# Patient Record
Sex: FEMALE | Race: WHITE | NOT HISPANIC OR LATINO | Employment: UNEMPLOYED | URBAN - METROPOLITAN AREA
[De-identification: names, ages, dates, MRNs, and addresses within clinical notes are randomized per-mention and may not be internally consistent; named-entity substitution may affect disease eponyms.]

---

## 2017-02-07 ENCOUNTER — GENERIC CONVERSION - ENCOUNTER (OUTPATIENT)
Dept: OTHER | Facility: OTHER | Age: 37
End: 2017-02-07

## 2017-02-21 ENCOUNTER — APPOINTMENT (OUTPATIENT)
Dept: LAB | Facility: CLINIC | Age: 37
End: 2017-02-21
Payer: MEDICARE

## 2017-02-21 ENCOUNTER — TRANSCRIBE ORDERS (OUTPATIENT)
Dept: LAB | Facility: CLINIC | Age: 37
End: 2017-02-21

## 2017-02-21 DIAGNOSIS — R53.83 OTHER MALAISE AND FATIGUE: Primary | ICD-10-CM

## 2017-02-21 DIAGNOSIS — E78.49 OTHER HYPERLIPIDEMIA: ICD-10-CM

## 2017-02-21 DIAGNOSIS — E34.9 UNSPECIFIED ENDOCRINE DISORDER: ICD-10-CM

## 2017-02-21 DIAGNOSIS — R79.82 ELEVATED C-REACTIVE PROTEIN (CRP): ICD-10-CM

## 2017-02-21 DIAGNOSIS — I10 ESSENTIAL HYPERTENSION, MALIGNANT: ICD-10-CM

## 2017-02-21 DIAGNOSIS — R94.7 NONSPECIFIC ABNORMAL RESULTS OF OTHER ENDOCRINE FUNCTION STUDY: ICD-10-CM

## 2017-02-21 DIAGNOSIS — E66.9 OBESITY, UNSPECIFIED: ICD-10-CM

## 2017-02-21 DIAGNOSIS — Z13.29 SCREENING FOR ENDOCRINE DISORDER: ICD-10-CM

## 2017-02-21 DIAGNOSIS — R53.81 OTHER MALAISE AND FATIGUE: ICD-10-CM

## 2017-02-21 DIAGNOSIS — Z13.29 SCREENING FOR ENDOCRINE, NUTRITIONAL, METABOLIC AND IMMUNITY DISORDER: ICD-10-CM

## 2017-02-21 DIAGNOSIS — R53.81 OTHER MALAISE AND FATIGUE: Primary | ICD-10-CM

## 2017-02-21 DIAGNOSIS — E11.8 DIABETIC COMPLICATION (HCC): ICD-10-CM

## 2017-02-21 DIAGNOSIS — Z13.1 SCREENING FOR DIABETES MELLITUS: ICD-10-CM

## 2017-02-21 DIAGNOSIS — Z13.228 SCREENING FOR METABOLIC DISORDER: ICD-10-CM

## 2017-02-21 DIAGNOSIS — E55.9 VITAMIN D DEFICIENCY, UNSPECIFIED: ICD-10-CM

## 2017-02-21 DIAGNOSIS — R53.83 OTHER MALAISE AND FATIGUE: ICD-10-CM

## 2017-02-21 DIAGNOSIS — D64.9 ANEMIA, UNSPECIFIED: ICD-10-CM

## 2017-02-21 DIAGNOSIS — E28.2 POLYCYSTIC OVARIAN DISEASE: ICD-10-CM

## 2017-02-21 DIAGNOSIS — R63.5 ABNORMAL WEIGHT GAIN: ICD-10-CM

## 2017-02-21 DIAGNOSIS — E03.9 UNSPECIFIED HYPOTHYROIDISM: ICD-10-CM

## 2017-02-21 DIAGNOSIS — E29.9 TESTICULAR DYSFUNCTION: ICD-10-CM

## 2017-02-21 DIAGNOSIS — Z13.0 SCREENING FOR ENDOCRINE, NUTRITIONAL, METABOLIC AND IMMUNITY DISORDER: ICD-10-CM

## 2017-02-21 DIAGNOSIS — Z13.21 SCREENING FOR ENDOCRINE, NUTRITIONAL, METABOLIC AND IMMUNITY DISORDER: ICD-10-CM

## 2017-02-21 DIAGNOSIS — R94.6 NONSPECIFIC ABNORMAL RESULTS OF THYROID FUNCTION STUDY: ICD-10-CM

## 2017-02-21 DIAGNOSIS — E88.9 UNSPECIFIED DISORDER OF METABOLISM: ICD-10-CM

## 2017-02-21 DIAGNOSIS — Z13.228 SCREENING FOR ENDOCRINE, NUTRITIONAL, METABOLIC AND IMMUNITY DISORDER: ICD-10-CM

## 2017-02-21 LAB
25(OH)D3 SERPL-MCNC: 16.3 NG/ML (ref 30–100)
ALBUMIN SERPL BCP-MCNC: 3.9 G/DL (ref 3.5–5)
ALP SERPL-CCNC: 86 U/L (ref 46–116)
ALT SERPL W P-5'-P-CCNC: 49 U/L (ref 12–78)
ANION GAP SERPL CALCULATED.3IONS-SCNC: 9 MMOL/L (ref 4–13)
AST SERPL W P-5'-P-CCNC: 23 U/L (ref 5–45)
BACTERIA UR QL AUTO: ABNORMAL /HPF
BASOPHILS # BLD AUTO: 0.02 THOUSANDS/ΜL (ref 0–0.1)
BASOPHILS NFR BLD AUTO: 0 % (ref 0–1)
BILIRUB SERPL-MCNC: 0.59 MG/DL (ref 0.2–1)
BILIRUB UR QL STRIP: NEGATIVE
BUN SERPL-MCNC: 10 MG/DL (ref 5–25)
CALCIUM SERPL-MCNC: 9.4 MG/DL (ref 8.3–10.1)
CHLORIDE SERPL-SCNC: 104 MMOL/L (ref 100–108)
CHOLEST SERPL-MCNC: 159 MG/DL (ref 50–200)
CLARITY UR: CLEAR
CO2 SERPL-SCNC: 27 MMOL/L (ref 21–32)
COLOR UR: YELLOW
CREAT SERPL-MCNC: 0.61 MG/DL (ref 0.6–1.3)
CRP SERPL HS-MCNC: 2 MG/L
EOSINOPHIL # BLD AUTO: 0.25 THOUSAND/ΜL (ref 0–0.61)
EOSINOPHIL NFR BLD AUTO: 4 % (ref 0–6)
ERYTHROCYTE [DISTWIDTH] IN BLOOD BY AUTOMATED COUNT: 13.1 % (ref 11.6–15.1)
EST. AVERAGE GLUCOSE BLD GHB EST-MCNC: 120 MG/DL
FERRITIN SERPL-MCNC: 18 NG/ML (ref 8–388)
FOLATE SERPL-MCNC: 16 NG/ML (ref 3.1–17.5)
GFR SERPL CREATININE-BSD FRML MDRD: >60 ML/MIN/1.73SQ M
GLUCOSE SERPL-MCNC: 90 MG/DL (ref 65–140)
GLUCOSE UR STRIP-MCNC: NEGATIVE MG/DL
HBA1C MFR BLD: 5.8 % (ref 4.2–6.3)
HCT VFR BLD AUTO: 40.3 % (ref 34.8–46.1)
HCYS SERPL-SCNC: 9 UMOL/L (ref 3.7–11.2)
HDLC SERPL-MCNC: 53 MG/DL (ref 40–60)
HGB BLD-MCNC: 13.5 G/DL (ref 11.5–15.4)
HGB UR QL STRIP.AUTO: NEGATIVE
HYALINE CASTS #/AREA URNS LPF: ABNORMAL /LPF
INSULIN SERPL-ACNC: 31.3 MU/L (ref 3–25)
IRON SERPL-MCNC: 80 UG/DL (ref 50–170)
KETONES UR STRIP-MCNC: NEGATIVE MG/DL
LDLC SERPL CALC-MCNC: 81 MG/DL (ref 0–100)
LEUKOCYTE ESTERASE UR QL STRIP: NEGATIVE
LYMPHOCYTES # BLD AUTO: 1.79 THOUSANDS/ΜL (ref 0.6–4.47)
LYMPHOCYTES NFR BLD AUTO: 30 % (ref 14–44)
MCH RBC QN AUTO: 29.8 PG (ref 26.8–34.3)
MCHC RBC AUTO-ENTMCNC: 33.5 G/DL (ref 31.4–37.4)
MCV RBC AUTO: 89 FL (ref 82–98)
MONOCYTES # BLD AUTO: 0.41 THOUSAND/ΜL (ref 0.17–1.22)
MONOCYTES NFR BLD AUTO: 7 % (ref 4–12)
NEUTROPHILS # BLD AUTO: 3.53 THOUSANDS/ΜL (ref 1.85–7.62)
NEUTS SEG NFR BLD AUTO: 59 % (ref 43–75)
NITRITE UR QL STRIP: NEGATIVE
NON-SQ EPI CELLS URNS QL MICRO: ABNORMAL /HPF
NRBC BLD AUTO-RTO: 0 /100 WBCS
PH UR STRIP.AUTO: 6.5 [PH] (ref 4.5–8)
PLATELET # BLD AUTO: 305 THOUSANDS/UL (ref 149–390)
PMV BLD AUTO: 9.7 FL (ref 8.9–12.7)
POTASSIUM SERPL-SCNC: 4 MMOL/L (ref 3.5–5.3)
PROT SERPL-MCNC: 7.8 G/DL (ref 6.4–8.2)
PROT UR STRIP-MCNC: ABNORMAL MG/DL
RBC # BLD AUTO: 4.53 MILLION/UL (ref 3.81–5.12)
RBC #/AREA URNS AUTO: ABNORMAL /HPF
SODIUM SERPL-SCNC: 140 MMOL/L (ref 136–145)
SP GR UR STRIP.AUTO: 1.02 (ref 1–1.03)
T3 SERPL-MCNC: 0.9 NG/ML (ref 0.6–1.8)
T3FREE SERPL-MCNC: 2.8 PG/ML (ref 2.3–4.2)
T4 FREE SERPL-MCNC: 0.94 NG/DL (ref 0.76–1.46)
T4 SERPL-MCNC: 4.4 UG/DL (ref 4.7–13.3)
TRIGL SERPL-MCNC: 127 MG/DL
TSH SERPL DL<=0.05 MIU/L-ACNC: 1.09 UIU/ML (ref 0.36–3.74)
URATE SERPL-MCNC: 4.9 MG/DL (ref 2–6.8)
UROBILINOGEN UR QL STRIP.AUTO: 0.2 E.U./DL
VIT B12 SERPL-MCNC: 320 PG/ML (ref 100–900)
WBC # BLD AUTO: 6.01 THOUSAND/UL (ref 4.31–10.16)
WBC #/AREA URNS AUTO: ABNORMAL /HPF

## 2017-02-21 PROCEDURE — 84550 ASSAY OF BLOOD/URIC ACID: CPT

## 2017-02-21 PROCEDURE — 82672 ASSAY OF ESTROGEN: CPT

## 2017-02-21 PROCEDURE — 84403 ASSAY OF TOTAL TESTOSTERONE: CPT

## 2017-02-21 PROCEDURE — 82626 DEHYDROEPIANDROSTERONE: CPT

## 2017-02-21 PROCEDURE — 83520 IMMUNOASSAY QUANT NOS NONAB: CPT

## 2017-02-21 PROCEDURE — 83090 ASSAY OF HOMOCYSTEINE: CPT

## 2017-02-21 PROCEDURE — 82746 ASSAY OF FOLIC ACID SERUM: CPT

## 2017-02-21 PROCEDURE — 83540 ASSAY OF IRON: CPT

## 2017-02-21 PROCEDURE — 82728 ASSAY OF FERRITIN: CPT

## 2017-02-21 PROCEDURE — 83036 HEMOGLOBIN GLYCOSYLATED A1C: CPT

## 2017-02-21 PROCEDURE — 36415 COLL VENOUS BLD VENIPUNCTURE: CPT

## 2017-02-21 PROCEDURE — 82157 ASSAY OF ANDROSTENEDIONE: CPT

## 2017-02-21 PROCEDURE — 81001 URINALYSIS AUTO W/SCOPE: CPT

## 2017-02-21 PROCEDURE — 82627 DEHYDROEPIANDROSTERONE: CPT

## 2017-02-21 PROCEDURE — 80053 COMPREHEN METABOLIC PANEL: CPT

## 2017-02-21 PROCEDURE — 85025 COMPLETE CBC W/AUTO DIFF WBC: CPT

## 2017-02-21 PROCEDURE — 84480 ASSAY TRIIODOTHYRONINE (T3): CPT

## 2017-02-21 PROCEDURE — 86141 C-REACTIVE PROTEIN HS: CPT

## 2017-02-21 PROCEDURE — 84443 ASSAY THYROID STIM HORMONE: CPT

## 2017-02-21 PROCEDURE — 80061 LIPID PANEL: CPT

## 2017-02-21 PROCEDURE — 84439 ASSAY OF FREE THYROXINE: CPT

## 2017-02-21 PROCEDURE — 82306 VITAMIN D 25 HYDROXY: CPT

## 2017-02-21 PROCEDURE — 82607 VITAMIN B-12: CPT

## 2017-02-21 PROCEDURE — 84402 ASSAY OF FREE TESTOSTERONE: CPT

## 2017-02-21 PROCEDURE — 84681 ASSAY OF C-PEPTIDE: CPT

## 2017-02-21 PROCEDURE — 84481 FREE ASSAY (FT-3): CPT

## 2017-02-21 PROCEDURE — 84479 ASSAY OF THYROID (T3 OR T4): CPT

## 2017-02-21 PROCEDURE — 83525 ASSAY OF INSULIN: CPT

## 2017-02-21 PROCEDURE — 82679 ASSAY OF ESTRONE: CPT

## 2017-02-22 LAB
C PEPTIDE SERPL-MCNC: 3.5 NG/ML (ref 1.1–4.4)
DHEA-S SERPL-MCNC: 503.7 UG/DL (ref 57.3–279.2)
T3RU NFR SERPL: 40 % (ref 24–39)
TESTOST FREE SERPL-MCNC: 3 PG/ML (ref 0–4.2)
TESTOST SERPL-MCNC: 46 NG/DL (ref 8–48)

## 2017-02-23 LAB
ESTROGEN SERPL-MCNC: 40 PG/ML
ESTRONE SERPL-MCNC: 69 PG/ML
LEPTIN SERPL-MCNC: 35.7 NG/ML

## 2017-02-24 LAB — DHEA SERPL-MCNC: 845 NG/DL (ref 31–701)

## 2017-02-27 LAB — ANDROST SERPL-MCNC: 109 NG/DL (ref 41–262)

## 2017-03-16 ENCOUNTER — GENERIC CONVERSION - ENCOUNTER (OUTPATIENT)
Dept: OTHER | Facility: OTHER | Age: 37
End: 2017-03-16

## 2017-03-21 ENCOUNTER — ALLSCRIPTS OFFICE VISIT (OUTPATIENT)
Dept: OTHER | Facility: OTHER | Age: 37
End: 2017-03-21

## 2017-04-10 ENCOUNTER — OFFICE VISIT (OUTPATIENT)
Dept: URGENT CARE | Facility: CLINIC | Age: 37
End: 2017-04-10
Payer: MEDICARE

## 2017-04-10 PROCEDURE — 99214 OFFICE O/P EST MOD 30 MIN: CPT

## 2017-04-10 PROCEDURE — G0463 HOSPITAL OUTPT CLINIC VISIT: HCPCS

## 2017-04-20 ENCOUNTER — GENERIC CONVERSION - ENCOUNTER (OUTPATIENT)
Dept: OTHER | Facility: OTHER | Age: 37
End: 2017-04-20

## 2017-07-26 ENCOUNTER — OFFICE VISIT (OUTPATIENT)
Dept: URGENT CARE | Facility: CLINIC | Age: 37
End: 2017-07-26
Payer: MEDICARE

## 2017-07-26 PROCEDURE — G0463 HOSPITAL OUTPT CLINIC VISIT: HCPCS

## 2017-07-26 PROCEDURE — 99213 OFFICE O/P EST LOW 20 MIN: CPT

## 2017-08-08 ENCOUNTER — APPOINTMENT (OUTPATIENT)
Dept: RADIOLOGY | Facility: CLINIC | Age: 37
End: 2017-08-08
Payer: MEDICARE

## 2017-08-08 ENCOUNTER — OFFICE VISIT (OUTPATIENT)
Dept: URGENT CARE | Facility: CLINIC | Age: 37
End: 2017-08-08
Payer: MEDICARE

## 2017-08-08 DIAGNOSIS — S00.33XA CONTUSION OF NOSE: ICD-10-CM

## 2017-08-08 PROCEDURE — G0463 HOSPITAL OUTPT CLINIC VISIT: HCPCS

## 2017-08-08 PROCEDURE — 99213 OFFICE O/P EST LOW 20 MIN: CPT

## 2017-08-08 PROCEDURE — 70160 X-RAY EXAM OF NASAL BONES: CPT

## 2017-10-19 ENCOUNTER — ALLSCRIPTS OFFICE VISIT (OUTPATIENT)
Dept: OTHER | Facility: OTHER | Age: 37
End: 2017-10-19

## 2017-10-19 ENCOUNTER — TRANSCRIBE ORDERS (OUTPATIENT)
Dept: RADIOLOGY | Facility: CLINIC | Age: 37
End: 2017-10-19

## 2017-10-19 DIAGNOSIS — E11.9 TYPE 2 DIABETES MELLITUS WITHOUT COMPLICATIONS (HCC): ICD-10-CM

## 2017-10-19 DIAGNOSIS — E55.9 VITAMIN D DEFICIENCY: ICD-10-CM

## 2017-10-19 DIAGNOSIS — E03.9 HYPOTHYROIDISM: ICD-10-CM

## 2017-10-19 DIAGNOSIS — M17.11 PRIMARY OSTEOARTHRITIS OF RIGHT KNEE: ICD-10-CM

## 2017-10-20 ENCOUNTER — APPOINTMENT (OUTPATIENT)
Dept: RADIOLOGY | Facility: CLINIC | Age: 37
End: 2017-10-20
Payer: COMMERCIAL

## 2017-10-20 ENCOUNTER — TRANSCRIBE ORDERS (OUTPATIENT)
Dept: RADIOLOGY | Facility: CLINIC | Age: 37
End: 2017-10-20

## 2017-10-20 DIAGNOSIS — M17.11 PRIMARY OSTEOARTHRITIS OF RIGHT KNEE: ICD-10-CM

## 2017-10-20 DIAGNOSIS — M17.11 PRIMARY OSTEOARTHRITIS OF RIGHT KNEE: Primary | ICD-10-CM

## 2017-10-20 PROCEDURE — 73562 X-RAY EXAM OF KNEE 3: CPT

## 2017-10-20 NOTE — PROGRESS NOTES
Assessment  1  Arthritis of knee, right (716 96) (M17 11)   2  Breast cancer (174 9) (C50 919)   3  Obesity (278 00) (E66 9)   4  Anxiety (300 00) (F41 9)   5  Hypothyroidism (244 9) (E03 9)   6  Hyperlipidemia (272 4) (E78 5)    Plan  Anxiety    · ALPRAZolam 0 25 MG Oral Tablet; TAKE 1 TABLET 4 TIMES DAILY AS NEEDED  Arthritis of knee, right    · * XR KNEE 3 VW RIGHT NON INJURY; Status:Active; Requested for:19Oct2017;   Breast cancer    · Anastrozole 1 MG Oral Tablet (Arimidex); TAKE 1 TABLET DAILY  Depression    · PARoxetine HCl - 20 MG Oral Tablet; TAKE 1 TABLET DAILY AS DIRECTED  Depression screening    · *VB-Depression Screening; Status:Complete - Retrospective Authorization;   Done:  41JUO7003 02:32PM  Diabetes mellitus    · MetFORMIN HCl - 500 MG Oral Tablet   · Januvia 100 MG Oral Tablet; TAKE 1 TABLET DAILY   · (1) HEMOGLOBIN A1C; Status:Active; Requested USO:[id number];    · *VB - Eye Exam; Status:Active - Retrospective Authorization; Requested for:19Oct2017;    · *VB - Foot Exam; Status:Complete - Retrospective Authorization;   Done: 04XFR6881  12:00AM  Hyperlipidemia    · Simvastatin 10 MG Oral Tablet; TAKE 1 TABLET AT BEDTIME  Hypothyroidism    · Levothyroxine Sodium 75 MCG Oral Tablet (Synthroid); TAKE 1 TABLET DAILY   · (1) TSH; Status:Active; Requested for:19Oct2017;   Vitamin D deficiency    · From  Vitamin D 55217 UNIT CAPS  To Vitamin D (Ergocalciferol) 89879 UNIT  Oral Capsule TAKE 1 CAPSULE WEEKLY    Discussion/Summary  Discussion Summary:   The patient will follow up in one month to follow up on her DM  Counseling Documentation With Imm: The patient was counseled regarding impressions  Chief Complaint  Chief Complaint Free Text Note Form: NEWP to be established  Depression screening positive :27  Foot exam due today  HgbA1c today 5 3%  RTN labs are up to date  History of Present Illness  HPI: The patient was seen and examined and noted to have issues with DM   She states that she has been on the Metformin and this has caused her severe GI distress  She states that she has no other issues at this time  The patient states that she has pain in her bilateral knees and notes that she can not use NSAIDs because she had prior gastric bypass surgery  The patient states that she is being seen by Dr Phuong Godwin  The patient states that there are no other concerns at this time  Review of Systems  Complete-Female:   Constitutional: recent weight gain, but-- no fever,-- not feeling poorly,-- not feeling tired-- and-- no recent weight loss  Eyes: no dryness of the eyes-- and-- no purulent discharge from the eyes  ENT: no sore throat-- and-- no nasal discharge  Cardiovascular: no chest pain,-- no intermittent leg claudication,-- no palpitations-- and-- no lower extremity edema  Respiratory: no shortness of breath,-- no cough,-- no orthopnea-- and-- no shortness of breath during exertion  Gastrointestinal: no abdominal pain,-- no nausea,-- no vomiting-- and-- no diarrhea  Genitourinary: No complaints of dysuria, no incontinence, no pelvic pain, no dysmenorrhea, no vaginal discharge or bleeding  Musculoskeletal: no arthralgias-- and-- no myalgias  Integumentary: No complaints of skin rash or lesions, no itching, no skin wounds, no breast pain or lump  Neurological: no headache,-- no numbness,-- no tingling-- and-- no dizziness  Psychiatric: Not suicidal, no sleep disturbance, no anxiety or depression, no change in personality, no emotional problems  Active Problems  1  Acute frontal sinusitis (461 1) (J01 10)   2  Anxiety (300 00) (F41 9)   3  Arthritis (716 90) (M19 90)   4  Backache (724 5) (M54 9)   5  BRCA1 negative (V82 71) (Z13 71)   6  BRCA2 negative (V82 71) (Z13 71)   7  Breast cancer (174 9) (C50 919)   8  Depression (311) (F32 9)   9  Diabetes mellitus (250 00) (E11 9)   10  Hyperlipidemia (272 4) (E78 5)   11  Hypothyroidism (244 9) (E03 9)   12   Knee pain, bilateral (719 46) (M25 561,M25 562)   13  Lice infestation (302 8) (B85 2)   14  Low vitamin B12 level (266 2) (E53 8)   15  Nasal contusion (920) (S00 33XA)   16  Obesity (278 00) (E66 9)   17  Other abnormal glucose (790 29) (R73 09)   18  Patellofemoral joint pain (719 46) (M25 569)   19  Postgastrectomy malabsorption (579 3) (K91 2,Z90 3)   20  Primary malignant neoplasm of upper outer quadrant of female breast, left (174 4)    (C50 412)   21  Status post bariatric surgery (V45 86) (Z98 84)   22  Vitamin D deficiency (268 9) (E55 9)   23  Weight gain (783 1) (R63 5)    Past Medical History  1  History of Age At First Period 16 Years Old (Menarche)   2  History of Age At First Pregnancy 26 Years Old   3  History of Candidiasis, cutaneous (112 3) (B37 2)   4  History of Cervical Cancer (V10 41)   5  History of Disorders Of Urinary Tract (599 9)   6  History of Encounter for breast reconstruction following mastectomy (V51 0) (Z42 1)   7  History of Encounter to discuss breast reconstruction (V65 49) (Z71 89)   8  History of Fatty liver (571 8) (K76 0)   9  History Of 8  Previous Pregnancies (V61 5)   10  History of acute bronchitis (V12 69) (Z87 09)   11  History of allergy (V15 09) (Z88 9)   12  History of hemorrhoids (V13 89) (Z87 19)   13  History of radiation therapy (V15 3) (Z92 3)   14  Denied: History of substance abuse   15  History of urinary tract infection (V13 02) (Z87 440)   16  History of Pre-operative cardiovascular examination (V72 81) (Z01 810)   17  History of Previous Pregnancies Resulted In 5  Live Birth(S)    Surgical History  1  History of Biopsy Breast Percutaneous Needle Core   2  History of Breast Surgery Mastectomy   3  History of Gastric Surgery For Morbid Obesity Laparoscopic Longitudinal Gastrectomy   4  History of Hysterectomy   5  History of Salpingo-oophorectomy Bilateral    Family History  Mother    1  Family history of Diabetes Mellitus (V18 0)   2  Denied: Family history of mental disorder   3  Denied: Family history of substance abuse  Father    4  Family history of Diabetes Mellitus (V18 0)   5  Denied: Family history of mental disorder   6  Denied: Family history of substance abuse  Son    7  Family history of chronic myeloid leukemia (V16 6) (Z80 6)  Sister    6  Family history of Ovarian Cancer (V16 41)  Paternal Grandmother    5  Family history of Hodgkin Disease  Maternal Grandfather    10  Family history of Hypertension (V17 49)  Maternal Aunt    11  Family history of Carcinoma Of The Uterus (V16 49)  Family History    12  Family history of Breast Cancer (V16 3)   13  Family history of Diabetes Mellitus (V18 0)   14  Denied: Family history of mental disorder   15  Denied: Family history of substance abuse   16  Family history of Hypertension (V17 49)    Social History   · Denied: History of Alcohol   · History of Current Smoker (305 1)   · Denied: History of Drug use   · Former smoker (B36 87) (N20 643)  Social History Reviewed: The social history was reviewed and updated today  The social history was reviewed and is unchanged  Current Meds   1  Arimidex 1 MG Oral Tablet; Therapy: (Recorded:13Bey1151) to Recorded   2  MetFORMIN HCl - 500 MG Oral Tablet; take one tablet by mouth twice daily; Therapy: (Recorded:72Uaa8507) to Recorded   3  Multivitamins TABS; Therapy: (Naheed Gonzales) to Recorded   4  Simvastatin 10 MG Oral Tablet; TAKE 1 TABLET AT BEDTIME; Therapy: (Recorded:96Pqg9770) to Recorded   5  Synthroid 50 MCG Oral Tablet; TAKE 1 TABLET DAILY; Therapy: ((99) 2502 5745) to Recorded   6  Vitamin D 45111 UNIT CAPS; Therapy: (Theowood Rarden) to Recorded   7  Xanax TABS; PRN; Therapy: (Recorded:61Sxv3203) to Recorded    Allergies  1   Taxol    Vitals  Signs   Recorded: 89PEC1671 02:12PM   Temperature: 98 F  Heart Rate: 92  Respiration: 18  Systolic: 460  Diastolic: 86  Height: 5 ft 2 in  Weight: 213 lb   BMI Calculated: 38 96  BSA Calculated: 1 96  O2 Saturation: 97    Physical Exam    Constitutional   General appearance: No acute distress, well appearing and well nourished  Eyes   Conjunctiva and lids: No swelling, erythema or discharge  Ears, Nose, Mouth, and Throat   Otoscopic examination: Tympanic membranes translucent with normal light reflex  Canals patent without erythema  Nasal mucosa, septum, and turbinates: Normal without edema or erythema  Oropharynx: Normal with no erythema, edema, exudate or lesions  Pulmonary   Respiratory effort: No increased work of breathing or signs of respiratory distress  Auscultation of lungs: Clear to auscultation  Cardiovascular   Palpation of heart: Normal PMI, no thrills  Auscultation of heart: Normal rate and rhythm, normal S1 and S2, without murmurs  Examination of extremities for edema and/or varicosities: Normal     Carotid pulses: Normal     Abdomen   Abdomen: Non-tender, no masses  Lymphatic   Palpation of lymph nodes in neck: No lymphadenopathy  Musculoskeletal   Gait and station: Normal     Skin   Skin and subcutaneous tissue: Normal without rashes or lesions  Neurologic   Cranial nerves: Cranial nerves 2-12 intact  Psychiatric   Mood and affect: Normal          Results/Data  *VB-Depression Screening 75AVM9849 02:32PM Illona Contras     Test Name Result Flag Reference   Depression Scale Result      Depression Screen - Positive Findings     PHQ-9 Adult Depression Screening 91YFM9063 02:31PM User, Ahs     Test Name Result Flag Reference   PHQ-9 Adult Depression Score 27     Over the last two weeks, how often have you been bothered by any of the following problems?   Little interest or pleasure in doing things: Nearly every day - 3  Feeling down, depressed, or hopeless: Nearly every day - 3  Trouble falling or staying asleep, or sleeping too much: Nearly every day - 3  Feeling tired or having little energy: Nearly every day - 3  Poor appetite or over eating: Nearly every day - 3  Feeling bad about yourself - or that you are a failure or have let yourself or your family down: Nearly every day - 3  Trouble concentrating on things, such as reading the newspaper or watching television: Nearly every day - 3  Moving or speaking so slowly that other people could have noticed   Or the opposite -  being so fidgety or restless that you have been moving around a lot more than usual: Nearly every day - 3  Thoughts that you would be better off dead, or of hurting yourself in some way: Nearly every day - 3   PHQ-9 Adult Depression Screening Positive     PHQ-9 Difficulty Level Very difficult     PHQ-9 Severity Severe Depression       *VB - Foot Exam 37KWI1490 12:00AM Deepti Hamilton     Test Name Result Flag Reference   FOOT EXAM 10/19/17         Future Appointments    Date/Time Provider Specialty Site   03/21/2018 10:30 AM Baldo Pallas, [facility] General Surgery [facility]     Signatures   Electronically signed by : Adriel Banda DO; Oct 19 2017  3:14PM EST                       (Author)

## 2017-10-23 ENCOUNTER — LAB CONVERSION - ENCOUNTER (OUTPATIENT)
Dept: OTHER | Facility: OTHER | Age: 37
End: 2017-10-23

## 2017-10-26 ENCOUNTER — GENERIC CONVERSION - ENCOUNTER (OUTPATIENT)
Dept: OTHER | Facility: OTHER | Age: 37
End: 2017-10-26

## 2017-11-03 ENCOUNTER — GENERIC CONVERSION - ENCOUNTER (OUTPATIENT)
Dept: OTHER | Facility: OTHER | Age: 37
End: 2017-11-03

## 2017-11-10 ENCOUNTER — OFFICE VISIT (OUTPATIENT)
Dept: URGENT CARE | Facility: CLINIC | Age: 37
End: 2017-11-10
Payer: COMMERCIAL

## 2017-11-10 PROCEDURE — S9088 SERVICES PROVIDED IN URGENT: HCPCS

## 2017-11-10 PROCEDURE — 99213 OFFICE O/P EST LOW 20 MIN: CPT

## 2017-11-13 ENCOUNTER — GENERIC CONVERSION - ENCOUNTER (OUTPATIENT)
Dept: OTHER | Facility: OTHER | Age: 37
End: 2017-11-13

## 2017-11-13 NOTE — PROGRESS NOTES
Assessment    1  Headache, acute (784 0) (R51)   2  Vertigo (780 4) (R42)    Discussion/Summary  Discussion Summary:   Discussed with patient due to chief complaints of new onset headache and vertigo she will need a further eval at ER patient is in agreement and will go to [facility] report called over at this time  Medication Side Effects Reviewed: Possible side effects of new medications were reviewed with the patient/guardian today  Understands and agrees with treatment plan: The treatment plan was reviewed with the patient/guardian  The patient/guardian understands and agrees with the treatment plan   Counseling Documentation With Imm: The patient was counseled regarding patient and family education  total time of encounter was 25 minutes-- and-- 10 minutes was spent counseling  Follow Up Instructions:   proceed to ER for further eval    If your symptoms worsen, go to the nearest [facility] Emergency Department  Chief Complaint    1  Headache  Chief Complaint Free Text Note Form: C/o headache since prescribed new x 2 weeks ago  History of Present Illness  HPI: 40year old female at urgent care today with chief complaint of new onset headache for the past 2 weeks also having increased fatigue and lightheadness for the past week   Hospital Based Practices Required Assessment:  Pain Assessment  the patient states they have pain  The pain is located in the headache  Abuse And Domestic Violence Screen   Yes, the patient is safe at home  -- The patient states no one is hurting them  Readiness To Learn: Receptive  Barriers To Learning: none  Preferred Learning: verbal  Education Completed: further treatment/follow-up  Teaching Method: verbal  Person Taught: patient  Evaluation Of Learning: verbalized/demonstrated understanding   Headache: Riki Gottron presents with complaints of headache    Associated symptoms include new onset headache-- and-- vertigo, but-- no typical headache features,-- no different headache features,-- no nausea,-- no vomiting,-- no phonophobia,-- no aura,-- no scotoma,-- no numbness,-- no tingling,-- no weakness,-- no fever,-- no chills,-- no scalp tenderness,-- no ataxia,-- no dysarthria,-- no aphasia,-- no diplopia,-- no vision loss,-- no confusion-- and-- no tinnitus  Review of Systems  Focused-Female:  Constitutional: No fever, no chills, feels well, no tiredness, no recent weight gain or loss  ENT: no ear ache, no loss of hearing, no nosebleeds or nasal discharge, no sore throat or hoarseness  Cardiovascular: no complaints of slow or fast heart rate, no chest pain, no palpitations, no leg claudication or lower extremity edema  Respiratory: no complaints of shortness of breath, no wheezing, no dyspnea on exertion, no orthopnea or PND  Breasts: no complaints of breast pain, breast lump or nipple discharge  Gastrointestinal: no complaints of abdominal pain, no constipation, no nausea or diarrhea, no vomiting, no bloody stools  Genitourinary: no complaints of dysuria, no incontinence, no pelvic pain, no dysmenorrhea, no vaginal discharge or abnormal vaginal bleeding  Musculoskeletal: no complaints of arthralgia, no myalgia, no joint swelling or stiffness, no limb pain or swelling  Integumentary: no complaints of skin rash or lesion, no itching or dry skin, no skin wounds  Neurological: headache-- and-- dizziness, but-- as noted in HPI  ROS Reviewed:   ROS reviewed  Active Problems    1  Acute frontal sinusitis (461 1) (J01 10)   2  Anxiety (300 00) (F41 9)   3  Arthritis (716 90) (M19 90)   4  Arthritis of knee, right (716 96) (M17 11)   5  Backache (724 5) (M54 9)   6  BRCA1 negative (V82 71) (Z13 71)   7  BRCA2 negative (V82 71) (Z13 71)   8  Breast cancer (174 9) (C50 919)   9  Depression (311) (F32 9)   10  Depression screening (V79 0) (Z13 89)   11  Diabetes mellitus (250 00) (E11 9)   12  Hyperlipidemia (272 4) (E78 5)   13   Hypothyroidism (244 9) (E03 9)   14  Knee pain, bilateral (719 46) (M25 561,M25 562)   15  Lice infestation (803 1) (B85 2)   16  Low vitamin B12 level (266 2) (E53 8)   17  Nasal contusion (920) (S00 33XA)   18  Obesity (278 00) (E66 9)   19  Other abnormal glucose (790 29) (R73 09)   20  Patellofemoral joint pain (719 46) (M25 569)   21  Postgastrectomy malabsorption (579 3) (K91 2,Z90 3)   22  Primary malignant neoplasm of upper outer quadrant of female breast, left (174 4)  (C50 412)   23  Status post bariatric surgery (V45 86) (Z98 84)   24  Vitamin D deficiency (268 9) (E55 9)   25  Weight gain (783 1) (R63 5)    Past Medical History  1  History of Age At First Period 16 Years Old (Menarche)   2  History of Age At First Pregnancy 26 Years Old   3  History of Candidiasis, cutaneous (112 3) (B37 2)   4  History of Cervical Cancer (V10 41)   5  History of Disorders Of Urinary Tract (599 9)   6  History of Encounter for breast reconstruction following mastectomy (V51 0) (Z42 1)   7  History of Encounter to discuss breast reconstruction (V65 49) (Z71 89)   8  History of Fatty liver (571 8) (K76 0)   9  History Of 8  Previous Pregnancies (V61 5)   10  History of acute bronchitis (V12 69) (Z87 09)   11  History of allergy (V15 09) (Z88 9)   12  History of hemorrhoids (V13 89) (Z87 19)   13  History of radiation therapy (V15 3) (Z92 3)   14  Denied: History of substance abuse   15  History of urinary tract infection (V13 02) (Z87 440)   16  History of Pre-operative cardiovascular examination (V72 81) (Z01 810)   17  History of Previous Pregnancies Resulted In 5  Live Birth(S)  Active Problems And Past Medical History Reviewed: The active problems and past medical history were reviewed and updated today  Family History  Mother    1  Family history of Diabetes Mellitus (V18 0)   2  Denied: Family history of mental disorder   3  Denied: Family history of substance abuse  Father    4  Family history of Diabetes Mellitus (V18 0)   5  Denied: Family history of mental disorder   6  Denied: Family history of substance abuse  Son    7  Family history of chronic myeloid leukemia (V16 6) (Z80 6)  Sister    6  Family history of Ovarian Cancer (V16 41)  Paternal Grandmother    5  Family history of Hodgkin Disease  Maternal Grandfather    10  Family history of Hypertension (V17 49)  Maternal Aunt    11  Family history of Carcinoma Of The Uterus (V16 49)  Family History    12  Family history of Breast Cancer (V16 3)   13  Family history of Diabetes Mellitus (V18 0)   14  Denied: Family history of mental disorder   15  Denied: Family history of substance abuse   16  Family history of Hypertension (V17 49)  Family History Reviewed: The family history was reviewed and updated today  Social History   · Denied: History of Alcohol   · History of Current Smoker (305 1)   · Denied: History of Drug use   · Former smoker (Q43 28) (R22 268)  Social History Reviewed: The social history was reviewed and updated today  The social history was reviewed and is unchanged  Surgical History    1  History of Biopsy Breast Percutaneous Needle Core   2  History of Breast Surgery Mastectomy   3  History of Gastric Surgery For Morbid Obesity Laparoscopic Longitudinal Gastrectomy   4  History of Hysterectomy   5  History of Salpingo-oophorectomy Bilateral  Surgical History Reviewed: The surgical history was reviewed and updated today  Current Meds   1  ALPRAZolam 0 25 MG Oral Tablet; TAKE 1 TABLET 4 TIMES DAILY AS NEEDED; Last Rx:19Oct2017 Ordered   2  Anastrozole 1 MG Oral Tablet; TAKE 1 TABLET DAILY; Last Rx:19Oct2017 Ordered   3  Rufina Contour Monitor w/Device Kit; USE AS DIRECTED; Therapy: 82AIK8553 to (Last Rx:03Nov2017)  Requested for: 97DZT6730 Ordered   4  Rufina Contour Test In Vitro Strip; TEST ONCE DAILY; Therapy: 93BOL0139 to (51-30-20-57)  Requested for: 64VOM1357; Last Rx:03Nov2017 Ordered   5   Rufina Microlet Lancets Miscellaneous; TEST ONCE DAILY; Therapy: 80YEB2490 to (74 831 591)  Requested for: 77VBN7502; Last Rx:03Nov2017 Ordered   6  Januvia 100 MG Oral Tablet; TAKE 1 TABLET DAILY; Therapy: 73AOW1814 to (Refugia Pod)  Requested for: 80VFB7713; Last Rx:03Nov2017 Ordered   7  Levothyroxine Sodium 75 MCG Oral Tablet; TAKE 1 TABLET DAILY  Requested for: 25PLP1533; Last Rx:19Oct2017 Ordered   8  Multivitamins TABS; Therapy: (Jocelin Churchill) to Recorded   9  Sertraline HCl - 25 MG Oral Tablet; TAKE 1 TABLET DAILY; Therapy: 01LJS6914 to (Roslyn Currie)  Requested for: 06IUN8235; Last Rx:03Nov2017 Ordered   10  Simvastatin 10 MG Oral Tablet; TAKE 1 TABLET AT BEDTIME; Therapy: (Recorded:19Oct2017) to Recorded   11  Vitamin D (Ergocalciferol) 38695 UNIT Oral Capsule; TAKE 1 CAPSULE WEEKLY; Last  Rx:19Oct2017 Ordered  Medication List Reviewed: The medication list was reviewed and updated today  Allergies    1  Taxol    Vitals  Signs   Recorded: 36UEB0551 01:08PM   Temperature: 98 2 F  Heart Rate: 106  Respiration: 18  Systolic: 062  Diastolic: 88  BP Cuff Size: Large  Height: 5 ft 2 in  Weight: 210 lb   BMI Calculated: 38 41  BSA Calculated: 1 95    Physical Exam   Constitutional  General appearance: No acute distress, well appearing and well nourished  Eyes  Conjunctiva and lids: No swelling, erythema or discharge  Pupils and irises: Equal, round and reactive to light  Ears, Nose, Mouth, and Throat  External inspection of ears and nose: Normal    Otoscopic examination: Tympanic membranes translucent with normal light reflex  Canals patent without erythema  Nasal mucosa, septum, and turbinates: Normal without edema or erythema  Oropharynx: Normal with no erythema, edema, exudate or lesions  Pulmonary  Respiratory effort: No increased work of breathing or signs of respiratory distress  Auscultation of lungs: Clear to auscultation  Cardiovascular  Palpation of heart: Normal PMI, no thrills     Auscultation of heart: Normal rate and rhythm, normal S1 and S2, without murmurs  Examination of extremities for edema and/or varicosities: Normal    Abdomen  Abdomen: Non-tender, no masses  Liver and spleen: No hepatomegaly or splenomegaly  Lymphatic  Palpation of lymph nodes in neck: No lymphadenopathy  Musculoskeletal  Gait and station: Normal    Digits and nails: Normal without clubbing or cyanosis  Inspection/palpation of joints, bones, and muscles: Normal    Skin  Skin and subcutaneous tissue: Normal without rashes or lesions  Neurologic  Cranial nerves: Cranial nerves 2-12 intact  Reflexes: 2+ and symmetric  Sensation: No sensory loss     Psychiatric  Orientation to person, place, and time: Normal    Mood and affect: Normal        Future Appointments    Date/Time Provider Specialty Site   11/13/2017 10:45 AM Alisa Whelan DO Internal Medicine [address] [place] [address]   12/04/2017 09:30 AM Alisa Whelan DO Internal Medicine [address] [place] [address]   03/21/2018 10:30 AM Rocco Ramirez [facility] General Surgery [facility]       Signatures   Electronically signed by : Ifeanyi Bejarano NP; Nov 10 2017  1:19PM EST                       (Author)    Electronically signed by : ANALI Singh ; Nov 12 2017 11:41AM EST                       (Co-author)

## 2017-12-04 ENCOUNTER — GENERIC CONVERSION - ENCOUNTER (OUTPATIENT)
Dept: OTHER | Facility: OTHER | Age: 37
End: 2017-12-04

## 2018-01-11 NOTE — MISCELLANEOUS
Message  Patient's  called and wanted to know "Why the doctor is refusing to do surgery on my wife"?    is on the communication list  Informed Hayley March that surgery has not been discussed by either Dr Dione Delaney or his wife on her last two follow up visits with Dr Dione Delaney  Patient saw Dr Sally Rodgers in November of 2015 at which time he stated that further surgery should wait at least 6-12 months due to the patient's recent bariatric procedure  I suggested to patient that since neither he was here at the time of his wife's visit today and I was not present during Dr Moe Lemus discussion with her, that I would like to have Dr Dione Delaney discuss this issue directly with Bertha  I informed him that Dr Dione Delaney would return his call within 48 hours as he is out of the office today and in the OR all day tomorrow  I also made him aware that the message was relayed to the , Ty Hooker as his wife would like to speak with her as well  Active Problems    1  Anxiety (300 00) (F41 9)   2  Arthritis (716 90) (M19 90)   3  Backache (724 5) (M54 9)   4  BRCA1 negative (V82 71) (Z13 71)   5  BRCA2 negative (V82 71) (Z13 71)   6  Breast cancer (174 9) (C50 919)   7  Depression (311) (F32 9)   8  Diabetes mellitus (250 00) (E11 9)   9  Hypothyroidism (244 9) (E03 9)   10  Knee pain, bilateral (719 46) (M25 561,M25 562)   11  Low vitamin B12 level (266 2) (E53 8)   12  Obesity (278 00) (E66 9)   13  Patellofemoral joint pain (719 46) (M25 569)   14  Postgastrectomy malabsorption (579 3) (K91 2,Z90 3)   15  Status post bariatric surgery (V45 86) (Z98 84)    Current Meds   1  Bariatric Fusion CHEW Recorded   2  Synthroid 50 MCG Oral Tablet (Levothyroxine Sodium); TAKE 1 TABLET DAILY; Therapy: (Recorded:09Oct2014) to Recorded   3  Xanax TABS (ALPRAZolam); PRN;    Therapy: (Recorded:68Zgd5765) to Recorded    Plan  Anxiety, Breast cancer    · MAMMO DIAGNOSTIC RIGHT W CAD; Status:Hold For - Scheduling; Requested  for:57Zzt6331;    · Follow-up visit in 6 months Evaluation and Treatment  Follow-up  Status: Complete   Done: 95LKQ4985    Signatures   Electronically signed by : Gwen Estrada MD; May  5 2016  7:38AM EST

## 2018-01-11 NOTE — MISCELLANEOUS
Message  Patient called immediately after leaving the office with her appointment with Dr Brandon Melo  She refused to schedule any follow appointments at check out  When she called my number she stated she wanted to get a surgery date for September of 2016  I discussed with patient that the topic of further surgery was not discussed with Dr Brandon Melo at today's visit and informed her that if she wanted surgery in September she would need to see Dr Brandon Melo before that time  Patient became very angry and was yelling and swearing on the phone  She stated she talked to him before about further surgery including a prophylactic mastectomy with bilateral reconstruction but he refused further surgery in the past  I did ask that she refrain from yelling at me that I was just relaying information from her chart  Offered the patient a return phone call from Dr Brandon Melo and the patient stated she wanted to speak to the " of the Building"  I attempted to contact [email or web address], however, her  stated she was in a meeting  I explained to the patient that Barbara Horvath would return the call  Patient stated she would obtain her medical records and go to another surgeon  Active Problems   1  Anxiety (300 00) (F41 9)  2  Arthritis (716 90) (M19 90)  3  Backache (724 5) (M54 9)  4  BRCA1 negative (V82 71) (Z13 71)  5  BRCA2 negative (V82 71) (Z13 71)  6  Breast cancer (174 9) (C50 919)  7  Depression (311) (F32 9)  8  Diabetes mellitus (250 00) (E11 9)  9  Hypothyroidism (244 9) (E03 9)  10  Knee pain, bilateral (719 46) (M25 561,M25 562)  11  Low vitamin B12 level (266 2) (E53 8)  12  Obesity (278 00) (E66 9)  13  Patellofemoral joint pain (719 46) (M25 569)  14  Postgastrectomy malabsorption (579 3) (K91 2,Z90 3)  15  Status post bariatric surgery (V45 86) (Z98 84)    Current Meds  1  Bariatric Fusion CHEW Recorded  2  Synthroid 50 MCG Oral Tablet; TAKE 1 TABLET DAILY; Therapy: (Recorded:09Oct2014) to Recorded  3  Xanax TABS; PRN;    Therapy: (Recorded:68Sit3540) to Recorded    Plan  Anxiety, Breast cancer    · MAMMO DIAGNOSTIC RIGHT W CAD; Status:Hold For - Scheduling; Requested  for:58Nxu0804;    · Follow-up visit in 6 months Evaluation and Treatment  Follow-up  Status: Complete   Done: 86JSF1088    Signatures   Electronically signed by : Delia Germain MD; May  5 2016  7:39AM EST

## 2018-01-13 VITALS
RESPIRATION RATE: 18 BRPM | HEART RATE: 92 BPM | DIASTOLIC BLOOD PRESSURE: 86 MMHG | TEMPERATURE: 98 F | WEIGHT: 213 LBS | BODY MASS INDEX: 39.2 KG/M2 | HEIGHT: 62 IN | SYSTOLIC BLOOD PRESSURE: 122 MMHG | OXYGEN SATURATION: 97 %

## 2018-01-14 VITALS
HEART RATE: 74 BPM | RESPIRATION RATE: 18 BRPM | SYSTOLIC BLOOD PRESSURE: 110 MMHG | WEIGHT: 214.5 LBS | HEIGHT: 62 IN | TEMPERATURE: 98.6 F | DIASTOLIC BLOOD PRESSURE: 70 MMHG | BODY MASS INDEX: 39.47 KG/M2

## 2018-01-14 NOTE — MISCELLANEOUS
Provider Comments  Provider Comments:   Dear Phil Lopez had a scheduled appointment at our office 02/16/2017 today that you called to cancel but did not reschedule for another day  It is very important that you follow up with us so that we can assess your physical and nutritional safety after your surgery  Please call our office at [phone] to reschedule your appointment       Sincerely,     [facility]          Signatures   Electronically signed by : Balbina Montiel [facility]; Feb 7 2017  8:46AM EST                       (Author)

## 2018-01-14 NOTE — RESULT NOTES
Verified Results  * XR KNEE 3 VW RIGHT NON INJURY 20Oct2017 08:43AM Baby Didi     Test Name Result Flag Reference   XR KNEE 3 VW RIGHT (Report)     RIGHT KNEE     INDICATION: Right knee pain  COMPARISON: None     VIEWS: 3     IMAGES: 3     FINDINGS:     There is no acute fracture or dislocation  There is no joint effusion  No significant degenerative changes aside from medial femorotibial compartment space narrowing  No lytic or blastic lesions are seen  Soft tissues are unremarkable  IMPRESSION:     No significant degenerative changes aside from stable medial femorotibial compartment space narrowing         Workstation performed: [id number]     Signed by:   Alton Noriega DO   10/23/17

## 2018-01-15 ENCOUNTER — ALLSCRIPTS OFFICE VISIT (OUTPATIENT)
Dept: OTHER | Facility: OTHER | Age: 38
End: 2018-01-15

## 2018-01-15 NOTE — MISCELLANEOUS
Message   Recorded as Task   Date: 06/22/2016 01:22 PM, Created By: Claudia Hernandez   Task Name: Miscellaneous   Assigned To: Nathaly Aranda   Regarding Patient: Jess Rea, Status: Active   CommentInda Backer - 22 Jun 2016 1:22 PM     TASK CREATED  Patient  called left message, cancelled appointment with Dr Garcia Diss for 7/26/2016  Is going to follow with Anshu Parker for now        Active Problems    1  Anxiety (300 00) (F41 9)   2  Arthritis (716 90) (M19 90)   3  Backache (724 5) (M54 9)   4  BRCA1 negative (V82 71) (Z13 71)   5  BRCA2 negative (V82 71) (Z13 71)   6  Breast cancer (174 9) (C50 919)   7  Depression (311) (F32 9)   8  Diabetes mellitus (250 00) (E11 9)   9  Hypothyroidism (244 9) (E03 9)   10  Knee pain, bilateral (719 46) (M25 561,M25 562)   11  Low vitamin B12 level (266 2) (E53 8)   12  Obesity (278 00) (E66 9)   13  Patellofemoral joint pain (719 46) (M25 569)   14  Postgastrectomy malabsorption (579 3) (K91 2,Z90 3)   15  Primary malignant neoplasm of upper outer quadrant of female breast, left (174 4)    (C50 412)   16  Status post bariatric surgery (V45 86) (Z98 84)    Current Meds   1  Arimidex 1 MG Oral Tablet (Anastrozole); Therapy: (Recorded:48Yzg3641) to Recorded   2  Synthroid 50 MCG Oral Tablet (Levothyroxine Sodium); TAKE 1 TABLET DAILY; Therapy: (Recorded:67Uac6541) to Recorded   3  Xanax TABS (ALPRAZolam); PRN; Therapy: (Recorded:17Vnb7021) to Recorded    Allergies    1   Taxol    Signatures   Electronically signed by : Lamont Sam, ; Jun 22 2016  1:23PM EST                       (Author)

## 2018-01-16 NOTE — PROGRESS NOTES
Message  placed follow up call to patient  appointment scheduled for SW and dietary for 5/26/17 at 10 am  Patient is experiencing levels of agitation  has history of PTSD  she has leg pains, fatigue and some vitamin concerns  CR recommended SW and dietary visit  patient will check with her insurance regarding finding a therapist       Active Problems    1  Acute frontal sinusitis (461 1) (J01 10)   2  Anxiety (300 00) (F41 9)   3  Arthritis (716 90) (M19 90)   4  Backache (724 5) (M54 9)   5  BRCA1 negative (V82 71) (Z13 71)   6  BRCA2 negative (V82 71) (Z13 71)   7  Breast cancer (174 9) (C50 919)   8  Depression (311) (F32 9)   9  Diabetes mellitus (250 00) (E11 9)   10  Hypothyroidism (244 9) (E03 9)   11  Knee pain, bilateral (719 46) (M25 561,M25 562)   12  Low vitamin B12 level (266 2) (E53 8)   13  Obesity (278 00) (E66 9)   14  Other abnormal glucose (790 29) (R73 09)   15  Patellofemoral joint pain (719 46) (M25 569)   16  Postgastrectomy malabsorption (579 3) (K91 2,Z90 3)   17  Primary malignant neoplasm of upper outer quadrant of female breast, left (174 4)    (C50 412)   18  Status post bariatric surgery (V45 86) (Z98 84)   19  Weight gain (783 1) (R63 5)    Current Meds   1  Amoxicillin-Pot Clavulanate 875-125 MG Oral Tablet (Augmentin); TAKE 1 TABLET   EVERY 12 HOURS DAILY; Therapy: 62Kcl4398 to (Evaluate:89Mdr4268)  Requested for: 15Wpe4298; Last   Rx:30Ter7870 Ordered   2  Arimidex 1 MG Oral Tablet (Anastrozole); Therapy: (Recorded:94Evm8172) to Recorded   3  Calcium 500 TABS; Therapy: (Aldo Christina) to Recorded   4  Fluticasone Propionate 50 MCG/ACT Nasal Suspension; USE 2 SPRAYS IN EACH   NOSTRIL ONCE DAILY; Therapy: 21Evx4145 to (Last Rx:46Fcg5899)  Requested for: 29Ixt8590 Ordered   5  Multivitamins TABS; Therapy: (Aldo Christina) to Recorded   6  Synthroid 50 MCG Oral Tablet (Levothyroxine Sodium); TAKE 1 TABLET DAILY; Therapy: (([phone]) to Recorded   7  Vitamin D 73707 UNIT CAPS; Therapy: (Dylon Weber) to Recorded   8  Xanax TABS (ALPRAZolam); PRN; Therapy: (Recorded:62Esk5378) to Recorded    Allergies    1  Taxol    Signatures   Electronically signed by :  Estrella Lafleur LCSWMSWMSJOSE,MARISOL; Apr 20 2017 10:38AM EST                       (Author)

## 2018-01-16 NOTE — RESULT NOTES
Dear Josafat Tam,   Your test results have returned and are listed below:      Discussion/Summary  Your results show some abnormalities  Your vitamin B12 level is low, which can affect your nerve health  Recommend that you take 500 mcg of vitamin B12 sublingually (under the tongue) per day  Your vitamin D level is low, which can affect your bone health  Recommend that you take 50,000 IU of vitamin D3( Replesta) once per week for 8 weeks  Jessica Maldonados is an over the counter product and a slip is enclosed for you to take to the pharmacy, or it can be purchased on line  In addition, you need to take 1200 to 1500 mg of calcium per day, in divided doses of 500 to 600 mg each  After 8 weeks, switch to a maintenance dose of 4000 IU of vitamin D3 per day and continue to take calcium daily  There were no other significant abnormalities  Please keep your regularly scheduled follow-up appointment  If you do not have a follow-up scheduled, please call the office to schedule a follow-up visit  If you have any questions, please don't hesitate to call the office  Sincerely,      Signatures   Electronically signed by : LUDA Weaver RD; Mar 16 2017  3:05PM EST                       (Author)    Electronically signed by :  SANJUANA Garcia Si ; Apr 14 2017  9:59AM EST

## 2018-01-16 NOTE — PROGRESS NOTES
Assessment   1  Depression (311) (F32 9)   2  Anxiety (300 00) (F41 9)   3  Hyperlipidemia (272 4) (E78 5)   4  Hypothyroidism (244 9) (E03 9)   5  Diabetes mellitus (250 00) (E11 9)   · Borderline    Plan   Breast cancer, Depression, Diabetes mellitus, Hyperlipidemia, Hypothyroidism    · (1) CBC/PLT/DIFF; Status:Active; Requested for:33Dqa4081;    · (1) COMPREHENSIVE METABOLIC PANEL; Status:Active; Requested for:43Mqb5388;    · (1) LIPID PANEL FASTING W DIRECT LDL REFLEX; Status:Active; Requested for:06Wyu7891;   Breast cancer, Diabetes mellitus, Hyperlipidemia, Hypothyroidism    · (1) MICROALBUMIN CREATININE RATIO, RANDOM URINE; Status:Active; Requested for:77Htz8134;   Depression    · FLUoxetine HCl - 20 MG Oral Capsule; TAKE (1) CAPSULE BY MOUTH ONCE DAILY  Diabetes mellitus    · Januvia 100 MG Oral Tablet; TAKE 1 TABLET DAILY  Diabetes mellitus, Other abnormal glucose    · (1) HEMOGLOBIN A1C; Status:Active; Requested for:22Feb2018;   Hyperlipidemia    · Simvastatin 10 MG Oral Tablet; TAKE 1 TABLET AT BEDTIME  Hypothyroidism    · Levothyroxine Sodium 75 MCG Oral Tablet (Synthroid); TAKE 1 TABLET DAILY    Discussion/Summary   Discussion Summary: We will follow up in the next 4 months and see how she is doing  We will follow up on labs when available  Counseling Documentation With Imm: The patient was counseled regarding impressions  Chief Complaint   Chief Complaint Free Text Note Form: Pt presents today for 1 month follow up  Pt states that she is doing well  Labs ordered  History of Present Illness   HPI: The patient was seen and examined and noted to have issues with an elevated DM  She notes that she is doing well with the Januvia  Her TSH was checked in October of last year and was noted to be good  He current dose of Levothyroxine appears to be correct  The patient is doing well on the Fluoxetine and her mood is good with the Alprazolam to help with acute anxiety   He BP notes some mild Diastolic HTN and we will continue to watch this  Th patient is tolerating the Simvastatin  Review of Systems   Complete-Female:      Constitutional: no fever,-- not feeling poorly-- and-- no chills  ENT: no sore throat-- and-- no nasal discharge  Cardiovascular: no chest pain,-- no intermittent leg claudication,-- no palpitations-- and-- no lower extremity edema  Respiratory: no shortness of breath,-- no cough,-- no orthopnea-- and-- no shortness of breath during exertion  Gastrointestinal: no abdominal pain,-- no nausea,-- no vomiting-- and-- no diarrhea  Genitourinary: No complaints of dysuria, no incontinence, no pelvic pain, no dysmenorrhea, no vaginal discharge or bleeding  Musculoskeletal: no arthralgias-- and-- no myalgias  Neurological: no headache,-- no numbness,-- no tingling-- and-- no dizziness  Psychiatric: Not suicidal, no sleep disturbance, no anxiety or depression, no change in personality, no emotional problems  Endocrine: No complaints of proptosis, no hot flashes, no muscle weakness, no deepening of the voice, no feelings of weakness  Hematologic/Lymphatic: No complaints of swollen glands, no swollen glands in the neck, does not bleed easily, does not bruise easily  Active Problems   1  Acute frontal sinusitis (461 1) (J01 10)   2  Anxiety (300 00) (F41 9)   3  Arthritis (716 90) (M19 90)   4  Arthritis of knee, right (716 96) (M17 11)   5  Backache (724 5) (M54 9)   6  BRCA1 negative (V82 71) (Z13 71)   7  BRCA2 negative (V82 71) (Z13 71)   8  Breast cancer (174 9) (C50 919)   9  Chronic allergic rhinitis, unspecified seasonality, unspecified trigger (477 9) (J30 9)   10  Depression (311) (F32 9)   11  Depression screening (V79 0) (Z13 89)   12  Diabetes mellitus (250 00) (E11 9)   13  Headache, acute (784 0) (R51)   14  Hyperlipidemia (272 4) (E78 5)   15  Hypothyroidism (244 9) (E03 9)   16   Knee pain, bilateral (719 46) (M25 561,M25 562)   17  Lice infestation (983 3) (B85 2)   18  Low vitamin B12 level (266 2) (E53 8)   19  Nasal contusion (920) (S00 33XA)   20  Obesity (278 00) (E66 9)   21  Other abnormal glucose (790 29) (R73 09)   22  Patellofemoral joint pain (719 46) (M25 569)   23  Postgastrectomy malabsorption (579 3) (K91 2,Z90 3)   24  Primary malignant neoplasm of upper outer quadrant of female breast, left (174 4) (C50 412)   25  Status post bariatric surgery (V45 86) (Z98 84)   26  Vertigo (780 4) (R42)   27  Vitamin D deficiency (268 9) (E55 9)   28  Weight gain (783 1) (R63 5)    Past Medical History   1  History of Age At First Period 16 Years Old (Menarche)   2  History of Age At First Pregnancy 26 Years Old   3  History of Candidiasis, cutaneous (112 3) (B37 2)   4  History of Cervical Cancer (V10 41)   5  History of Disorders Of Urinary Tract (599 9)   6  History of Encounter for breast reconstruction following mastectomy (V51 0) (Z42 1)   7  History of Encounter to discuss breast reconstruction (V65 49) (Z71 89)   8  History of Fatty liver (571 8) (K76 0)   9  History Of 8  Previous Pregnancies (V61 5)   10  History of acute bronchitis (V12 69) (Z87 09)   11  History of allergy (V15 09) (Z88 9)   12  History of hemorrhoids (V13 89) (Z87 19)   13  History of radiation therapy (V15 3) (Z92 3)   14  Denied: History of substance abuse   15  History of urinary tract infection (V13 02) (Z87 440)   16  History of Pre-operative cardiovascular examination (V72 81) (Z01 810)   17  History of Previous Pregnancies Resulted In 5  Live Birth(S)  Active Problems And Past Medical History Reviewed: The active problems and past medical history were reviewed and updated today  Surgical History   1  History of Biopsy Breast Percutaneous Needle Core   2  History of Breast Surgery Mastectomy   3  History of Gastric Surgery For Morbid Obesity Laparoscopic Longitudinal Gastrectomy   4  History of Hysterectomy   5   History of Salpingo-oophorectomy Bilateral  Surgical History Reviewed: The surgical history was reviewed and updated today  Family History   Mother    1  Family history of Diabetes Mellitus (V18 0)   2  Denied: Family history of mental disorder   3  Denied: Family history of substance abuse  Father    4  Family history of Diabetes Mellitus (V18 0)   5  Denied: Family history of mental disorder   6  Denied: Family history of substance abuse  Son    7  Family history of chronic myeloid leukemia (V16 6) (Z80 6)  Sister    6  Family history of Ovarian Cancer (V16 41)  Paternal Grandmother    5  Family history of Hodgkin Disease  Maternal Grandfather    10  Family history of Hypertension (V17 49)  Maternal Aunt    11  Family history of Carcinoma Of The Uterus (V16 49)  Family History    12  Family history of Breast Cancer (V16 3)   13  Family history of Diabetes Mellitus (V18 0)   14  Denied: Family history of mental disorder   15  Denied: Family history of substance abuse   16  Family history of Hypertension (V17 49)  Family History Reviewed: The family history was reviewed and updated today  Social History    · Denied: History of Alcohol   · History of Current Smoker (305 1)   · Denied: History of Drug use   · Former smoker (Y48 27) (O21 184)  Social History Reviewed: The social history was reviewed and updated today  The social history was reviewed and is unchanged  Current Meds    1  ALPRAZolam 0 25 MG Oral Tablet; TAKE 1 TABLET 4 TIMES DAILY AS NEEDED; Last Rx:19Oct2017     Ordered   2  Anastrozole 1 MG Oral Tablet; TAKE 1 TABLET DAILY; Last Rx:19Oct2017 Ordered   3  Rufina Contour Monitor w/Device Kit; USE AS DIRECTED; Therapy: 39GFB4945 to (Last Rx:03Nov2017)  Requested for: 24XFI2826 Ordered   4  Rufina Contour Test In Vitro Strip; TEST ONCE DAILY; Therapy: 99GKE7161 to (04 00 14 32 96)  Requested for: 80WAU3555; Last Rx:03Nov2017     Ordered   5   Rufina Microlet Lancets Miscellaneous; TEST ONCE DAILY; Therapy: 93BXB9208 to ([phone])  Requested for: 26TTC2993; Last Rx:03Nov2017     Ordered   6  FLUoxetine HCl - 20 MG Oral Capsule; TAKE (1) CAPSULE BY MOUTH ONCE DAILY; Therapy: 11CFF5331 to (Evaluate:28Vzo7629)  Requested for: 81RXP9697; Last Rx:13Nov2017     Ordered   7  Fluticasone Propionate 50 MCG/ACT Nasal Suspension; USE 2 SPRAYS IN EACH NOSTRIL ONCE     DAILY; Therapy: 61EOA8207 to (Tilden Hue)  Requested for: 86AZK0732; Last Rx:76Mce1034     Ordered   8  Januvia 100 MG Oral Tablet; TAKE 1 TABLET DAILY; Therapy: 02VIH7737 to (Levora Locust)  Requested for: 53VIK8489; Last Rx:03Nov2017     Ordered   9  Levothyroxine Sodium 75 MCG Oral Tablet; TAKE 1 TABLET DAILY  Requested for: 47EWK9858; Last     Rx:19Oct2017 Ordered   10  Multivitamins TABS; Therapy: (Bertis Cowden) to Recorded   11  Simvastatin 10 MG Oral Tablet; TAKE 1 TABLET AT BEDTIME; Therapy: (Recorded:19Oct2017) to Recorded   12  Vitamin D (Ergocalciferol) 41118 UNIT Oral Capsule; TAKE 1 CAPSULE WEEKLY  Requested for:      68UUM1642; Last Rx:14Nov2017 Ordered  Medication List Reviewed: The medication list was reviewed and updated today  Allergies   1  Taxol    Vitals   Vital Signs    Recorded: 47OGU4980 08:33AM   Temperature 98 5 F   Heart Rate 84   Respiration 16   Systolic 290   Diastolic 90   Height 5 ft 2 in   Weight 218 lb 4 oz   BMI Calculated 39 92   BSA Calculated 1 98   O2 Saturation 99     Physical Exam        Constitutional      General appearance: No acute distress, well appearing and well nourished  Eyes      Conjunctiva and lids: No swelling, erythema or discharge  Ears, Nose, Mouth, and Throat      Otoscopic examination: Tympanic membranes translucent with normal light reflex  Canals patent without erythema  Nasal mucosa, septum, and turbinates: Normal without edema or erythema  Oropharynx: Normal with no erythema, edema, exudate or lesions  Pulmonary      Respiratory effort: No increased work of breathing or signs of respiratory distress  Auscultation of lungs: Clear to auscultation  Auscultation of the lungs revealed no expiratory wheezing,-- normal expiratory time-- and-- no inspiratory wheezing  no rales or crackles were heard bilaterally  no rhonchi  no friction rub  no wheezing  no diminished breath sounds  no bronchial breath sounds  Cardiovascular      Auscultation of heart: Normal rate and rhythm, normal S1 and S2, without murmurs  Examination of extremities for edema and/or varicosities: Normal        Carotid pulses: Normal        Abdomen      Abdomen: Non-tender, no masses  Liver and spleen: No hepatomegaly or splenomegaly  Lymphatic      Palpation of lymph nodes in neck: No lymphadenopathy  Musculoskeletal      Gait and station: Normal        Skin      Skin and subcutaneous tissue: Normal without rashes or lesions  Neurologic      Cranial nerves: Cranial nerves 2-12 intact         Psychiatric      Orientation to person, place, and time: Normal        Mood and affect: Normal           Future Appointments      Date/Time Provider Specialty Site   03/21/2018 10:30 AM Rajinder Holder, [facility]     Signatures    Electronically signed by : Franc Christianson DO; Pedro 15 2018  9:14AM EST                       (Author)

## 2018-01-22 VITALS
BODY MASS INDEX: 38.72 KG/M2 | HEART RATE: 92 BPM | TEMPERATURE: 98.6 F | SYSTOLIC BLOOD PRESSURE: 132 MMHG | HEIGHT: 62 IN | DIASTOLIC BLOOD PRESSURE: 84 MMHG | RESPIRATION RATE: 18 BRPM | OXYGEN SATURATION: 98 % | WEIGHT: 210.38 LBS

## 2018-01-22 VITALS
WEIGHT: 218.25 LBS | HEART RATE: 84 BPM | HEIGHT: 62 IN | RESPIRATION RATE: 16 BRPM | OXYGEN SATURATION: 99 % | BODY MASS INDEX: 40.16 KG/M2 | DIASTOLIC BLOOD PRESSURE: 90 MMHG | SYSTOLIC BLOOD PRESSURE: 122 MMHG | TEMPERATURE: 98.5 F

## 2018-01-22 VITALS
DIASTOLIC BLOOD PRESSURE: 84 MMHG | HEIGHT: 62 IN | WEIGHT: 213.5 LBS | BODY MASS INDEX: 39.29 KG/M2 | OXYGEN SATURATION: 97 % | SYSTOLIC BLOOD PRESSURE: 128 MMHG | HEART RATE: 99 BPM | TEMPERATURE: 98.3 F | RESPIRATION RATE: 20 BRPM

## 2018-01-24 VITALS
WEIGHT: 214.25 LBS | HEIGHT: 62 IN | SYSTOLIC BLOOD PRESSURE: 122 MMHG | BODY MASS INDEX: 39.43 KG/M2 | HEART RATE: 88 BPM | RESPIRATION RATE: 18 BRPM | DIASTOLIC BLOOD PRESSURE: 86 MMHG | TEMPERATURE: 99 F | OXYGEN SATURATION: 98 %

## 2018-02-06 LAB
LEFT EYE DIABETIC RETINOPATHY: NORMAL
RIGHT EYE DIABETIC RETINOPATHY: NORMAL

## 2018-02-06 PROCEDURE — 3072F LOW RISK FOR RETINOPATHY: CPT | Performed by: INTERNAL MEDICINE

## 2018-02-19 ENCOUNTER — TELEPHONE (OUTPATIENT)
Dept: INTERNAL MEDICINE CLINIC | Facility: CLINIC | Age: 38
End: 2018-02-19

## 2018-02-19 DIAGNOSIS — Z00.8 ENCOUNTER FOR WORK CAPABILITY ASSESSMENT: ICD-10-CM

## 2018-02-19 DIAGNOSIS — Z00.8 ENCOUNTER FOR WORK CAPABILITY ASSESSMENT: Primary | ICD-10-CM

## 2018-02-19 NOTE — TELEPHONE ENCOUNTER
Why would she need a FCE  There is no diagnosis in the chart that would justify this  Follow up to discuss

## 2018-02-20 NOTE — TELEPHONE ENCOUNTER
Pt states she just needs an Rx for this so she can schedule a work assessment at [facility] in [place] d/t her cancer and said she saw you last month

## 2018-02-20 NOTE — TELEPHONE ENCOUNTER
Thanks for your input Samina, but why are we doing this   debilitation, unable to return to work secondary to? Follow up please or this will not be covered by insurance

## 2018-02-22 DIAGNOSIS — R73.09 OTHER ABNORMAL GLUCOSE: ICD-10-CM

## 2018-02-22 DIAGNOSIS — E11.9 TYPE 2 DIABETES MELLITUS WITHOUT COMPLICATIONS (HCC): ICD-10-CM

## 2018-02-22 DIAGNOSIS — F32.9 MAJOR DEPRESSIVE DISORDER, SINGLE EPISODE: ICD-10-CM

## 2018-02-22 DIAGNOSIS — E78.5 HYPERLIPIDEMIA: ICD-10-CM

## 2018-02-22 DIAGNOSIS — C50.919 MALIGNANT NEOPLASM OF FEMALE BREAST (HCC): ICD-10-CM

## 2018-02-22 DIAGNOSIS — E03.9 HYPOTHYROIDISM: ICD-10-CM

## 2018-03-16 ENCOUNTER — OFFICE VISIT (OUTPATIENT)
Dept: INTERNAL MEDICINE CLINIC | Facility: CLINIC | Age: 38
End: 2018-03-16
Payer: COMMERCIAL

## 2018-03-16 VITALS
OXYGEN SATURATION: 98 % | HEART RATE: 100 BPM | HEIGHT: 62 IN | WEIGHT: 219.6 LBS | TEMPERATURE: 98 F | DIASTOLIC BLOOD PRESSURE: 84 MMHG | SYSTOLIC BLOOD PRESSURE: 122 MMHG | RESPIRATION RATE: 16 BRPM | BODY MASS INDEX: 40.41 KG/M2

## 2018-03-16 DIAGNOSIS — E11.9 TYPE 2 DIABETES MELLITUS WITHOUT COMPLICATION, WITHOUT LONG-TERM CURRENT USE OF INSULIN (HCC): ICD-10-CM

## 2018-03-16 DIAGNOSIS — E03.8 HYPOTHYROIDISM DUE TO HASHIMOTO'S THYROIDITIS: ICD-10-CM

## 2018-03-16 DIAGNOSIS — E06.3 HYPOTHYROIDISM DUE TO HASHIMOTO'S THYROIDITIS: ICD-10-CM

## 2018-03-16 DIAGNOSIS — Z01.818 PRE-OP EXAM: Primary | ICD-10-CM

## 2018-03-16 PROBLEM — M17.11 ARTHRITIS OF KNEE, RIGHT: Status: ACTIVE | Noted: 2017-10-19

## 2018-03-16 PROBLEM — J30.9 CHRONIC ALLERGIC RHINITIS: Status: ACTIVE | Noted: 2017-12-04

## 2018-03-16 PROBLEM — E78.5 HYPERLIPIDEMIA: Status: ACTIVE | Noted: 2017-10-19

## 2018-03-16 PROBLEM — R42 VERTIGO: Status: ACTIVE | Noted: 2017-11-10

## 2018-03-16 PROBLEM — E55.9 VITAMIN D DEFICIENCY: Status: ACTIVE | Noted: 2017-05-10

## 2018-03-16 PROCEDURE — 99203 OFFICE O/P NEW LOW 30 MIN: CPT | Performed by: INTERNAL MEDICINE

## 2018-03-16 RX ORDER — ACETAMINOPHEN,DIPHENHYDRAMINE HCL 500; 25 MG/1; MG/1
TABLET, FILM COATED ORAL
COMMUNITY
End: 2019-07-10

## 2018-03-16 RX ORDER — ERGOCALCIFEROL 1.25 MG/1
CAPSULE ORAL WEEKLY
Refills: 3 | COMMUNITY
Start: 2017-12-20 | End: 2019-07-10 | Stop reason: SDUPTHER

## 2018-03-16 RX ORDER — FLUOXETINE HYDROCHLORIDE 20 MG/1
CAPSULE ORAL DAILY
COMMUNITY
Start: 2017-11-13 | End: 2019-07-10

## 2018-03-16 RX ORDER — ALPRAZOLAM 0.25 MG/1
0.25 TABLET ORAL
COMMUNITY

## 2018-03-16 RX ORDER — LEVOTHYROXINE SODIUM 0.07 MG/1
75 TABLET ORAL DAILY
Refills: 5 | COMMUNITY
Start: 2018-02-10 | End: 2018-06-04 | Stop reason: SDUPTHER

## 2018-03-16 RX ORDER — NYSTATIN 100000 [USP'U]/G
1 POWDER TOPICAL 2 TIMES DAILY PRN
Refills: 2 | COMMUNITY
Start: 2018-01-10 | End: 2021-01-15 | Stop reason: SDUPTHER

## 2018-03-16 RX ORDER — PREDNISOLONE ACETATE 10 MG/ML
SUSPENSION/ DROPS OPHTHALMIC
Refills: 0 | COMMUNITY
Start: 2018-02-06 | End: 2019-07-10

## 2018-03-16 RX ORDER — ERGOCALCIFEROL (VITAMIN D2) 1250 MCG
50000 CAPSULE ORAL
COMMUNITY
End: 2018-05-24

## 2018-03-16 RX ORDER — SIMVASTATIN 10 MG
1 TABLET ORAL
COMMUNITY
End: 2018-03-16

## 2018-03-16 RX ORDER — METFORMIN HYDROCHLORIDE 500 MG/1
TABLET, EXTENDED RELEASE ORAL 2 TIMES DAILY WITH MEALS
Refills: 11 | COMMUNITY
Start: 2018-02-23 | End: 2019-07-10

## 2018-03-16 RX ORDER — FLUOXETINE HYDROCHLORIDE 20 MG/1
CAPSULE ORAL
Refills: 5 | COMMUNITY
Start: 2017-12-20 | End: 2018-03-16

## 2018-03-16 RX ORDER — FLUTICASONE PROPIONATE 50 MCG
2 SPRAY, SUSPENSION (ML) NASAL DAILY
COMMUNITY
Start: 2017-12-04 | End: 2018-08-24

## 2018-03-16 RX ORDER — SIMVASTATIN 40 MG
40 TABLET ORAL
COMMUNITY
End: 2019-07-10

## 2018-03-16 NOTE — PROGRESS NOTES
Assessment/Plan:    No problem-specific Assessment & Plan notes found for this encounter  There are no diagnoses linked to this encounter  Subjective:      Patient ID: Braxton Boykin is a 41 y o  female  The patient was seen and examined and noted to have no issues at this time  The patient is here for pre-op clearance for left TKR  The following portions of the patient's history were reviewed and updated as appropriate: Subjective:     Bertha Campbell is a 41 y o  female who presents to the office today for a preoperative consultation at the request of surgeon Dr Lara Zavala who plans on performing Left total knee on March 28  This consultation is requested for the specific conditions prompting preoperative evaluation (i e  because of potential affect on operative risk): none  Planned anesthesia: general  The patient has the following known anesthesia issues: none  Patients bleeding risk: no recent abnormal bleeding  Patient does not have objections to receiving blood products if needed  The following portions of the patient's history were reviewed and updated as appropriate:   She  has a past medical history of Allergy; Candidiasis, cutaneous; Cervical cancer ([facility] ); Disorder of urinary tract; Fatty liver; Hemorrhoids; and History of radiation therapy  She   Patient Active Problem List    Diagnosis Date Noted    Chronic allergic rhinitis 12/04/2017    Vertigo 11/10/2017    Arthritis of knee, right 10/19/2017    Hyperlipidemia 10/19/2017    Vitamin D deficiency 05/10/2017    Breast cancer ([facility] ) 07/25/2016    Osteopenia 06/09/2016    PCOS (polycystic ovarian syndrome) 06/09/2016    Postgastrectomy malabsorption 02/06/2015    Anxiety 07/21/2014    Depression 07/21/2014    Diabetes mellitus ([facility] ) 07/21/2014    Obesity 07/21/2014    Hypothyroidism 02/20/2014     She  has a past surgical history that includes Breast biopsy;  Mastectomy (12/19/2013); GASTRIC BYPASS LAPAROSCOPIC; Hysterectomy (04/07/2014); and Bilateral salpingoophorectomy  Her family history includes Breast cancer in her family; Diabetes in her family, father, and mother; Hodgkin's lymphoma in her paternal grandmother; Hypertension in her family and maternal grandfather; Leukemia in her son; Ovarian cancer (age of onset: 29) in her sister; Uterine cancer (age of onset: 64) in her maternal aunt  She  reports that she has quit smoking  She has never used smokeless tobacco  She reports that she does not drink alcohol or use drugs  Current Outpatient Prescriptions   Medication Sig Dispense Refill    ALPRAZolam (XANAX) 0 25 mg tablet Take 0 25 mg by mouth      anastrozole (ARIMIDEX) 1 mg tablet Take 1 mg by mouth daily   calcium-vitamin D (OSCAL 500 + D) 500 mg-200 units per tablet Take 1 tablet by mouth daily   diphenhydrAMINE-acetaminophen (TYLENOL PM)  MG TABS Take by mouth      ergocalciferol (ERGOCALCIFEROL) 36363 units capsule Take 50,000 Units by mouth      ergocalciferol (VITAMIN D2) 50,000 units   3    FLUoxetine (PROzac) 20 mg capsule Take by mouth      levothyroxine 75 mcg tablet   5    metFORMIN (GLUCOPHAGE-XR) 500 mg 24 hr tablet   11    Multiple Vitamin (MULTIVITAMINS PO) Take by mouth      nystatin (MYCOSTATIN) powder   2    prednisoLONE acetate (PRED FORTE) 1 % ophthalmic suspension   0    simvastatin (ZOCOR) 40 mg tablet Take 40 mg by mouth      AURELIO MICROLET LANCETS lancets by Does not apply route daily      Blood Glucose Monitoring Suppl ([employer] CONTOUR MONITOR) w/Device KIT by Does not apply route      fluticasone (FLONASE) 50 mcg/act nasal spray 2 sprays into each nostril daily      Glucose Blood (AURELIO CONTOUR TEST VI) by In Vitro route daily       No current facility-administered medications for this visit  Current Outpatient Prescriptions on File Prior to Visit   Medication Sig    anastrozole (ARIMIDEX) 1 mg tablet Take 1 mg by mouth daily      calcium-vitamin D (OSCAL 500 + D) 500 mg-200 units per tablet Take 1 tablet by mouth daily   [DISCONTINUED] ALPRAZolam (XANAX) 0 25 mg tablet Take by mouth daily at bedtime as needed for anxiety   [DISCONTINUED] Cholecalciferol (VITAMIN D) 2000 UNITS CAPS Take by mouth   [DISCONTINUED] levothyroxine 50 mcg tablet Take 50 mcg by mouth daily  No current facility-administered medications on file prior to visit  She is allergic to paclitaxel       Review of Systems  Pertinent items are noted in HPI       Objective:     /84 (BP Location: Right arm, Patient Position: Sitting, Cuff Size: Large)   Pulse 100   Temp 98 °F (36 7 °C)   Resp 16   Ht 5' 2" (1 575 m)   Wt 99 6 kg (219 lb 9 6 oz)   SpO2 98%   BMI 40 17 kg/m²     General Appearance:    Alert, cooperative, no distress, appears stated age   Head:    Normocephalic, without obvious abnormality, atraumatic   Eyes:    PERRL, conjunctiva/corneas clear, EOM's intact, fundi     benign, both eyes   Ears:    Normal TM's and external ear canals, both ears   Nose:   Nares normal, septum midline, mucosa normal, no drainage    or sinus tenderness   Throat:   Lips, mucosa, and tongue normal; teeth and gums normal   Neck:   Supple, symmetrical, trachea midline, no adenopathy;     thyroid:  no enlargement/tenderness/nodules; no carotid    bruit or JVD   Back:     Symmetric, no curvature, ROM normal, no CVA tenderness   Lungs:     Clear to auscultation bilaterally, respirations unlabored   Chest Wall:    No tenderness or deformity    Heart:    Regular rate and rhythm, S1 and S2 normal, no murmur, rub   or gallop   Breast Exam:    No tenderness, masses, or nipple abnormality   Abdomen:     Soft, non-tender, bowel sounds active all four quadrants,     no masses, no organomegaly   Genitalia:    Normal female without lesion, discharge or tenderness   Rectal:    Normal tone, no masses or tenderness; guaiac negative stool   Extremities:   Extremities normal, atraumatic, no cyanosis or edema   Pulses:   2+ and symmetric all extremities   Skin:   Skin color, texture, turgor normal, no rashes or lesions   Lymph nodes:   Cervical, supraclavicular, and axillary nodes normal   Neurologic:   CNII-XII intact, normal strength, sensation and reflexes     throughout       Predictors of intubation difficulty:   Morbid obesity? yes   Anatomically abnormal facies? no   Prominent incisors? no   Receding mandible? no   Short, thick neck? no   Neck range of motion: normal   Mallampati score: I (soft palate, uvula, fauces, and tonsillar pillars visible)   Thyromental distance: < 6cm   Mouth openin cm   Dentition: No chipped, loose, or missing teeth  Cardiographics  ECG: normal sinus rhythm, no blocks or conduction defects, no ischemic changes  Echocardiogram: not done    Imaging  Chest x-ray: normal     Lab Review   No visits with results within 2 Month(s) from this visit  Latest known visit with results is:   Lab Requisition on 2017   Component Date Value    Cholesterol 2017 144         Assessment:     41 y o  female with planned surgery as above  Known risk factors for perioperative complications: None    Difficulty with intubation is not anticipated  Cardiac Risk Estimation: n/a    Current medications which may produce withdrawal symptoms if withheld perioperatively:  Uses Alprazolam    Plan:     1  Preoperative workup as follows none  2  Change in medication regimen before surgery: none, continue medication regimen including morning of surgery, with sip of water  3  Prophylaxis for cardiac events with perioperative beta-blockers: not indicated  4  Invasive hemodynamic monitoring perioperatively: at the discretion of anesthesiologist   5  Deep vein thrombosis prophylaxis postoperatively:pharmacologic prophylaxis (with any of the following: enoxaparin (Lovenox) 40mg SQ 2 hours prior to surgery then every day)    6  Surveillance for postoperative MI with ECG immediately postoperatively and on postoperative days 1 and 2 AND troponin levels 24 hours postoperatively and on day 4 or hospital discharge (whichever comes first): not indicated  7  Other measures: Preoperative alcohol abstention x 30 days  She  has a past medical history of Allergy; Candidiasis, cutaneous; Cervical cancer ([facility] ); Disorder of urinary tract; Fatty liver; Hemorrhoids; and History of radiation therapy  She   Patient Active Problem List    Diagnosis Date Noted    Chronic allergic rhinitis 12/04/2017    Vertigo 11/10/2017    Arthritis of knee, right 10/19/2017    Hyperlipidemia 10/19/2017    Vitamin D deficiency 05/10/2017    Breast cancer ([place] [address] ) 07/25/2016    Osteopenia 06/09/2016    PCOS (polycystic ovarian syndrome) 06/09/2016    Postgastrectomy malabsorption 02/06/2015    Anxiety 07/21/2014    Depression 07/21/2014    Diabetes mellitus ([place] [address] ) 07/21/2014    Obesity 07/21/2014    Hypothyroidism 02/20/2014     She  has a past surgical history that includes Breast biopsy; Mastectomy (12/19/2013); GASTRIC BYPASS LAPAROSCOPIC; Hysterectomy (04/07/2014); and Bilateral salpingoophorectomy  Her family history includes Breast cancer in her family; Diabetes in her family, father, and mother; Hodgkin's lymphoma in her paternal grandmother; Hypertension in her family and maternal grandfather; Leukemia in her son; Ovarian cancer (age of onset: 29) in her sister; Uterine cancer (age of onset: 64) in her maternal aunt  She  reports that she has quit smoking  She has never used smokeless tobacco  She reports that she does not drink alcohol or use drugs  Current Outpatient Prescriptions   Medication Sig Dispense Refill    ALPRAZolam (XANAX) 0 25 mg tablet Take 0 25 mg by mouth      anastrozole (ARIMIDEX) 1 mg tablet Take 1 mg by mouth daily   calcium-vitamin D (OSCAL 500 + D) 500 mg-200 units per tablet Take 1 tablet by mouth daily        diphenhydrAMINE-acetaminophen (TYLENOL PM)  MG TABS Take by mouth      ergocalciferol (ERGOCALCIFEROL) 08948 units capsule Take 50,000 Units by mouth      ergocalciferol (VITAMIN D2) 50,000 units   3    FLUoxetine (PROzac) 20 mg capsule Take by mouth      levothyroxine 75 mcg tablet   5    metFORMIN (GLUCOPHAGE-XR) 500 mg 24 hr tablet   11    Multiple Vitamin (MULTIVITAMINS PO) Take by mouth      nystatin (MYCOSTATIN) powder   2    prednisoLONE acetate (PRED FORTE) 1 % ophthalmic suspension   0    simvastatin (ZOCOR) 40 mg tablet Take 40 mg by mouth      AURELIO MICROLET LANCETS lancets by Does not apply route daily      Blood Glucose Monitoring Suppl ([employer] CONTOUR MONITOR) w/Device KIT by Does not apply route      fluticasone (FLONASE) 50 mcg/act nasal spray 2 sprays into each nostril daily      Glucose Blood (AURELIO CONTOUR TEST VI) by In Vitro route daily       No current facility-administered medications for this visit  Current Outpatient Prescriptions on File Prior to Visit   Medication Sig    anastrozole (ARIMIDEX) 1 mg tablet Take 1 mg by mouth daily   calcium-vitamin D (OSCAL 500 + D) 500 mg-200 units per tablet Take 1 tablet by mouth daily   [DISCONTINUED] ALPRAZolam (XANAX) 0 25 mg tablet Take by mouth daily at bedtime as needed for anxiety   [DISCONTINUED] Cholecalciferol (VITAMIN D) 2000 UNITS CAPS Take by mouth   [DISCONTINUED] levothyroxine 50 mcg tablet Take 50 mcg by mouth daily  No current facility-administered medications on file prior to visit  She is allergic to paclitaxel       Review of Systems   Constitutional: Negative for chills, fatigue and fever  HENT: Negative for sinus pain, sinus pressure and sore throat  Respiratory: Negative for cough and shortness of breath  Cardiovascular: Negative for chest pain and palpitations  Gastrointestinal: Negative for abdominal pain, constipation, diarrhea, nausea and vomiting  Endocrine: Negative  Genitourinary: Negative      Musculoskeletal: Negative for arthralgias, back pain and myalgias  Skin: Negative  Neurological: Negative  Psychiatric/Behavioral: Negative  Objective:      /84 (BP Location: Right arm, Patient Position: Sitting, Cuff Size: Large)   Pulse 100   Temp 98 °F (36 7 °C)   Resp 16   Ht 5' 2" (1 575 m)   Wt 99 6 kg (219 lb 9 6 oz)   SpO2 98%   BMI 40 17 kg/m²          Physical Exam   Constitutional: She is oriented to person, place, and time  She appears well-developed and well-nourished  HENT:   Head: Normocephalic and atraumatic  Eyes: Conjunctivae are normal  Pupils are equal, round, and reactive to light  Neck: Normal range of motion  Cardiovascular: Normal rate, regular rhythm, normal heart sounds and intact distal pulses  Pulmonary/Chest: Effort normal and breath sounds normal    Abdominal: Soft  Musculoskeletal: Normal range of motion  Neurological: She is alert and oriented to person, place, and time  Skin: Skin is warm and dry  Psychiatric: She has a normal mood and affect

## 2018-05-24 ENCOUNTER — OFFICE VISIT (OUTPATIENT)
Dept: INTERNAL MEDICINE CLINIC | Facility: CLINIC | Age: 38
End: 2018-05-24
Payer: COMMERCIAL

## 2018-05-24 VITALS
TEMPERATURE: 97.8 F | HEART RATE: 77 BPM | DIASTOLIC BLOOD PRESSURE: 90 MMHG | SYSTOLIC BLOOD PRESSURE: 122 MMHG | HEIGHT: 62 IN | RESPIRATION RATE: 16 BRPM | OXYGEN SATURATION: 97 % | BODY MASS INDEX: 39.6 KG/M2 | WEIGHT: 215.2 LBS

## 2018-05-24 DIAGNOSIS — E03.9 HYPOTHYROIDISM, UNSPECIFIED TYPE: Primary | ICD-10-CM

## 2018-05-24 DIAGNOSIS — E11.9 TYPE 2 DIABETES MELLITUS WITHOUT COMPLICATION, WITHOUT LONG-TERM CURRENT USE OF INSULIN (HCC): ICD-10-CM

## 2018-05-24 DIAGNOSIS — F32.5 MAJOR DEPRESSIVE DISORDER WITH SINGLE EPISODE, IN FULL REMISSION (HCC): ICD-10-CM

## 2018-05-24 DIAGNOSIS — R25.2 LEG CRAMPS: ICD-10-CM

## 2018-05-24 PROCEDURE — 99214 OFFICE O/P EST MOD 30 MIN: CPT | Performed by: INTERNAL MEDICINE

## 2018-05-24 NOTE — PROGRESS NOTES
Assessment/Plan:       Diagnoses and all orders for this visit:    Hypothyroidism, unspecified type  -     TSH, 3rd generation with T4 reflex    Major depressive disorder with single episode, in full remission ([facility] )    Type 2 diabetes mellitus without complication, without long-term current use of insulin (HCC)    Leg cramps  -     D-dimer, quantitative; Future  -     Magnesium; Future    Other orders  -     diclofenac sodium (VOLTAREN) 1 %; Apply 1 application topically        No problem-specific Assessment & Plan notes found for this encounter  The patient will follow up in the next 3 months and see how things go  Subjective:      Patient ID: Julio Merritt is a 41 y o  female  The patient was concerned and noted to have issues with DM  She is doing well with the Metformin and notes no issues  The labs are noted to be negative  The patient states that she is doing well with the Levothyroxine and we will follow up on the TSH when available  She notes bilateral legs cramps and we discussed warm up prior to activity and stretching  I noted that dehydration could also lead to leg  Cramps and she should attempt to stay as hydrated as possible  We will check her magnesium levels  Diabetes   Pertinent negatives for diabetes include no chest pain and no fatigue  The following portions of the patient's history were reviewed and updated as appropriate:   She has a past medical history of Allergy; Anxiety; Arthritis; Candidiasis, cutaneous; Cervical cancer ([facility] ); Depression; Diabetes mellitus ([facility] ); Disorder of urinary tract; Fatty liver; Hemorrhoids; History of radiation therapy; and Obesity  ,   does not have any pertinent problems on file  ,   has a past surgical history that includes Breast biopsy; Mastectomy (12/19/2013); GASTRIC BYPASS LAPAROSCOPIC; Hysterectomy (04/07/2014); and Bilateral salpingoophorectomy  ,  family history includes Breast cancer in her family; Diabetes in her family, father, and mother; Hodgkin's lymphoma in her paternal grandmother; Hypertension in her family and maternal grandfather; Leukemia in her son; Ovarian cancer (age of onset: 29) in her sister; Uterine cancer (age of onset: 64) in her maternal aunt  ,   reports that she has quit smoking  She has never used smokeless tobacco  She reports that she does not drink alcohol or use drugs  ,  is allergic to paclitaxel     Current Outpatient Prescriptions   Medication Sig Dispense Refill    ALPRAZolam (XANAX) 0 25 mg tablet Take 0 25 mg by mouth      anastrozole (ARIMIDEX) 1 mg tablet Take 1 mg by mouth daily   AURELIO MICROLET LANCETS lancets by Does not apply route daily      Blood Glucose Monitoring Suppl ([employer] CONTOUR MONITOR) w/Device KIT by Does not apply route      calcium-vitamin D (OSCAL 500 + D) 500 mg-200 units per tablet Take 1 tablet by mouth daily   diclofenac sodium (VOLTAREN) 1 % Apply 1 application topically      diphenhydrAMINE-acetaminophen (TYLENOL PM)  MG TABS Take by mouth      ergocalciferol (VITAMIN D2) 50,000 units   3    FLUoxetine (PROzac) 20 mg capsule Take by mouth daily        fluticasone (FLONASE) 50 mcg/act nasal spray 2 sprays into each nostril daily      Glucose Blood (AURELIO CONTOUR TEST VI) by In Vitro route daily      levothyroxine 75 mcg tablet 75 mcg daily    5    metFORMIN (GLUCOPHAGE-XR) 500 mg 24 hr tablet 2 (two) times a day with meals    11    Multiple Vitamin (MULTIVITAMINS PO) Take by mouth      nystatin (MYCOSTATIN) powder   2    prednisoLONE acetate (PRED FORTE) 1 % ophthalmic suspension   0    simvastatin (ZOCOR) 40 mg tablet Take 40 mg by mouth       No current facility-administered medications for this visit  Review of Systems   Constitutional: Negative for chills, fatigue and fever  HENT: Negative for ear pain, sinus pain and sinus pressure  Respiratory: Negative for cough and shortness of breath      Cardiovascular: Negative for chest pain and palpitations  Gastrointestinal: Negative for abdominal pain, constipation, diarrhea, nausea and vomiting  Genitourinary: Negative  Musculoskeletal: Negative for arthralgias and myalgias  Skin: Negative  Neurological: Negative  Psychiatric/Behavioral: Negative  Objective:  Vitals:    05/24/18 0927   BP: 122/90   BP Location: Right arm   Patient Position: Sitting   Cuff Size: Large   Pulse: 77   Resp: 16   Temp: 97 8 °F (36 6 °C)   SpO2: 97%   Weight: 97 6 kg (215 lb 3 2 oz)   Height: 5' 2" (1 575 m)     Body mass index is 39 36 kg/m²  Physical Exam   Constitutional: She is oriented to person, place, and time  She appears well-developed and well-nourished  HENT:   Head: Normocephalic and atraumatic  Eyes: Conjunctivae and EOM are normal  Pupils are equal, round, and reactive to light  Neck: Normal range of motion  Neck supple  Cardiovascular: Normal rate, regular rhythm, normal heart sounds and intact distal pulses  Pulses are no weak pulses  Pulses:       Dorsalis pedis pulses are 2+ on the right side, and 2+ on the left side  Posterior tibial pulses are 2+ on the right side, and 2+ on the left side  Pulmonary/Chest: Effort normal and breath sounds normal    Abdominal: Soft  Bowel sounds are normal    Musculoskeletal: Normal range of motion  Feet:   Right Foot:   Skin Integrity: Negative for ulcer, skin breakdown, erythema, warmth, callus or dry skin  Left Foot:   Skin Integrity: Negative for ulcer, skin breakdown, erythema, warmth, callus or dry skin  Neurological: She is alert and oriented to person, place, and time  She has normal reflexes  Skin: Skin is warm and dry  Psychiatric: She has a normal mood and affect  Patient's shoes and socks removed  Right Foot/Ankle   Right Foot Inspection  Skin Exam: skin normal and skin intact no dry skin, no warmth, no callus, no erythema, no maceration, no abnormal color, no pre-ulcer, no ulcer and no callus Toe Exam: ROM and strength within normal limits  Sensory       Monofilament testing: intact  Vascular  Capillary refills: < 3 seconds  The right DP pulse is 2+  The right PT pulse is 2+  Left Foot/Ankle  Left Foot Inspection  Skin Exam: skin normal and skin intactno dry skin, no warmth, no erythema, no maceration, normal color, no pre-ulcer, no ulcer and no callus                         Toe Exam: ROM and strength within normal limits                   Sensory       Monofilament: intact  Vascular  Capillary refills: < 3 seconds  The left DP pulse is 2+  The left PT pulse is 2+  Assign Risk Category:  No deformity present; No loss of protective sensation;  No weak pulses       Risk: 0

## 2018-05-29 LAB
HBA1C MFR BLD HPLC: 5.7 %
MICROALBUMIN UR-MCNC: 4.1 MG/L (ref 0–20)
MICROALBUMIN/CREAT UR: 17 MG/G{CREAT}

## 2018-05-29 PROCEDURE — 3061F NEG MICROALBUMINURIA REV: CPT | Performed by: INTERNAL MEDICINE

## 2018-05-30 PROBLEM — Z98.84 BARIATRIC SURGERY STATUS: Status: ACTIVE | Noted: 2018-05-30

## 2018-06-04 DIAGNOSIS — E03.9 HYPOTHYROIDISM, UNSPECIFIED TYPE: Primary | ICD-10-CM

## 2018-06-04 RX ORDER — LEVOTHYROXINE SODIUM 0.07 MG/1
75 TABLET ORAL DAILY
Qty: 30 TABLET | Refills: 5 | Status: SHIPPED | OUTPATIENT
Start: 2018-06-04 | End: 2018-08-24 | Stop reason: SDUPTHER

## 2018-08-08 ENCOUNTER — OFFICE VISIT (OUTPATIENT)
Dept: URGENT CARE | Facility: CLINIC | Age: 38
End: 2018-08-08
Payer: MEDICARE

## 2018-08-08 VITALS
RESPIRATION RATE: 16 BRPM | SYSTOLIC BLOOD PRESSURE: 136 MMHG | OXYGEN SATURATION: 98 % | TEMPERATURE: 100.4 F | HEART RATE: 106 BPM | DIASTOLIC BLOOD PRESSURE: 77 MMHG

## 2018-08-08 DIAGNOSIS — J02.0 STREP THROAT: Primary | ICD-10-CM

## 2018-08-08 LAB — S PYO AG THROAT QL: NEGATIVE

## 2018-08-08 PROCEDURE — 99213 OFFICE O/P EST LOW 20 MIN: CPT | Performed by: PHYSICIAN ASSISTANT

## 2018-08-08 PROCEDURE — G0463 HOSPITAL OUTPT CLINIC VISIT: HCPCS | Performed by: PHYSICIAN ASSISTANT

## 2018-08-08 PROCEDURE — 87070 CULTURE OTHR SPECIMN AEROBIC: CPT | Performed by: PHYSICIAN ASSISTANT

## 2018-08-08 PROCEDURE — 87430 STREP A AG IA: CPT | Performed by: PHYSICIAN ASSISTANT

## 2018-08-08 RX ORDER — AMOXICILLIN 500 MG/1
500 CAPSULE ORAL EVERY 8 HOURS SCHEDULED
Qty: 21 CAPSULE | Refills: 0 | Status: SHIPPED | OUTPATIENT
Start: 2018-08-08 | End: 2018-08-15

## 2018-08-08 NOTE — PATIENT INSTRUCTIONS
Take medications as directed  Drink plenty of fluids  Follow up with family doctor this week  Go to ER immediately if new or worsening symptoms occur  Strep Throat   WHAT YOU NEED TO KNOW:   Strep throat is a throat infection caused by bacteria  It is easily spread from person to person  DISCHARGE INSTRUCTIONS:   Call 911 for any of the following:   · You have trouble breathing  Return to the emergency department if:   · You have new symptoms like a bad headache, stiff neck, chest pain, or vomiting  · You are drooling because you cannot swallow your spit  Contact your healthcare provider if:   · You have a fever  · You have a rash or ear pain  · You have green, yellow-brown, or bloody mucus when you cough or blow your nose  · You are unable to drink anything  · You have questions or concerns about your condition or care  Medicines:   · Antibiotics  help treat your strep throat  You should feel better within 2 to 3 days after you start antibiotics  · Take your medicine as directed  Contact your healthcare provider if you think your medicine is not helping or if you have side effects  Tell him or her if you are allergic to any medicine  Keep a list of the medicines, vitamins, and herbs you take  Include the amounts, and when and why you take them  Bring the list or the pill bottles to follow-up visits  Carry your medicine list with you in case of an emergency  Manage your symptoms:   · Use lozenges, ice, soft foods, or popsicles  to soothe your throat  · Drink juice, milk shakes, or soup  if your throat is too sore to eat solid food  Drinking liquids can also help prevent dehydration  · Gargle with salt water  Mix ¼ teaspoon salt in a glass of warm water and gargle  This may help reduce swelling in your throat  · Do not smoke  Nicotine and other chemicals in cigarettes and cigars can cause lung damage and make your symptoms worse   Ask your healthcare provider for information if you currently smoke and need help to quit  E-cigarettes or smokeless tobacco still contain nicotine  Talk to your healthcare provider before you use these products  Return to work or school  24 hours after you start antibiotic medicine  Prevent the spread of strep throat:   · Wash your hands often  Use soap and water  Wash your hands after you use the bathroom, change a child's diapers, or sneeze  Wash your hands before you prepare or eat food  · Do not share food or drinks  Replace your toothbrush after you have taken antibiotics for 24 hours  Follow up with your healthcare provider as directed:  Write down your questions so you remember to ask them during your visits  © 2017 [employer] [employer] Information is for End User's use only and may not be sold, redistributed or otherwise used for commercial purposes  All illustrations and images included in CareNotes® are the copyrighted property of [employer]  or [employer]  The above information is an  only  It is not intended as medical advice for individual conditions or treatments  Talk to your doctor, nurse or pharmacist before following any medical regimen to see if it is safe and effective for you

## 2018-08-08 NOTE — PROGRESS NOTES
[facility]        NAME: Maddison Faria is a 41 y o  female  : 1980    MRN: [id number]  DATE: 2018  TIME: 1:37 PM    Assessment and Plan   Strep throat [J02 0]  1  Strep throat  POCT rapid strepA    amoxicillin (AMOXIL) 500 mg capsule    Throat culture         Patient Instructions       Take medications as directed  Drink plenty of fluids  Follow up with family doctor this week  Go to ER immediately if new or worsening symptoms occur  Chief Complaint     Chief Complaint   Patient presents with    Sore Throat     x 1 day          History of Present Illness       Sore Throat    This is a new problem  The current episode started yesterday  The problem has been rapidly worsening  Neither side of throat is experiencing more pain than the other  The maximum temperature recorded prior to her arrival was 100 4 - 100 9 F  The fever has been present for less than 1 day  The pain is at a severity of 6/10  The pain is moderate  Associated symptoms include swollen glands and trouble swallowing  Pertinent negatives include no congestion, coughing, diarrhea, drooling, ear discharge, ear pain, headaches, hoarse voice, neck pain, shortness of breath, stridor or vomiting  She has had no exposure to strep  She has tried acetaminophen for the symptoms  The treatment provided mild relief  Review of Systems   Review of Systems   Constitutional: Negative for chills, diaphoresis, fatigue and fever  HENT: Positive for sore throat and trouble swallowing  Negative for congestion, drooling, ear discharge, ear pain, hoarse voice, rhinorrhea, sinus pain, sinus pressure, sneezing and voice change  Eyes: Negative  Respiratory: Negative for cough, chest tightness, shortness of breath and stridor  Cardiovascular: Negative for chest pain and palpitations  Gastrointestinal: Negative for constipation, diarrhea, nausea and vomiting  Endocrine: Negative  Genitourinary: Negative for dysuria  Musculoskeletal: Negative for back pain, myalgias and neck pain  Skin: Negative for pallor and rash  Allergic/Immunologic: Negative  Neurological: Negative for dizziness, syncope and headaches  Hematological: Negative  Psychiatric/Behavioral: Negative  Current Medications       Current Outpatient Prescriptions:     ALPRAZolam (XANAX) 0 25 mg tablet, Take 0 25 mg by mouth, Disp: , Rfl:     amoxicillin (AMOXIL) 500 mg capsule, Take 1 capsule (500 mg total) by mouth every 8 (eight) hours for 7 days, Disp: 21 capsule, Rfl: 0    anastrozole (ARIMIDEX) 1 mg tablet, Take 1 mg by mouth daily  , Disp: , Rfl:     AURELIO MICROLET LANCETS lancets, by Does not apply route daily, Disp: , Rfl:     Blood Glucose Monitoring Suppl (AURELIO CONTOUR MONITOR) w/Device KIT, by Does not apply route, Disp: , Rfl:     calcium-vitamin D (OSCAL 500 + D) 500 mg-200 units per tablet, Take 1 tablet by mouth daily  , Disp: , Rfl:     diclofenac sodium (VOLTAREN) 1 %, Apply 1 application topically, Disp: , Rfl:     diphenhydrAMINE-acetaminophen (TYLENOL PM)  MG TABS, Take by mouth, Disp: , Rfl:     ergocalciferol (VITAMIN D2) 50,000 units, , Disp: , Rfl: 3    FLUoxetine (PROzac) 20 mg capsule, Take by mouth daily  , Disp: , Rfl:     fluticasone (FLONASE) 50 mcg/act nasal spray, 2 sprays into each nostril daily, Disp: , Rfl:     Glucose Blood (AURELIO CONTOUR TEST VI), by In Vitro route daily, Disp: , Rfl:     levothyroxine 75 mcg tablet, Take 1 tablet (75 mcg total) by mouth daily, Disp: 30 tablet, Rfl: 5    metFORMIN (GLUCOPHAGE-XR) 500 mg 24 hr tablet, 2 (two) times a day with meals  , Disp: , Rfl: 11    Multiple Vitamin (MULTIVITAMINS PO), Take by mouth, Disp: , Rfl:     nystatin (MYCOSTATIN) powder, , Disp: , Rfl: 2    prednisoLONE acetate (PRED FORTE) 1 % ophthalmic suspension, , Disp: , Rfl: 0    simvastatin (ZOCOR) 40 mg tablet, Take 40 mg by mouth, Disp: , Rfl:     Current Allergies     Allergies as of 08/08/2018 - Reviewed 08/08/2018   Allergen Reaction Noted    Paclitaxel Anaphylaxis 02/26/2016            The following portions of the patient's history were reviewed and updated as appropriate: allergies, current medications, past family history, past medical history, past social history, past surgical history and problem list      Past Medical History:   Diagnosis Date    Allergy     Anxiety     Arthritis     Candidiasis, cutaneous     [facility]     Depression     Diabetes mellitus [facility]     Disorder of urinary tract     Fatty liver     Hemorrhoids     History of radiation therapy     Obesity        Past Surgical History:   Procedure Laterality Date    BILATERAL SALPINGOOPHORECTOMY      BREAST BIOPSY      needle core    GASTRIC BYPASS LAPAROSCOPIC      Onset: 1/26/15    HYSTERECTOMY  04/07/2014    MASTECTOMY  12/19/2013       Family History   Problem Relation Age of Onset    Diabetes Mother     Diabetes Father     Ovarian cancer Sister 29    Hypertension Maternal Grandfather     Hodgkin's lymphoma Paternal Grandmother     Breast cancer Family     Diabetes Family     Hypertension Family     Uterine cancer Maternal Aunt 64    Leukemia Son         chronic myeloid         Medications have been verified  Objective   /77   Pulse (!) 106   Temp 100 4 °F (38 °C) (Tympanic)   Resp 16   SpO2 98%        Physical Exam     Physical Exam   Constitutional: She appears well-developed and well-nourished  No distress  HENT:   Head: Normocephalic and atraumatic  Right Ear: External ear normal    Left Ear: External ear normal    Nose: Nose normal    Erythematous posterior pharynx  No swelling  The airway patent  Handling own secretions  TMs intact and pearly bilaterally  Eyes: Conjunctivae are normal  Right eye exhibits no discharge  Left eye exhibits no discharge  Neck: Normal range of motion  Neck supple     Cardiovascular: Normal rate, regular rhythm, normal heart sounds and intact distal pulses  Pulmonary/Chest: Effort normal and breath sounds normal  No respiratory distress  She has no wheezes  She has no rales  Lymphadenopathy:     She has no cervical adenopathy  Skin: Skin is warm  No rash noted  She is not diaphoretic  Nursing note and vitals reviewed

## 2018-08-10 LAB — BACTERIA THROAT CULT: NORMAL

## 2018-08-17 ENCOUNTER — APPOINTMENT (OUTPATIENT)
Dept: RADIOLOGY | Facility: CLINIC | Age: 38
End: 2018-08-17
Payer: MEDICARE

## 2018-08-17 ENCOUNTER — OFFICE VISIT (OUTPATIENT)
Dept: URGENT CARE | Facility: CLINIC | Age: 38
End: 2018-08-17
Payer: MEDICARE

## 2018-08-17 VITALS
TEMPERATURE: 98.7 F | DIASTOLIC BLOOD PRESSURE: 73 MMHG | SYSTOLIC BLOOD PRESSURE: 128 MMHG | RESPIRATION RATE: 16 BRPM | HEART RATE: 90 BPM | OXYGEN SATURATION: 97 %

## 2018-08-17 DIAGNOSIS — M79.671 RIGHT FOOT PAIN: ICD-10-CM

## 2018-08-17 DIAGNOSIS — M79.674 TOE PAIN, RIGHT: Primary | ICD-10-CM

## 2018-08-17 PROCEDURE — 73630 X-RAY EXAM OF FOOT: CPT

## 2018-08-17 PROCEDURE — G0463 HOSPITAL OUTPT CLINIC VISIT: HCPCS | Performed by: PHYSICIAN ASSISTANT

## 2018-08-17 PROCEDURE — 99213 OFFICE O/P EST LOW 20 MIN: CPT | Performed by: PHYSICIAN ASSISTANT

## 2018-08-17 NOTE — PROGRESS NOTES
[address] [facility]  ([facility] [phone]  (fax) [phone]        NAME: Dillon Maravilla is a 41 y o  female  : 1980    MRN: [id number]  DATE: 2018  TIME: 5:34 PM    Assessment and Plan   Toe pain, right [M79 674]  1  Toe pain, right  XR foot 3+ vw right       Patient Instructions   Xray appears negative for any fracture  Will follow up with radiologist report when available  Recommend elevating body part, icing the area every 2 hours for 20-30 minutes, take Ibuprofen every 6-8 hours to reduce inflammation  If not improving over the next week, follow up with PCP or orthopedics  To present to the ER if symptoms worsen  Chief Complaint     Chief Complaint   Patient presents with    Toe Pain     p/s a "large wooden door" fell on her right foot and toes about 1 hour ago and is painful         History of Present Illness   Bertha Beckett presents to the clinic c/o    Toe Pain    The incident occurred 1 to 3 hours ago  The incident occurred at home  Injury mechanism: Door fell on foot  The pain is present in the right foot  The quality of the pain is described as aching  The pain is at a severity of 9/10  The pain is moderate  The pain has been constant since onset  Pertinent negatives include no inability to bear weight, loss of motion, loss of sensation, muscle weakness, numbness or tingling  She reports no foreign bodies present  Nothing aggravates the symptoms  She has tried nothing for the symptoms  The treatment provided no relief  Review of Systems   Review of Systems   Constitutional: Negative for activity change, appetite change, chills, diaphoresis, fatigue and fever  HENT: Negative for congestion, ear discharge, ear pain, facial swelling, rhinorrhea, sinus pain, sinus pressure, sneezing and sore throat  Eyes: Negative for photophobia, pain, discharge, redness, itching and visual disturbance     Respiratory: Negative for apnea, cough, chest tightness, shortness of breath and wheezing  Cardiovascular: Negative for chest pain  Gastrointestinal: Negative for abdominal distention, abdominal pain, anal bleeding, blood in stool, constipation, diarrhea, nausea and vomiting  Genitourinary: Negative for dysuria, flank pain, frequency, hematuria and urgency  Musculoskeletal: Positive for arthralgias and gait problem (limping due to toe pain)  Negative for back pain, joint swelling, myalgias, neck pain and neck stiffness  Skin: Negative for color change, rash and wound  Allergic/Immunologic: Negative for immunocompromised state  Neurological: Negative for dizziness, tingling, facial asymmetry, numbness and headaches  Hematological: Negative for adenopathy  Psychiatric/Behavioral: Negative for confusion and decreased concentration           Current Medications     Long-Term Prescriptions   Medication Sig Dispense Refill    "[employer] MICROLET LANCETS lancets by Does not apply route daily      Blood Glucose Monitoring Suppl ("[employer] CONTOUR MONITOR) w/Device KIT by Does not apply route      diclofenac sodium (VOLTAREN) 1 % Apply 1 application topically      diphenhydrAMINE-acetaminophen (TYLENOL PM)  MG TABS Take by mouth      ergocalciferol (VITAMIN D2) 50,000 units   3    FLUoxetine (PROzac) 20 mg capsule Take by mouth daily        fluticasone (FLONASE) 50 mcg/act nasal spray 2 sprays into each nostril daily      levothyroxine 75 mcg tablet Take 1 tablet (75 mcg total) by mouth daily 30 tablet 5    metFORMIN (GLUCOPHAGE-XR) 500 mg 24 hr tablet 2 (two) times a day with meals    11    nystatin (MYCOSTATIN) powder   2    prednisoLONE acetate (PRED FORTE) 1 % ophthalmic suspension   0    simvastatin (ZOCOR) 40 mg tablet Take 40 mg by mouth         Current Allergies     Allergies as of 08/17/2018 - Reviewed 08/17/2018   Allergen Reaction Noted    Paclitaxel Anaphylaxis 02/26/2016            The following portions of the patient's history were reviewed and updated as appropriate: allergies, current medications, past family history, past medical history, past social history, past surgical history and problem list   Past Medical History:   Diagnosis Date    Allergy     Anxiety     Arthritis     Candidiasis, cutaneous     [facility]     Depression     [facility]     Disorder of urinary tract     Fatty liver     Hemorrhoids     History of radiation therapy     Obesity      Past Surgical History:   Procedure Laterality Date    BILATERAL SALPINGOOPHORECTOMY      BREAST BIOPSY      needle core    GASTRIC BYPASS LAPAROSCOPIC      Onset: 1/26/15    HYSTERECTOMY  04/07/2014    MASTECTOMY  12/19/2013     Social History     Social History    Marital status: /Civil Union     Spouse name: N/A    Number of children: N/A    Years of education: N/A     Occupational History    Not on file  Social History Main Topics    Smoking status: Former Smoker    Smokeless tobacco: Never Used    Alcohol use No    Drug use: No    Sexual activity: Not on file     Other Topics Concern    Not on file     Social History Narrative    No narrative on file       Objective   /73   Pulse 90   Temp 98 7 °F (37 1 °C) (Tympanic)   Resp 16   SpO2 97%      Physical Exam     Physical Exam   Constitutional: She is oriented to person, place, and time  She appears well-developed and well-nourished  No distress  HENT:   Head: Normocephalic and atraumatic  Right Ear: Tympanic membrane and external ear normal    Left Ear: Tympanic membrane and external ear normal    Nose: Nose normal    Mouth/Throat: Oropharynx is clear and moist  No oropharyngeal exudate  Eyes: Conjunctivae and EOM are normal  Pupils are equal, round, and reactive to light  Right eye exhibits no discharge  Left eye exhibits no discharge  No scleral icterus  Neck: Normal range of motion  Neck supple  No JVD present   No tracheal deviation present  No thyromegaly present  Cardiovascular: Normal rate, regular rhythm and normal heart sounds  Exam reveals no gallop and no friction rub  No murmur heard  Pulmonary/Chest: Effort normal and breath sounds normal  No stridor  No respiratory distress  She has no wheezes  She has no rales  She exhibits no tenderness  Abdominal: Soft  Bowel sounds are normal  She exhibits no distension and no mass  There is no tenderness  There is no rebound and no guarding  Musculoskeletal: Normal range of motion  She exhibits no edema, tenderness or deformity  Feet:    Lymphadenopathy:     She has no cervical adenopathy  Neurological: She is alert and oriented to person, place, and time  She has normal reflexes  Coordination normal    Skin: Skin is warm and dry  No rash noted  She is not diaphoretic  No erythema  No pallor  Psychiatric: She has a normal mood and affect  Her behavior is normal  Judgment and thought content normal    Nursing note and vitals reviewed        Fay Briseno PA-C

## 2018-08-24 ENCOUNTER — OFFICE VISIT (OUTPATIENT)
Dept: INTERNAL MEDICINE CLINIC | Facility: CLINIC | Age: 38
End: 2018-08-24
Payer: MEDICARE

## 2018-08-24 VITALS
TEMPERATURE: 98.9 F | DIASTOLIC BLOOD PRESSURE: 86 MMHG | RESPIRATION RATE: 18 BRPM | HEART RATE: 75 BPM | OXYGEN SATURATION: 99 % | BODY MASS INDEX: 39.82 KG/M2 | WEIGHT: 216.4 LBS | SYSTOLIC BLOOD PRESSURE: 124 MMHG | HEIGHT: 62 IN

## 2018-08-24 DIAGNOSIS — E03.9 HYPOTHYROIDISM, UNSPECIFIED TYPE: ICD-10-CM

## 2018-08-24 DIAGNOSIS — J06.9 RECURRENT UPPER RESPIRATORY INFECTION (URI): Primary | ICD-10-CM

## 2018-08-24 PROCEDURE — 99213 OFFICE O/P EST LOW 20 MIN: CPT | Performed by: INTERNAL MEDICINE

## 2018-08-24 RX ORDER — LEVOTHYROXINE SODIUM 0.07 MG/1
75 TABLET ORAL DAILY
Qty: 90 TABLET | Refills: 1 | Status: SHIPPED | OUTPATIENT
Start: 2018-08-24 | End: 2019-02-12 | Stop reason: SDUPTHER

## 2018-08-26 RX ORDER — MONTELUKAST SODIUM 10 MG/1
10 TABLET ORAL
Qty: 30 TABLET | Refills: 5 | Status: SHIPPED | OUTPATIENT
Start: 2018-08-26 | End: 2019-07-10

## 2018-08-27 NOTE — PROGRESS NOTES
Assessment/Plan:       Diagnoses and all orders for this visit:    Recurrent upper respiratory infection (URI)  -     montelukast (SINGULAIR) 10 mg tablet; Take 1 tablet (10 mg total) by mouth daily at bedtime for 180 days    Hypothyroidism, unspecified type  -     levothyroxine 75 mcg tablet; Take 1 tablet (75 mcg total) by mouth daily        No problem-specific Assessment & Plan notes found for this encounter  We will follow up on labs when able and see how the patient is doing  We will treat this conservatively for now  Subjective:      Patient ID: Mica Du is a 41 y o  female  URI    This is a recurrent problem  The current episode started 1 to 4 weeks ago  The problem has been waxing and waning  There has been no fever  Associated symptoms include coughing and rhinorrhea  Pertinent negatives include no chest pain, nausea, sinus pain, sore throat or vomiting  She has tried nothing for the symptoms  The treatment provided significant relief  The following portions of the patient's history were reviewed and updated as appropriate:   She has a past medical history of Allergy; Anxiety; Arthritis; Candidiasis, cutaneous; Cervical cancer ([facility] [address] ); Depression; Diabetes mellitus ([facility] [address] [facility] ); Disorder of urinary tract; Fatty liver; Hemorrhoids; History of radiation therapy; and Obesity  ,   does not have any pertinent problems on file  ,   has a past surgical history that includes Breast biopsy; Mastectomy (12/19/2013); GASTRIC BYPASS LAPAROSCOPIC; Hysterectomy (04/07/2014); and Bilateral salpingoophorectomy  ,  family history includes Breast cancer in her family; Diabetes in her family, father, and mother; Hodgkin's lymphoma in her paternal grandmother; Hypertension in her family and maternal grandfather; Leukemia in her son; Ovarian cancer (age of onset: 29) in her sister; Uterine cancer (age of onset: 64) in her maternal aunt  ,   reports that she has quit smoking   She has never used smokeless tobacco  She reports that she does not drink alcohol or use drugs  ,  is allergic to paclitaxel     Current Outpatient Prescriptions   Medication Sig Dispense Refill    ALPRAZolam (XANAX) 0 25 mg tablet Take 0 25 mg by mouth as needed        anastrozole (ARIMIDEX) 1 mg tablet Take 1 mg by mouth daily   AURELIO MICROLET LANCETS lancets by Does not apply route daily      Blood Glucose Monitoring Suppl ([employer] CONTOUR MONITOR) w/Device KIT by Does not apply route      calcium-vitamin D (OSCAL 500 + D) 500 mg-200 units per tablet Take 1 tablet by mouth daily   diclofenac sodium (VOLTAREN) 1 % Apply 1 application topically      diphenhydrAMINE-acetaminophen (TYLENOL PM)  MG TABS Take by mouth      ergocalciferol (VITAMIN D2) 50,000 units once a week    3    FLUoxetine (PROzac) 20 mg capsule Take by mouth daily        Glucose Blood (AURELIO CONTOUR TEST VI) by In Vitro route daily      levothyroxine 75 mcg tablet Take 1 tablet (75 mcg total) by mouth daily 90 tablet 1    metFORMIN (GLUCOPHAGE-XR) 500 mg 24 hr tablet 2 (two) times a day with meals    11    Multiple Vitamin (MULTIVITAMINS PO) Take by mouth      nystatin (MYCOSTATIN) powder   2    prednisoLONE acetate (PRED FORTE) 1 % ophthalmic suspension   0    simvastatin (ZOCOR) 40 mg tablet Take 40 mg by mouth      montelukast (SINGULAIR) 10 mg tablet Take 1 tablet (10 mg total) by mouth daily at bedtime for 180 days 30 tablet 5     No current facility-administered medications for this visit  Review of Systems   Constitutional: Negative for chills, fatigue and fever  HENT: Positive for rhinorrhea  Negative for sinus pain and sore throat  Respiratory: Positive for cough  Cardiovascular: Negative for chest pain, palpitations and leg swelling  Gastrointestinal: Negative for nausea and vomiting  Musculoskeletal: Negative for arthralgias, myalgias and neck stiffness  Hematological: Negative  Psychiatric/Behavioral: Negative  Objective:  Vitals:    08/24/18 1037   BP: 124/86   BP Location: Right arm   Patient Position: Sitting   Cuff Size: Large   Pulse: 75   Resp: 18   Temp: 98 9 °F (37 2 °C)   SpO2: 99%   Weight: 98 2 kg (216 lb 6 4 oz)   Height: 5' 2" (1 575 m)     Body mass index is 39 58 kg/m²  Physical Exam   Constitutional: She is oriented to person, place, and time  She appears well-developed and well-nourished  HENT:   Head: Normocephalic and atraumatic  Nose: Rhinorrhea present  Eyes: Conjunctivae and EOM are normal  Pupils are equal, round, and reactive to light  Neck: Normal range of motion  Neck supple  Cardiovascular: Normal rate, regular rhythm and intact distal pulses  Pulmonary/Chest: Effort normal  No respiratory distress  She has no wheezes  She has rales  Abdominal: Soft  Bowel sounds are normal  She exhibits no distension  There is no tenderness  There is no rebound  Musculoskeletal: Normal range of motion  Neurological: She is alert and oriented to person, place, and time  Skin: Skin is warm and dry  Psychiatric: She has a normal mood and affect  Nursing note and vitals reviewed

## 2018-11-02 ENCOUNTER — OFFICE VISIT (OUTPATIENT)
Dept: URGENT CARE | Facility: CLINIC | Age: 38
End: 2018-11-02
Payer: MEDICARE

## 2018-11-02 VITALS
TEMPERATURE: 98.5 F | OXYGEN SATURATION: 97 % | WEIGHT: 220 LBS | HEART RATE: 92 BPM | HEIGHT: 62 IN | DIASTOLIC BLOOD PRESSURE: 60 MMHG | SYSTOLIC BLOOD PRESSURE: 128 MMHG | RESPIRATION RATE: 20 BRPM | BODY MASS INDEX: 40.48 KG/M2

## 2018-11-02 DIAGNOSIS — J06.9 ACUTE URI: Primary | ICD-10-CM

## 2018-11-02 PROCEDURE — G0463 HOSPITAL OUTPT CLINIC VISIT: HCPCS | Performed by: NURSE PRACTITIONER

## 2018-11-02 PROCEDURE — 99213 OFFICE O/P EST LOW 20 MIN: CPT | Performed by: NURSE PRACTITIONER

## 2018-11-02 RX ORDER — INFLUENZA A VIRUS A/SINGAPORE/GP1908/2015 IVR-180A (H1N1) ANTIGEN (PROPIOLACTONE INACTIVATED), INFLUENZA A VIRUS A/HONG KONG/4801/2014 X-263B (H3N2) ANTIGEN (PROPIOLACTONE INACTIVATED), INFLUENZA B VIRUS B/BRISBANE/46/2015 ANTIGEN (PROPIOLACTONE INACTIVATED), AND INFLUENZA B VIRUS B/PHUKET/3073/2013 BVR-1B ANTIGEN (PROPIOLACTONE INACTIVATED) 15; 15; 15; 15 UG/.5ML; UG/.5ML; UG/.5ML; UG/.5ML
INJECTION, SUSPENSION INTRAMUSCULAR
Refills: 0 | COMMUNITY
Start: 2018-09-12 | End: 2019-07-10

## 2018-11-02 RX ORDER — DEXTROMETHORPHAN HYDROBROMIDE AND PROMETHAZINE HYDROCHLORIDE 15; 6.25 MG/5ML; MG/5ML
5 SYRUP ORAL 4 TIMES DAILY PRN
Qty: 118 ML | Refills: 0 | Status: SHIPPED | OUTPATIENT
Start: 2018-11-02 | End: 2019-07-10

## 2018-11-02 RX ORDER — SIMVASTATIN 40 MG
40 TABLET ORAL DAILY
COMMUNITY
End: 2019-07-10

## 2018-11-02 RX ORDER — NYSTATIN 100000 [USP'U]/G
POWDER TOPICAL 3 TIMES DAILY
COMMUNITY
Start: 2018-01-10 | End: 2019-07-10

## 2018-11-02 RX ORDER — DIPHENOXYLATE HYDROCHLORIDE AND ATROPINE SULFATE 2.5; .025 MG/1; MG/1
1 TABLET ORAL DAILY
COMMUNITY
End: 2019-07-10

## 2018-11-02 RX ORDER — AMOXICILLIN 500 MG/1
500 TABLET, FILM COATED ORAL 3 TIMES DAILY
Qty: 30 TABLET | Refills: 0 | Status: SHIPPED | OUTPATIENT
Start: 2018-11-02 | End: 2018-11-12

## 2018-11-02 RX ORDER — ANASTROZOLE 1 MG/1
1 TABLET ORAL DAILY
COMMUNITY
Start: 2018-09-28 | End: 2019-07-10

## 2018-11-02 NOTE — PROGRESS NOTES
[facility]        NAME: Cornelia Urbina is a 41 y o  female  : 1980    MRN: [id number]  DATE: 2018  TIME: 3:59 PM    Assessment and Plan   Acute URI [J06 9]  1  Acute URI  amoxicillin (AMOXIL) 500 MG tablet    promethazine-dextromethorphan (PHENERGAN-DM) 6 25-15 mg/5 mL oral syrup         Patient Instructions       Follow up with PCP in 3-5 days  Proceed to  ER if symptoms worsen  You have an upper respiratory infection  You have been prescribed amoxicillin and promethazine DM - take as prescribed  Increase your water intake  Follow up with your PCP  Go to the ED if symptoms worsen      Chief Complaint     Chief Complaint   Patient presents with    Cough     cough,runny nose and migraine for 3 days         History of Present Illness       This is a morbidly obese 41year old female who states has had a sorethroat, cough, nasal congestion and headache x 3 days  She states she has been taking dayquil for relief  She states she is unable to take motrin due to gastric bypass hx  Unsure of fevers  LMP: hysterectomy       Cough   Associated symptoms include a fever, myalgias, rhinorrhea and a sore throat  Review of Systems   Review of Systems   Constitutional: Positive for fever  HENT: Positive for congestion, rhinorrhea and sore throat  Eyes: Negative  Respiratory: Positive for cough  Cardiovascular: Negative  Gastrointestinal: Negative  Endocrine: Negative  Genitourinary: Negative  Musculoskeletal: Positive for myalgias  Skin: Negative  Allergic/Immunologic: Negative  Neurological: Negative  Hematological: Negative  Psychiatric/Behavioral: Negative  All other systems reviewed and are negative          Current Medications       Current Outpatient Prescriptions:     AFLURIA QUADRIVALENT SUSP, , Disp: , Rfl: 0    ALPRAZolam (XANAX) 0 25 mg tablet, Take 0 25 mg by mouth as needed  , Disp: , Rfl:     anastrozole (ARIMIDEX) 1 mg tablet, Take 1 mg by mouth daily  , Disp: , Rfl:     AURELIO MICROLET LANCETS lancets, by Does not apply route daily, Disp: , Rfl:     Blood Glucose Monitoring Suppl (AURELIO CONTOUR MONITOR) w/Device KIT, by Does not apply route, Disp: , Rfl:     calcium-vitamin D (OSCAL 500 + D) 500 mg-200 units per tablet, Take 1 tablet by mouth daily  , Disp: , Rfl:     diclofenac sodium (VOLTAREN) 1 %, Apply 1 application topically, Disp: , Rfl:     ergocalciferol (VITAMIN D2) 50,000 units, once a week  , Disp: , Rfl: 3    FLUoxetine (PROzac) 20 mg capsule, Take by mouth daily  , Disp: , Rfl:     Glucose Blood (AURELIO CONTOUR TEST VI), by In Vitro route daily, Disp: , Rfl:     levothyroxine 75 mcg tablet, Take 1 tablet (75 mcg total) by mouth daily, Disp: 90 tablet, Rfl: 1    metFORMIN (GLUCOPHAGE-XR) 500 mg 24 hr tablet, 2 (two) times a day with meals  , Disp: , Rfl: 11    Multiple Vitamin (MULTIVITAMINS PO), Take by mouth, Disp: , Rfl:     multivitamin (THERAGRAN) TABS, Take 1 tablet by mouth daily, Disp: , Rfl:     simvastatin (ZOCOR) 40 mg tablet, Take 40 mg by mouth, Disp: , Rfl:     amoxicillin (AMOXIL) 500 MG tablet, Take 1 tablet (500 mg total) by mouth 3 (three) times a day for 10 days, Disp: 30 tablet, Rfl: 0    anastrozole (ARIMIDEX) 1 mg tablet, Take 1 mg by mouth daily, Disp: , Rfl:     diclofenac sodium (VOLTAREN) 1 %, APPLY 1 APPLICATION TOPICALLY 4 (FOUR) TIMES A DAY   APPLY TO AFFECTED AREA , Disp: , Rfl:     diphenhydrAMINE-acetaminophen (TYLENOL PM)  MG TABS, Take by mouth, Disp: , Rfl:     montelukast (SINGULAIR) 10 mg tablet, Take 1 tablet (10 mg total) by mouth daily at bedtime for 180 days (Patient not taking: Reported on 11/2/2018 ), Disp: 30 tablet, Rfl: 5    nystatin (MYCOSTATIN) powder, , Disp: , Rfl: 2    nystatin (MYCOSTATIN) powder, Apply topically Three times a day, Disp: , Rfl:     prednisoLONE acetate (PRED FORTE) 1 % ophthalmic suspension, , Disp: , Rfl: 0   promethazine-dextromethorphan (PHENERGAN-DM) 6 25-15 mg/5 mL oral syrup, Take 5 mL by mouth 4 (four) times a day as needed for cough, Disp: 118 mL, Rfl: 0    simvastatin (ZOCOR) 40 mg tablet, Take 40 mg by mouth daily, Disp: , Rfl:     sitaGLIPtin (JANUVIA) 100 mg tablet, daily, Disp: , Rfl:     Current Allergies     Allergies as of 11/02/2018 - Reviewed 11/02/2018   Allergen Reaction Noted    Paclitaxel Anaphylaxis 02/26/2016            The following portions of the patient's history were reviewed and updated as appropriate: allergies, current medications, past family history, past medical history, past social history, past surgical history and problem list      Past Medical History:   Diagnosis Date    Allergy     Anxiety     Arthritis     [facility]     Candidiasis, cutaneous     Cervical cancer [facility]     Depression     [facility]     Disorder of urinary tract     Fatty liver     Hemorrhoids     History of radiation therapy     Obesity        Past Surgical History:   Procedure Laterality Date    BILATERAL SALPINGOOPHORECTOMY      BREAST BIOPSY      needle core    GASTRIC BYPASS LAPAROSCOPIC      Onset: 1/26/15    HYSTERECTOMY  04/07/2014    MASTECTOMY  12/19/2013       Family History   Problem Relation Age of Onset    Diabetes Mother     Diabetes Father     Ovarian cancer Sister 29    Hypertension Maternal Grandfather     Hodgkin's lymphoma Paternal Grandmother     Breast cancer Family     Diabetes Family     Hypertension Family     Uterine cancer Maternal Aunt 64    Leukemia Son         chronic myeloid         Medications have been verified  Objective   /60   Pulse 92   Temp 98 5 °F (36 9 °C) (Tympanic)   Resp 20   Ht 5' 2" (1 575 m)   Wt 99 8 kg (220 lb)   SpO2 97%   BMI 40 24 kg/m²        Physical Exam     Physical Exam   Constitutional: She is oriented to person, place, and time  She appears well-developed and well-nourished   She appears distressed  HENT:   Head: Normocephalic and atraumatic  Mouth/Throat: Oropharyngeal exudate present  B/L TM bulging  + oropharyngeal erythema    Eyes: Pupils are equal, round, and reactive to light  Conjunctivae and EOM are normal    Neck: Normal range of motion  Neck supple  Cardiovascular: Normal rate, regular rhythm and normal heart sounds  Pulmonary/Chest: Effort normal and breath sounds normal    Abdominal: Soft  Bowel sounds are normal    Musculoskeletal: Normal range of motion  Neurological: She is alert and oriented to person, place, and time  She has normal reflexes  Skin: Skin is warm and dry  She is not diaphoretic  Psychiatric: She has a normal mood and affect  Her behavior is normal  Judgment and thought content normal    Nursing note and vitals reviewed

## 2018-11-02 NOTE — PATIENT INSTRUCTIONS
You have an upper respiratory infection  You have been prescribed amoxicillin and promethazine DM - take as prescribed  Increase your water intake  Follow up with your PCP  Go to the ED if symptoms worsen    Upper Respiratory Infection   WHAT YOU NEED TO KNOW:   An upper respiratory infection is also called the common cold  It is an infection that can affect your nose, throat, ears, and sinuses  For healthy people, the common cold is usually not serious and does not need special treatment  Cold symptoms are usually worst for the first 3 to 5 days  Most people get better in 7 to 14 days  You may continue to cough for 2 to 3 weeks  Colds are caused by viruses and do not get better with antibiotics  DISCHARGE INSTRUCTIONS:   Return to the emergency department if:   · You have chest pain or trouble breathing  Contact your healthcare provider if:   · You have a fever over 102ºF (39°C)  · Your sore throat gets worse or you see white or yellow spots in your throat  · Your symptoms get worse after 3 to 5 days or your cold is not better in 14 days  · You have a rash anywhere on your skin  · You have large, tender lumps in your neck  · You have thick, green or yellow drainage from your nose  · You cough up thick yellow, green, or bloody mucus  · You have vomiting for more than 24 hours and cannot keep fluids down  · You have a bad earache  · You have questions or concerns about your condition or care  Medicines: You may need any of the following:  · Decongestants  help reduce nasal congestion and help you breathe more easily  If you take decongestant pills, they may make you feel restless or cause problems with your sleep  Do not use decongestant sprays for more than a few days  · Cough suppressants  help reduce coughing  Ask your healthcare provider which type of cough medicine is best for you  · NSAIDs , such as ibuprofen, help decrease swelling, pain, and fever   NSAIDs can cause stomach bleeding or kidney problems in certain people  If you take blood thinner medicine, always ask your healthcare provider if NSAIDs are safe for you  Always read the medicine label and follow directions  · Acetaminophen  decreases pain and fever  It is available without a doctor's order  Ask how much to take and how often to take it  Follow directions  Read the labels of all other medicines you are using to see if they also contain acetaminophen, or ask your doctor or pharmacist  Acetaminophen can cause liver damage if not taken correctly  Do not use more than 4 grams (4,000 milligrams) total of acetaminophen in one day  · Take your medicine as directed  Contact your healthcare provider if you think your medicine is not helping or if you have side effects  Tell him or her if you are allergic to any medicine  Keep a list of the medicines, vitamins, and herbs you take  Include the amounts, and when and why you take them  Bring the list or the pill bottles to follow-up visits  Carry your medicine list with you in case of an emergency  Follow up with your healthcare provider as directed:  Write down your questions so you remember to ask them during your visits  Self-care:   · Rest as much as possible  Slowly start to do more each day  · Drink more liquids as directed  Liquids will help thin and loosen mucus so you can cough it up  Liquids will also help prevent dehydration  Liquids that help prevent dehydration include water, fruit juice, and broth  Do not drink liquids that contain caffeine  Caffeine can increase your risk for dehydration  Ask your healthcare provider how much liquid to drink each day  · Soothe a sore throat  Gargle with warm salt water  This helps your sore throat feel better  Make salt water by dissolving ¼ teaspoon salt in 1 cup warm water  You may also suck on hard candy or throat lozenges  You may use a sore throat spray  · Use a humidifier or vaporizer    Use a cool mist humidifier or a vaporizer to increase air moisture in your home  This may make it easier for you to breathe and help decrease your cough  · Use saline nasal drops as directed  These help relieve congestion  · Apply petroleum-based jelly around the outside of your nostrils  This can decrease irritation from blowing your nose  · Do not smoke  Nicotine and other chemicals in cigarettes and cigars can make your symptoms worse  They can also cause infections such as bronchitis or pneumonia  Ask your healthcare provider for information if you currently smoke and need help to quit  E-cigarettes or smokeless tobacco still contain nicotine  Talk to your healthcare provider before you use these products  Prevent spreading your cold to others:   · Try to stay away from other people during the first 2 to 3 days of your cold when it is more easily spread  · Do not share food or drinks  · Do not share hand towels with household members  · Wash your hands often, especially after you blow your nose  Turn away from other people and cover your mouth and nose with a tissue when you sneeze or cough  © 2017 [facility] Information is for End User's use only and may not be sold, redistributed or otherwise used for commercial purposes  All illustrations and images included in CareNotes® are the copyrighted property of [employer] D [employer] M [employer]  or [employer]  The above information is an  only  It is not intended as medical advice for individual conditions or treatments  Talk to your doctor, nurse or pharmacist before following any medical regimen to see if it is safe and effective for you

## 2018-11-02 NOTE — LETTER
November 2, 2018     Patient: Leona Santos   YOB: 1980   Date of Visit: 11/2/2018       To Whom It May Concern: It is my medical opinion that Ani Davis may return to work on 11/5/18  If you have any questions or concerns, please don't hesitate to call           Sincerely,        CONCEPCIÓN Curry    CC: No Recipients

## 2019-02-12 DIAGNOSIS — E03.9 HYPOTHYROIDISM, UNSPECIFIED TYPE: ICD-10-CM

## 2019-02-13 RX ORDER — LEVOTHYROXINE SODIUM 0.07 MG/1
75 TABLET ORAL DAILY
Qty: 90 TABLET | Refills: 0 | Status: SHIPPED | OUTPATIENT
Start: 2019-02-13 | End: 2019-11-12 | Stop reason: SDUPTHER

## 2019-05-10 ENCOUNTER — OFFICE VISIT (OUTPATIENT)
Dept: URGENT CARE | Facility: CLINIC | Age: 39
End: 2019-05-10
Payer: COMMERCIAL

## 2019-05-10 VITALS
TEMPERATURE: 98.4 F | DIASTOLIC BLOOD PRESSURE: 79 MMHG | RESPIRATION RATE: 18 BRPM | HEART RATE: 93 BPM | OXYGEN SATURATION: 96 % | SYSTOLIC BLOOD PRESSURE: 132 MMHG

## 2019-05-10 DIAGNOSIS — J30.2 SEASONAL ALLERGIES: Primary | ICD-10-CM

## 2019-05-10 PROCEDURE — 99283 EMERGENCY DEPT VISIT LOW MDM: CPT | Performed by: PHYSICIAN ASSISTANT

## 2019-05-10 PROCEDURE — G0463 HOSPITAL OUTPT CLINIC VISIT: HCPCS | Performed by: PHYSICIAN ASSISTANT

## 2019-05-10 RX ORDER — METHYLPREDNISOLONE 4 MG/1
TABLET ORAL
Qty: 1 EACH | Refills: 0 | Status: SHIPPED | OUTPATIENT
Start: 2019-05-10 | End: 2019-07-10

## 2019-05-30 LAB
LEFT EYE DIABETIC RETINOPATHY: NORMAL
RIGHT EYE DIABETIC RETINOPATHY: NORMAL

## 2019-06-10 ENCOUNTER — TELEPHONE (OUTPATIENT)
Dept: BARIATRICS | Facility: CLINIC | Age: 39
End: 2019-06-10

## 2019-07-10 ENCOUNTER — OFFICE VISIT (OUTPATIENT)
Dept: INTERNAL MEDICINE CLINIC | Facility: CLINIC | Age: 39
End: 2019-07-10
Payer: COMMERCIAL

## 2019-07-10 VITALS
WEIGHT: 225 LBS | BODY MASS INDEX: 41.41 KG/M2 | OXYGEN SATURATION: 97 % | RESPIRATION RATE: 18 BRPM | SYSTOLIC BLOOD PRESSURE: 138 MMHG | TEMPERATURE: 99 F | DIASTOLIC BLOOD PRESSURE: 90 MMHG | HEIGHT: 62 IN | HEART RATE: 89 BPM

## 2019-07-10 DIAGNOSIS — E55.9 VITAMIN D DEFICIENCY: ICD-10-CM

## 2019-07-10 DIAGNOSIS — E78.5 HYPERLIPIDEMIA, UNSPECIFIED HYPERLIPIDEMIA TYPE: ICD-10-CM

## 2019-07-10 DIAGNOSIS — E03.8 HYPOTHYROIDISM DUE TO HASHIMOTO'S THYROIDITIS: ICD-10-CM

## 2019-07-10 DIAGNOSIS — E66.01 MORBID OBESITY WITH BMI OF 40.0-44.9, ADULT (HCC): ICD-10-CM

## 2019-07-10 DIAGNOSIS — E11.9 TYPE 2 DIABETES MELLITUS WITHOUT COMPLICATION, WITHOUT LONG-TERM CURRENT USE OF INSULIN (HCC): Primary | ICD-10-CM

## 2019-07-10 DIAGNOSIS — Z17.0 MALIGNANT NEOPLASM OF LEFT BREAST IN FEMALE, ESTROGEN RECEPTOR POSITIVE, UNSPECIFIED SITE OF BREAST (HCC): ICD-10-CM

## 2019-07-10 DIAGNOSIS — E06.3 HYPOTHYROIDISM DUE TO HASHIMOTO'S THYROIDITIS: ICD-10-CM

## 2019-07-10 DIAGNOSIS — C50.912 MALIGNANT NEOPLASM OF LEFT BREAST IN FEMALE, ESTROGEN RECEPTOR POSITIVE, UNSPECIFIED SITE OF BREAST (HCC): ICD-10-CM

## 2019-07-10 LAB — SL AMB POCT HEMOGLOBIN AIC: 5.6 (ref ?–6.5)

## 2019-07-10 PROCEDURE — 83036 HEMOGLOBIN GLYCOSYLATED A1C: CPT | Performed by: INTERNAL MEDICINE

## 2019-07-10 PROCEDURE — 99214 OFFICE O/P EST MOD 30 MIN: CPT | Performed by: INTERNAL MEDICINE

## 2019-07-10 RX ORDER — ERGOCALCIFEROL 1.25 MG/1
50000 CAPSULE ORAL WEEKLY
Qty: 4 CAPSULE | Refills: 0 | Status: SHIPPED | OUTPATIENT
Start: 2019-07-10 | End: 2019-09-16 | Stop reason: SDUPTHER

## 2019-07-10 NOTE — PATIENT INSTRUCTIONS
Obesity   AMBULATORY CARE:   Obesity  is when your body mass index (BMI) is greater than 30  Your healthcare provider will use your height and weight to measure your BMI  The risks of obesity include  many health problems, such as injuries or physical disability  You may need tests to check for the following:  · Diabetes     · High blood pressure or high cholesterol     · Heart disease     · Gallbladder or liver disease     · Cancer of the colon, breast, prostate, liver, or kidney     · Sleep apnea     · Arthritis or gout  Seek care immediately if:   · You have a severe headache, confusion, or difficulty speaking  · You have weakness on one side of your body  · You have chest pain, sweating, or shortness of breath  Contact your healthcare provider if:   · You have symptoms of gallbladder or liver disease, such as pain in your upper abdomen  · You have knee or hip pain and discomfort while walking  · You have symptoms of diabetes, such as intense hunger and thirst, and frequent urination  · You have symptoms of sleep apnea, such as snoring or daytime sleepiness  · You have questions or concerns about your condition or care  Treatment for obesity  focuses on helping you lose weight to improve your health  Even a small decrease in BMI can reduce the risk for many health problems  Your healthcare provider will help you set a weight-loss goal   · Lifestyle changes  are the first step in treating obesity  These include making healthy food choices and getting regular physical activity  Your healthcare provider may suggest a weight-loss program that involves coaching, education, and therapy  · Medicine  may help you lose weight when it is used with a healthy diet and physical activity  · Surgery  can help you lose weight if you are very obese and have other health problems  There are several types of weight-loss surgery  Ask your healthcare provider for more information    Be successful losing weight:   · Set small, realistic goals  An example of a small goal is to walk for 20 minutes 5 days a week  Anther goal is to lose 5% of your body weight  · Tell friends, family members, and coworkers about your goals  and ask for their support  Ask a friend to lose weight with you, or join a weight-loss support group  · Identify foods or triggers that may cause you to overeat , and find ways to avoid them  Remove tempting high-calorie foods from your home and workplace  Place a bowl of fresh fruit on your kitchen counter  If stress causes you to eat, then find other ways to cope with stress  · Keep a diary to track what you eat and drink  Also write down how many minutes of physical activity you do each day  Weigh yourself once a week and record it in your diary  Eating changes: You will need to eat 500 to 1,000 fewer calories each day than you currently eat to lose 1 to 2 pounds a week  The following changes will help you cut calories:  · Eat smaller portions  Use small plates, no larger than 9 inches in diameter  Fill your plate half full of fruits and vegetables  Measure your food using measuring cups until you know what a serving size looks like  · Eat 3 meals and 1 or 2 snacks each day  Plan your meals in advance  [place] and eat at home most of the time  Eat slowly  · Eat fruits and vegetables at every meal   They are low in calories and high in fiber, which makes you feel full  Do not add butter, margarine, or cream sauce to vegetables  Use herbs to season steamed vegetables  · Eat less fat and fewer fried foods  Eat more baked or grilled chicken and fish  These protein sources are lower in calories and fat than red meat  Limit fast food  Dress your salads with olive oil and vinegar instead of bottled dressing  · Limit the amount of sugar you eat  Do not drink sugary beverages  Limit alcohol  Activity changes:  Physical activity is good for your body in many ways   It helps you burn calories and build strong muscles  It decreases stress and depression, and improves your mood  It can also help you sleep better  Talk to your healthcare provider before you begin an exercise program   · Exercise for at least 30 minutes 5 days a week  Start slowly  Set aside time each day for physical activity that you enjoy and that is convenient for you  It is best to do both weight training and an activity that increases your heart rate, such as walking, bicycling, or swimming  · Find ways to be more active  Do yard work and housecleaning  Walk up the stairs instead of using elevators  Spend your leisure time going to events that require walking, such as outdoor festivals or fairs  This extra physical activity can help you lose weight and keep it off  Follow up with your healthcare provider as directed: You may need to meet with a dietitian  Write down your questions so you remember to ask them during your visits  © 2017 [address] [employer] Information is for End User's use only and may not be sold, redistributed or otherwise used for commercial purposes  All illustrations and images included in CareNotes® are the copyrighted property of [employer]  or [employer]  The above information is an  only  It is not intended as medical advice for individual conditions or treatments  Talk to your doctor, nurse or pharmacist before following any medical regimen to see if it is safe and effective for you

## 2019-07-10 NOTE — PROGRESS NOTES
Assessment/Plan:       Diagnoses and all orders for this visit:    Type 2 diabetes mellitus without complication, without long-term current use of insulin ([facility])  -     POCT hemoglobin A1c  -     Microalbumin / creatinine urine ratio  -     Ambulatory referral to Ophthalmology; Future  -     CBC and differential; Future  -     Comprehensive metabolic panel; Future  -     metFORMIN (GLUCOPHAGE) 500 mg tablet; Take 1 tablet (500 mg total) by mouth daily for 180 days    Vitamin D deficiency  -     Vitamin D 25 hydroxy; Future  -     ergocalciferol (VITAMIN D2) 50,000 units; Take 1 capsule (50,000 Units total) by mouth once a week for 30 days    Hypothyroidism due to Hashimoto's thyroiditis  -     TSH, 3rd generation with Free T4 reflex; Future    Hyperlipidemia, unspecified hyperlipidemia type  -     Lipid Panel with Direct LDL reflex; Future    Malignant neoplasm of left breast in female, estrogen receptor positive, unspecified site of breast ([facility] [address] )        No problem-specific Assessment & Plan notes found for this encounter  The patient will follow up in the next several weeks and see how things go  Subjective:      Patient ID: Catia Redd is a 46 y o  female  The patient is noted to have a HgbA1c of 5 6 and is noted to have stopped the Metfromin prescribed previously  However, she does have issues with obesity and this could help loose weight also  We discussed that and caloric control  The patient's vitamin D needs evaluation and we gave her a month of D2 and we will follow up on her vitamin D level  The patient states there are no other issues at this time  She also need a follow up on her TSH  Her BP is noted to be mildly elevated  She continues on the Anastrozole  Diabetes   She presents for her follow-up diabetic visit  She has type 2 diabetes mellitus  Her disease course has been stable  There are no hypoglycemic associated symptoms  There are no diabetic associated symptoms   Pertinent negatives for diabetes include no chest pain and no fatigue  Symptoms are stable  Risk factors for coronary artery disease include obesity and diabetes mellitus  Current diabetic treatment includes oral agent (monotherapy)  She is compliant with treatment all of the time  Her weight is stable  The following portions of the patient's history were reviewed and updated as appropriate:   She has a past medical history of Allergy, Anxiety, Arthritis, Breast cancer ([facility] [address] ), Candidiasis, cutaneous, Cervical cancer ([facility] [address] ), Depression, Diabetes mellitus ([facility] [address] ), Disorder of urinary tract, Fatty liver, Hemorrhoids, History of radiation therapy, and Obesity  ,  does not have any pertinent problems on file  ,   has a past surgical history that includes Breast biopsy; Mastectomy (12/19/2013); GASTRIC BYPASS LAPAROSCOPIC; Hysterectomy (04/07/2014); and Bilateral salpingoophorectomy  ,  family history includes Breast cancer in her family; Diabetes in her family, father, and mother; Hodgkin's lymphoma in her paternal grandmother; Hypertension in her family and maternal grandfather; Leukemia in her son; Ovarian cancer (age of onset: 29) in her sister; Uterine cancer (age of onset: 64) in her maternal aunt  ,   reports that she has quit smoking  She has never used smokeless tobacco  She reports that she does not drink alcohol or use drugs  ,  is allergic to paclitaxel     Current Outpatient Medications   Medication Sig Dispense Refill    ALPRAZolam (XANAX) 0 25 mg tablet Take 0 25 mg by mouth as needed        anastrozole (ARIMIDEX) 1 mg tablet Take 1 mg by mouth daily   AURELIO MICROLET LANCETS lancets by Does not apply route daily      Blood Glucose Monitoring Suppl ([employer] CONTOUR MONITOR) w/Device KIT by Does not apply route      calcium-vitamin D (OSCAL 500 + D) 500 mg-200 units per tablet Take 1 tablet by mouth daily        diclofenac sodium (VOLTAREN) 1 % Apply 1 application topically      ergocalciferol (VITAMIN D2) 50,000 units Take 1 capsule (50,000 Units total) by mouth once a week for 30 days 4 capsule 0    Glucose Blood (AURELIO CONTOUR TEST VI) by In Vitro route daily      levothyroxine 75 mcg tablet Take 1 tablet (75 mcg total) by mouth daily 90 tablet 0    Multiple Vitamin (MULTIVITAMINS PO) Take by mouth      metFORMIN (GLUCOPHAGE) 500 mg tablet Take 1 tablet (500 mg total) by mouth daily for 180 days 30 tablet 5    nystatin (MYCOSTATIN) powder   2     No current facility-administered medications for this visit  Review of Systems   Constitutional: Negative for chills, fatigue and fever  HENT: Negative for rhinorrhea, sinus pressure and sinus pain  Respiratory: Negative for cough, chest tightness, shortness of breath and stridor  Cardiovascular: Negative for chest pain, palpitations and leg swelling  Gastrointestinal: Negative for abdominal pain, constipation, diarrhea and nausea  Genitourinary: Negative  Musculoskeletal: Negative for arthralgias, joint swelling, myalgias and neck pain  Neurological: Negative  Psychiatric/Behavioral: Negative  Objective:  Vitals:    07/10/19 1243   BP: 138/90   BP Location: Right arm   Patient Position: Sitting   Cuff Size: Large   Pulse: 89   Resp: 18   Temp: 99 °F (37 2 °C)   TempSrc: Tympanic   SpO2: 97%   Weight: 102 kg (225 lb)   Height: 5' 2" (1 575 m)     Body mass index is 41 15 kg/m²  Physical Exam   Constitutional: She is oriented to person, place, and time  She appears well-developed and well-nourished  HENT:   Head: Normocephalic and atraumatic  Eyes: Pupils are equal, round, and reactive to light  EOM are normal    Neck: Normal range of motion  Neck supple  Cardiovascular: Normal rate, regular rhythm, normal heart sounds and intact distal pulses  Exam reveals no friction rub  Pulses are no weak pulses  No murmur heard  Pulses:       Dorsalis pedis pulses are 2+ on the right side, and 2+ on the left side          Posterior tibial pulses are 2+ on the right side, and 2+ on the left side  Pulmonary/Chest: Effort normal and breath sounds normal    Abdominal: Soft  Bowel sounds are normal    Musculoskeletal: Normal range of motion  She exhibits no edema or tenderness  Feet:   Right Foot:   Skin Integrity: Negative for ulcer, skin breakdown, erythema, warmth, callus or dry skin  Left Foot:   Skin Integrity: Negative for ulcer, skin breakdown, erythema, warmth, callus or dry skin  Neurological: She is alert and oriented to person, place, and time  Skin: Skin is warm and dry  Psychiatric: She has a normal mood and affect  Nursing note and vitals reviewed  Patient's shoes and socks removed  Right Foot/Ankle   Right Foot Inspection  Skin Exam: skin normal and skin intact no dry skin, no warmth, no callus, no erythema, no maceration, no abnormal color, no pre-ulcer, no ulcer and no callus                          Toe Exam: ROM and strength within normal limits  Sensory       Monofilament testing: intact  Vascular  Capillary refills: < 3 seconds  The right DP pulse is 2+  The right PT pulse is 2+  Left Foot/Ankle  Left Foot Inspection  Skin Exam: skin normal and skin intactno dry skin, no warmth, no erythema, no maceration, normal color, no pre-ulcer, no ulcer and no callus                         Toe Exam: ROM and strength within normal limits                   Sensory       Monofilament: intact  Vascular  Capillary refills: < 3 seconds  The left DP pulse is 2+  The left PT pulse is 2+  Assign Risk Category:  No deformity present; No loss of protective sensation; No weak pulses       Risk: 0    BMI Counseling: Body mass index is 41 15 kg/m²  Discussed the patient's BMI with her  The BMI is above average  BMI counseling and education was provided to the patient  Nutrition recommendations include reducing portion sizes

## 2019-07-19 DIAGNOSIS — E78.5 HYPERLIPIDEMIA, UNSPECIFIED HYPERLIPIDEMIA TYPE: Primary | ICD-10-CM

## 2019-07-19 RX ORDER — SIMVASTATIN 20 MG
20 TABLET ORAL
Qty: 30 TABLET | Refills: 5 | Status: SHIPPED | OUTPATIENT
Start: 2019-07-19 | End: 2019-08-07 | Stop reason: SDUPTHER

## 2019-07-24 ENCOUNTER — HOSPITAL ENCOUNTER (EMERGENCY)
Facility: HOSPITAL | Age: 39
Discharge: HOME | End: 2019-07-24
Attending: EMERGENCY MEDICINE
Payer: COMMERCIAL

## 2019-07-24 ENCOUNTER — APPOINTMENT (EMERGENCY)
Dept: CT IMAGING | Facility: HOSPITAL | Age: 39
End: 2019-07-24
Payer: COMMERCIAL

## 2019-07-24 VITALS
WEIGHT: 222 LBS | BODY MASS INDEX: 40.85 KG/M2 | SYSTOLIC BLOOD PRESSURE: 138 MMHG | HEART RATE: 92 BPM | DIASTOLIC BLOOD PRESSURE: 92 MMHG | RESPIRATION RATE: 18 BRPM | HEIGHT: 62 IN | TEMPERATURE: 97.8 F | OXYGEN SATURATION: 98 %

## 2019-07-24 DIAGNOSIS — D73.89 SPLENIC LESION: ICD-10-CM

## 2019-07-24 DIAGNOSIS — R10.11 RUQ PAIN: Primary | ICD-10-CM

## 2019-07-24 LAB
ALBUMIN SERPL BCP-MCNC: 4.4 G/DL (ref 3.5–5.7)
ALP SERPL-CCNC: 63 U/L (ref 40–150)
ALT SERPL W P-5'-P-CCNC: 35 U/L (ref 7–52)
ANION GAP SERPL CALCULATED.3IONS-SCNC: 7 MMOL/L (ref 4–13)
AST SERPL W P-5'-P-CCNC: 22 U/L (ref 13–39)
BACTERIA UR QL AUTO: ABNORMAL /HPF
BASOPHILS # BLD AUTO: 0 THOUSANDS/ΜL (ref 0–0.1)
BASOPHILS NFR BLD AUTO: 1 % (ref 0–2)
BILIRUB SERPL-MCNC: 0.6 MG/DL (ref 0.2–1)
BILIRUB UR QL STRIP: NEGATIVE
BUN SERPL-MCNC: 16 MG/DL (ref 7–25)
CALCIUM SERPL-MCNC: 9.5 MG/DL (ref 8.6–10.5)
CHLORIDE SERPL-SCNC: 103 MMOL/L (ref 98–107)
CLARITY UR: CLEAR
CO2 SERPL-SCNC: 28 MMOL/L (ref 21–31)
COLOR UR: YELLOW
CREAT SERPL-MCNC: 0.7 MG/DL (ref 0.6–1.2)
EOSINOPHIL # BLD AUTO: 0.2 THOUSAND/ΜL (ref 0–0.61)
EOSINOPHIL NFR BLD AUTO: 3 % (ref 0–5)
ERYTHROCYTE [DISTWIDTH] IN BLOOD BY AUTOMATED COUNT: 13.9 % (ref 11.5–14.5)
GFR SERPL CREATININE-BSD FRML MDRD: 110 ML/MIN/1.73SQ M
GLUCOSE SERPL-MCNC: 99 MG/DL (ref 65–99)
GLUCOSE UR STRIP-MCNC: NEGATIVE MG/DL
HCT VFR BLD AUTO: 38.8 % (ref 42–47)
HGB BLD-MCNC: 13.4 G/DL (ref 12–16)
HGB UR QL STRIP.AUTO: ABNORMAL
KETONES UR STRIP-MCNC: NEGATIVE MG/DL
LEUKOCYTE ESTERASE UR QL STRIP: NEGATIVE
LIPASE SERPL-CCNC: 25 U/L (ref 11–82)
LYMPHOCYTES # BLD AUTO: 2 THOUSANDS/ΜL (ref 0.6–4.47)
LYMPHOCYTES NFR BLD AUTO: 30 % (ref 21–51)
MCH RBC QN AUTO: 30.4 PG (ref 26–34)
MCHC RBC AUTO-ENTMCNC: 34.6 G/DL (ref 31–37)
MCV RBC AUTO: 88 FL (ref 81–99)
MONOCYTES # BLD AUTO: 0.5 THOUSAND/ΜL (ref 0.17–1.22)
MONOCYTES NFR BLD AUTO: 8 % (ref 2–12)
NEUTROPHILS # BLD AUTO: 3.7 THOUSANDS/ΜL (ref 1.4–6.5)
NEUTS SEG NFR BLD AUTO: 58 % (ref 42–75)
NITRITE UR QL STRIP: NEGATIVE
NON-SQ EPI CELLS URNS QL MICRO: ABNORMAL /HPF
PH UR STRIP.AUTO: 5.5 [PH]
PLATELET # BLD AUTO: 273 THOUSANDS/UL (ref 149–390)
PMV BLD AUTO: 7.6 FL (ref 8.6–11.7)
POTASSIUM SERPL-SCNC: 4.1 MMOL/L (ref 3.5–5.5)
PROT SERPL-MCNC: 7.2 G/DL (ref 6.4–8.9)
PROT UR STRIP-MCNC: NEGATIVE MG/DL
RBC # BLD AUTO: 4.42 MILLION/UL (ref 3.9–5.2)
RBC #/AREA URNS AUTO: ABNORMAL /HPF
SODIUM SERPL-SCNC: 138 MMOL/L (ref 134–143)
SP GR UR STRIP.AUTO: >=1.03 (ref 1–1.03)
UROBILINOGEN UR QL STRIP.AUTO: 0.2 E.U./DL
WBC # BLD AUTO: 6.5 THOUSAND/UL (ref 4.8–10.8)
WBC #/AREA URNS AUTO: ABNORMAL /HPF

## 2019-07-24 PROCEDURE — 81001 URINALYSIS AUTO W/SCOPE: CPT | Performed by: PHYSICIAN ASSISTANT

## 2019-07-24 PROCEDURE — 81003 URINALYSIS AUTO W/O SCOPE: CPT | Performed by: PHYSICIAN ASSISTANT

## 2019-07-24 PROCEDURE — 85025 COMPLETE CBC W/AUTO DIFF WBC: CPT | Performed by: PHYSICIAN ASSISTANT

## 2019-07-24 PROCEDURE — 96374 THER/PROPH/DIAG INJ IV PUSH: CPT

## 2019-07-24 PROCEDURE — 36415 COLL VENOUS BLD VENIPUNCTURE: CPT | Performed by: PHYSICIAN ASSISTANT

## 2019-07-24 PROCEDURE — 74177 CT ABD & PELVIS W/CONTRAST: CPT

## 2019-07-24 PROCEDURE — 80053 COMPREHEN METABOLIC PANEL: CPT | Performed by: PHYSICIAN ASSISTANT

## 2019-07-24 PROCEDURE — 99284 EMERGENCY DEPT VISIT MOD MDM: CPT

## 2019-07-24 PROCEDURE — 83690 ASSAY OF LIPASE: CPT | Performed by: PHYSICIAN ASSISTANT

## 2019-07-24 RX ORDER — KETOROLAC TROMETHAMINE 30 MG/ML
30 INJECTION, SOLUTION INTRAMUSCULAR; INTRAVENOUS ONCE
Status: COMPLETED | OUTPATIENT
Start: 2019-07-24 | End: 2019-07-24

## 2019-07-24 RX ADMIN — IOHEXOL 100 ML: 350 INJECTION, SOLUTION INTRAVENOUS at 16:38

## 2019-07-24 RX ADMIN — KETOROLAC TROMETHAMINE 30 MG: 30 INJECTION, SOLUTION INTRAMUSCULAR; INTRAVENOUS at 16:02

## 2019-07-24 NOTE — ED PROVIDER NOTES
History  Chief Complaint   Patient presents with    Abdominal Pain     Patient presents with intermittent right upper quadrant pain for the past week  Patient states pain began last night at work and has been constant since, currently 6/10, does not radiate, took Tylenol without relief, no provocation or palliation noted  Patient also admits to loose stool this morning  Last meal was 7:00 a m  Today  History provided by:  Patient  Abdominal Pain   Pain location:  RUQ  Pain radiates to:  Does not radiate  Pain severity:  Moderate  Onset quality:  Gradual  Duration:  1 week  Timing:  Intermittent  Progression:  Worsening  Chronicity:  New  Relieved by:  Nothing  Worsened by:  Nothing  Ineffective treatments:  OTC medications  Associated symptoms: no chest pain, no chills, no constipation, no cough, no diarrhea, no dysuria, no fever, no hematemesis, no hematochezia, no hematuria, no melena, no nausea, no shortness of breath, no sore throat and no vomiting        Prior to Admission Medications   Prescriptions Last Dose Informant Patient Reported? Taking? ALPRAZolam (XANAX) 0 25 mg tablet More than a month at Unknown time  Yes No   Sig: Take 0 25 mg by mouth as needed     AURELIO MICROLET LANCETS lancets Past Week at Unknown time  Yes Yes   Sig: by Does not apply route daily   Blood Glucose Monitoring Suppl ([employer] CONTOUR MONITOR) w/Device KIT Past Week at Unknown time  Yes Yes   Sig: by Does not apply route   Glucose Blood (AURELIO CONTOUR TEST VI)   Yes No   Sig: by In Vitro route daily   Multiple Vitamin (MULTIVITAMINS PO) 7/24/2019 at Unknown time  Yes Yes   Sig: Take by mouth   anastrozole (ARIMIDEX) 1 mg tablet 7/24/2019 at Unknown time  Yes Yes   Sig: Take 1 mg by mouth daily  calcium-vitamin D (OSCAL 500 + D) 500 mg-200 units per tablet 7/24/2019 at Unknown time  Yes Yes   Sig: Take 1 tablet by mouth daily     diclofenac sodium (VOLTAREN) 1 %   Yes No   Sig: Apply 1 application topically ergocalciferol (VITAMIN D2) 50,000 units 7/24/2019 at Unknown time  No Yes   Sig: Take 1 capsule (50,000 Units total) by mouth once a week for 30 days   levothyroxine 75 mcg tablet 7/24/2019 at Unknown time  No Yes   Sig: Take 1 tablet (75 mcg total) by mouth daily   metFORMIN (GLUCOPHAGE) 500 mg tablet 7/23/2019 at Unknown time  No Yes   Sig: Take 1 tablet (500 mg total) by mouth daily for 180 days   nystatin (MYCOSTATIN) powder 7/24/2019 at Unknown time  Yes Yes   simvastatin (ZOCOR) 20 mg tablet 7/23/2019 at Unknown time  No Yes   Sig: Take 1 tablet (20 mg total) by mouth daily at bedtime      Facility-Administered Medications: None       Past Medical History:   Diagnosis Date    Allergy     Anxiety     Arthritis     [facility] ([facility] )     Candidiasis, cutaneous     Cervical cancer ([facility] )     Depression     Diabetes mellitus (HCC)     Disorder of urinary tract     Fatty liver     Hemorrhoids     History of radiation therapy     Obesity        Past Surgical History:   Procedure Laterality Date    BILATERAL SALPINGOOPHORECTOMY      BREAST BIOPSY      needle core    GASTRIC BYPASS LAPAROSCOPIC      Onset: 1/26/15    HYSTERECTOMY  04/07/2014    MASTECTOMY  12/19/2013       Family History   Problem Relation Age of Onset    Diabetes Mother     Diabetes Father     Ovarian cancer Sister 29    Hypertension Maternal Grandfather     Hodgkin's lymphoma Paternal Grandmother     Breast cancer Family     Diabetes Family     Hypertension Family     Uterine cancer Maternal Aunt 64    Leukemia Son         chronic myeloid     I have reviewed and agree with the history as documented  Social History     Tobacco Use    Smoking status: Former Smoker    Smokeless tobacco: Never Used   Substance Use Topics    Alcohol use: No    Drug use: No        Review of Systems   Constitutional: Negative for chills and fever  HENT: Negative for congestion, rhinorrhea and sore throat      Eyes: Negative for visual disturbance  Respiratory: Negative for cough and shortness of breath  Cardiovascular: Negative for chest pain  Gastrointestinal: Positive for abdominal pain  Negative for constipation, diarrhea, hematemesis, hematochezia, melena, nausea and vomiting  Genitourinary: Negative for dysuria and hematuria  Musculoskeletal: Negative for back pain  Skin: Negative for rash  Neurological: Negative for headaches  Physical Exam  Physical Exam   Constitutional: She is oriented to person, place, and time  She appears well-developed and well-nourished  HENT:   Head: Normocephalic and atraumatic  Mouth/Throat: Oropharynx is clear and moist    Eyes: EOM are normal    Cardiovascular: Normal rate, regular rhythm and normal heart sounds  Pulmonary/Chest: Effort normal and breath sounds normal    Abdominal: Soft  Normal appearance and bowel sounds are normal  There is tenderness in the right upper quadrant and right lower quadrant  Neurological: She is alert and oriented to person, place, and time  Skin: Skin is warm and dry  Capillary refill takes less than 2 seconds  Psychiatric: She has a normal mood and affect  Her behavior is normal    Nursing note and vitals reviewed        Vital Signs  ED Triage Vitals [07/24/19 1514]   Temperature Pulse Respirations Blood Pressure SpO2   97 8 °F (36 6 °C) 92 18 138/92 98 %      Temp Source Heart Rate Source Patient Position - Orthostatic VS BP Location FiO2 (%)   Temporal Monitor Sitting Right arm --      Pain Score       6           Vitals:    07/24/19 1514   BP: 138/92   Pulse: 92   Patient Position - Orthostatic VS: Sitting         Visual Acuity      ED Medications  Medications   ketorolac (TORADOL) injection 30 mg (30 mg Intravenous Given 7/24/19 1602)   iohexol (OMNIPAQUE) 350 MG/ML injection (MULTI-DOSE) 100 mL (100 mL Intravenous Given 7/24/19 1638)       Diagnostic Studies  Results Reviewed     Procedure Component Value Units Date/Time    Urine Microscopic [[id number]]  (Abnormal) Collected:  07/24/19 1608    Lab Status:  Final result Specimen:  Urine, Clean Catch Updated:  07/24/19 1634     RBC, UA 1-2 /hpf      WBC, UA 2-4 /hpf      Epithelial Cells Occasional /hpf      Bacteria, UA Occasional /hpf     UA w Reflex to Microscopic w Reflex to Culture [[id number]]  (Abnormal) Collected:  07/24/19 1608    Lab Status:  Final result Specimen:  Urine, Clean Catch Updated:  07/24/19 1621     Color, UA Yellow     Clarity, UA Clear     Specific Gravity, UA >=1 030     pH, UA 5 5     Leukocytes, UA Negative     Nitrite, UA Negative     Protein, UA Negative mg/dl      Glucose, UA Negative mg/dl      Ketones, UA Negative mg/dl      Urobilinogen, UA 0 2 E U /dl      Bilirubin, UA Negative     Blood, UA 1+    Lipase [[id number]]  (Normal) Collected:  07/24/19 1543    Lab Status:  Final result Specimen:  Blood from Arm, Right Updated:  07/24/19 1604     Lipase 25 u/L     Comprehensive metabolic panel [[id number]] Collected:  07/24/19 1543    Lab Status:  Final result Specimen:  Blood from Arm, Right Updated:  07/24/19 1604     Sodium 138 mmol/L      Potassium 4 1 mmol/L      Chloride 103 mmol/L      CO2 28 mmol/L      ANION GAP 7 mmol/L      BUN 16 mg/dL      Creatinine 0 70 mg/dL      Glucose 99 mg/dL      Calcium 9 5 mg/dL      AST 22 U/L      ALT 35 U/L      Alkaline Phosphatase 63 U/L      Total Protein 7 2 g/dL      Albumin 4 4 g/dL      Total Bilirubin 0 60 mg/dL      eGFR 110 ml/min/1 73sq m     Narrative:       Eliana guidelines for Chronic Kidney Disease (CKD):     Stage 1 with normal or high GFR (GFR > 90 mL/min/1 73 square meters)    Stage 2 Mild CKD (GFR = 60-89 mL/min/1 73 square meters)    Stage 3A Moderate CKD (GFR = 45-59 mL/min/1 73 square meters)    Stage 3B Moderate CKD (GFR = 30-44 mL/min/1 73 square meters)    Stage 4 Severe CKD (GFR = 15-29 mL/min/1 73 square meters)    Stage 5 End Stage CKD (GFR <15 mL/min/1 73 square meters)  Note: GFR calculation is accurate only with a steady state creatinine    CBC and differential [[id number]]  (Abnormal) Collected:  07/24/19 1543    Lab Status:  Final result Specimen:  Blood from Arm, Right Updated:  07/24/19 1548     WBC 6 50 Thousand/uL      RBC 4 42 Million/uL      Hemoglobin 13 4 g/dL      Hematocrit 38 8 %      MCV 88 fL      MCH 30 4 pg      MCHC 34 6 g/dL      RDW 13 9 %      MPV 7 6 fL      Platelets 034 Thousands/uL      Neutrophils Relative 58 %      Lymphocytes Relative 30 %      Monocytes Relative 8 %      Eosinophils Relative 3 %      Basophils Relative 1 %      Neutrophils Absolute 3 70 Thousands/µL      Lymphocytes Absolute 2 00 Thousands/µL      Monocytes Absolute 0 50 Thousand/µL      Eosinophils Absolute 0 20 Thousand/µL      Basophils Absolute 0 00 Thousands/µL                  CT abdomen pelvis with contrast   Final Result by Marisa Parisi DO (07/24 1701)      No signs of bowel obstruction or inflammation  Hepatic steatosis  Prior gastric surgery  2 4 cm cystic mass arising from the medial aspect of the spleen with Hounsfield units of 20  This is nonspecific and may represent simple cyst or hemangioma  Recommend nonemergent ultrasound follow-up  The study was marked in [facility] for immediate notification  Workstation performed: [id number]                    Procedures  Procedures       ED Course  ED Course as of Jul 24 1718 Wed Jul 24, 2019   1715 Pain is 1/10 currently  Will discharge patient with GI follow-up                                    MDM    Disposition  Final diagnoses:   RUQ pain   Splenic lesion     Time reflects when diagnosis was documented in both MDM as applicable and the Disposition within this note     Time User Action Codes Description Comment    7/24/2019  5:16 PM Primo Barrera Add [R10 11] RUQ pain     7/24/2019  5:16 PM Primo Barrera Add [D73 9] Splenic lesion       ED Disposition     ED Disposition Condition Date/Time Comment    Discharge Stable Wed Jul 24, 2019  5:16 PM Bertha Beckett discharge to home/self care  Follow-up Information     Follow up With Specialties Details Why [address] Estrella MD Gastroenterology Schedule an appointment as soon as possible for a visit   [address] [place]  [address] [facility]  [phone]            Patient's Medications   Discharge Prescriptions    No medications on file     No discharge procedures on file      ED Provider  Electronically Signed by           Jyoti Cao PA-C  07/24/19 4073

## 2019-07-25 ENCOUNTER — TELEPHONE (OUTPATIENT)
Dept: INTERNAL MEDICINE CLINIC | Facility: CLINIC | Age: 39
End: 2019-07-25

## 2019-07-25 DIAGNOSIS — D73.4 SPLENIC CYST: Primary | ICD-10-CM

## 2019-07-25 NOTE — TELEPHONE ENCOUNTER
PT called back while I was waiting for your response and she already scheduled appt with Dr Cleve Barclay on Monday  Advise please

## 2019-07-25 NOTE — TELEPHONE ENCOUNTER
PT was in ER last night and they found a mass on her spleen  She has to f/u with Dr Chris Silverman (gastro) and she is requesting a referral  If you are ok with providing please put order in chart and I will leave her know she can schedule   Thanks

## 2019-07-26 ENCOUNTER — HOSPITAL ENCOUNTER (OUTPATIENT)
Dept: ULTRASOUND IMAGING | Facility: HOSPITAL | Age: 39
Discharge: HOME | End: 2019-07-26
Payer: COMMERCIAL

## 2019-07-26 DIAGNOSIS — D73.4 SPLENIC CYST: ICD-10-CM

## 2019-07-26 PROCEDURE — 76705 ECHO EXAM OF ABDOMEN: CPT

## 2019-07-31 ENCOUNTER — OFFICE VISIT (OUTPATIENT)
Dept: BARIATRICS | Facility: CLINIC | Age: 39
End: 2019-07-31
Payer: COMMERCIAL

## 2019-07-31 VITALS
BODY MASS INDEX: 42.95 KG/M2 | WEIGHT: 233.4 LBS | HEIGHT: 62 IN | SYSTOLIC BLOOD PRESSURE: 114 MMHG | RESPIRATION RATE: 16 BRPM | DIASTOLIC BLOOD PRESSURE: 74 MMHG | HEART RATE: 86 BPM | TEMPERATURE: 98.2 F

## 2019-07-31 DIAGNOSIS — K91.2 POSTSURGICAL MALABSORPTION: ICD-10-CM

## 2019-07-31 DIAGNOSIS — Z98.84 BARIATRIC SURGERY STATUS: Primary | ICD-10-CM

## 2019-07-31 DIAGNOSIS — E66.01 OBESITY, CLASS III, BMI 40-49.9 (MORBID OBESITY) (HCC): ICD-10-CM

## 2019-07-31 PROCEDURE — 99214 OFFICE O/P EST MOD 30 MIN: CPT | Performed by: PHYSICIAN ASSISTANT

## 2019-07-31 NOTE — PROGRESS NOTES
Assessment/Plan:     Patient ID: Pepe William is a 46 y o  female seen today at [facility]  Bariatric Surgery Status    · Continued/Maintain healthy weight loss with good nutrition intakes  · Adequate hydration with at least 64oz  fluid intake  · Follow diet as discussed  · Follow vitamin and mineral recommendations as reviewed with you  · Exercise as tolerated  · Follow-up in 1 month after studies completed  We kindly ask that your arrive 15 minutes before your scheduled appointment time with your provider to allow our staff to room you, get your vital signs and update your chart  · Get lab work done prior to annual visit  Please call the office if you need a script  It is recommended to check with your insurance BEFORE getting labs done to make sure they are covered by your policy  · Call our office if you have any problems with abdominal pain especially associated with fever, chills, nausea, vomiting or any other concerns  · All  Post-bariatric surgery patients should be aware that very small quantities of any alcohol can cause impairment and it is very possible not to feel the effect  The effect can be in the system for several hours  It is also a stomach irritant  · It is advised to AVOID alcohol, Nonsteroidal antiinflammatory drugs (NSAIDS) and nicotine of all forms   Any of these can cause stomach irritation/pain  · Keep up the good work!      Postsurgical Malabsorption   -At risk for malabsorption of vitamins/minerals secondary to malabsorption and restriction of intake from bariatric surgery  -Currently taking adequate postop bariatric surgery vitamin supplementation  -Next set of bariatric labs ordered for approximately 2 weeks  -Patient received education about the importance of adhering to a lifelong supplementation regimen to avoid vitamin/mineral deficiencies     Obesity Class III, BMI 40-49 9 ([facility] [address] )   - Meet with dietitian and  to review healthy lifestyle choices  - Follow dietary suggestions as discussed  - Increase physical exercise  - UGI and EGD to evaluate sleeve anatomy; rule out non-excluded fundus, fistula, ulcer   - After meeting with dietitian and  patient informed she may be referred to Medical Weight Management if she chooses  - Also discussed option of a revision but patient needs to have time to consider this option because she is not interested in another surgery at this time     Subjective:      Patient ID: Anthony Phelps is a 46 y o  female  -s/p Vertical Sleeve Gastrectomy with Dr Walt Yancey on 1/26/2015  Presents to the office today for routine follow up  Tolerating diet without issues; denies N/V, dysphagia, reflux  Overall doing Poor  Patient states that she has failed to lose any significant weight since surgery  Lost a total since surgery 20lbs and has regained all of her weight back  Does not exercise  Still has restriction in diet  If she were to order a large pizza, could only tolerate 1 slice  Initial: 222  Current: 223 4  EWL: -6%  Fabrice: 180  Current BMI is Body mass index is 42 69 kg/m²  · Tolerating a regular diet-yes  · Eating at least 60 grams of protein per day-yes  · Following 30/60 minute rule with liquids-no  · Drinking at least 64 ounces of fluid per day-no  · Drinking carbonated beverages-yes  · Sufficient exercise-no  · Using NSAIDs regularly-no  · Using nicotine-no  · Using alcohol-no  · Supplements: Multivitamin patch     · EWL is -6%    The following portions of the patient's history were reviewed and updated as appropriate: allergies, current medications, past family history, past medical history, past social history, past surgical history and problem list     Review of Systems   Constitutional: Positive for fatigue  Negative for chills, fever and unexpected weight change  HENT: Negative for trouble swallowing  Eyes: Negative for visual disturbance     Respiratory: Negative for apnea, cough and choking  Cardiovascular: Negative for chest pain and leg swelling  Gastrointestinal: Negative for abdominal pain, blood in stool, constipation, diarrhea, nausea and vomiting  Neurological: Negative for dizziness, syncope, weakness and numbness  Psychiatric/Behavioral: Negative  Objective:    /74 (BP Location: Left arm, Patient Position: Sitting, Cuff Size: Large)   Pulse 86   Temp 98 2 °F (36 8 °C) (Temporal)   Resp 16   Ht 5' 2" (1 575 m)   Wt 106 kg (233 lb 6 4 oz)   BMI 42 69 kg/m²      Physical Exam   Constitutional: She is oriented to person, place, and time  She appears well-developed and well-nourished  No distress  HENT:   Head: Normocephalic and atraumatic  Eyes: Conjunctivae and EOM are normal  No scleral icterus  Neck: Normal range of motion  Neck supple  Cardiovascular: Normal rate and normal heart sounds  No murmur heard  Pulmonary/Chest: Effort normal and breath sounds normal  No respiratory distress  Abdominal: Soft  She exhibits no distension  There is no tenderness  Obese   Musculoskeletal: Normal range of motion  Neurological: She is alert and oriented to person, place, and time  Skin: Skin is warm and dry  Psychiatric: She has a normal mood and affect  Her behavior is normal    Vitals reviewed

## 2019-08-07 ENCOUNTER — OFFICE VISIT (OUTPATIENT)
Dept: INTERNAL MEDICINE CLINIC | Facility: CLINIC | Age: 39
End: 2019-08-07
Payer: COMMERCIAL

## 2019-08-07 VITALS
HEIGHT: 62 IN | RESPIRATION RATE: 14 BRPM | TEMPERATURE: 98.7 F | DIASTOLIC BLOOD PRESSURE: 78 MMHG | WEIGHT: 234 LBS | BODY MASS INDEX: 43.06 KG/M2 | HEART RATE: 88 BPM | SYSTOLIC BLOOD PRESSURE: 114 MMHG

## 2019-08-07 DIAGNOSIS — Z00.00 HEALTHCARE MAINTENANCE: ICD-10-CM

## 2019-08-07 DIAGNOSIS — D73.4 SPLENIC CYST: ICD-10-CM

## 2019-08-07 DIAGNOSIS — E66.01 MORBID OBESITY WITH BMI OF 40.0-44.9, ADULT (HCC): ICD-10-CM

## 2019-08-07 DIAGNOSIS — E78.5 HYPERLIPIDEMIA, UNSPECIFIED HYPERLIPIDEMIA TYPE: ICD-10-CM

## 2019-08-07 DIAGNOSIS — Z23 ENCOUNTER FOR VACCINATION: Primary | ICD-10-CM

## 2019-08-07 PROCEDURE — 99395 PREV VISIT EST AGE 18-39: CPT | Performed by: INTERNAL MEDICINE

## 2019-08-07 PROCEDURE — 86580 TB INTRADERMAL TEST: CPT | Performed by: INTERNAL MEDICINE

## 2019-08-07 RX ORDER — SIMVASTATIN 40 MG
40 TABLET ORAL
Qty: 90 TABLET | Refills: 1 | Status: CANCELLED | OUTPATIENT
Start: 2019-08-07

## 2019-08-07 RX ORDER — SIMVASTATIN 40 MG
40 TABLET ORAL
Qty: 90 TABLET | Refills: 1 | Status: SHIPPED | OUTPATIENT
Start: 2019-08-07 | End: 2019-11-12

## 2019-08-07 NOTE — PATIENT INSTRUCTIONS
Obesity   AMBULATORY CARE:   Obesity  is when your body mass index (BMI) is greater than 30  Your healthcare provider will use your height and weight to measure your BMI  The risks of obesity include  many health problems, such as injuries or physical disability  You may need tests to check for the following:  · Diabetes     · High blood pressure or high cholesterol     · Heart disease     · Gallbladder or liver disease     · Cancer of the colon, breast, prostate, liver, or kidney     · Sleep apnea     · Arthritis or gout  Seek care immediately if:   · You have a severe headache, confusion, or difficulty speaking  · You have weakness on one side of your body  · You have chest pain, sweating, or shortness of breath  Contact your healthcare provider if:   · You have symptoms of gallbladder or liver disease, such as pain in your upper abdomen  · You have knee or hip pain and discomfort while walking  · You have symptoms of diabetes, such as intense hunger and thirst, and frequent urination  · You have symptoms of sleep apnea, such as snoring or daytime sleepiness  · You have questions or concerns about your condition or care  Treatment for obesity  focuses on helping you lose weight to improve your health  Even a small decrease in BMI can reduce the risk for many health problems  Your healthcare provider will help you set a weight-loss goal   · Lifestyle changes  are the first step in treating obesity  These include making healthy food choices and getting regular physical activity  Your healthcare provider may suggest a weight-loss program that involves coaching, education, and therapy  · Medicine  may help you lose weight when it is used with a healthy diet and physical activity  · Surgery  can help you lose weight if you are very obese and have other health problems  There are several types of weight-loss surgery  Ask your healthcare provider for more information    Be successful losing weight:   · Set small, realistic goals  An example of a small goal is to walk for 20 minutes 5 days a week  Anther goal is to lose 5% of your body weight  · Tell friends, family members, and coworkers about your goals  and ask for their support  Ask a friend to lose weight with you, or join a weight-loss support group  · Identify foods or triggers that may cause you to overeat , and find ways to avoid them  Remove tempting high-calorie foods from your home and workplace  Place a bowl of fresh fruit on your kitchen counter  If stress causes you to eat, then find other ways to cope with stress  · Keep a diary to track what you eat and drink  Also write down how many minutes of physical activity you do each day  Weigh yourself once a week and record it in your diary  Eating changes: You will need to eat 500 to 1,000 fewer calories each day than you currently eat to lose 1 to 2 pounds a week  The following changes will help you cut calories:  · Eat smaller portions  Use small plates, no larger than 9 inches in diameter  Fill your plate half full of fruits and vegetables  Measure your food using measuring cups until you know what a serving size looks like  · Eat 3 meals and 1 or 2 snacks each day  Plan your meals in advance  Debe Comp and eat at home most of the time  Eat slowly  · Eat fruits and vegetables at every meal   They are low in calories and high in fiber, which makes you feel full  Do not add butter, margarine, or cream sauce to vegetables  Use herbs to season steamed vegetables  · Eat less fat and fewer fried foods  Eat more baked or grilled chicken and fish  These protein sources are lower in calories and fat than red meat  Limit fast food  Dress your salads with olive oil and vinegar instead of bottled dressing  · Limit the amount of sugar you eat  Do not drink sugary beverages  Limit alcohol  Activity changes:  Physical activity is good for your body in many ways   It helps you burn calories and build strong muscles  It decreases stress and depression, and improves your mood  It can also help you sleep better  Talk to your healthcare provider before you begin an exercise program   · Exercise for at least 30 minutes 5 days a week  Start slowly  Set aside time each day for physical activity that you enjoy and that is convenient for you  It is best to do both weight training and an activity that increases your heart rate, such as walking, bicycling, or swimming  · Find ways to be more active  Do yard work and housecleaning  Walk up the stairs instead of using elevators  Spend your leisure time going to events that require walking, such as outdoor festivals or fairs  This extra physical activity can help you lose weight and keep it off  Follow up with your healthcare provider as directed: You may need to meet with a dietitian  Write down your questions so you remember to ask them during your visits  © 2017 [facility] Information is for End User's use only and may not be sold, redistributed or otherwise used for commercial purposes  All illustrations and images included in CareNotes® are the copyrighted property of [employer]  or [employer]olph Luciano  The above information is an  only  It is not intended as medical advice for individual conditions or treatments  Talk to your doctor, nurse or pharmacist before following any medical regimen to see if it is safe and effective for you

## 2019-08-07 NOTE — PROGRESS NOTES
Assessment/Plan:       Diagnoses and all orders for this visit:    Encounter for vaccination  -     TB Skin Test    Healthcare maintenance    Hyperlipidemia, unspecified hyperlipidemia type  -     simvastatin (ZOCOR) 40 mg tablet; Take 1 tablet (40 mg total) by mouth daily at bedtime    Splenic cyst    Other orders  -     Cancel: simvastatin (ZOCOR) 40 mg tablet; Take 1 tablet (40 mg total) by mouth daily at bedtime        No problem-specific Assessment & Plan notes found for this encounter  We will follow up PRN  Subjective:      Patient ID: Grace Johnson is a 46 y o  female  We reviewed the patient's labs from Dr Vitaliy Dodson and noted no concerns  The US of the spleen noted a 2 cm simple cyst and we will follow up on this in 6 months  The following portions of the patient's history were reviewed and updated as appropriate:   She has a past medical history of Allergy, Anxiety, Arthritis, Breast cancer ([facility] [address] ), Candidiasis, cutaneous, Cervical cancer ([address] ), Depression, Diabetes mellitus ([facility] [address] ), Disorder of urinary tract, Fatty liver, Hemorrhoids, History of radiation therapy, and Obesity  ,  does not have any pertinent problems on file  ,   has a past surgical history that includes Breast biopsy; Mastectomy (Left, 12/19/2013); Hysterectomy (04/07/2014); Bilateral salpingoophorectomy; and GASTRECTOMY SLEEVE LAPAROSCOPIC ,  family history includes Breast cancer in her family; Diabetes in her family, father, and mother; Hodgkin's lymphoma in her paternal grandmother; Hypertension in her family and maternal grandfather; Leukemia in her son; Ovarian cancer (age of onset: 29) in her sister; Uterine cancer (age of onset: 64) in her maternal aunt  ,   reports that she quit smoking about 6 years ago  She has never used smokeless tobacco  She reports that she does not drink alcohol or use drugs  ,  is allergic to paclitaxel     Current Outpatient Medications   Medication Sig Dispense Refill    ALPRAZolam (XANAX) 0 25 mg tablet Take 0 25 mg by mouth as needed        anastrozole (ARIMIDEX) 1 mg tablet Take 1 mg by mouth daily   AURELIO MICROLET LANCETS lancets by Does not apply route daily      Blood Glucose Monitoring Suppl (R2G CONTOUR MONITOR) w/Device KIT by Does not apply route      calcium-vitamin D (OSCAL 500 + D) 500 mg-200 units per tablet Take 1 tablet by mouth daily   ergocalciferol (VITAMIN D2) 50,000 units Take 1 capsule (50,000 Units total) by mouth once a week for 30 days 4 capsule 0    Glucose Blood (AURELIO CONTOUR TEST VI) by In Vitro route daily      levothyroxine 75 mcg tablet Take 1 tablet (75 mcg total) by mouth daily 90 tablet 0    metFORMIN (GLUCOPHAGE) 500 mg tablet Take 1 tablet (500 mg total) by mouth daily for 180 days 30 tablet 5    Multiple Vitamin (MULTIVITAMINS PO) Take by mouth      nystatin (MYCOSTATIN) powder   2    simvastatin (ZOCOR) 40 mg tablet Take 1 tablet (40 mg total) by mouth daily at bedtime 90 tablet 1    diclofenac sodium (VOLTAREN) 1 % Apply 1 application topically       No current facility-administered medications for this visit  Review of Systems   Constitutional: Negative  HENT: Negative  Respiratory: Negative  Cardiovascular: Negative  Gastrointestinal: Negative  Genitourinary: Negative  Musculoskeletal: Negative  Skin: Negative  Neurological: Negative  Hematological: Negative  Psychiatric/Behavioral: Negative  Objective:  Vitals:    08/07/19 0907   BP: 114/78   Pulse: 88   Resp: 14   Temp: 98 7 °F (37 1 °C)   TempSrc: Tympanic   Weight: 106 kg (234 lb)   Height: 5' 2" (1 575 m)     Body mass index is 42 8 kg/m²  Physical Exam   Constitutional: She is oriented to person, place, and time  She appears well-developed  HENT:   Head: Normocephalic  Eyes: Pupils are equal, round, and reactive to light  EOM are normal    Neck: Normal range of motion  Neck supple     Cardiovascular: Normal rate, regular rhythm, normal heart sounds and intact distal pulses  Exam reveals no gallop and no friction rub  No murmur heard  Pulmonary/Chest: Effort normal and breath sounds normal  No stridor  No respiratory distress  She has no wheezes  Abdominal: Soft  Bowel sounds are normal  She exhibits no distension  There is no tenderness  There is no guarding  Musculoskeletal: Normal range of motion  She exhibits no edema or tenderness  Neurological: She is alert and oriented to person, place, and time  No cranial nerve deficit  Skin: Skin is warm and dry  Psychiatric: She has a normal mood and affect  Nursing note and vitals reviewed  BMI Counseling: Body mass index is 42 8 kg/m²  Discussed the patient's BMI with her  The BMI is above average  BMI counseling and education was provided to the patient  Referral to bariatric surgery was provided to the patient

## 2019-08-09 ENCOUNTER — CLINICAL SUPPORT (OUTPATIENT)
Dept: INTERNAL MEDICINE CLINIC | Facility: CLINIC | Age: 39
End: 2019-08-09

## 2019-08-09 ENCOUNTER — HOSPITAL ENCOUNTER (OUTPATIENT)
Dept: RADIOLOGY | Facility: HOSPITAL | Age: 39
Discharge: HOME | End: 2019-08-09
Payer: COMMERCIAL

## 2019-08-09 DIAGNOSIS — Z11.1 ENCOUNTER FOR PPD SKIN TEST READING: Primary | ICD-10-CM

## 2019-08-09 DIAGNOSIS — E66.01 OBESITY, CLASS III, BMI 40-49.9 (MORBID OBESITY) (HCC): ICD-10-CM

## 2019-08-09 DIAGNOSIS — Z98.84 BARIATRIC SURGERY STATUS: ICD-10-CM

## 2019-08-09 LAB
INDURATION: 0 MM
TB SKIN TEST: NEGATIVE

## 2019-08-09 PROCEDURE — 74240 X-RAY XM UPR GI TRC 1CNTRST: CPT

## 2019-08-20 ENCOUNTER — CLINICAL SUPPORT (OUTPATIENT)
Dept: INTERNAL MEDICINE CLINIC | Facility: CLINIC | Age: 39
End: 2019-08-20
Payer: COMMERCIAL

## 2019-08-20 DIAGNOSIS — Z11.1 ENCOUNTER FOR PPD TEST: Primary | ICD-10-CM

## 2019-08-20 PROCEDURE — 86580 TB INTRADERMAL TEST: CPT

## 2019-08-22 ENCOUNTER — CLINICAL SUPPORT (OUTPATIENT)
Dept: INTERNAL MEDICINE CLINIC | Facility: CLINIC | Age: 39
End: 2019-08-22

## 2019-08-22 DIAGNOSIS — Z11.1 ENCOUNTER FOR PPD SKIN TEST READING: Primary | ICD-10-CM

## 2019-08-22 LAB
INDURATION: 0 MM
TB SKIN TEST: NEGATIVE

## 2019-09-11 ENCOUNTER — HOSPITAL ENCOUNTER (OUTPATIENT)
Dept: GASTROENTEROLOGY | Facility: HOSPITAL | Age: 39
Setting detail: OUTPATIENT SURGERY
Discharge: HOME | End: 2019-09-11
Attending: SURGERY | Admitting: SURGERY
Payer: COMMERCIAL

## 2019-09-11 ENCOUNTER — ANESTHESIA EVENT (OUTPATIENT)
Dept: GASTROENTEROLOGY | Facility: HOSPITAL | Age: 39
End: 2019-09-11

## 2019-09-11 ENCOUNTER — ANESTHESIA (OUTPATIENT)
Dept: GASTROENTEROLOGY | Facility: HOSPITAL | Age: 39
End: 2019-09-11

## 2019-09-11 VITALS
BODY MASS INDEX: 41.59 KG/M2 | HEART RATE: 80 BPM | TEMPERATURE: 98.1 F | RESPIRATION RATE: 20 BRPM | DIASTOLIC BLOOD PRESSURE: 78 MMHG | SYSTOLIC BLOOD PRESSURE: 114 MMHG | OXYGEN SATURATION: 96 % | WEIGHT: 226 LBS | HEIGHT: 62 IN

## 2019-09-11 DIAGNOSIS — Z98.84 BARIATRIC SURGERY STATUS: ICD-10-CM

## 2019-09-11 PROCEDURE — 43239 EGD BIOPSY SINGLE/MULTIPLE: CPT | Performed by: SURGERY

## 2019-09-11 PROCEDURE — 88305 TISSUE EXAM BY PATHOLOGIST: CPT | Performed by: PATHOLOGY

## 2019-09-11 RX ORDER — SODIUM CHLORIDE 9 MG/ML
125 INJECTION, SOLUTION INTRAVENOUS CONTINUOUS
Status: DISCONTINUED | OUTPATIENT
Start: 2019-09-11 | End: 2019-09-15 | Stop reason: HOSPADM

## 2019-09-11 RX ORDER — LIDOCAINE HYDROCHLORIDE 20 MG/ML
INJECTION, SOLUTION EPIDURAL; INFILTRATION; INTRACAUDAL; PERINEURAL AS NEEDED
Status: DISCONTINUED | OUTPATIENT
Start: 2019-09-11 | End: 2019-09-11 | Stop reason: SURG

## 2019-09-11 RX ORDER — PROPOFOL 10 MG/ML
INJECTION, EMULSION INTRAVENOUS AS NEEDED
Status: DISCONTINUED | OUTPATIENT
Start: 2019-09-11 | End: 2019-09-11 | Stop reason: SURG

## 2019-09-11 RX ADMIN — PROPOFOL 50 MG: 10 INJECTION, EMULSION INTRAVENOUS at 07:39

## 2019-09-11 RX ADMIN — SODIUM CHLORIDE 125 ML/HR: 0.9 INJECTION, SOLUTION INTRAVENOUS at 06:48

## 2019-09-11 RX ADMIN — PROPOFOL 150 MG: 10 INJECTION, EMULSION INTRAVENOUS at 07:36

## 2019-09-11 RX ADMIN — LIDOCAINE HYDROCHLORIDE 60 MG: 20 INJECTION, SOLUTION EPIDURAL; INFILTRATION; INTRACAUDAL; PERINEURAL at 07:36

## 2019-09-11 NOTE — PROGRESS NOTES
D/C instructions given and pt verbalizes understanding  Dr Cadence Dorado was here to talk with pt  OOB to ambulate, gait steady denies dizziness

## 2019-09-11 NOTE — ANESTHESIA PREPROCEDURE EVALUATION
Review of Systems/Medical History      History of anesthetic complications PONV    Cardiovascular  Hyperlipidemia,    Pulmonary       GI/Hepatic    Liver disease , Bariatric surgery,             Endo/Other  Diabetes Oral agent, History of thyroid disease , hypothyroidism,   Obesity    GYN    Breast cancer        Hematology   Musculoskeletal    Arthritis     Neurology   Psychology   Anxiety, Depression ,              Physical Exam    Airway    Mallampati score: II    Neck ROM: full     Dental   No notable dental hx     Cardiovascular  Rhythm: regular, Rate: normal,     Pulmonary  Breath sounds clear to auscultation,     Other Findings        Anesthesia Plan  ASA Score- 3     Anesthesia Type- IV sedation with anesthesia with ASA Monitors  Additional Monitors:   Airway Plan:         Plan Factors-  Patient did not smoke on day of surgery  Induction- intravenous  Postoperative Plan-     Informed Consent- Anesthetic plan and risks discussed with patient

## 2019-09-11 NOTE — DISCHARGE INSTRUCTIONS
Esophagitis   WHAT YOU NEED TO KNOW:   Esophagitis is inflammation or irritation of the lining of the esophagus  DISCHARGE INSTRUCTIONS:   Call 911 for any of the following:   · You have chest pain that does not go away within a few minutes or gets worse  Seek care immediately if:   · You feel like you have food stuck in your throat and you cannot cough it out  Contact your healthcare provider if:   · You have new or worsening symptoms, even after treatment  · You have questions or concerns about your condition or care  Medicines:   · Medicines  may be given to fight an infection or to control stomach acid  · Take your medicine as directed  Contact your healthcare provider if you think your medicine is not helping or if you have side effects  Tell him or her if you are allergic to any medicine  Keep a list of the medicines, vitamins, and herbs you take  Include the amounts, and when and why you take them  Bring the list or the pill bottles to follow-up visits  Carry your medicine list with you in case of an emergency  Follow up with your healthcare provider as directed: You may need ongoing tests or treatment  Write down your questions so you remember to ask them during your visits  Do not smoke:  Nicotine and other chemicals in cigarettes and cigars can cause blood vessel and lung damage  Ask your healthcare provider for information if you currently smoke and need help to quit  E-cigarettes or smokeless tobacco still contain nicotine  Talk to your healthcare provider before you use these products  Do not drink alcohol:  Alcohol can irritate your esophagus  Talk to your healthcare provider if you need help to stop drinking  Keep batteries and similar objects out of the reach of children:  Babies often put items in their mouths to explore them  Button batteries are easy to swallow and can cause serious damage   Keep the battery covers of electronic devices such as remote controls taped closed  Store all batteries and toxic materials where children cannot get to them  Use childproof locks to keep children away from dangerous materials  Manage or prevent esophagitis:   · Limit or do not eat foods that can lead to esophagitis  Foods such as oranges and salsa can irritate your esophagus  Caffeine and chocolate can cause acid reflux  High-fat and fried foods make your stomach digest food more slowly  This increases the amount of stomach acid your esophagus is exposed to  Eat small meals, and drink water with your meals  Soft foods such as yogurt and applesauce may help soothe your throat  Do not eat for at least 3 hours before you go to bed  · Prevent acid reflux  Do not bend over unless it is necessary  Acid may back up into your esophagus when you bend over  If possible, keep the head of your bed elevated while you sleep  This will help keep acid from backing up  Manage stress  Stress can make your symptoms worse or cause stomach acid to back up  · Drink more liquid when you take pills  Drink a full glass of water when you take your pills  Ask your healthcare provider if you can take your pills at least an hour before you go to bed  © 2017 [facility] Information is for End User's use only and may not be sold, redistributed or otherwise used for commercial purposes  All illustrations and images included in CareNotes® are the copyrighted property of [employer]  or Rudolph Luciano  The above information is an  only  It is not intended as medical advice for individual conditions or treatments  Talk to your doctor, nurse or pharmacist before following any medical regimen to see if it is safe and effective for you  Gastritis   WHAT YOU NEED TO KNOW:   Gastritis is inflammation or irritation of the lining of your stomach          DISCHARGE INSTRUCTIONS:   Call 911 for any of the following:   · You develop chest pain or shortness of breath  Seek care immediately if:   · You vomit blood  · You have black or bloody bowel movements  · You have severe stomach or back pain  Contact your healthcare provider if:   · You have a fever  · You have new or worsening symptoms, even after treatment  · You have questions or concerns about your condition or care  Medicines:   · Medicines  may be given to help treat a bacterial infection or decrease stomach acid  · Take your medicine as directed  Contact your healthcare provider if you think your medicine is not helping or if you have side effects  Tell him or her if you are allergic to any medicine  Keep a list of the medicines, vitamins, and herbs you take  Include the amounts, and when and why you take them  Bring the list or the pill bottles to follow-up visits  Carry your medicine list with you in case of an emergency  Manage or prevent gastritis:   · Do not smoke  Nicotine and other chemicals in cigarettes and cigars can make your symptoms worse and cause lung damage  Ask your healthcare provider for information if you currently smoke and need help to quit  E-cigarettes or smokeless tobacco still contain nicotine  Talk to your healthcare provider before you use these products  · Do not drink alcohol  Alcohol can prevent healing and make your gastritis worse  Talk to your healthcare provider if you need help to stop drinking  · Do not take NSAIDs or aspirin unless directed  These and similar medicines can cause irritation  If your healthcare provider says it is okay to take NSAIDs, take them with food  · Do not eat foods that cause irritation  Foods such as oranges and salsa can cause burning or pain  Eat a variety of healthy foods  Examples include fruits (not citrus), vegetables, low-fat dairy products, beans, whole-grain breads, and lean meats and fish  Try to eat small meals, and drink water with your meals   Do not eat for at least 3 hours before you go to bed     · Find ways to relax and decrease stress  Stress can increase stomach acid and make gastritis worse  Activities such as yoga, meditation, or listening to music can help you relax  Spend time with friends, or do things you enjoy  Follow up with your healthcare provider as directed: You may need ongoing tests or treatment, or referral to a gastroenterologist  Write down your questions so you remember to ask them during your visits  © 2017 [facility] Information is for End User's use only and may not be sold, redistributed or otherwise used for commercial purposes  All illustrations and images included in CareNotes® are the copyrighted property of A D A M , Inc  or [employer]olph Luciano  The above information is an  only  It is not intended as medical advice for individual conditions or treatments  Talk to your doctor, nurse or pharmacist before following any medical regimen to see if it is safe and effective for you  Upper Endoscopy   WHAT YOU NEED TO KNOW:   An upper endoscopy is also called an upper gastrointestinal (GI) endoscopy, or an esophagogastroduodenoscopy (EGD)  You may feel bloated, gassy, or have some abdominal discomfort after your procedure  Your throat may be sore for 24 to 36 hours  You may burp or pass gas from air that is still inside your body  DISCHARGE INSTRUCTIONS:   Call 911 for any of the following:   · You have sudden chest pain or trouble breathing  Seek care immediately if:   · You feel dizzy or faint  · You have trouble swallowing  · Your bowel movements are very dark or black  · Your abdomen is hard and firm and you have severe pain  · You vomit blood  Contact your healthcare provider if:   · You feel full or bloated and cannot burp or pass gas  · You have not had a bowel movement for 3 days after your procedure  · You have neck pain  · You have a fever or chills  · You have nausea or are vomiting      · You have a rash or hives  · You have questions or concerns about your endoscopy  Relieve a sore throat:  Suck on throat lozenges or crushed ice  Gargle with a small amount of warm salt water  Mix 1 teaspoon of salt and 1 cup of warm water to make salt water  Relieve gas and discomfort from bloating:  Lie on your right side with a heating pad on your abdomen  Take short walks to help pass gas  Eat small meals until bloating is relieved  Rest after your procedure: You have been given medicine to relax you  Do not  drive or make important decisions until the day after your procedure  Return to your normal activity as directed  You can usually return to work the day after your procedure  Follow up with your healthcare provider as directed:  Write down your questions so you remember to ask them during your visits  © 2017 [address] is for End User's use only and may not be sold, redistributed or otherwise used for commercial purposes  All illustrations and images included in CareNotes® are the copyrighted property of A D A M [employer] Inc  or Rudolph Luciano  The above information is an  only  It is not intended as medical advice for individual conditions or treatments  Talk to your doctor, nurse or pharmacist before following any medical regimen to see if it is safe and effective for you

## 2019-09-11 NOTE — H&P
This is a 46 y o  female with a history of a Vertical Sleeve Gastrectomy with Dr Omega Ford on 1/26/2015  Presented to the office with weight regain  Here for an EGD to evaluate the anatomy of the GI tract  Physical Exam    /85   Pulse 78   Temp 98 1 °F (36 7 °C)   Resp 16   Ht 5' 2" (1 575 m)   Wt 103 kg (226 lb)   SpO2 96%   BMI 41 34 kg/m²    AAOx3  RRR  CTA B  Abdomen obese  Benign  A/P:    This is a 46 y o  female with a history of a sleeve gastrectomy and now weight regain  Will proceed with the EGD and biopsies        Mayra Wynn MD  09/11/19  7:33 AM

## 2019-09-16 DIAGNOSIS — E55.9 VITAMIN D DEFICIENCY: ICD-10-CM

## 2019-09-16 RX ORDER — ERGOCALCIFEROL 1.25 MG/1
CAPSULE ORAL
Qty: 4 CAPSULE | Refills: 0 | Status: SHIPPED | OUTPATIENT
Start: 2019-09-16 | End: 2019-10-14 | Stop reason: SDUPTHER

## 2019-09-19 ENCOUNTER — OFFICE VISIT (OUTPATIENT)
Dept: BARIATRICS | Facility: CLINIC | Age: 39
End: 2019-09-19
Payer: MEDICARE

## 2019-09-19 VITALS
WEIGHT: 223.5 LBS | DIASTOLIC BLOOD PRESSURE: 76 MMHG | HEIGHT: 62 IN | SYSTOLIC BLOOD PRESSURE: 130 MMHG | HEART RATE: 83 BPM | TEMPERATURE: 98.1 F | BODY MASS INDEX: 41.13 KG/M2

## 2019-09-19 DIAGNOSIS — E66.9 OBESITY: ICD-10-CM

## 2019-09-19 DIAGNOSIS — E11.9 TYPE 2 DIABETES MELLITUS WITHOUT COMPLICATION, WITHOUT LONG-TERM CURRENT USE OF INSULIN (HCC): ICD-10-CM

## 2019-09-19 DIAGNOSIS — Z98.84 BARIATRIC SURGERY STATUS: Primary | ICD-10-CM

## 2019-09-19 DIAGNOSIS — E28.2 PCOS (POLYCYSTIC OVARIAN SYNDROME): ICD-10-CM

## 2019-09-19 PROCEDURE — 99213 OFFICE O/P EST LOW 20 MIN: CPT | Performed by: SURGERY

## 2019-09-19 NOTE — PROGRESS NOTES
Office Visit - BARIATRIC SURGERY  Bertha Hua 46 y o  female MRN: [id number]  Unit/Bed#:  Encounter: [id number]      HPI:  Mary Jane Clark is a 46 y o  female, status post sleeve gastrectomy performed in 2015 by Dorothy Cordova  She is currently being followed for weight regain after this procedure  She is here to review the results of her recent endoscopy  Subjective     Patient reports doing well  She denies any symptoms associated to reflux  Her main complaint is weight regain  Review of Systems   Constitutional: Negative  HENT: Negative  Eyes: Negative  Respiratory: Negative  Cardiovascular: Negative  Gastrointestinal: Negative  Endocrine: Negative  Genitourinary: Negative  Musculoskeletal: Negative  Skin: Negative  Allergic/Immunologic: Negative  Neurological: Negative  Hematological: Negative  Psychiatric/Behavioral: Negative          Historical Information   Past Medical History:   Diagnosis Date    Allergy     Anxiety     Arthritis     [facility] cancer ([facility] )     Candidiasis, cutaneous     Cervical cancer ([facility] )     Depression     Diabetes mellitus ([facility] )     Disease of thyroid gland     hypothyroid    Disorder of urinary tract     Fatty liver     Hemorrhoids     History of chemotherapy     History of radiation therapy     Obesity     PONV (postoperative nausea and vomiting)     Port-A-Cath in place      Past Surgical History:   Procedure Laterality Date    BILATERAL SALPINGOOPHORECTOMY      BREAST BIOPSY      needle core    GASTRECTOMY SLEEVE LAPAROSCOPIC      Onset: 1/26/15  Dr Kela Fleischer  04/07/2014    MASTECTOMY Left 12/19/2013    Lymph nodes removed     Social History   Social History     Substance and Sexual Activity   Alcohol Use No     Social History     Substance and Sexual Activity   Drug Use No     Social History     Tobacco Use   Smoking Status Former Smoker    Types: Cigarettes    Last attempt to quit: 2013    Years since quittin 4   Smokeless Tobacco Never Used       Objective       Current Vitals:   Blood Pressure: 130/76 (19)  Pulse: 83 (19)  Temperature: 98 1 °F (36 7 °C) (19)  Temp Source: Tympanic (19)  Height: 5' 2" (157 5 cm) (19)  Weight - Scale: 101 kg (223 lb 8 oz) (19)    Invasive Devices     None                 Physical Exam   Constitutional: She is oriented to person, place, and time  She appears well-developed and well-nourished  HENT:   Head: Normocephalic and atraumatic  Nose: Nose normal    Eyes: Conjunctivae and EOM are normal    Neck: Normal range of motion  Cardiovascular: Normal rate  Pulmonary/Chest: Effort normal    Abdominal: Soft  Bowel sounds are normal  She exhibits no distension and no mass  There is no tenderness  There is no rebound and no guarding  No hernia  Abdomen is soft and benign  Musculoskeletal: Normal range of motion  Neurological: She is alert and oriented to person, place, and time  Skin: Skin is warm  Psychiatric: She has a normal mood and affect  Her behavior is normal  Judgment and thought content normal            Assessment/PLAN:    Shakir Adam is a 46 y o  female status post distant sleeve gastrectomy consulting service for weight regain  EGD results and upper GI reviewed with the patient  There appears to be no anatomic abnormality to explain weight regain  Conversion to a bypass was discussed with the patient, she will be enrolled in the revisional pathway  She is to continue following up with medical weight loss  Follow up in 3 months as scheduled                Vidal Martinez MD  Bariatric Surgery Fellow  2019  4:03 PM

## 2019-10-03 ENCOUNTER — TELEPHONE (OUTPATIENT)
Dept: INTERVENTIONAL RADIOLOGY/VASCULAR | Facility: HOSPITAL | Age: 39
End: 2019-10-03

## 2019-10-04 ENCOUNTER — TELEPHONE (OUTPATIENT)
Dept: INTERVENTIONAL RADIOLOGY/VASCULAR | Facility: HOSPITAL | Age: 39
End: 2019-10-04

## 2019-10-04 NOTE — TELEPHONE ENCOUNTER
Called patient to schedule port removal  Patient scheduled for 10/24/19 at 0800  Patient was given earlier dates, but wished to schedule for her day off of work  Patient to arrive at 0700 for prep, has a ride to and from, and NPO after midnight the night before  Consult complete

## 2019-10-08 ENCOUNTER — TELEPHONE (OUTPATIENT)
Dept: BARIATRICS | Facility: CLINIC | Age: 39
End: 2019-10-08

## 2019-10-14 ENCOUNTER — TELEPHONE (OUTPATIENT)
Dept: INTERNAL MEDICINE CLINIC | Facility: CLINIC | Age: 39
End: 2019-10-14

## 2019-10-14 ENCOUNTER — OFFICE VISIT (OUTPATIENT)
Dept: URGENT CARE | Facility: CLINIC | Age: 39
End: 2019-10-14
Payer: COMMERCIAL

## 2019-10-14 VITALS
RESPIRATION RATE: 16 BRPM | BODY MASS INDEX: 41.04 KG/M2 | DIASTOLIC BLOOD PRESSURE: 78 MMHG | SYSTOLIC BLOOD PRESSURE: 129 MMHG | HEIGHT: 62 IN | OXYGEN SATURATION: 97 % | WEIGHT: 223 LBS | HEART RATE: 95 BPM

## 2019-10-14 DIAGNOSIS — L03.011 PARONYCHIA OF RIGHT RING FINGER: Primary | ICD-10-CM

## 2019-10-14 DIAGNOSIS — E55.9 VITAMIN D DEFICIENCY: ICD-10-CM

## 2019-10-14 PROCEDURE — G0463 HOSPITAL OUTPT CLINIC VISIT: HCPCS | Performed by: PHYSICIAN ASSISTANT

## 2019-10-14 PROCEDURE — 99283 EMERGENCY DEPT VISIT LOW MDM: CPT | Performed by: PHYSICIAN ASSISTANT

## 2019-10-14 RX ORDER — CEPHALEXIN 500 MG/1
500 CAPSULE ORAL EVERY 8 HOURS SCHEDULED
Qty: 21 CAPSULE | Refills: 0 | Status: SHIPPED | OUTPATIENT
Start: 2019-10-14 | End: 2019-10-21

## 2019-10-14 RX ORDER — ERGOCALCIFEROL 1.25 MG/1
CAPSULE ORAL
Qty: 4 CAPSULE | Refills: 0 | Status: SHIPPED | OUTPATIENT
Start: 2019-10-14 | End: 2019-11-12 | Stop reason: SDUPTHER

## 2019-10-14 NOTE — TELEPHONE ENCOUNTER
Pt called, has an appt with you on 11-11-19, pt c/o elevated  liver enzymes, also has a fatty liver, levels never come down, also skin very itchy,pt currently on simvastatin, could this be the cause of the elevated levels? Feels the levels should have went down , pt also has h/o breast ca, pt asking if you would want her to come in sooner or do you want to order bw?  Pt concerned, pls advise

## 2019-10-14 NOTE — PATIENT INSTRUCTIONS
Paronychia   WHAT YOU NEED TO KNOW:   What is paronychia? Paronychia is an infection of your nail fold caused by bacteria or a fungus  The nail fold is the skin around your nail  Paronychia may happen suddenly and last for 6 weeks or longer  You may have paronychia on more than 1 finger or toe  What increases my risk for paronychia? · Trauma:  Any injury that causes your skin to tear can lead to infection  Your risk is increased if you have ingrown nails, bite your nails, or wear acrylic nails  · Frequent contact with water:  Jobs that require you to soak your hands in water often may increase your risk for paronychia  Common examples are nurses, cooks, and   Swimmers also have increased risk  · Medical conditions:  Diabetes and other conditions that cause a weak immune system can increase your risk  Some examples are skin cancer, psoriasis, HIV, and lupus  · Chemicals:  Contact with soaps, detergents, and other chemicals can cause inflammation and lead to paronychia  · Allergies: Allergies to certain foods, nail polish, or latex can cause inflammation and increase your risk  What are the signs and symptoms of paronychia? · Red, hot, swollen, painful nail fold    · Pus coming out of your nail fold when you press on it    · Nail that pulls away from your nail fold and may fall off    · Changes in nail color, such as green nails    · Fever    · Thick, rough nail, or ridges in the nail  How is paronychia diagnosed? Your healthcare provider will examine your nails and ask about your symptoms  He may press on your infected nail to see if pus drains from it  He will send any pus to a lab for tests to learn what germ is causing your infection  This is called a fluid culture  How is paronychia treated? · Medicine:     ¨ Td vaccine  is a booster shot used to help prevent tetanus and diphtheria   The Td booster may be given to adolescents and adults every 10 years or for certain wounds and injuries  ¨ Antibiotics: This medicine will help fight or prevent an infection caused by bacteria  It may be given as a pill, cream, or ointment  ¨ Steroids: This medicine will help decrease inflammation  It may be given as a pill, cream, or ointment  ¨ Antifungal medicine: This medicine helps kill fungus that may be causing your infection  It may be given as a cream or ointment  ¨ NSAIDs:  These medicines decrease pain and swelling  NSAIDs are available without a doctor's order  Ask your healthcare provider which medicine is right for you  Ask how much to take and when to take it  Take as directed  NSAIDs can cause stomach bleeding and kidney problems if not taken correctly  · Procedures: You may need surgery to drain an abscess (pus pocket) in your finger or toe  Your nail may need to be removed  Infected tissue around your nail may also need to be removed  What are the risks of paronychia? Your nail may become loose, deformed, or fall off  The infection may form a large abscess on your nail  The infection may spread to nearby tissue and bone  How can paronychia be prevented? · Avoid chemicals and allergens that may harm your skin and nails  This includes soaps, laundry detergents, and nail products  · Keep your nails clean and dry  Do not soak your nails in water  Use cotton-lined rubber gloves or wear 2 rubber gloves if you work with food or water  The gloves will help protect your nail folds  · Keep your nails short  Do not bite your nails, pick at your hangnails, suck your fingers, or wear fake nails  Bring your own nail tools when you go to the nail salon  How can I manage my symptoms? · Soak your nail:  Soak your nail in a mixture of equal parts vinegar and water 3 or 4 times each day  This will help decrease inflammation  · Apply a warm compress:  Soak a washcloth in warm water and place it on your nail  This will help decrease inflammation       · Elevate:  Raise your nail above the level of your heart as often as you can  This will help decrease swelling and pain  Prop your nail on pillows or blankets to keep it elevated comfortably  · Use lotion:  Apply lotion after you wash your hands  This will prevent the skin from becoming too dry  When should I contact my healthcare provider? · Your nail becomes loose, deformed, or falls off  · You have a large abscess on your nail  · You have questions or concerns about your condition or care  When should I seek immediate care? · You have severe nail pain  · The inflammation spreads to your hand or arm  CARE AGREEMENT:   You have the right to help plan your care  Learn about your health condition and how it may be treated  Discuss treatment options with your caregivers to decide what care you want to receive  You always have the right to refuse treatment  The above information is an  only  It is not intended as medical advice for individual conditions or treatments  Talk to your doctor, nurse or pharmacist before following any medical regimen to see if it is safe and effective for you  © 2017 [employer]  Information is for End User's use only and may not be sold, redistributed or otherwise used for commercial purposes  All illustrations and images included in CareNotes® are the copyrighted property of A D A M [employer] Inc  or [employer]

## 2019-10-14 NOTE — PROGRESS NOTES
[facility]        NAME: Cuba Cotto is a 46 y o  female  : 1980    MRN: [id number]  DATE: 2019  TIME: 7:10 PM    Assessment and Plan   Paronychia of right ring finger [L03 011]  1  Paronychia of right ring finger  cephalexin (KEFLEX) 500 mg capsule         Patient Instructions     Start Keflex as directed  Soak finger in warm soapy water 10 minutes daily  Follow up with PCP in 3-5 days  Proceed to  ER if symptoms worsen  Chief Complaint     Chief Complaint   Patient presents with    Wound Infection     right ring finger x 2 days         History of Present Illness       Patient had hangnail on her right 4th finger which she pulled and now has swelling redness to the nail border of the right 4th finger  Review of Systems   Review of Systems   Constitutional: Negative for chills and fever  Gastrointestinal: Negative for nausea and vomiting  Musculoskeletal:        Right 4th finger pain   Skin: Negative for rash  Neurological: Negative for weakness and numbness  Hematological: Negative for adenopathy  Current Medications       Current Outpatient Medications:     ALPRAZolam (XANAX) 0 25 mg tablet, Take 0 25 mg by mouth as needed  , Disp: , Rfl:     anastrozole (ARIMIDEX) 1 mg tablet, Take 1 mg by mouth daily  , Disp: , Rfl:     AURELIO MICROLET LANCETS lancets, by Does not apply route daily, Disp: , Rfl:     Blood Glucose Monitoring Suppl (AURELIO CONTOUR MONITOR) w/Device KIT, by Does not apply route, Disp: , Rfl:     calcium-vitamin D (OSCAL 500 + D) 500 mg-200 units per tablet, Take 1 tablet by mouth daily  , Disp: , Rfl:     ergocalciferol (VITAMIN D2) 50,000 units, take 1 capsule by mouth weekly, Disp: 4 capsule, Rfl: 0    Glucose Blood (AURELIO CONTOUR TEST VI), by In Vitro route daily, Disp: , Rfl:     levothyroxine 75 mcg tablet, Take 1 tablet (75 mcg total) by mouth daily, Disp: 90 tablet, Rfl: 0    metFORMIN (GLUCOPHAGE) 500 mg tablet, Take 1 tablet (500 mg total) by mouth daily for 180 days, Disp: 30 tablet, Rfl: 5    Multiple Vitamin (MULTIVITAMINS PO), Take by mouth, Disp: , Rfl:     nystatin (MYCOSTATIN) powder, , Disp: , Rfl: 2    simvastatin (ZOCOR) 40 mg tablet, Take 1 tablet (40 mg total) by mouth daily at bedtime, Disp: 90 tablet, Rfl: 1    cephalexin (KEFLEX) 500 mg capsule, Take 1 capsule (500 mg total) by mouth every 8 (eight) hours for 7 days, Disp: 21 capsule, Rfl: 0    diclofenac sodium (VOLTAREN) 1 %, Apply 1 application topically, Disp: , Rfl:     Current Allergies     Allergies as of 10/14/2019 - Reviewed 10/14/2019   Allergen Reaction Noted    Paclitaxel Anaphylaxis 02/26/2016            The following portions of the patient's history were reviewed and updated as appropriate: allergies, current medications, past family history, past medical history, past social history, past surgical history and problem list      Past Medical History:   Diagnosis Date    Allergy     Anxiety     Arthritis     [facility]     Candidiasis, cutaneous     C[facility]     Depression     Diabetes mellitus [facility]     Disease of thyroid gland     hypothyroid    Disorder of urinary tract     Fatty liver     Hemorrhoids     History of chemotherapy     History of radiation therapy     Obesity     PONV (postoperative nausea and vomiting)     Port-A-Cath in place        Past Surgical History:   Procedure Laterality Date    BILATERAL SALPINGOOPHORECTOMY      BREAST BIOPSY      needle core    GASTRECTOMY SLEEVE LAPAROSCOPIC      Onset: 1/26/15  Dr Bella Cary  04/07/2014    MASTECTOMY Left 12/19/2013    Lymph nodes removed       Family History   Problem Relation Age of Onset    Diabetes Mother     Diabetes Father     Ovarian cancer Sister 29    Hypertension Maternal Grandfather     Hodgkin's lymphoma Paternal Grandmother     Breast cancer Family     Diabetes Family     Hypertension Family     Uterine cancer Maternal Aunt 64    Leukemia Son         chronic myeloid         Medications have been verified  Objective   /78   Pulse 95   Resp 16   Ht 5' 2" (1 575 m)   Wt 101 kg (223 lb)   SpO2 97%   BMI 40 79 kg/m²        Physical Exam     Physical Exam   Constitutional: She is oriented to person, place, and time  She appears well-developed and well-nourished  HENT:   Head: Normocephalic and atraumatic  Neck: Normal range of motion  Cardiovascular: Normal rate and regular rhythm  Pulmonary/Chest: Effort normal    Musculoskeletal:   Paronychia medial border right 4th fingernail with mild swelling erythema and tenderness to palpation  Lymphatic streaking  Capillary refill less than 2 seconds  Neurological: She is alert and oriented to person, place, and time  Skin: Skin is warm and dry  Psychiatric: She has a normal mood and affect  Her behavior is normal    Nursing note and vitals reviewed

## 2019-10-21 ENCOUNTER — TELEPHONE (OUTPATIENT)
Dept: INTERVENTIONAL RADIOLOGY/VASCULAR | Facility: HOSPITAL | Age: 39
End: 2019-10-21

## 2019-10-28 ENCOUNTER — TELEPHONE (OUTPATIENT)
Dept: INTERVENTIONAL RADIOLOGY/VASCULAR | Facility: HOSPITAL | Age: 39
End: 2019-10-28

## 2019-11-08 ENCOUNTER — APPOINTMENT (OUTPATIENT)
Dept: LAB | Facility: CLINIC | Age: 39
End: 2019-11-08
Payer: COMMERCIAL

## 2019-11-08 ENCOUNTER — TRANSCRIBE ORDERS (OUTPATIENT)
Dept: RADIOLOGY | Facility: CLINIC | Age: 39
End: 2019-11-08

## 2019-11-08 DIAGNOSIS — Z17.0 MALIGNANT NEOPLASM OF LEFT BREAST IN FEMALE, ESTROGEN RECEPTOR POSITIVE, UNSPECIFIED SITE OF BREAST (HCC): ICD-10-CM

## 2019-11-08 DIAGNOSIS — R79.89 ELEVATED LFTS: ICD-10-CM

## 2019-11-08 DIAGNOSIS — Z17.0 MALIGNANT NEOPLASM OF LEFT BREAST IN FEMALE, ESTROGEN RECEPTOR POSITIVE, UNSPECIFIED SITE OF BREAST (HCC): Primary | ICD-10-CM

## 2019-11-08 DIAGNOSIS — C50.912 MALIGNANT NEOPLASM OF LEFT BREAST IN FEMALE, ESTROGEN RECEPTOR POSITIVE, UNSPECIFIED SITE OF BREAST (HCC): Primary | ICD-10-CM

## 2019-11-08 DIAGNOSIS — C50.912 MALIGNANT NEOPLASM OF LEFT BREAST IN FEMALE, ESTROGEN RECEPTOR POSITIVE, UNSPECIFIED SITE OF BREAST (HCC): ICD-10-CM

## 2019-11-08 LAB
ALBUMIN SERPL BCP-MCNC: 3.8 G/DL (ref 3.5–5)
ALP SERPL-CCNC: 86 U/L (ref 46–116)
ALT SERPL W P-5'-P-CCNC: 43 U/L (ref 12–78)
ANION GAP SERPL CALCULATED.3IONS-SCNC: 5 MMOL/L (ref 4–13)
AST SERPL W P-5'-P-CCNC: 19 U/L (ref 5–45)
BILIRUB SERPL-MCNC: 0.34 MG/DL (ref 0.2–1)
BUN SERPL-MCNC: 14 MG/DL (ref 5–25)
CALCIUM SERPL-MCNC: 9.2 MG/DL (ref 8.3–10.1)
CHLORIDE SERPL-SCNC: 106 MMOL/L (ref 100–108)
CO2 SERPL-SCNC: 28 MMOL/L (ref 21–32)
CREAT SERPL-MCNC: 0.68 MG/DL (ref 0.6–1.3)
GFR SERPL CREATININE-BSD FRML MDRD: 111 ML/MIN/1.73SQ M
GLUCOSE SERPL-MCNC: 102 MG/DL (ref 65–140)
POTASSIUM SERPL-SCNC: 3.7 MMOL/L (ref 3.5–5.3)
PROT SERPL-MCNC: 7.5 G/DL (ref 6.4–8.2)
SODIUM SERPL-SCNC: 139 MMOL/L (ref 136–145)

## 2019-11-08 PROCEDURE — 36415 COLL VENOUS BLD VENIPUNCTURE: CPT

## 2019-11-08 PROCEDURE — 80053 COMPREHEN METABOLIC PANEL: CPT

## 2019-11-12 ENCOUNTER — OFFICE VISIT (OUTPATIENT)
Dept: INTERNAL MEDICINE CLINIC | Facility: CLINIC | Age: 39
End: 2019-11-12
Payer: COMMERCIAL

## 2019-11-12 VITALS
HEART RATE: 92 BPM | SYSTOLIC BLOOD PRESSURE: 120 MMHG | HEIGHT: 62 IN | WEIGHT: 220 LBS | DIASTOLIC BLOOD PRESSURE: 82 MMHG | BODY MASS INDEX: 40.48 KG/M2 | TEMPERATURE: 100.5 F | RESPIRATION RATE: 14 BRPM

## 2019-11-12 DIAGNOSIS — E55.9 VITAMIN D DEFICIENCY: ICD-10-CM

## 2019-11-12 DIAGNOSIS — E11.9 TYPE 2 DIABETES MELLITUS WITHOUT COMPLICATION, WITHOUT LONG-TERM CURRENT USE OF INSULIN (HCC): ICD-10-CM

## 2019-11-12 DIAGNOSIS — R79.89 ELEVATED LFTS: ICD-10-CM

## 2019-11-12 DIAGNOSIS — E03.9 HYPOTHYROIDISM, UNSPECIFIED TYPE: ICD-10-CM

## 2019-11-12 DIAGNOSIS — M81.0 AGE-RELATED OSTEOPOROSIS WITHOUT CURRENT PATHOLOGICAL FRACTURE: ICD-10-CM

## 2019-11-12 DIAGNOSIS — E78.5 HYPERLIPIDEMIA, UNSPECIFIED HYPERLIPIDEMIA TYPE: Primary | ICD-10-CM

## 2019-11-12 LAB — SL AMB POCT HEMOGLOBIN AIC: 5.5 (ref ?–6.5)

## 2019-11-12 PROCEDURE — 3044F HG A1C LEVEL LT 7.0%: CPT | Performed by: INTERNAL MEDICINE

## 2019-11-12 PROCEDURE — 83036 HEMOGLOBIN GLYCOSYLATED A1C: CPT | Performed by: INTERNAL MEDICINE

## 2019-11-12 PROCEDURE — 99214 OFFICE O/P EST MOD 30 MIN: CPT | Performed by: INTERNAL MEDICINE

## 2019-11-12 RX ORDER — ERGOCALCIFEROL 1.25 MG/1
50000 CAPSULE ORAL WEEKLY
Qty: 12 CAPSULE | Refills: 1 | Status: SHIPPED | OUTPATIENT
Start: 2019-11-12 | End: 2020-09-30 | Stop reason: SDUPTHER

## 2019-11-12 RX ORDER — SIMVASTATIN 20 MG
20 TABLET ORAL
Qty: 30 TABLET | Refills: 5 | Status: SHIPPED | OUTPATIENT
Start: 2019-11-12 | End: 2020-02-07 | Stop reason: SDUPTHER

## 2019-11-12 RX ORDER — LEVOTHYROXINE SODIUM 0.07 MG/1
75 TABLET ORAL DAILY
Qty: 90 TABLET | Refills: 1 | Status: SHIPPED | OUTPATIENT
Start: 2019-11-12 | End: 2020-02-07 | Stop reason: SDUPTHER

## 2019-11-12 NOTE — PROGRESS NOTES
Assessment/Plan:    Problem List Items Addressed This Visit        Endocrine    Diabetes mellitus ([address] )    Relevant Medications    metFORMIN (GLUCOPHAGE) 500 mg tablet    Other Relevant Orders    POCT hemoglobin A1c (Completed)    Hypothyroidism    Relevant Medications    levothyroxine 75 mcg tablet       Other    Hyperlipidemia - Primary    Relevant Medications    simvastatin (ZOCOR) 20 mg tablet    Other Relevant Orders    Lipid Panel with Direct LDL reflex    Vitamin D deficiency    Relevant Medications    ergocalciferol (VITAMIN D2) 50,000 units      Other Visit Diagnoses     Elevated LFTs        Relevant Orders    Hepatic function panel    Age-related osteoporosis without current pathological fracture        Relevant Medications    denosumab (PROLIA) 60 mg/mL           Diagnoses and all orders for this visit:    Hyperlipidemia, unspecified hyperlipidemia type  -     simvastatin (ZOCOR) 20 mg tablet; Take 1 tablet (20 mg total) by mouth daily at bedtime  -     Lipid Panel with Direct LDL reflex; Future    Type 2 diabetes mellitus without complication, without long-term current use of insulin (HCC)  -     metFORMIN (GLUCOPHAGE) 500 mg tablet; Take 1 tablet (500 mg total) by mouth daily  -     POCT hemoglobin A1c    Hypothyroidism, unspecified type  -     levothyroxine 75 mcg tablet; Take 1 tablet (75 mcg total) by mouth daily    Vitamin D deficiency  -     ergocalciferol (VITAMIN D2) 50,000 units; Take 1 capsule (50,000 Units total) by mouth once a week    Elevated LFTs  -     Hepatic function panel; Future    Age-related osteoporosis without current pathological fracture  -     denosumab (PROLIA) 60 mg/mL; Inject 1 mL (60 mg total) under the skin once for 1 dose        No problem-specific Assessment & Plan notes found for this encounter  We will follow up with the Prolia injection  Subjective:      Patient ID: Justice Lanes is a 46 y o  female      The patient is here for follow up regarding the her mildly elevated LFTs  The patient states that she has stopped the Simvastatin  I noted that she has never had abnormal LFTs here and has been on Zocor 10 mg for several years  We will try her on 20 mg and see how it goes  She is noted to have osteoporosis by [facility] at [facility]  We discussed and she was amendable to starting Prolia  In addition, the patient HgbA1c is noted to be good  The following portions of the patient's history were reviewed and updated as appropriate:   She has a past medical history of Allergy, Anxiety, Arthritis, Breast cancer ([facility] ), Candidiasis, cutaneous, Cervical cancer ([facility] ), Depression, Diabetes mellitus ([facility] ), Disease of thyroid gland, Disorder of urinary tract, Fatty liver, Hemorrhoids, History of chemotherapy, History of radiation therapy, Obesity, PONV (postoperative nausea and vomiting), and Port-A-Cath in place  ,  does not have any pertinent problems on file  ,   has a past surgical history that includes Breast biopsy; Mastectomy (Left, 12/19/2013); Hysterectomy (04/07/2014); Bilateral salpingoophorectomy; and GASTRECTOMY SLEEVE LAPAROSCOPIC ,  family history includes Breast cancer in her family; Diabetes in her family, father, and mother; Hodgkin's lymphoma in her paternal grandmother; Hypertension in her family and maternal grandfather; Leukemia in her son; Ovarian cancer (age of onset: 29) in her sister; Uterine cancer (age of onset: 64) in her maternal aunt  ,   reports that she quit smoking about 6 years ago  Her smoking use included cigarettes  She has never used smokeless tobacco  She reports that she does not drink alcohol or use drugs  ,  is allergic to paclitaxel     Current Outpatient Medications   Medication Sig Dispense Refill    ALPRAZolam (XANAX) 0 25 mg tablet Take 0 25 mg by mouth as needed        anastrozole (ARIMIDEX) 1 mg tablet Take 1 mg by mouth daily        AURELIO MICROLET LANCETS lancets by Does not apply route daily      Blood Glucose Monitoring Suppl (AURELIO CONTOUR MONITOR) w/Device KIT by Does not apply route      calcium-vitamin D (OSCAL 500 + D) 500 mg-200 units per tablet Take 1 tablet by mouth daily   ergocalciferol (VITAMIN D2) 50,000 units Take 1 capsule (50,000 Units total) by mouth once a week 12 capsule 1    Glucose Blood (AURELIO CONTOUR TEST VI) by In Vitro route daily      levothyroxine 75 mcg tablet Take 1 tablet (75 mcg total) by mouth daily 90 tablet 1    metFORMIN (GLUCOPHAGE) 500 mg tablet Take 1 tablet (500 mg total) by mouth daily 90 tablet 1    Multiple Vitamin (MULTIVITAMINS PO) Take by mouth      nystatin (MYCOSTATIN) powder   2    denosumab (PROLIA) 60 mg/mL Inject 1 mL (60 mg total) under the skin once for 1 dose 1 mL 0    diclofenac sodium (VOLTAREN) 1 % Apply 1 application topically      simvastatin (ZOCOR) 20 mg tablet Take 1 tablet (20 mg total) by mouth daily at bedtime 30 tablet 5     No current facility-administered medications for this visit  Review of Systems   Constitutional: Negative for chills, fatigue and fever  HENT: Negative  Respiratory: Negative for cough, chest tightness and shortness of breath  Cardiovascular: Negative for chest pain, palpitations and leg swelling  Gastrointestinal: Negative for abdominal pain, constipation, diarrhea, nausea and vomiting  Genitourinary: Negative  Musculoskeletal: Negative for arthralgias, back pain and myalgias  Skin: Negative  Neurological: Negative  Psychiatric/Behavioral: Negative  Objective:  Vitals:    11/12/19 1035   BP: 120/82   Pulse: 92   Resp: 14   Temp: 100 5 °F (38 1 °C)   TempSrc: Tympanic   Weight: 99 8 kg (220 lb)   Height: 5' 2" (1 575 m)     Body mass index is 40 24 kg/m²  Physical Exam   Constitutional: She is oriented to person, place, and time  She appears well-developed and well-nourished  HENT:   Head: Normocephalic and atraumatic  Eyes: Pupils are equal, round, and reactive to light   EOM are normal  Neck: Normal range of motion  Neck supple  Cardiovascular: Normal rate, regular rhythm, normal heart sounds and intact distal pulses  No murmur heard  Pulmonary/Chest: Effort normal and breath sounds normal  No respiratory distress  She has no wheezes  Abdominal: Soft  Bowel sounds are normal  There is no tenderness  Musculoskeletal: Normal range of motion  She exhibits no edema  Neurological: She is alert and oriented to person, place, and time  Skin: Skin is warm and dry  Psychiatric: She has a normal mood and affect  Nursing note and vitals reviewed  PHQ-9 Depression Screening    PHQ-9:    Frequency of the following problems over the past two weeks:       Little interest or pleasure in doing things:  1 - several days  Feeling down, depressed, or hopeless:  0 - not at all  Trouble falling or staying asleep, or sleeping too much:  0 - not at all  Feeling tired or having little energy:  1 - several days  Poor appetite or overeatin - not at all  Feeling bad about yourself - or that you are a failure or have let yourself or your family down:  0 - not at all  Trouble concentrating on things, such as reading the newspaper or watching television:  0 - not at all  Moving or speaking so slowly that other people could have noticed   Or the opposite - being so fidgety or restless that you have been moving around a lot more than usual:  0 - not at all  Thoughts that you would be better off dead, or of hurting yourself in some way:  0 - not at all  PHQ-2 Score:  1  PHQ-9 Score:  2

## 2019-11-20 ENCOUNTER — TELEPHONE (OUTPATIENT)
Dept: SURGERY | Facility: HOSPITAL | Age: 39
End: 2019-11-20

## 2019-11-21 ENCOUNTER — HOSPITAL ENCOUNTER (OUTPATIENT)
Dept: INTERVENTIONAL RADIOLOGY/VASCULAR | Facility: HOSPITAL | Age: 39
Discharge: HOME | End: 2019-11-21
Admitting: RADIOLOGY
Payer: COMMERCIAL

## 2019-11-21 VITALS
BODY MASS INDEX: 40.48 KG/M2 | DIASTOLIC BLOOD PRESSURE: 66 MMHG | SYSTOLIC BLOOD PRESSURE: 118 MMHG | HEIGHT: 62 IN | HEART RATE: 73 BPM | OXYGEN SATURATION: 95 % | TEMPERATURE: 98.7 F | WEIGHT: 220 LBS | RESPIRATION RATE: 16 BRPM

## 2019-11-21 DIAGNOSIS — Z95.828 PORT-A-CATH IN PLACE: ICD-10-CM

## 2019-11-21 PROCEDURE — 99153 MOD SED SAME PHYS/QHP EA: CPT

## 2019-11-21 PROCEDURE — 36590 REMOVAL TUNNELED CV CATH: CPT

## 2019-11-21 PROCEDURE — 99152 MOD SED SAME PHYS/QHP 5/>YRS: CPT

## 2019-11-21 PROCEDURE — 36590 REMOVAL TUNNELED CV CATH: CPT | Performed by: RADIOLOGY

## 2019-11-21 PROCEDURE — 99152 MOD SED SAME PHYS/QHP 5/>YRS: CPT | Performed by: RADIOLOGY

## 2019-11-21 RX ORDER — FENTANYL CITRATE 50 UG/ML
INJECTION, SOLUTION INTRAMUSCULAR; INTRAVENOUS CODE/TRAUMA/SEDATION MEDICATION
Status: COMPLETED | OUTPATIENT
Start: 2019-11-21 | End: 2019-11-21

## 2019-11-21 RX ORDER — LIDOCAINE HYDROCHLORIDE AND EPINEPHRINE 10; 10 MG/ML; UG/ML
INJECTION, SOLUTION INFILTRATION; PERINEURAL CODE/TRAUMA/SEDATION MEDICATION
Status: COMPLETED | OUTPATIENT
Start: 2019-11-21 | End: 2019-11-21

## 2019-11-21 RX ORDER — SODIUM CHLORIDE, SODIUM LACTATE, POTASSIUM CHLORIDE, CALCIUM CHLORIDE 600; 310; 30; 20 MG/100ML; MG/100ML; MG/100ML; MG/100ML
125 INJECTION, SOLUTION INTRAVENOUS CONTINUOUS
Status: DISCONTINUED | OUTPATIENT
Start: 2019-11-21 | End: 2019-11-25 | Stop reason: HOSPADM

## 2019-11-21 RX ORDER — MIDAZOLAM HYDROCHLORIDE 2 MG/2ML
INJECTION, SOLUTION INTRAMUSCULAR; INTRAVENOUS CODE/TRAUMA/SEDATION MEDICATION
Status: COMPLETED | OUTPATIENT
Start: 2019-11-21 | End: 2019-11-21

## 2019-11-21 RX ADMIN — FENTANYL CITRATE 50 MCG: 50 INJECTION INTRAMUSCULAR; INTRAVENOUS at 08:55

## 2019-11-21 RX ADMIN — FENTANYL CITRATE 50 MCG: 50 INJECTION INTRAMUSCULAR; INTRAVENOUS at 08:49

## 2019-11-21 RX ADMIN — SODIUM CHLORIDE, POTASSIUM CHLORIDE, SODIUM LACTATE AND CALCIUM CHLORIDE 125 ML/HR: 600; 310; 30; 20 INJECTION, SOLUTION INTRAVENOUS at 07:59

## 2019-11-21 RX ADMIN — MIDAZOLAM HYDROCHLORIDE 1 MG: 1 INJECTION, SOLUTION INTRAMUSCULAR; INTRAVENOUS at 08:55

## 2019-11-21 RX ADMIN — LIDOCAINE HYDROCHLORIDE,EPINEPHRINE BITARTRATE 10 ML: 10; .01 INJECTION, SOLUTION INFILTRATION; PERINEURAL at 08:55

## 2019-11-21 RX ADMIN — MIDAZOLAM HYDROCHLORIDE 1 MG: 1 INJECTION, SOLUTION INTRAMUSCULAR; INTRAVENOUS at 08:49

## 2019-11-21 NOTE — DISCHARGE INSTRUCTIONS
[address] [place] [address]  Interventional Radiology  Dr Constantin Bhat: [phone] (M-F 7:30am - 4:00pm)  Tamra: [phone] (Off hours or no answer)        Implanted Venous Access Port Removal    WHAT YOU NEED TO KNOW:   An implanted venous access port is a device used to give treatments and take blood  It may also be called a central venous access device (CVAD)  The port is a small container that is placed under your skin, usually in your upper chest  A port can also be placed in your arm or abdomen  The port is attached to a catheter that enters a large vein  DISCHARGE INSTRUCTIONS:   Resume your normal diet  Small sips of flat soda will help with mild nausea  Prevent an infection:     Wash your hands often  Use soap and water  Clean your hands before and  after you care for your incision  Check your skin for infection every day  Look for redness, swelling, or fluid oozing from the incision site  Dressing may come off in 24 hours  Medical glue will peel off on its own in 5 to 10 days  You may shower 24 hours after procedure  Follow up with your healthcare provider as directed  Write down your questions so you remember to ask them during your visits  Activity:  You may return to your daily activities when the area heals  Contact Interventional Radiology at [phone] Rajeev PATIENTS: Contact Interventional Radiology at [phone]) Mary Ann Duarte PATIENTS: Contact Interventional Radiology at [phone]) if:     You have a fever  You have persistent nausea  Your inciscion site is red, swollen, or draining pus  You have questions or concerns about your condition or care  Seek care immediately or call 911 if:  Blood soaks through your bandage  The skin over or around your incision breaks open  Your heart is jumping or fluttering  You have a headache, blurred vision, and feel confused      You have pain in your arm, neck, shoulder, or chest     You have trouble breathing that is getting worse over time

## 2019-11-21 NOTE — PROGRESS NOTES
The history and physical were reviewed, along with progress notes, and the patient was examined  There are no changes since it was written  /73   Pulse 77   Temp 98 7 °F (37 1 °C) (Temporal)   Resp 16   Ht 5' 2" (1 575 m)   Wt 99 8 kg (220 lb)   SpO2 94%   BMI 40 24 kg/m²     Patient with personal history of breast and cervical cancer  Status post chemotherapy  Port no longer needed  We discussed the possibility leaving the port in place  She tells me her oncologist would prefers removed due to thrombotic risk  She was offered the option of leaving in place  She prefers removal   This is not unreasonable  On exam today she is well-appearing, and in no distress  The skin over the port is clean, dry, and intact  There is no pacer or dialysis catheter  There is no jugular venous distention or venous collaterals  There is a regular rate and rhythm  Plan is monitored intravenous conscious sedation, port removal, and recovery in same-day surgery

## 2019-11-21 NOTE — PROCEDURES
Interventional Radiology Procedure Note    PATIENT NAME: Karl Bernardo  : 1980  MRN: [id number]     Pre-op Diagnosis:   1  Port-A-Cath in place      2  Port no longer need, patient requests port removed    Post-op Diagnosis:   1  Port-A-Cath in place      2     Same    Procedure: port removal    Surgeon:   Cruz Santos MD  Assistants:     No qualified resident was available, Resident is only observing    Estimated Blood Loss: Minimal     Findings: port removed in its entirety    Specimens: None     Complications:  None     Anesthesia: Conscious sedation and Local    Cruz Santos MD     Date: 2019  Time: 11:26 AM

## 2019-12-06 ENCOUNTER — TELEPHONE (OUTPATIENT)
Dept: INTERNAL MEDICINE CLINIC | Facility: CLINIC | Age: 39
End: 2019-12-06

## 2019-12-17 NOTE — TELEPHONE ENCOUNTER
Since pt has not tried alendronate or something equivalent, and her last dexa T-score is -1 7 the insurance will probably not cover  Insurance requires a trial of a medication and a T-score of -2   Do you want to try a medication first?

## 2020-01-18 ENCOUNTER — OFFICE VISIT (OUTPATIENT)
Dept: URGENT CARE | Facility: CLINIC | Age: 40
End: 2020-01-18
Payer: COMMERCIAL

## 2020-01-18 VITALS
DIASTOLIC BLOOD PRESSURE: 72 MMHG | OXYGEN SATURATION: 97 % | TEMPERATURE: 97.7 F | WEIGHT: 220 LBS | RESPIRATION RATE: 16 BRPM | SYSTOLIC BLOOD PRESSURE: 108 MMHG | HEIGHT: 62 IN | BODY MASS INDEX: 40.48 KG/M2 | HEART RATE: 83 BPM

## 2020-01-18 DIAGNOSIS — J20.9 ACUTE BRONCHITIS, UNSPECIFIED ORGANISM: Primary | ICD-10-CM

## 2020-01-18 PROCEDURE — 99213 OFFICE O/P EST LOW 20 MIN: CPT | Performed by: NURSE PRACTITIONER

## 2020-01-18 PROCEDURE — G0382 LEV 3 HOSP TYPE B ED VISIT: HCPCS | Performed by: NURSE PRACTITIONER

## 2020-01-18 PROCEDURE — 99283 EMERGENCY DEPT VISIT LOW MDM: CPT | Performed by: NURSE PRACTITIONER

## 2020-01-18 RX ORDER — LEVOTHYROXINE SODIUM 0.1 MG/1
100 TABLET ORAL DAILY
COMMUNITY
End: 2020-01-26

## 2020-01-18 RX ORDER — ALBUTEROL SULFATE 90 UG/1
2 AEROSOL, METERED RESPIRATORY (INHALATION) EVERY 4 HOURS PRN
Qty: 18 G | Refills: 0 | Status: SHIPPED | OUTPATIENT
Start: 2020-01-18 | End: 2020-02-17

## 2020-01-18 RX ORDER — AZITHROMYCIN 250 MG/1
TABLET, FILM COATED ORAL
Qty: 6 TABLET | Refills: 0 | Status: SHIPPED | OUTPATIENT
Start: 2020-01-18 | End: 2020-01-22

## 2020-01-18 RX ORDER — PREDNISONE 50 MG/1
50 TABLET ORAL DAILY
Qty: 5 TABLET | Refills: 0 | Status: SHIPPED | OUTPATIENT
Start: 2020-01-18 | End: 2020-01-23

## 2020-01-18 RX ORDER — BENZONATATE 100 MG/1
100 CAPSULE ORAL 3 TIMES DAILY PRN
Qty: 30 CAPSULE | Refills: 0 | Status: SHIPPED | OUTPATIENT
Start: 2020-01-18 | End: 2020-01-28

## 2020-01-18 NOTE — PROGRESS NOTES
[facility]        NAME: Teo Mcgregor is a 46 y o  female  : 1980    MRN: [id number]  DATE: 2020  TIME: 4:32 PM    Assessment and Plan   Acute bronchitis, unspecified organism [J20 9]  1  Acute bronchitis, unspecified organism  azithromycin (ZITHROMAX) 250 mg tablet    predniSONE 50 mg tablet    benzonatate (TESSALON PERLES) 100 mg capsule    albuterol (VENTOLIN HFA) 90 mcg/act inhaler         Patient Instructions     Patient Instructions     Take the azithromycin (antibiotic) and prednisone (steroid) as ordered until completed  Use the albuterol inhaler every 4-6 hours as needed for shortness of breath, chest tightness, wheezing or persistent cough  Use the Tessalon Perles up to 3x/day as needed for cough  Acute Bronchitis   AMBULATORY CARE:   Acute bronchitis  is swelling and irritation in the air passages of your lungs  This irritation may cause you to cough or have other breathing problems  Acute bronchitis often starts because of another illness, such as a cold or the flu  The illness spreads from your nose and throat to your windpipe and airways  Bronchitis is often called a chest cold  Acute bronchitis lasts about 3 to 6 weeks and is usually not a serious illness  Your cough can last for several weeks  You may have any of the following symptoms:   · A cough with sputum that may be clear, yellow, or green    · Feeling more tired than usual, and body aches    · A fever and chills    · Wheezing when you breathe    · A tight chest or pain when you breathe or cough  Seek care immediately if:   · You cough up blood  · Your lips or fingernails turn blue  · You feel like you are not getting enough air when you breathe  Contact your healthcare provider if:   · You have a fever  · Your breathing problems do not go away or get worse  · Your cough does not get better within 4 weeks  · You have questions or concerns about your condition or care    Self-care:   · Get more rest   Rest helps your body to heal  Slowly start to do more each day  Rest when you feel it is needed  · Avoid irritants in the air  Avoid chemicals, fumes, and dust  Wear a face mask if you must work around dust or fumes  Stay inside on days when air pollution levels are high  If you have allergies, stay inside when pollen counts are high  Do not use aerosol products, such as spray-on deodorant, bug spray, and hair spray  · Do not smoke or be around others who smoke  Nicotine and other chemicals in cigarettes and cigars damages the cilia that move mucus out of your lungs  Ask your healthcare provider for information if you currently smoke and need help to quit  E-cigarettes or smokeless tobacco still contain nicotine  Talk to your healthcare provider before you use these products  · Drink liquids as directed  Liquids help keep your air passages moist and help you cough up mucus  You may need to drink more liquids when you have acute bronchitis  Ask how much liquid to drink each day and which liquids are best for you  · Use a humidifier or vaporizer  Use a cool mist humidifier or a vaporizer to increase air moisture in your home  This may make it easier for you to breathe and help decrease your cough  Prevent acute bronchitis by doing the following:   · Get the vaccinations you need  Ask your healthcare provider if you should get vaccinated against the flu or pneumonia  · Prevent the spread of germs  You can decrease your risk of acute bronchitis and other illnesses by doing the following:     [facility] your hands often with soap and water  Carry germ-killing hand lotion or gel with you  You can use the lotion or gel to clean your hands when soap and water are not available  ¨ Do not touch your eyes, nose, or mouth unless you have washed your hands first     ¨ Always cover your mouth when you cough to prevent the spread of germs   It is best to cough into a tissue or your shirt sleeve instead of into your hand  Ask those around you cover their mouths when they cough  ¨ Try to avoid people who have a cold or the flu  If you are sick, stay away from others as much as possible  Medicines: Your healthcare provider may  give you any of the following:  · Ibuprofen or acetaminophen  are medicines that help lower your fever  They are available without a doctor's order  Ask your healthcare provider which medicine is right for you  Ask how much to take and how often to take it  Follow directions  These medicines can cause stomach bleeding if not taken correctly  Ibuprofen can cause kidney damage  Do not take ibuprofen if you have kidney disease, an ulcer, or allergies to aspirin  Acetaminophen can cause liver damage  Do not take more than 4,000 milligrams in 24 hours  · Decongestants  help loosen mucus in your lungs and make it easier to cough up  This can help you breathe easier  · Cough suppressants  decrease your urge to cough  If your cough produces mucus, do not take a cough suppressant unless your healthcare provider tells you to  Your healthcare provider may suggest that you take a cough suppressant at night so you can rest     · Inhalers  may be given  Your healthcare provider may give you one or more inhalers to help you breathe easier and cough less  An inhaler gives your medicine to open your airways  Ask your healthcare provider to show you how to use your inhaler correctly  Follow up with your healthcare provider as directed:  Write down questions you have so you will remember to ask them during your follow-up visits  © 2017 [address] [employer] Information is for End User's use only and may not be sold, redistributed or otherwise used for commercial purposes  All illustrations and images included in CareNotes® are the copyrighted property of [employer]  or [employer]  The above information is an  only   It is not intended as medical advice for individual conditions or treatments  Talk to your doctor, nurse or pharmacist before following any medical regimen to see if it is safe and effective for you  Follow up with PCP in 3-5 days  Proceed to  ER if symptoms worsen  Chief Complaint     Chief Complaint   Patient presents with    Cough     x 5 days    Cold Like Symptoms         History of Present Illness       Patient reports upper respiratory symptoms and some chest tightness x5 days, getting worse not better  She has had bronchitis before and feels it could be in the beginning stages of that  She presents to be seen  Review of Systems   Review of Systems   HENT: Positive for congestion, postnasal drip and rhinorrhea  Respiratory: Positive for cough, chest tightness and shortness of breath  Negative for wheezing  All other systems reviewed and are negative  Current Medications       Current Outpatient Medications:     ALPRAZolam (XANAX) 0 25 mg tablet, Take 0 25 mg by mouth daily at bedtime as needed , Disp: , Rfl:     anastrozole (ARIMIDEX) 1 mg tablet, Take 1 mg by mouth daily  , Disp: , Rfl:     AURELIO MICROLET LANCETS lancets, by Does not apply route daily, Disp: , Rfl:     Blood Glucose Monitoring Suppl (AURELIO CONTOUR MONITOR) w/Device KIT, by Does not apply route, Disp: , Rfl:     calcium-vitamin D (OSCAL 500 + D) 500 mg-200 units per tablet, Take 1 tablet by mouth daily  , Disp: , Rfl:     ergocalciferol (VITAMIN D2) 50,000 units, Take 1 capsule (50,000 Units total) by mouth once a week, Disp: 12 capsule, Rfl: 1    Glucose Blood (AURELIO CONTOUR TEST VI), by In Vitro route daily, Disp: , Rfl:     levothyroxine 100 mcg tablet, Take 100 mcg by mouth daily, Disp: , Rfl:     levothyroxine 75 mcg tablet, Take 1 tablet (75 mcg total) by mouth daily, Disp: 90 tablet, Rfl: 1    metFORMIN (GLUCOPHAGE) 500 mg tablet, Take 1 tablet (500 mg total) by mouth daily, Disp: 90 tablet, Rfl: 1    Multiple Vitamin (MULTIVITAMINS PO), Take 1 caplet by mouth every morning , Disp: , Rfl:     nystatin (MYCOSTATIN) powder, Apply 1 application topically 2 (two) times a day as needed Abdominal fold, Disp: , Rfl: 2    simvastatin (ZOCOR) 20 mg tablet, Take 1 tablet (20 mg total) by mouth daily at bedtime, Disp: 30 tablet, Rfl: 5    albuterol (VENTOLIN HFA) 90 mcg/act inhaler, Inhale 2 puffs every 4 (four) hours as needed for wheezing or shortness of breath, Disp: 18 g, Rfl: 0    azithromycin (ZITHROMAX) 250 mg tablet, Take 2 tablets today then 1 tablet daily x 4 days, Disp: 6 tablet, Rfl: 0    benzonatate (TESSALON PERLES) 100 mg capsule, Take 1 capsule (100 mg total) by mouth 3 (three) times a day as needed for cough for up to 10 days, Disp: 30 capsule, Rfl: 0    denosumab (PROLIA) 60 mg/mL, Inject 1 mL (60 mg total) under the skin once for 1 dose, Disp: 1 mL, Rfl: 0    diclofenac sodium (VOLTAREN) 1 %, Apply 1 application topically daily as needed , Disp: , Rfl:     predniSONE 50 mg tablet, Take 1 tablet (50 mg total) by mouth daily for 5 days, Disp: 5 tablet, Rfl: 0    Current Allergies     Allergies as of 01/18/2020 - Reviewed 01/18/2020   Allergen Reaction Noted    Paclitaxel Anaphylaxis 02/26/2016            The following portions of the patient's history were reviewed and updated as appropriate: allergies, current medications, past family history, past medical history, past social history, past surgical history and problem list      Past Medical History:   Diagnosis Date    Allergy     Anxiety     Arthritis     [facility] [address] [facility]     Candidiasis, cutaneous     Cervical cancer ([facility] [address] [facility]     Depression     Diabetes mellitus ([facility] [address] [facility]     Disease of thyroid gland     hypothyroid    Disorder of urinary tract     Fatty liver     Hemorrhoids     History of chemotherapy     History of radiation therapy     Obesity     PONV (postoperative nausea and vomiting)     Port-A-Cath in place        Past Surgical History:   Procedure Laterality Date    BILATERAL SALPINGOOPHORECTOMY      BREAST BIOPSY      needle core    GASTRECTOMY SLEEVE LAPAROSCOPIC      Onset: 1/26/15  Dr Samuel Felipe  04/07/2014    IR PORT REMOVAL  11/21/2019    MASTECTOMY Left 12/19/2013    Lymph nodes removed       Family History   Problem Relation Age of Onset    Diabetes Mother     Diabetes Father     Ovarian cancer Sister 29    Hypertension Maternal Grandfather     Hodgkin's lymphoma Paternal Grandmother     Breast cancer Family     Diabetes Family     Hypertension Family     Uterine cancer Maternal Aunt 64    Leukemia Son         chronic myeloid         Medications have been verified  Objective   /72   Pulse 83   Temp 97 7 °F (36 5 °C)   Resp 16   Ht 5' 2" (1 575 m)   Wt 99 8 kg (220 lb)   SpO2 97%   BMI 40 24 kg/m²        Physical Exam     Physical Exam   Constitutional: She is oriented to person, place, and time  She appears well-developed and well-nourished  No distress  HENT:   Head: Normocephalic and atraumatic  Right Ear: Hearing, tympanic membrane, external ear and ear canal normal    Left Ear: Hearing, tympanic membrane, external ear and ear canal normal    Nose: Mucosal edema, rhinorrhea and sinus tenderness (slight sinus tenderness) present  Mouth/Throat: Uvula is midline, oropharynx is clear and moist and mucous membranes are normal  No posterior oropharyngeal erythema or tonsillar abscesses  No tonsillar exudate  Eyes: Pupils are equal, round, and reactive to light  Neck: Normal range of motion  Neck supple  Cardiovascular: Normal rate, regular rhythm and normal heart sounds  Pulmonary/Chest: Effort normal  No accessory muscle usage or stridor  No apnea, no tachypnea and no bradypnea  No respiratory distress  She has decreased breath sounds  She has wheezes  She has no rhonchi  She has no rales  Mildly tight throughout scattered expiratory wheezes   Abdominal: Soft   Bowel sounds are normal  She exhibits no distension  There is no tenderness  Musculoskeletal: Normal range of motion  Neurological: She is alert and oriented to person, place, and time  Skin: Skin is warm and dry  Capillary refill takes less than 2 seconds  She is not diaphoretic  Psychiatric: She has a normal mood and affect  Her behavior is normal  Judgment and thought content normal    Nursing note and vitals reviewed

## 2020-01-18 NOTE — PATIENT INSTRUCTIONS
Take the azithromycin (antibiotic) and prednisone (steroid) as ordered until completed  Use the albuterol inhaler every 4-6 hours as needed for shortness of breath, chest tightness, wheezing or persistent cough  Use the Tessalon Perles up to 3x/day as needed for cough  Acute Bronchitis   AMBULATORY CARE:   Acute bronchitis  is swelling and irritation in the air passages of your lungs  This irritation may cause you to cough or have other breathing problems  Acute bronchitis often starts because of another illness, such as a cold or the flu  The illness spreads from your nose and throat to your windpipe and airways  Bronchitis is often called a chest cold  Acute bronchitis lasts about 3 to 6 weeks and is usually not a serious illness  Your cough can last for several weeks  You may have any of the following symptoms:   · A cough with sputum that may be clear, yellow, or green    · Feeling more tired than usual, and body aches    · A fever and chills    · Wheezing when you breathe    · A tight chest or pain when you breathe or cough  Seek care immediately if:   · You cough up blood  · Your lips or fingernails turn blue  · You feel like you are not getting enough air when you breathe  Contact your healthcare provider if:   · You have a fever  · Your breathing problems do not go away or get worse  · Your cough does not get better within 4 weeks  · You have questions or concerns about your condition or care  Self-care:   · Get more rest   Rest helps your body to heal  Slowly start to do more each day  Rest when you feel it is needed  · Avoid irritants in the air  Avoid chemicals, fumes, and dust  Wear a face mask if you must work around dust or fumes  Stay inside on days when air pollution levels are high  If you have allergies, stay inside when pollen counts are high  Do not use aerosol products, such as spray-on deodorant, bug spray, and hair spray      · Do not smoke or be around others who smoke   Nicotine and other chemicals in cigarettes and cigars damages the cilia that move mucus out of your lungs  Ask your healthcare provider for information if you currently smoke and need help to quit  E-cigarettes or smokeless tobacco still contain nicotine  Talk to your healthcare provider before you use these products  · Drink liquids as directed  Liquids help keep your air passages moist and help you cough up mucus  You may need to drink more liquids when you have acute bronchitis  Ask how much liquid to drink each day and which liquids are best for you  · Use a humidifier or vaporizer  Use a cool mist humidifier or a vaporizer to increase air moisture in your home  This may make it easier for you to breathe and help decrease your cough  Prevent acute bronchitis by doing the following:   · Get the vaccinations you need  Ask your healthcare provider if you should get vaccinated against the flu or pneumonia  · Prevent the spread of germs  You can decrease your risk of acute bronchitis and other illnesses by doing the following:     [facility] your hands often with soap and water  Carry germ-killing hand lotion or gel with you  You can use the lotion or gel to clean your hands when soap and water are not available  ¨ Do not touch your eyes, nose, or mouth unless you have washed your hands first     ¨ Always cover your mouth when you cough to prevent the spread of germs  It is best to cough into a tissue or your shirt sleeve instead of into your hand  Ask those around you cover their mouths when they cough  ¨ Try to avoid people who have a cold or the flu  If you are sick, stay away from others as much as possible  Medicines: Your healthcare provider may  give you any of the following:  · Ibuprofen or acetaminophen  are medicines that help lower your fever  They are available without a doctor's order  Ask your healthcare provider which medicine is right for you   Ask how much to take and how often to take it  Follow directions  These medicines can cause stomach bleeding if not taken correctly  Ibuprofen can cause kidney damage  Do not take ibuprofen if you have kidney disease, an ulcer, or allergies to aspirin  Acetaminophen can cause liver damage  Do not take more than 4,000 milligrams in 24 hours  · Decongestants  help loosen mucus in your lungs and make it easier to cough up  This can help you breathe easier  · Cough suppressants  decrease your urge to cough  If your cough produces mucus, do not take a cough suppressant unless your healthcare provider tells you to  Your healthcare provider may suggest that you take a cough suppressant at night so you can rest     · Inhalers  may be given  Your healthcare provider may give you one or more inhalers to help you breathe easier and cough less  An inhaler gives your medicine to open your airways  Ask your healthcare provider to show you how to use your inhaler correctly  Follow up with your healthcare provider as directed:  Write down questions you have so you will remember to ask them during your follow-up visits  © 2017 [facility] [facility] Information is for End User's use only and may not be sold, redistributed or otherwise used for commercial purposes  All illustrations and images included in CareNotes® are the copyrighted property of [employer]  or [employer]  The above information is an  only  It is not intended as medical advice for individual conditions or treatments  Talk to your doctor, nurse or pharmacist before following any medical regimen to see if it is safe and effective for you

## 2020-01-18 NOTE — LETTER
January 18, 2020     Patient: Davonna Cheadle   YOB: 1980   Date of Visit: 1/18/2020       To Whom It May Concern: It is my medical opinion that Gee Nava may return to work on 1/19 night shift (into 1/20) or 1/20 night shift (into 1/21)  Please excuse from work 1/18 night shift and possibly 1/19 night shift  If you have any questions or concerns, please don't hesitate to call           Sincerely,        CONCEPCIÓN Lai    CC: No Recipients

## 2020-01-26 ENCOUNTER — HOSPITAL ENCOUNTER (EMERGENCY)
Facility: HOSPITAL | Age: 40
Discharge: HOME | End: 2020-01-26
Attending: INTERNAL MEDICINE
Payer: COMMERCIAL

## 2020-01-26 VITALS
HEIGHT: 62 IN | DIASTOLIC BLOOD PRESSURE: 51 MMHG | OXYGEN SATURATION: 95 % | SYSTOLIC BLOOD PRESSURE: 99 MMHG | BODY MASS INDEX: 41.41 KG/M2 | TEMPERATURE: 98.8 F | HEART RATE: 100 BPM | RESPIRATION RATE: 16 BRPM | WEIGHT: 225 LBS

## 2020-01-26 DIAGNOSIS — J11.1 INFLUENZA: Primary | ICD-10-CM

## 2020-01-26 PROCEDURE — 99283 EMERGENCY DEPT VISIT LOW MDM: CPT

## 2020-01-26 PROCEDURE — 99284 EMERGENCY DEPT VISIT MOD MDM: CPT | Performed by: INTERNAL MEDICINE

## 2020-01-26 RX ORDER — IBUPROFEN 600 MG/1
600 TABLET ORAL ONCE
Status: COMPLETED | OUTPATIENT
Start: 2020-01-26 | End: 2020-01-26

## 2020-01-26 RX ORDER — IBUPROFEN 600 MG/1
600 TABLET ORAL EVERY 6 HOURS PRN
Qty: 30 TABLET | Refills: 0 | Status: SHIPPED | OUTPATIENT
Start: 2020-01-26

## 2020-01-26 RX ORDER — OSELTAMIVIR PHOSPHATE 75 MG/1
75 CAPSULE ORAL 2 TIMES DAILY
COMMUNITY
Start: 2020-01-25 | End: 2020-01-30

## 2020-01-26 RX ADMIN — IBUPROFEN 600 MG: 600 TABLET, FILM COATED ORAL at 12:04

## 2020-01-26 NOTE — ED PROVIDER NOTES
History  Chief Complaint   Patient presents with    Flu Symptoms     +flu A yesterday, body aches, coughing, not eating      30-year-old female presents emergency room with influenza A  Patient states she was diagnosed with influenza yesterday 1 dose of Tamiflu yesterday took a 2nd dose early this morning and does not feel significantly better  She still has a dry cough and headache with body aches  She did take 2 Tylenol today without any significant relief she denies any nausea vomiting, fever at this time  She states the cough is dry and not significantly better but no worse  Prior to Admission Medications   Prescriptions Last Dose Informant Patient Reported? Taking? ALPRAZolam (XANAX) 0 25 mg tablet   Yes No   Sig: Take 0 25 mg by mouth daily at bedtime as needed    AURELIO MICROLET LANCETS lancets   Yes No   Sig: by Does not apply route daily   Blood Glucose Monitoring Suppl (Chartboost CONTOUR MONITOR) w/Device KIT   Yes No   Sig: by Does not apply route   Glucose Blood (Chartboost CONTOUR TEST VI)   Yes No   Sig: by In Vitro route daily   albuterol (VENTOLIN HFA) 90 mcg/act inhaler   No No   Sig: Inhale 2 puffs every 4 (four) hours as needed for wheezing or shortness of breath   anastrozole (ARIMIDEX) 1 mg tablet   Yes No   Sig: Take 1 mg by mouth daily  benzonatate (TESSALON PERLES) 100 mg capsule   No No   Sig: Take 1 capsule (100 mg total) by mouth 3 (three) times a day as needed for cough for up to 10 days   calcium-vitamin D (OSCAL 500 + D) 500 mg-200 units per tablet   Yes No   Sig: Take 1 tablet by mouth daily     denosumab (PROLIA) 60 mg/mL   Yes Yes   Sig: Inject 60 mg under the skin once   diclofenac sodium (VOLTAREN) 1 %   Yes No   Sig: Apply 1 application topically daily as needed    ergocalciferol (VITAMIN D2) 50,000 units   No No   Sig: Take 1 capsule (50,000 Units total) by mouth once a week   levothyroxine 75 mcg tablet   No No   Sig: Take 1 tablet (75 mcg total) by mouth daily metFORMIN (GLUCOPHAGE) 500 mg tablet   No No   Sig: Take 1 tablet (500 mg total) by mouth daily   nystatin (MYCOSTATIN) powder   Yes No   Sig: Apply 1 application topically 2 (two) times a day as needed Abdominal fold   oseltamivir (TAMIFLU) 75 mg capsule   Yes Yes   Sig: Take 75 mg by mouth 2 (two) times a day   simvastatin (ZOCOR) 20 mg tablet   No No   Sig: Take 1 tablet (20 mg total) by mouth daily at bedtime      Facility-Administered Medications: None       Past Medical History:   Diagnosis Date    Allergy     Anxiety     Arthritis     [facility] cancer ([facility] )     Candidiasis, cutaneous     Cervical cancer (HCC)     Depression     Diabetes mellitus ([facility] )     Disease of thyroid gland     hypothyroid    Disorder of urinary tract     Fatty liver     Hemorrhoids     History of chemotherapy     History of radiation therapy     Obesity     PONV (postoperative nausea and vomiting)     Port-A-Cath in place        Past Surgical History:   Procedure Laterality Date    BILATERAL SALPINGOOPHORECTOMY      BREAST BIOPSY      needle core    GASTRECTOMY SLEEVE LAPAROSCOPIC      Onset: 1/26/15  Dr Jesse Quiroz  2014    IR PORT REMOVAL  2019    MASTECTOMY Left 2013    Lymph nodes removed       Family History   Problem Relation Age of Onset    Diabetes Mother     Diabetes Father     Ovarian cancer Sister 29    Hypertension Maternal Grandfather     Hodgkin's lymphoma Paternal Grandmother     Breast cancer Family     Diabetes Family     Hypertension Family     Uterine cancer Maternal Aunt 64    Leukemia Son         chronic myeloid     I have reviewed and agree with the history as documented      Social History     Tobacco Use    Smoking status: Former Smoker     Types: Cigarettes     Last attempt to quit: 2013     Years since quittin 8    Smokeless tobacco: Never Used   Substance Use Topics    Alcohol use: No    Drug use: No        Review of Systems Constitutional: Positive for fatigue  HENT: Negative  Respiratory: Positive for cough  Cardiovascular: Negative  Gastrointestinal: Negative  Musculoskeletal: Positive for myalgias  Skin: Negative  Neurological: Positive for headaches  Hematological: Negative  Psychiatric/Behavioral: Negative  Physical Exam  Physical Exam   Constitutional: She appears well-developed and well-nourished  No distress  HENT:   Head: Normocephalic and atraumatic  Right Ear: External ear normal    Left Ear: External ear normal    Mouth/Throat: Oropharynx is clear and moist    Eyes: Conjunctivae and EOM are normal    Neck: Normal range of motion  Neck supple  Cardiovascular: Normal rate, regular rhythm and normal heart sounds  Pulmonary/Chest: Effort normal and breath sounds normal    Abdominal: Soft  Bowel sounds are normal    Skin: Skin is warm and dry  She is not diaphoretic  Psychiatric: She has a normal mood and affect  Her behavior is normal  Judgment and thought content normal    Nursing note and vitals reviewed        Vital Signs  ED Triage Vitals [01/26/20 1146]   Temperature Pulse Respirations Blood Pressure SpO2   98 8 °F (37 1 °C) 100 16 99/51 95 %      Temp Source Heart Rate Source Patient Position - Orthostatic VS BP Location FiO2 (%)   Temporal Monitor Sitting Left arm --      Pain Score       8           Vitals:    01/26/20 1146   BP: 99/51   Pulse: 100   Patient Position - Orthostatic VS: Sitting         Visual Acuity      ED Medications  Medications   ibuprofen (MOTRIN) tablet 600 mg (has no administration in time range)       Diagnostic Studies  Results Reviewed     None                 No orders to display              Procedures  Procedures         ED Course                               MDM      Disposition  Final diagnoses:   Influenza     Time reflects when diagnosis was documented in both MDM as applicable and the Disposition within this note     Time User Action Codes Description Comment    1/26/2020 11:56 AM Hawa Denney Add [J11 1] Influenza       ED Disposition     ED Disposition Condition Date/Time Comment    Discharge Stable Sun Jan 26, 2020 11:56 AM Bertha Beckett discharge to home/self care  Follow-up Information     Follow up With Specialties Details Why Contact Nico Candelaria DO Internal Medicine In 2 days  [address] [place] [address]  [phone]            Patient's Medications   Discharge Prescriptions    IBUPROFEN (MOTRIN) 600 MG TABLET    Take 1 tablet (600 mg total) by mouth every 6 (six) hours as needed for mild pain       Start Date: 1/26/2020 End Date: --       Order Dose: 600 mg       Quantity: 30 tablet    Refills: 0     No discharge procedures on file      ED Provider  Electronically Signed by           Sandrine Lott MD  01/26/20 0131

## 2020-02-07 ENCOUNTER — OFFICE VISIT (OUTPATIENT)
Dept: INTERNAL MEDICINE CLINIC | Facility: CLINIC | Age: 40
End: 2020-02-07
Payer: COMMERCIAL

## 2020-02-07 VITALS
OXYGEN SATURATION: 96 % | BODY MASS INDEX: 41.04 KG/M2 | SYSTOLIC BLOOD PRESSURE: 118 MMHG | TEMPERATURE: 97.7 F | RESPIRATION RATE: 18 BRPM | DIASTOLIC BLOOD PRESSURE: 72 MMHG | WEIGHT: 223 LBS | HEIGHT: 62 IN | HEART RATE: 80 BPM

## 2020-02-07 DIAGNOSIS — C50.912 MALIGNANT NEOPLASM OF LEFT BREAST IN FEMALE, ESTROGEN RECEPTOR POSITIVE, UNSPECIFIED SITE OF BREAST (HCC): ICD-10-CM

## 2020-02-07 DIAGNOSIS — M62.838 CERVICAL PARASPINAL MUSCLE SPASM: ICD-10-CM

## 2020-02-07 DIAGNOSIS — E11.9 TYPE 2 DIABETES MELLITUS WITHOUT COMPLICATION, WITHOUT LONG-TERM CURRENT USE OF INSULIN (HCC): ICD-10-CM

## 2020-02-07 DIAGNOSIS — E66.01 OBESITY, CLASS III, BMI 40-49.9 (MORBID OBESITY) (HCC): ICD-10-CM

## 2020-02-07 DIAGNOSIS — Z17.0 MALIGNANT NEOPLASM OF LEFT BREAST IN FEMALE, ESTROGEN RECEPTOR POSITIVE, UNSPECIFIED SITE OF BREAST (HCC): ICD-10-CM

## 2020-02-07 DIAGNOSIS — E03.9 HYPOTHYROIDISM, UNSPECIFIED TYPE: ICD-10-CM

## 2020-02-07 DIAGNOSIS — E11.9 DIABETIC EYE EXAM (HCC): Primary | ICD-10-CM

## 2020-02-07 DIAGNOSIS — Z01.00 DIABETIC EYE EXAM (HCC): Primary | ICD-10-CM

## 2020-02-07 DIAGNOSIS — E78.5 HYPERLIPIDEMIA, UNSPECIFIED HYPERLIPIDEMIA TYPE: ICD-10-CM

## 2020-02-07 PROBLEM — E66.813 OBESITY, CLASS III, BMI 40-49.9 (MORBID OBESITY): Status: ACTIVE | Noted: 2020-02-07

## 2020-02-07 PROCEDURE — 3008F BODY MASS INDEX DOCD: CPT | Performed by: INTERNAL MEDICINE

## 2020-02-07 PROCEDURE — 99214 OFFICE O/P EST MOD 30 MIN: CPT | Performed by: INTERNAL MEDICINE

## 2020-02-07 PROCEDURE — 3074F SYST BP LT 130 MM HG: CPT | Performed by: INTERNAL MEDICINE

## 2020-02-07 PROCEDURE — 1036F TOBACCO NON-USER: CPT | Performed by: INTERNAL MEDICINE

## 2020-02-07 PROCEDURE — 3078F DIAST BP <80 MM HG: CPT | Performed by: INTERNAL MEDICINE

## 2020-02-07 RX ORDER — SIMVASTATIN 20 MG
20 TABLET ORAL
Qty: 30 TABLET | Refills: 5 | Status: SHIPPED | OUTPATIENT
Start: 2020-02-07 | End: 2020-04-07 | Stop reason: SDUPTHER

## 2020-02-07 RX ORDER — CYCLOBENZAPRINE HCL 10 MG
10 TABLET ORAL 3 TIMES DAILY PRN
Qty: 42 TABLET | Refills: 0 | Status: SHIPPED | OUTPATIENT
Start: 2020-02-07 | End: 2021-06-15 | Stop reason: SDUPTHER

## 2020-02-07 RX ORDER — ANASTROZOLE 1 MG/1
1 TABLET ORAL DAILY
Qty: 90 TABLET | Refills: 1 | Status: SHIPPED | OUTPATIENT
Start: 2020-02-07 | End: 2020-09-30 | Stop reason: SDUPTHER

## 2020-02-07 RX ORDER — LEVOTHYROXINE SODIUM 0.07 MG/1
75 TABLET ORAL DAILY
Qty: 90 TABLET | Refills: 1 | Status: SHIPPED | OUTPATIENT
Start: 2020-02-07 | End: 2020-04-07 | Stop reason: SDUPTHER

## 2020-02-07 NOTE — PROGRESS NOTES
BMI Counseling: Body mass index is 40 79 kg/m²  The BMI is above normal  Nutrition recommendations include decreasing portion sizes  Exercise recommendations include exercising 3-5 times per week  No pharmacotherapy was ordered  Assessment/Plan:    Problem List Items Addressed This Visit        Endocrine    Diabetes mellitus (HCC)    Relevant Medications    metFORMIN (GLUCOPHAGE) 500 mg tablet    Hypothyroidism    Relevant Medications    levothyroxine 75 mcg tablet       Other    Breast cancer (HCC)    Relevant Medications    anastrozole (ARIMIDEX) 1 mg tablet    Hyperlipidemia    Relevant Medications    simvastatin (ZOCOR) 20 mg tablet    Obesity, Class III, BMI 40-49 9 (morbid obesity) ([facility] )      Other Visit Diagnoses     Diabetic eye exam ([place] [address] )    -  Primary    Relevant Orders    Ambulatory referral to Ophthalmology    Cervical paraspinal muscle spasm        Relevant Medications    cyclobenzaprine (FLEXERIL) 10 mg tablet           Diagnoses and all orders for this visit:    Diabetic eye exam [facility])  -     Ambulatory referral to Ophthalmology; Future    Type 2 diabetes mellitus without complication, without long-term current use of insulin (HCC)  -     metFORMIN (GLUCOPHAGE) 500 mg tablet; Take 1 tablet (500 mg total) by mouth daily    Hypothyroidism, unspecified type  -     levothyroxine 75 mcg tablet; Take 1 tablet (75 mcg total) by mouth daily    Hyperlipidemia, unspecified hyperlipidemia type  -     simvastatin (ZOCOR) 20 mg tablet; Take 1 tablet (20 mg total) by mouth daily at bedtime    Cervical paraspinal muscle spasm  -     cyclobenzaprine (FLEXERIL) 10 mg tablet; Take 1 tablet (10 mg total) by mouth 3 (three) times a day as needed for muscle spasms for up to 14 days    Malignant neoplasm of left breast in female, estrogen receptor positive, unspecified site of breast (HCC)  -     anastrozole (ARIMIDEX) 1 mg tablet;  Take 1 tablet (1 mg total) by mouth daily    Obesity, Class III, BMI 40-49 9 (morbid obesity) ([facility] )    Other orders  -     Cancel: POCT hemoglobin A1c        No problem-specific Assessment & Plan notes found for this encounter  We will follow up in 4 months and see how she is doing  Subjective:      Patient ID: Cristiana Erwin is a 45 y o  female  We reviewed her labs and notes that she has an excelent HgbA1c of 5 9%  She is doing well on the current dose of levothyroxine  The patient states that she has pain in the cervical region of her neck and states that this has improved, but has been present for several weeks  She can not use NSAIDs secondary to bariatric surgery  The patient states that there are no other concerns  She is doing well with the Arimidex  She states that she has no other issues at this time  She continues to work on her weight and states there are no other issues   Diabetes   She presents for her follow-up diabetic visit  She has type 2 diabetes mellitus  MedicAlert identification noted  Her disease course has been stable  Pertinent negatives for hypoglycemia include no confusion, dizziness, headaches, hunger, mood changes, nervousness/anxiousness, pallor, seizures, sleepiness, speech difficulty, sweats or tremors  Pertinent negatives for diabetes include no blurred vision, no chest pain, no fatigue, no foot paresthesias, no foot ulcerations, no polydipsia, no polyphagia, no polyuria, no visual change, no weakness and no weight loss  There are no hypoglycemic complications  Symptoms are stable  There are no diabetic complications  There are no known risk factors for coronary artery disease  Current diabetic treatment includes oral agent (monotherapy)  She is compliant with treatment all of the time  She is following a diabetic diet  She has had a previous visit with a dietitian  There is no change in her home blood glucose trend  An ACE inhibitor/angiotensin II receptor blocker is not being taken  Eye exam is current         The following portions of the patient's history were reviewed and updated as appropriate:   She has a past medical history of Allergy, Anxiety, Arthritis, Breast cancer ([facility] ), Candidiasis, cutaneous, Cervical cancer ([facility] ), Depression, Diabetes mellitus ([facility] ), Disease of thyroid gland, Disorder of urinary tract, Fatty liver, Hemorrhoids, History of chemotherapy, History of radiation therapy, Influenza A, Obesity, PONV (postoperative nausea and vomiting), and Port-A-Cath in place  ,  does not have any pertinent problems on file  ,   has a past surgical history that includes Breast biopsy; Mastectomy (Left, 12/19/2013); Hysterectomy (04/07/2014); Bilateral salpingoophorectomy; GASTRECTOMY SLEEVE LAPAROSCOPIC; and IR port Removal (11/21/2019)  ,  family history includes Breast cancer in her family; Diabetes in her family, father, and mother; Hodgkin's lymphoma in her paternal grandmother; Hypertension in her family and maternal grandfather; Leukemia in her son; Ovarian cancer (age of onset: 29) in her sister; Uterine cancer (age of onset: 64) in her maternal aunt  ,   reports that she quit smoking about 6 years ago  Her smoking use included cigarettes  She has never used smokeless tobacco  She reports that she does not drink alcohol or use drugs  ,  is allergic to paclitaxel     Current Outpatient Medications   Medication Sig Dispense Refill    albuterol (VENTOLIN HFA) 90 mcg/act inhaler Inhale 2 puffs every 4 (four) hours as needed for wheezing or shortness of breath 18 g 0    ALPRAZolam (XANAX) 0 25 mg tablet Take 0 25 mg by mouth daily at bedtime as needed       anastrozole (ARIMIDEX) 1 mg tablet Take 1 tablet (1 mg total) by mouth daily 90 tablet 1    AURELIO MICROLET LANCETS lancets by Does not apply route daily      Blood Glucose Monitoring Suppl (AURELIO CONTOUR MONITOR) w/Device KIT by Does not apply route      calcium-vitamin D (OSCAL 500 + D) 500 mg-200 units per tablet Take 1 tablet by mouth daily        denosumab (PROLIA) 60 mg/mL Inject 60 mg under the skin once      diclofenac sodium (VOLTAREN) 1 % Apply 1 application topically daily as needed       ergocalciferol (VITAMIN D2) 50,000 units Take 1 capsule (50,000 Units total) by mouth once a week 12 capsule 1    Glucose Blood (AURELIO CONTOUR TEST VI) by In Vitro route daily      ibuprofen (MOTRIN) 600 mg tablet Take 1 tablet (600 mg total) by mouth every 6 (six) hours as needed for mild pain 30 tablet 0    levothyroxine 75 mcg tablet Take 1 tablet (75 mcg total) by mouth daily 90 tablet 1    metFORMIN (GLUCOPHAGE) 500 mg tablet Take 1 tablet (500 mg total) by mouth daily 90 tablet 1    nystatin (MYCOSTATIN) powder Apply 1 application topically 2 (two) times a day as needed Abdominal fold  2    simvastatin (ZOCOR) 20 mg tablet Take 1 tablet (20 mg total) by mouth daily at bedtime 30 tablet 5    cyclobenzaprine (FLEXERIL) 10 mg tablet Take 1 tablet (10 mg total) by mouth 3 (three) times a day as needed for muscle spasms for up to 14 days 42 tablet 0     No current facility-administered medications for this visit  Review of Systems   Constitutional: Negative for fatigue and weight loss  Eyes: Negative for blurred vision  Cardiovascular: Negative for chest pain  Endocrine: Negative for polydipsia, polyphagia and polyuria  Skin: Negative for pallor  Neurological: Negative for dizziness, tremors, seizures, speech difficulty, weakness and headaches  Psychiatric/Behavioral: Negative for confusion  The patient is not nervous/anxious  Objective:  Vitals:    02/07/20 0843   BP: 118/72   BP Location: Left arm   Patient Position: Sitting   Cuff Size: Adult   Pulse: 80   Resp: 18   Temp: 97 7 °F (36 5 °C)   TempSrc: Tympanic   SpO2: 96%   Weight: 101 kg (223 lb)   Height: 5' 2" (1 575 m)     Body mass index is 40 79 kg/m²       Physical Exam     PHQ-9 Depression Screening    PHQ-9:    Frequency of the following problems over the past two weeks:

## 2020-02-19 ENCOUNTER — TELEPHONE (OUTPATIENT)
Dept: INTERNAL MEDICINE CLINIC | Facility: CLINIC | Age: 40
End: 2020-02-19

## 2020-02-19 NOTE — TELEPHONE ENCOUNTER
----- Message from [address] [place] [address]  Sophy sent at 2/18/2020  5:50 PM EST -----  Regarding: Non-Urgent Medical Question  Contact: [phone]  Need a copy of my Physical & 2 Step TB I had there, Can  tomorrow or my  Qiana Luis will  It's for my Work!  Thanks     Bertha Beckett LPN

## 2020-04-05 ENCOUNTER — NURSE TRIAGE (OUTPATIENT)
Dept: OTHER | Facility: OTHER | Age: 40
End: 2020-04-05

## 2020-04-07 ENCOUNTER — TELEMEDICINE (OUTPATIENT)
Dept: INTERNAL MEDICINE CLINIC | Facility: CLINIC | Age: 40
End: 2020-04-07
Payer: COMMERCIAL

## 2020-04-07 VITALS
BODY MASS INDEX: 41.41 KG/M2 | OXYGEN SATURATION: 98 % | DIASTOLIC BLOOD PRESSURE: 68 MMHG | SYSTOLIC BLOOD PRESSURE: 104 MMHG | HEIGHT: 62 IN | HEART RATE: 82 BPM | RESPIRATION RATE: 16 BRPM | WEIGHT: 225 LBS

## 2020-04-07 DIAGNOSIS — E03.9 HYPOTHYROIDISM, UNSPECIFIED TYPE: ICD-10-CM

## 2020-04-07 DIAGNOSIS — E78.5 HYPERLIPIDEMIA, UNSPECIFIED HYPERLIPIDEMIA TYPE: ICD-10-CM

## 2020-04-07 DIAGNOSIS — E11.9 TYPE 2 DIABETES MELLITUS WITHOUT COMPLICATION, WITHOUT LONG-TERM CURRENT USE OF INSULIN (HCC): Primary | ICD-10-CM

## 2020-04-07 PROCEDURE — G2012 BRIEF CHECK IN BY MD/QHP: HCPCS | Performed by: INTERNAL MEDICINE

## 2020-04-07 RX ORDER — SIMVASTATIN 20 MG
20 TABLET ORAL
Qty: 30 TABLET | Refills: 5 | Status: SHIPPED | OUTPATIENT
Start: 2020-04-07 | End: 2020-09-30 | Stop reason: SDUPTHER

## 2020-04-07 RX ORDER — LEVOTHYROXINE SODIUM 0.07 MG/1
75 TABLET ORAL DAILY
Qty: 90 TABLET | Refills: 1 | Status: SHIPPED | OUTPATIENT
Start: 2020-04-07 | End: 2020-09-30 | Stop reason: SDUPTHER

## 2020-05-06 ENCOUNTER — APPOINTMENT (OUTPATIENT)
Dept: LAB | Facility: CLINIC | Age: 40
End: 2020-05-06
Payer: COMMERCIAL

## 2020-05-06 ENCOUNTER — TELEMEDICINE (OUTPATIENT)
Dept: INTERNAL MEDICINE CLINIC | Facility: CLINIC | Age: 40
End: 2020-05-06
Payer: COMMERCIAL

## 2020-05-06 ENCOUNTER — TRANSCRIBE ORDERS (OUTPATIENT)
Dept: ADMINISTRATIVE | Facility: HOSPITAL | Age: 40
End: 2020-05-06

## 2020-05-06 VITALS — BODY MASS INDEX: 41.15 KG/M2 | HEIGHT: 62 IN

## 2020-05-06 DIAGNOSIS — Z20.822 ENCOUNTER FOR LABORATORY TESTING FOR COVID-19 VIRUS: ICD-10-CM

## 2020-05-06 DIAGNOSIS — E55.9 VITAMIN D DEFICIENCY: ICD-10-CM

## 2020-05-06 DIAGNOSIS — R79.89 ELEVATED LFTS: ICD-10-CM

## 2020-05-06 DIAGNOSIS — K91.2 POSTSURGICAL MALABSORPTION: ICD-10-CM

## 2020-05-06 DIAGNOSIS — E78.5 HYPERLIPIDEMIA, UNSPECIFIED HYPERLIPIDEMIA TYPE: ICD-10-CM

## 2020-05-06 DIAGNOSIS — E06.3 HYPOTHYROIDISM DUE TO HASHIMOTO'S THYROIDITIS: ICD-10-CM

## 2020-05-06 DIAGNOSIS — E03.8 HYPOTHYROIDISM DUE TO HASHIMOTO'S THYROIDITIS: ICD-10-CM

## 2020-05-06 DIAGNOSIS — E11.9 TYPE 2 DIABETES MELLITUS WITHOUT COMPLICATION, WITHOUT LONG-TERM CURRENT USE OF INSULIN (HCC): ICD-10-CM

## 2020-05-06 DIAGNOSIS — E11.9 TYPE 2 DIABETES MELLITUS WITHOUT COMPLICATION, WITHOUT LONG-TERM CURRENT USE OF INSULIN (HCC): Primary | ICD-10-CM

## 2020-05-06 LAB
25(OH)D3 SERPL-MCNC: 16.5 NG/ML (ref 30–100)
ALBUMIN SERPL BCP-MCNC: 4.1 G/DL (ref 3.5–5)
ALP SERPL-CCNC: 53 U/L (ref 46–116)
ALT SERPL W P-5'-P-CCNC: 53 U/L (ref 12–78)
ANION GAP SERPL CALCULATED.3IONS-SCNC: 5 MMOL/L (ref 4–13)
AST SERPL W P-5'-P-CCNC: 30 U/L (ref 5–45)
BASOPHILS # BLD AUTO: 0.04 THOUSANDS/ΜL (ref 0–0.1)
BASOPHILS NFR BLD AUTO: 1 % (ref 0–1)
BILIRUB DIRECT SERPL-MCNC: 0.19 MG/DL (ref 0–0.2)
BILIRUB SERPL-MCNC: 0.51 MG/DL (ref 0.2–1)
BUN SERPL-MCNC: 8 MG/DL (ref 5–25)
CALCIUM SERPL-MCNC: 9 MG/DL (ref 8.3–10.1)
CHLORIDE SERPL-SCNC: 108 MMOL/L (ref 100–108)
CHOLEST SERPL-MCNC: 192 MG/DL (ref 50–200)
CO2 SERPL-SCNC: 26 MMOL/L (ref 21–32)
CREAT SERPL-MCNC: 0.65 MG/DL (ref 0.6–1.3)
CREAT UR-MCNC: 31.9 MG/DL
EOSINOPHIL # BLD AUTO: 0.21 THOUSAND/ΜL (ref 0–0.61)
EOSINOPHIL NFR BLD AUTO: 3 % (ref 0–6)
ERYTHROCYTE [DISTWIDTH] IN BLOOD BY AUTOMATED COUNT: 13.5 % (ref 11.6–15.1)
FERRITIN SERPL-MCNC: 36 NG/ML (ref 8–388)
FOLATE SERPL-MCNC: >20 NG/ML (ref 3.1–17.5)
GFR SERPL CREATININE-BSD FRML MDRD: 112 ML/MIN/1.73SQ M
GLUCOSE P FAST SERPL-MCNC: 118 MG/DL (ref 65–99)
HCT VFR BLD AUTO: 42.6 % (ref 34.8–46.1)
HDLC SERPL-MCNC: 41 MG/DL
HGB BLD-MCNC: 14.2 G/DL (ref 11.5–15.4)
IMM GRANULOCYTES # BLD AUTO: 0.03 THOUSAND/UL (ref 0–0.2)
IMM GRANULOCYTES NFR BLD AUTO: 0 % (ref 0–2)
IRON SATN MFR SERPL: 19 %
IRON SERPL-MCNC: 74 UG/DL (ref 50–170)
LDLC SERPL CALC-MCNC: 121 MG/DL (ref 0–100)
LYMPHOCYTES # BLD AUTO: 1.76 THOUSANDS/ΜL (ref 0.6–4.47)
LYMPHOCYTES NFR BLD AUTO: 22 % (ref 14–44)
MCH RBC QN AUTO: 29.8 PG (ref 26.8–34.3)
MCHC RBC AUTO-ENTMCNC: 33.3 G/DL (ref 31.4–37.4)
MCV RBC AUTO: 90 FL (ref 82–98)
MICROALBUMIN UR-MCNC: 9 MG/L (ref 0–20)
MICROALBUMIN/CREAT 24H UR: 28 MG/G CREATININE (ref 0–30)
MONOCYTES # BLD AUTO: 0.6 THOUSAND/ΜL (ref 0.17–1.22)
MONOCYTES NFR BLD AUTO: 8 % (ref 4–12)
NEUTROPHILS # BLD AUTO: 5.35 THOUSANDS/ΜL (ref 1.85–7.62)
NEUTS SEG NFR BLD AUTO: 66 % (ref 43–75)
NRBC BLD AUTO-RTO: 0 /100 WBCS
PLATELET # BLD AUTO: 299 THOUSANDS/UL (ref 149–390)
PMV BLD AUTO: 9.9 FL (ref 8.9–12.7)
POTASSIUM SERPL-SCNC: 4 MMOL/L (ref 3.5–5.3)
PROT SERPL-MCNC: 7.9 G/DL (ref 6.4–8.2)
PTH-INTACT SERPL-MCNC: 124.5 PG/ML (ref 18.4–80.1)
RBC # BLD AUTO: 4.76 MILLION/UL (ref 3.81–5.12)
SODIUM SERPL-SCNC: 139 MMOL/L (ref 136–145)
TIBC SERPL-MCNC: 384 UG/DL (ref 250–450)
TRIGL SERPL-MCNC: 149 MG/DL
TSH SERPL DL<=0.05 MIU/L-ACNC: 1.76 UIU/ML (ref 0.36–3.74)
VIT B12 SERPL-MCNC: 310 PG/ML (ref 100–900)
WBC # BLD AUTO: 7.99 THOUSAND/UL (ref 4.31–10.16)

## 2020-05-06 PROCEDURE — 82248 BILIRUBIN DIRECT: CPT

## 2020-05-06 PROCEDURE — 82043 UR ALBUMIN QUANTITATIVE: CPT | Performed by: INTERNAL MEDICINE

## 2020-05-06 PROCEDURE — 3061F NEG MICROALBUMINURIA REV: CPT | Performed by: INTERNAL MEDICINE

## 2020-05-06 PROCEDURE — 83970 ASSAY OF PARATHORMONE: CPT

## 2020-05-06 PROCEDURE — 82746 ASSAY OF FOLIC ACID SERUM: CPT

## 2020-05-06 PROCEDURE — 99213 OFFICE O/P EST LOW 20 MIN: CPT | Performed by: INTERNAL MEDICINE

## 2020-05-06 PROCEDURE — 85025 COMPLETE CBC W/AUTO DIFF WBC: CPT

## 2020-05-06 PROCEDURE — 80061 LIPID PANEL: CPT

## 2020-05-06 PROCEDURE — 80053 COMPREHEN METABOLIC PANEL: CPT

## 2020-05-06 PROCEDURE — 84443 ASSAY THYROID STIM HORMONE: CPT

## 2020-05-06 PROCEDURE — 84425 ASSAY OF VITAMIN B-1: CPT

## 2020-05-06 PROCEDURE — 82570 ASSAY OF URINE CREATININE: CPT | Performed by: INTERNAL MEDICINE

## 2020-05-06 PROCEDURE — 84590 ASSAY OF VITAMIN A: CPT

## 2020-05-06 PROCEDURE — 83550 IRON BINDING TEST: CPT

## 2020-05-06 PROCEDURE — 36415 COLL VENOUS BLD VENIPUNCTURE: CPT

## 2020-05-06 PROCEDURE — 84630 ASSAY OF ZINC: CPT

## 2020-05-06 PROCEDURE — 82728 ASSAY OF FERRITIN: CPT

## 2020-05-06 PROCEDURE — 82306 VITAMIN D 25 HYDROXY: CPT

## 2020-05-06 PROCEDURE — 86769 SARS-COV-2 COVID-19 ANTIBODY: CPT

## 2020-05-06 PROCEDURE — 82525 ASSAY OF COPPER: CPT

## 2020-05-06 PROCEDURE — U0003 INFECTIOUS AGENT DETECTION BY NUCLEIC ACID (DNA OR RNA); SEVERE ACUTE RESPIRATORY SYNDROME CORONAVIRUS 2 (SARS-COV-2) (CORONAVIRUS DISEASE [COVID-19]), AMPLIFIED PROBE TECHNIQUE, MAKING USE OF HIGH THROUGHPUT TECHNOLOGIES AS DESCRIBED BY CMS-2020-01-R: HCPCS | Performed by: INTERNAL MEDICINE

## 2020-05-06 PROCEDURE — 83540 ASSAY OF IRON: CPT

## 2020-05-06 PROCEDURE — 82607 VITAMIN B-12: CPT

## 2020-05-07 ENCOUNTER — TELEPHONE (OUTPATIENT)
Dept: INTERNAL MEDICINE CLINIC | Facility: CLINIC | Age: 40
End: 2020-05-07

## 2020-05-07 LAB
SARS-COV-2 IGG SERPL QL IA: NEGATIVE
SARS-COV-2 IGG SERPL QL IA: NORMAL
SARS-COV-2 IGM SERPL QL IA: NEGATIVE
SARS-COV-2 RNA SPEC QL NAA+PROBE: NOT DETECTED

## 2020-05-08 LAB
COPPER SERPL-MCNC: 129 UG/DL (ref 72–166)
ZINC SERPL-MCNC: 90 UG/DL (ref 56–134)

## 2020-05-09 LAB — VIT B1 BLD-SCNC: 128.9 NMOL/L (ref 66.5–200)

## 2020-05-10 LAB — VIT A SERPL-MCNC: 45.3 UG/DL (ref 18.9–57.3)

## 2020-05-11 DIAGNOSIS — K91.2 POSTSURGICAL MALABSORPTION: ICD-10-CM

## 2020-05-11 DIAGNOSIS — R79.89 HIGH SERUM PARATHYROID HORMONE (PTH): Primary | ICD-10-CM

## 2020-06-04 ENCOUNTER — APPOINTMENT (EMERGENCY)
Dept: NON INVASIVE DIAGNOSTICS | Facility: HOSPITAL | Age: 40
End: 2020-06-04
Payer: COMMERCIAL

## 2020-06-04 ENCOUNTER — HOSPITAL ENCOUNTER (EMERGENCY)
Facility: HOSPITAL | Age: 40
Discharge: HOME | End: 2020-06-04
Attending: EMERGENCY MEDICINE | Admitting: EMERGENCY MEDICINE
Payer: COMMERCIAL

## 2020-06-04 VITALS
BODY MASS INDEX: 40.48 KG/M2 | HEART RATE: 92 BPM | TEMPERATURE: 97.3 F | HEIGHT: 62 IN | OXYGEN SATURATION: 99 % | SYSTOLIC BLOOD PRESSURE: 124 MMHG | WEIGHT: 220 LBS | RESPIRATION RATE: 18 BRPM | DIASTOLIC BLOOD PRESSURE: 89 MMHG

## 2020-06-04 DIAGNOSIS — B35.3 TINEA PEDIS OF LEFT FOOT: ICD-10-CM

## 2020-06-04 DIAGNOSIS — S39.013A STRAIN OF MUSCLE OF RIGHT GROIN REGION: Primary | ICD-10-CM

## 2020-06-04 PROCEDURE — 99284 EMERGENCY DEPT VISIT MOD MDM: CPT | Performed by: EMERGENCY MEDICINE

## 2020-06-04 PROCEDURE — 99283 EMERGENCY DEPT VISIT LOW MDM: CPT

## 2020-06-04 PROCEDURE — 93971 EXTREMITY STUDY: CPT | Performed by: SURGERY

## 2020-06-04 PROCEDURE — 93971 EXTREMITY STUDY: CPT

## 2020-06-04 RX ORDER — CLOTRIMAZOLE 1 %
CREAM (GRAM) TOPICAL
Qty: 30 G | Refills: 0 | Status: SHIPPED | OUTPATIENT
Start: 2020-06-04 | End: 2020-10-01

## 2020-06-05 ENCOUNTER — OFFICE VISIT (OUTPATIENT)
Dept: INTERNAL MEDICINE CLINIC | Facility: CLINIC | Age: 40
End: 2020-06-05
Payer: COMMERCIAL

## 2020-06-05 VITALS
RESPIRATION RATE: 18 BRPM | OXYGEN SATURATION: 98 % | SYSTOLIC BLOOD PRESSURE: 110 MMHG | HEIGHT: 62 IN | HEART RATE: 104 BPM | DIASTOLIC BLOOD PRESSURE: 70 MMHG | TEMPERATURE: 98.6 F | WEIGHT: 225 LBS | BODY MASS INDEX: 41.41 KG/M2

## 2020-06-05 DIAGNOSIS — E11.9 TYPE 2 DIABETES MELLITUS WITHOUT COMPLICATION, WITHOUT LONG-TERM CURRENT USE OF INSULIN (HCC): Primary | ICD-10-CM

## 2020-06-05 PROCEDURE — 3008F BODY MASS INDEX DOCD: CPT | Performed by: INTERNAL MEDICINE

## 2020-06-05 PROCEDURE — 1036F TOBACCO NON-USER: CPT | Performed by: INTERNAL MEDICINE

## 2020-06-05 PROCEDURE — 2022F DILAT RTA XM EVC RTNOPTHY: CPT | Performed by: INTERNAL MEDICINE

## 2020-06-05 PROCEDURE — 3078F DIAST BP <80 MM HG: CPT | Performed by: INTERNAL MEDICINE

## 2020-06-05 PROCEDURE — 99213 OFFICE O/P EST LOW 20 MIN: CPT | Performed by: INTERNAL MEDICINE

## 2020-06-05 PROCEDURE — 3074F SYST BP LT 130 MM HG: CPT | Performed by: INTERNAL MEDICINE

## 2020-06-17 ENCOUNTER — TELEPHONE (OUTPATIENT)
Dept: INTERNAL MEDICINE CLINIC | Facility: CLINIC | Age: 40
End: 2020-06-17

## 2020-06-17 ENCOUNTER — APPOINTMENT (OUTPATIENT)
Dept: LAB | Facility: CLINIC | Age: 40
End: 2020-06-17
Payer: COMMERCIAL

## 2020-06-17 DIAGNOSIS — E11.9 TYPE 2 DIABETES MELLITUS WITHOUT COMPLICATION, WITHOUT LONG-TERM CURRENT USE OF INSULIN (HCC): ICD-10-CM

## 2020-06-17 LAB
EST. AVERAGE GLUCOSE BLD GHB EST-MCNC: 126 MG/DL
HBA1C MFR BLD: 6 %

## 2020-06-17 PROCEDURE — 36415 COLL VENOUS BLD VENIPUNCTURE: CPT

## 2020-06-17 PROCEDURE — 83036 HEMOGLOBIN GLYCOSYLATED A1C: CPT

## 2020-06-17 PROCEDURE — 3044F HG A1C LEVEL LT 7.0%: CPT | Performed by: INTERNAL MEDICINE

## 2020-06-29 DIAGNOSIS — Z20.822 EXPOSURE TO COVID-19 VIRUS: Primary | ICD-10-CM

## 2020-06-30 DIAGNOSIS — Z20.822 EXPOSURE TO COVID-19 VIRUS: ICD-10-CM

## 2020-06-30 PROCEDURE — U0003 INFECTIOUS AGENT DETECTION BY NUCLEIC ACID (DNA OR RNA); SEVERE ACUTE RESPIRATORY SYNDROME CORONAVIRUS 2 (SARS-COV-2) (CORONAVIRUS DISEASE [COVID-19]), AMPLIFIED PROBE TECHNIQUE, MAKING USE OF HIGH THROUGHPUT TECHNOLOGIES AS DESCRIBED BY CMS-2020-01-R: HCPCS

## 2020-07-05 LAB — SARS-COV-2 RNA SPEC QL NAA+PROBE: NOT DETECTED

## 2020-07-28 ENCOUNTER — CLINICAL SUPPORT (OUTPATIENT)
Dept: INTERNAL MEDICINE CLINIC | Facility: CLINIC | Age: 40
End: 2020-07-28
Payer: COMMERCIAL

## 2020-07-28 DIAGNOSIS — Z11.1 ENCOUNTER FOR PPD TEST: Primary | ICD-10-CM

## 2020-07-28 PROCEDURE — 86580 TB INTRADERMAL TEST: CPT

## 2020-07-30 ENCOUNTER — CLINICAL SUPPORT (OUTPATIENT)
Dept: INTERNAL MEDICINE CLINIC | Facility: CLINIC | Age: 40
End: 2020-07-30

## 2020-07-30 DIAGNOSIS — Z11.1 ENCOUNTER FOR PPD SKIN TEST READING: Primary | ICD-10-CM

## 2020-07-30 LAB
INDURATION: 0 MM
TB SKIN TEST: NEGATIVE

## 2020-09-30 DIAGNOSIS — Z17.0 MALIGNANT NEOPLASM OF LEFT BREAST IN FEMALE, ESTROGEN RECEPTOR POSITIVE, UNSPECIFIED SITE OF BREAST (HCC): ICD-10-CM

## 2020-09-30 DIAGNOSIS — C50.912 MALIGNANT NEOPLASM OF LEFT BREAST IN FEMALE, ESTROGEN RECEPTOR POSITIVE, UNSPECIFIED SITE OF BREAST (HCC): ICD-10-CM

## 2020-09-30 DIAGNOSIS — E03.9 HYPOTHYROIDISM, UNSPECIFIED TYPE: ICD-10-CM

## 2020-09-30 DIAGNOSIS — E11.9 TYPE 2 DIABETES MELLITUS WITHOUT COMPLICATION, WITHOUT LONG-TERM CURRENT USE OF INSULIN (HCC): ICD-10-CM

## 2020-09-30 DIAGNOSIS — E55.9 VITAMIN D DEFICIENCY: ICD-10-CM

## 2020-09-30 DIAGNOSIS — E78.5 HYPERLIPIDEMIA, UNSPECIFIED HYPERLIPIDEMIA TYPE: ICD-10-CM

## 2020-09-30 RX ORDER — SIMVASTATIN 20 MG
20 TABLET ORAL
Qty: 30 TABLET | Refills: 0 | Status: SHIPPED | OUTPATIENT
Start: 2020-09-30 | End: 2020-12-30

## 2020-09-30 RX ORDER — ANASTROZOLE 1 MG/1
1 TABLET ORAL DAILY
Qty: 90 TABLET | Refills: 0 | Status: SHIPPED | OUTPATIENT
Start: 2020-09-30 | End: 2021-02-23 | Stop reason: SDUPTHER

## 2020-09-30 RX ORDER — LEVOTHYROXINE SODIUM 0.07 MG/1
75 TABLET ORAL DAILY
Qty: 90 TABLET | Refills: 0 | Status: SHIPPED | OUTPATIENT
Start: 2020-09-30 | End: 2021-02-23 | Stop reason: SDUPTHER

## 2020-09-30 RX ORDER — ERGOCALCIFEROL 1.25 MG/1
50000 CAPSULE ORAL WEEKLY
Qty: 12 CAPSULE | Refills: 0 | Status: SHIPPED | OUTPATIENT
Start: 2020-09-30 | End: 2021-02-23 | Stop reason: SDUPTHER

## 2020-10-01 ENCOUNTER — OFFICE VISIT (OUTPATIENT)
Dept: INTERNAL MEDICINE CLINIC | Facility: CLINIC | Age: 40
End: 2020-10-01
Payer: COMMERCIAL

## 2020-10-01 VITALS
RESPIRATION RATE: 14 BRPM | OXYGEN SATURATION: 97 % | SYSTOLIC BLOOD PRESSURE: 116 MMHG | TEMPERATURE: 99 F | HEIGHT: 62 IN | WEIGHT: 227 LBS | DIASTOLIC BLOOD PRESSURE: 78 MMHG | BODY MASS INDEX: 41.77 KG/M2 | HEART RATE: 85 BPM

## 2020-10-01 DIAGNOSIS — Z00.00 HEALTHCARE MAINTENANCE: Primary | ICD-10-CM

## 2020-10-01 DIAGNOSIS — K21.00 GASTROESOPHAGEAL REFLUX DISEASE WITH ESOPHAGITIS WITHOUT HEMORRHAGE: ICD-10-CM

## 2020-10-01 DIAGNOSIS — E11.9 TYPE 2 DIABETES MELLITUS WITHOUT COMPLICATION, WITHOUT LONG-TERM CURRENT USE OF INSULIN (HCC): ICD-10-CM

## 2020-10-01 LAB — SL AMB POCT HEMOGLOBIN AIC: 5.6 (ref ?–6.5)

## 2020-10-01 PROCEDURE — 1036F TOBACCO NON-USER: CPT | Performed by: INTERNAL MEDICINE

## 2020-10-01 PROCEDURE — 3044F HG A1C LEVEL LT 7.0%: CPT | Performed by: INTERNAL MEDICINE

## 2020-10-01 PROCEDURE — 83036 HEMOGLOBIN GLYCOSYLATED A1C: CPT | Performed by: INTERNAL MEDICINE

## 2020-10-01 PROCEDURE — 99395 PREV VISIT EST AGE 18-39: CPT | Performed by: INTERNAL MEDICINE

## 2020-10-01 PROCEDURE — 3725F SCREEN DEPRESSION PERFORMED: CPT | Performed by: INTERNAL MEDICINE

## 2020-10-01 RX ORDER — OMEPRAZOLE 40 MG/1
40 CAPSULE, DELAYED RELEASE ORAL DAILY
Qty: 90 CAPSULE | Refills: 1 | Status: SHIPPED | OUTPATIENT
Start: 2020-10-01 | End: 2021-02-23 | Stop reason: SDUPTHER

## 2020-11-05 ENCOUNTER — TRANSCRIBE ORDERS (OUTPATIENT)
Dept: ADMINISTRATIVE | Facility: HOSPITAL | Age: 40
End: 2020-11-05

## 2020-11-05 DIAGNOSIS — D73.3 ABSCESS OF SPLEEN: Primary | ICD-10-CM

## 2020-11-10 ENCOUNTER — HOSPITAL ENCOUNTER (OUTPATIENT)
Dept: ULTRASOUND IMAGING | Facility: HOSPITAL | Age: 40
Discharge: HOME | End: 2020-11-10
Payer: COMMERCIAL

## 2020-11-10 DIAGNOSIS — D73.3 ABSCESS OF SPLEEN: ICD-10-CM

## 2020-11-10 PROCEDURE — 76700 US EXAM ABDOM COMPLETE: CPT

## 2020-11-23 ENCOUNTER — TRANSCRIBE ORDERS (OUTPATIENT)
Dept: ADMINISTRATIVE | Facility: HOSPITAL | Age: 40
End: 2020-11-23

## 2020-11-23 DIAGNOSIS — D73.4 SPLENIC CYST: Primary | ICD-10-CM

## 2020-12-08 ENCOUNTER — CLINICAL SUPPORT (OUTPATIENT)
Dept: INTERNAL MEDICINE CLINIC | Facility: CLINIC | Age: 40
End: 2020-12-08
Payer: COMMERCIAL

## 2020-12-08 ENCOUNTER — HOSPITAL ENCOUNTER (OUTPATIENT)
Dept: MRI IMAGING | Facility: HOSPITAL | Age: 40
Discharge: HOME | End: 2020-12-08
Payer: COMMERCIAL

## 2020-12-08 DIAGNOSIS — Z23 NEED FOR VACCINATION: Primary | ICD-10-CM

## 2020-12-08 DIAGNOSIS — D73.4 SPLENIC CYST: ICD-10-CM

## 2020-12-08 PROCEDURE — A9585 GADOBUTROL INJECTION: HCPCS | Performed by: RADIOLOGY

## 2020-12-08 PROCEDURE — G1004 CDSM NDSC: HCPCS

## 2020-12-08 PROCEDURE — 86580 TB INTRADERMAL TEST: CPT

## 2020-12-08 PROCEDURE — 74183 MRI ABD W/O CNTR FLWD CNTR: CPT

## 2020-12-08 RX ADMIN — GADOBUTROL 11 ML: 604.72 INJECTION INTRAVENOUS at 17:21

## 2020-12-10 ENCOUNTER — CLINICAL SUPPORT (OUTPATIENT)
Dept: INTERNAL MEDICINE CLINIC | Facility: CLINIC | Age: 40
End: 2020-12-10

## 2020-12-10 DIAGNOSIS — Z11.1 ENCOUNTER FOR PPD SKIN TEST READING: Primary | ICD-10-CM

## 2020-12-10 LAB
INDURATION: 0 MM
TB SKIN TEST: NEGATIVE

## 2020-12-16 ENCOUNTER — CLINICAL SUPPORT (OUTPATIENT)
Dept: INTERNAL MEDICINE CLINIC | Facility: CLINIC | Age: 40
End: 2020-12-16
Payer: COMMERCIAL

## 2020-12-16 DIAGNOSIS — Z11.1 ENCOUNTER FOR PPD TEST: ICD-10-CM

## 2020-12-16 DIAGNOSIS — Z11.1 ENCOUNTER FOR PPD SKIN TEST READING: Primary | ICD-10-CM

## 2020-12-16 PROCEDURE — 86580 TB INTRADERMAL TEST: CPT

## 2020-12-18 ENCOUNTER — CLINICAL SUPPORT (OUTPATIENT)
Dept: INTERNAL MEDICINE CLINIC | Facility: CLINIC | Age: 40
End: 2020-12-18

## 2020-12-18 DIAGNOSIS — Z11.1 ENCOUNTER FOR PPD SKIN TEST READING: Primary | ICD-10-CM

## 2020-12-18 LAB
INDURATION: 0 MM
TB SKIN TEST: NEGATIVE

## 2020-12-29 DIAGNOSIS — E78.5 HYPERLIPIDEMIA, UNSPECIFIED HYPERLIPIDEMIA TYPE: ICD-10-CM

## 2020-12-30 RX ORDER — SIMVASTATIN 20 MG
TABLET ORAL
Qty: 30 TABLET | Refills: 5 | Status: SHIPPED | OUTPATIENT
Start: 2020-12-30 | End: 2021-02-23 | Stop reason: SDUPTHER

## 2021-01-13 ENCOUNTER — LAB (OUTPATIENT)
Dept: LAB | Facility: HOSPITAL | Age: 41
End: 2021-01-13
Payer: COMMERCIAL

## 2021-01-13 ENCOUNTER — TRANSCRIBE ORDERS (OUTPATIENT)
Dept: LAB | Facility: HOSPITAL | Age: 41
End: 2021-01-13

## 2021-01-13 DIAGNOSIS — M81.0 MENOPAUSAL OSTEOPOROSIS: ICD-10-CM

## 2021-01-13 DIAGNOSIS — E55.9 VITAMIN D DEFICIENCY: ICD-10-CM

## 2021-01-13 DIAGNOSIS — M81.0 MENOPAUSAL OSTEOPOROSIS: Primary | ICD-10-CM

## 2021-01-13 LAB
25(OH)D3 SERPL-MCNC: 24.9 NG/ML (ref 30–100)
ALBUMIN SERPL BCP-MCNC: 4.5 G/DL (ref 3.5–5.7)
ALP SERPL-CCNC: 47 U/L (ref 40–150)
ALT SERPL W P-5'-P-CCNC: 36 U/L (ref 7–52)
ANION GAP SERPL CALCULATED.3IONS-SCNC: 8 MMOL/L (ref 4–13)
AST SERPL W P-5'-P-CCNC: 24 U/L (ref 13–39)
BILIRUB SERPL-MCNC: 0.9 MG/DL (ref 0.2–1)
BUN SERPL-MCNC: 13 MG/DL (ref 7–25)
CALCIUM SERPL-MCNC: 9.7 MG/DL (ref 8.6–10.5)
CHLORIDE SERPL-SCNC: 99 MMOL/L (ref 98–107)
CO2 SERPL-SCNC: 31 MMOL/L (ref 21–31)
CREAT SERPL-MCNC: 0.66 MG/DL (ref 0.6–1.2)
GFR SERPL CREATININE-BSD FRML MDRD: 112 ML/MIN/1.73SQ M
GLUCOSE P FAST SERPL-MCNC: 98 MG/DL (ref 65–99)
POTASSIUM SERPL-SCNC: 3.9 MMOL/L (ref 3.5–5.5)
PROT SERPL-MCNC: 7.5 G/DL (ref 6.4–8.9)
SODIUM SERPL-SCNC: 138 MMOL/L (ref 134–143)

## 2021-01-13 PROCEDURE — 82523 COLLAGEN CROSSLINKS: CPT

## 2021-01-13 PROCEDURE — 84080 ASSAY ALKALINE PHOSPHATASES: CPT

## 2021-01-13 PROCEDURE — 82306 VITAMIN D 25 HYDROXY: CPT

## 2021-01-13 PROCEDURE — 80053 COMPREHEN METABOLIC PANEL: CPT

## 2021-01-13 PROCEDURE — 36415 COLL VENOUS BLD VENIPUNCTURE: CPT

## 2021-01-14 ENCOUNTER — PATIENT MESSAGE (OUTPATIENT)
Dept: INTERNAL MEDICINE CLINIC | Facility: CLINIC | Age: 41
End: 2021-01-14

## 2021-01-14 ENCOUNTER — TELEPHONE (OUTPATIENT)
Dept: HEMATOLOGY ONCOLOGY | Facility: CLINIC | Age: 41
End: 2021-01-14

## 2021-01-14 DIAGNOSIS — Z23 ENCOUNTER FOR IMMUNIZATION: ICD-10-CM

## 2021-01-14 NOTE — TELEPHONE ENCOUNTER
Returned the call to Bertha  She said she mistakenly sent the request to Dr Antonio Moeller  (Dr Antonio Moeller requests that she contact her PCP for the letter to go back to work)

## 2021-01-15 DIAGNOSIS — R21 RASH: Primary | ICD-10-CM

## 2021-01-15 RX ORDER — NYSTATIN 100000 [USP'U]/G
1 POWDER TOPICAL 2 TIMES DAILY
Qty: 15 G | Refills: 3 | Status: SHIPPED | OUTPATIENT
Start: 2021-01-15 | End: 2021-02-23 | Stop reason: SDUPTHER

## 2021-01-17 LAB — COLLAGEN CTX SERPL-MCNC: 505 PG/ML

## 2021-01-18 ENCOUNTER — IMMUNIZATIONS (OUTPATIENT)
Dept: FAMILY MEDICINE CLINIC | Facility: HOSPITAL | Age: 41
End: 2021-01-18

## 2021-01-18 DIAGNOSIS — Z23 ENCOUNTER FOR IMMUNIZATION: Primary | ICD-10-CM

## 2021-01-18 PROCEDURE — 91301 SARS-COV-2 / COVID-19 MRNA VACCINE (MODERNA) 100 MCG: CPT

## 2021-01-18 PROCEDURE — 0011A SARS-COV-2 / COVID-19 MRNA VACCINE (MODERNA) 100 MCG: CPT

## 2021-01-19 LAB — ALP BONE SERPL-MCNC: 10.3 UG/L

## 2021-02-15 ENCOUNTER — IMMUNIZATIONS (OUTPATIENT)
Dept: FAMILY MEDICINE CLINIC | Facility: HOSPITAL | Age: 41
End: 2021-02-15

## 2021-02-15 DIAGNOSIS — Z23 ENCOUNTER FOR IMMUNIZATION: Primary | ICD-10-CM

## 2021-02-15 PROCEDURE — 91301 SARS-COV-2 / COVID-19 MRNA VACCINE (MODERNA) 100 MCG: CPT

## 2021-02-15 PROCEDURE — 0012A SARS-COV-2 / COVID-19 MRNA VACCINE (MODERNA) 100 MCG: CPT

## 2021-02-23 DIAGNOSIS — K21.00 GASTROESOPHAGEAL REFLUX DISEASE WITH ESOPHAGITIS WITHOUT HEMORRHAGE: ICD-10-CM

## 2021-02-23 DIAGNOSIS — R21 RASH: ICD-10-CM

## 2021-02-23 DIAGNOSIS — Z17.0 MALIGNANT NEOPLASM OF LEFT BREAST IN FEMALE, ESTROGEN RECEPTOR POSITIVE, UNSPECIFIED SITE OF BREAST (HCC): ICD-10-CM

## 2021-02-23 DIAGNOSIS — C50.912 MALIGNANT NEOPLASM OF LEFT BREAST IN FEMALE, ESTROGEN RECEPTOR POSITIVE, UNSPECIFIED SITE OF BREAST (HCC): ICD-10-CM

## 2021-02-23 DIAGNOSIS — E78.5 HYPERLIPIDEMIA, UNSPECIFIED HYPERLIPIDEMIA TYPE: ICD-10-CM

## 2021-02-23 DIAGNOSIS — E11.9 TYPE 2 DIABETES MELLITUS WITHOUT COMPLICATION, WITHOUT LONG-TERM CURRENT USE OF INSULIN (HCC): ICD-10-CM

## 2021-02-23 DIAGNOSIS — E03.9 HYPOTHYROIDISM, UNSPECIFIED TYPE: ICD-10-CM

## 2021-02-23 DIAGNOSIS — E55.9 VITAMIN D DEFICIENCY: ICD-10-CM

## 2021-02-24 ENCOUNTER — TELEPHONE (OUTPATIENT)
Dept: INTERNAL MEDICINE CLINIC | Facility: CLINIC | Age: 41
End: 2021-02-24

## 2021-02-24 RX ORDER — LEVOTHYROXINE SODIUM 0.07 MG/1
75 TABLET ORAL DAILY
Qty: 90 TABLET | Refills: 1 | Status: SHIPPED | OUTPATIENT
Start: 2021-02-24 | End: 2021-06-11

## 2021-02-24 RX ORDER — SIMVASTATIN 20 MG
20 TABLET ORAL
Qty: 90 TABLET | Refills: 1 | Status: SHIPPED | OUTPATIENT
Start: 2021-02-24 | End: 2021-06-15 | Stop reason: SDUPTHER

## 2021-02-24 RX ORDER — ERGOCALCIFEROL 1.25 MG/1
50000 CAPSULE ORAL WEEKLY
Qty: 12 CAPSULE | Refills: 1 | Status: SHIPPED | OUTPATIENT
Start: 2021-02-24 | End: 2021-06-15 | Stop reason: SDUPTHER

## 2021-02-24 RX ORDER — ANASTROZOLE 1 MG/1
1 TABLET ORAL DAILY
Qty: 90 TABLET | Refills: 1 | Status: SHIPPED | OUTPATIENT
Start: 2021-02-24 | End: 2021-06-15 | Stop reason: SDUPTHER

## 2021-02-24 RX ORDER — OMEPRAZOLE 40 MG/1
40 CAPSULE, DELAYED RELEASE ORAL DAILY
Qty: 90 CAPSULE | Refills: 1 | Status: SHIPPED | OUTPATIENT
Start: 2021-02-24 | End: 2021-06-15 | Stop reason: SDUPTHER

## 2021-02-24 RX ORDER — NYSTATIN 100000 [USP'U]/G
1 POWDER TOPICAL 2 TIMES DAILY
Qty: 15 G | Refills: 0 | Status: SHIPPED | OUTPATIENT
Start: 2021-02-24 | End: 2021-06-15 | Stop reason: SDUPTHER

## 2021-02-24 NOTE — TELEPHONE ENCOUNTER
----- Message from Malachi Leon sent at 2/24/2021  7:00 AM EST -----  Regarding: FW: Pre-Op/Post-Op Question  Contact: [phone]  Please contact pt needs EKG for Colonoscopy on 3/2/2021  Please contact pt for appointment either PreOp clearance or just nurse visit for EKG?   ----- Message -----  From: Francisca Baker  Sent: 2/23/2021   9:03 PM EST  To: [address] [facility],[address] Clinical  Subject: Pre-Op/Post-Op Question                          I have a colonoscopy comming up March 2nd, they just gave me instructions in mail    #1 Do I need a referral from insurance company for this procedure?  I go to  [facility]  in [place] at [address] with Dr Saroj Ozuna    #2 says I need EKG done with cardiologist need it from you, see attachment      I just got this in mail today & procedure March 2

## 2021-02-25 ENCOUNTER — TRANSCRIBE ORDERS (OUTPATIENT)
Dept: ADMINISTRATIVE | Facility: HOSPITAL | Age: 41
End: 2021-02-25

## 2021-02-25 DIAGNOSIS — E88.81 METABOLIC SYNDROME: ICD-10-CM

## 2021-02-25 DIAGNOSIS — K76.0 FATTY (CHANGE OF) LIVER, NOT ELSEWHERE CLASSIFIED: ICD-10-CM

## 2021-02-25 DIAGNOSIS — R94.5 ABNORMAL RESULTS OF LIVER FUNCTION STUDIES: Primary | ICD-10-CM

## 2021-03-30 ENCOUNTER — HOSPITAL ENCOUNTER (OUTPATIENT)
Dept: ULTRASOUND IMAGING | Facility: HOSPITAL | Age: 41
Discharge: HOME | End: 2021-03-30
Payer: COMMERCIAL

## 2021-03-30 DIAGNOSIS — E88.81 METABOLIC SYNDROME: ICD-10-CM

## 2021-03-30 DIAGNOSIS — K76.0 FATTY (CHANGE OF) LIVER, NOT ELSEWHERE CLASSIFIED: ICD-10-CM

## 2021-03-30 DIAGNOSIS — R94.5 ABNORMAL RESULTS OF LIVER FUNCTION STUDIES: ICD-10-CM

## 2021-03-30 PROCEDURE — 76981 USE PARENCHYMA: CPT

## 2021-03-31 ENCOUNTER — TELEPHONE (OUTPATIENT)
Dept: BARIATRICS | Facility: CLINIC | Age: 41
End: 2021-03-31

## 2021-03-31 ENCOUNTER — TELEPHONE (OUTPATIENT)
Dept: INTERNAL MEDICINE CLINIC | Facility: CLINIC | Age: 41
End: 2021-03-31

## 2021-03-31 DIAGNOSIS — E03.9 HYPOTHYROIDISM, UNSPECIFIED TYPE: Primary | ICD-10-CM

## 2021-03-31 DIAGNOSIS — E11.9 TYPE 2 DIABETES MELLITUS WITHOUT COMPLICATION, WITHOUT LONG-TERM CURRENT USE OF INSULIN (HCC): ICD-10-CM

## 2021-04-01 ENCOUNTER — LAB (OUTPATIENT)
Dept: LAB | Facility: CLINIC | Age: 41
End: 2021-04-01
Payer: COMMERCIAL

## 2021-04-01 DIAGNOSIS — R79.89 HIGH SERUM PARATHYROID HORMONE (PTH): ICD-10-CM

## 2021-04-01 DIAGNOSIS — E03.9 HYPOTHYROIDISM, UNSPECIFIED TYPE: ICD-10-CM

## 2021-04-01 DIAGNOSIS — E11.9 TYPE 2 DIABETES MELLITUS WITHOUT COMPLICATION, WITHOUT LONG-TERM CURRENT USE OF INSULIN (HCC): ICD-10-CM

## 2021-04-01 DIAGNOSIS — K91.2 POSTSURGICAL MALABSORPTION: ICD-10-CM

## 2021-04-01 LAB
25(OH)D3 SERPL-MCNC: 21.8 NG/ML (ref 30–100)
ALBUMIN SERPL BCP-MCNC: 4.2 G/DL (ref 3.5–5)
ALP SERPL-CCNC: 56 U/L (ref 46–116)
ALT SERPL W P-5'-P-CCNC: 45 U/L (ref 12–78)
ANION GAP SERPL CALCULATED.3IONS-SCNC: 3 MMOL/L (ref 4–13)
AST SERPL W P-5'-P-CCNC: 20 U/L (ref 5–45)
BASOPHILS # BLD AUTO: 0.04 THOUSANDS/ΜL (ref 0–0.1)
BASOPHILS NFR BLD AUTO: 1 % (ref 0–1)
BILIRUB SERPL-MCNC: 0.9 MG/DL (ref 0.2–1)
BUN SERPL-MCNC: 8 MG/DL (ref 5–25)
CALCIUM SERPL-MCNC: 8.8 MG/DL (ref 8.3–10.1)
CHLORIDE SERPL-SCNC: 106 MMOL/L (ref 100–108)
CHOLEST SERPL-MCNC: 176 MG/DL (ref 50–200)
CO2 SERPL-SCNC: 29 MMOL/L (ref 21–32)
CREAT SERPL-MCNC: 0.63 MG/DL (ref 0.6–1.3)
CREAT UR-MCNC: 138 MG/DL
EOSINOPHIL # BLD AUTO: 0.18 THOUSAND/ΜL (ref 0–0.61)
EOSINOPHIL NFR BLD AUTO: 3 % (ref 0–6)
ERYTHROCYTE [DISTWIDTH] IN BLOOD BY AUTOMATED COUNT: 13.1 % (ref 11.6–15.1)
EST. AVERAGE GLUCOSE BLD GHB EST-MCNC: 120 MG/DL
GFR SERPL CREATININE-BSD FRML MDRD: 113 ML/MIN/1.73SQ M
GLUCOSE P FAST SERPL-MCNC: 91 MG/DL (ref 65–99)
HBA1C MFR BLD: 5.8 %
HCT VFR BLD AUTO: 43.2 % (ref 34.8–46.1)
HDLC SERPL-MCNC: 44 MG/DL
HGB BLD-MCNC: 13.9 G/DL (ref 11.5–15.4)
IMM GRANULOCYTES # BLD AUTO: 0.03 THOUSAND/UL (ref 0–0.2)
IMM GRANULOCYTES NFR BLD AUTO: 0 % (ref 0–2)
LDLC SERPL CALC-MCNC: 112 MG/DL (ref 0–100)
LYMPHOCYTES # BLD AUTO: 2.06 THOUSANDS/ΜL (ref 0.6–4.47)
LYMPHOCYTES NFR BLD AUTO: 29 % (ref 14–44)
MCH RBC QN AUTO: 29 PG (ref 26.8–34.3)
MCHC RBC AUTO-ENTMCNC: 32.2 G/DL (ref 31.4–37.4)
MCV RBC AUTO: 90 FL (ref 82–98)
MICROALBUMIN UR-MCNC: 33.3 MG/L (ref 0–20)
MICROALBUMIN/CREAT 24H UR: 24 MG/G CREATININE (ref 0–30)
MONOCYTES # BLD AUTO: 0.5 THOUSAND/ΜL (ref 0.17–1.22)
MONOCYTES NFR BLD AUTO: 7 % (ref 4–12)
NEUTROPHILS # BLD AUTO: 4.22 THOUSANDS/ΜL (ref 1.85–7.62)
NEUTS SEG NFR BLD AUTO: 60 % (ref 43–75)
NRBC BLD AUTO-RTO: 0 /100 WBCS
PLATELET # BLD AUTO: 309 THOUSANDS/UL (ref 149–390)
PMV BLD AUTO: 9.3 FL (ref 8.9–12.7)
POTASSIUM SERPL-SCNC: 4.1 MMOL/L (ref 3.5–5.3)
PROT SERPL-MCNC: 7.9 G/DL (ref 6.4–8.2)
PTH-INTACT SERPL-MCNC: 159.5 PG/ML (ref 18.4–80.1)
RBC # BLD AUTO: 4.79 MILLION/UL (ref 3.81–5.12)
SODIUM SERPL-SCNC: 138 MMOL/L (ref 136–145)
TRIGL SERPL-MCNC: 98 MG/DL
TSH SERPL DL<=0.05 MIU/L-ACNC: 1.18 UIU/ML (ref 0.36–3.74)
WBC # BLD AUTO: 7.03 THOUSAND/UL (ref 4.31–10.16)

## 2021-04-01 PROCEDURE — 82570 ASSAY OF URINE CREATININE: CPT | Performed by: INTERNAL MEDICINE

## 2021-04-01 PROCEDURE — 80061 LIPID PANEL: CPT

## 2021-04-01 PROCEDURE — 36415 COLL VENOUS BLD VENIPUNCTURE: CPT

## 2021-04-01 PROCEDURE — 82306 VITAMIN D 25 HYDROXY: CPT

## 2021-04-01 PROCEDURE — 80053 COMPREHEN METABOLIC PANEL: CPT

## 2021-04-01 PROCEDURE — 83970 ASSAY OF PARATHORMONE: CPT

## 2021-04-01 PROCEDURE — 82043 UR ALBUMIN QUANTITATIVE: CPT | Performed by: INTERNAL MEDICINE

## 2021-04-01 PROCEDURE — 85025 COMPLETE CBC W/AUTO DIFF WBC: CPT

## 2021-04-01 PROCEDURE — 84443 ASSAY THYROID STIM HORMONE: CPT

## 2021-04-01 PROCEDURE — 83036 HEMOGLOBIN GLYCOSYLATED A1C: CPT

## 2021-04-06 ENCOUNTER — OFFICE VISIT (OUTPATIENT)
Dept: BARIATRICS | Facility: CLINIC | Age: 41
End: 2021-04-06
Payer: COMMERCIAL

## 2021-04-06 VITALS
HEIGHT: 62 IN | SYSTOLIC BLOOD PRESSURE: 138 MMHG | HEART RATE: 100 BPM | BODY MASS INDEX: 41.59 KG/M2 | WEIGHT: 226 LBS | DIASTOLIC BLOOD PRESSURE: 78 MMHG | TEMPERATURE: 98 F

## 2021-04-06 DIAGNOSIS — E66.01 OBESITY, CLASS III, BMI 40-49.9 (MORBID OBESITY) (HCC): ICD-10-CM

## 2021-04-06 DIAGNOSIS — Z98.84 BARIATRIC SURGERY STATUS: ICD-10-CM

## 2021-04-06 DIAGNOSIS — K91.2 POSTSURGICAL MALABSORPTION: ICD-10-CM

## 2021-04-06 DIAGNOSIS — E03.8 HYPOTHYROIDISM DUE TO HASHIMOTO'S THYROIDITIS: ICD-10-CM

## 2021-04-06 DIAGNOSIS — Z48.815 ENCOUNTER FOR SURGICAL AFTERCARE FOLLOWING SURGERY OF DIGESTIVE SYSTEM: Primary | ICD-10-CM

## 2021-04-06 DIAGNOSIS — M85.80 OSTEOPENIA: ICD-10-CM

## 2021-04-06 DIAGNOSIS — E06.3 HYPOTHYROIDISM DUE TO HASHIMOTO'S THYROIDITIS: ICD-10-CM

## 2021-04-06 PROCEDURE — 1036F TOBACCO NON-USER: CPT | Performed by: PHYSICIAN ASSISTANT

## 2021-04-06 PROCEDURE — 99214 OFFICE O/P EST MOD 30 MIN: CPT | Performed by: PHYSICIAN ASSISTANT

## 2021-04-06 NOTE — PROGRESS NOTES
Assessment/Plan:     Patient ID: Akshat Julien is a 37 y o  female  Bariatric Surgery Status    status post sleeve gastrectomy performed in 2015 by Sheela Pantoja to the office today for overdue annual     Was previously enrolled in [facility] and at last visit with Dr Megan Fontenot in 2019 was to be enrolled in revision pathway for weight regain however lost to follow up due to Covid  Refer to results of UGI and EGD from 2019 below  Patient still interested in revision as she has regained all of her weight back since surgery  Plan will be to repeat her UGI and have her see Dr Nadya Carranza again to discuss next steps  · Continued/Maintain healthy weight loss with good nutrition intakes  · Adequate hydration with at least 64oz  fluid intake  · Follow diet as discussed  · Follow vitamin and mineral recommendations as reviewed with you  · Exercise as tolerated  · Colonoscopy referral made: no    · Follow-up with Dr Megan Fontenot  We kindly ask that your arrive 15 minutes before your scheduled appointment time with your provider to allow our staff to room you, get your vital signs and update your chart  · Get lab work done  Please call the office if you need a script  It is recommended to check with your insurance BEFORE getting labs done to make sure they are covered by your policy  · Call our office if you have any problems with abdominal pain especially associated with fever, chills, nausea, vomiting or any other concerns  · All  Post-bariatric surgery patients should be aware that very small quantities of any alcohol can cause impairment and it is very possible not to feel the effect  The effect can be in the system for several hours  It is also a stomach irritant  · It is advised to AVOID alcohol, Nonsteroidal antiinflammatory drugs (NSAIDS) and nicotine of all forms   Any of these can cause stomach irritation/pain      · Discussed the effects of alcohol on a bariatric patient and the increased impairment risk  · Keep up the good work! Postsurgical Malabsorption   -At risk for malabsorption of vitamins/minerals secondary to malabsorption and restriction of intake from bariatric surgery  -Currently taking calcium + regular MVI - given handout for proper vitamins to take advised that she is likely not getting adequate supplementation  -recent vitamin D and PTH complete - low vitamin D 21 8 advised adding 2000 IU D3 daily  High PTH but patient states this has been consistent for her due to osteopenia/history of breat cancer of Arimidex, follows up with endocrinology for this   -Next set of bariatric labs ordered for approximately 2 weeks  -Patient received education about the importance of adhering to a lifelong supplementation regimen to avoid vitamin/mineral deficiencies      Diagnoses and all orders for this visit:    Encounter for surgical aftercare following surgery of digestive system  -     FL UPPER GI UGI; Future  -     Zinc; Future  -     Vitamin B12; Future  -     Vitamin B1, whole blood; Future  -     Vitamin A; Future  -     Iron Saturation %; Future  -     Folate; Future  -     Ferritin; Future    Bariatric surgery status  -     FL UPPER GI UGI; Future  -     Zinc; Future  -     Vitamin B12; Future  -     Vitamin B1, whole blood; Future  -     Vitamin A; Future  -     Iron Saturation %; Future  -     Folate; Future  -     Ferritin; Future    Postsurgical malabsorption  -     Zinc; Future  -     Vitamin B12; Future  -     Vitamin B1, whole blood; Future  -     Vitamin A; Future  -     Iron Saturation %; Future  -     Folate; Future  -     Ferritin; Future    Obesity, Class III, BMI 40-49 9 (morbid obesity) (HCC)  -     FL UPPER GI UGI; Future  -     Zinc; Future  -     Vitamin B12; Future  -     Vitamin B1, whole blood; Future  -     Vitamin A; Future  -     Iron Saturation %; Future  -     Folate; Future  -     Ferritin;  Future    Hypothyroidism due to Hashimoto's thyroiditis    Osteopenia         Subjective:      Patient ID: Katelyn Lay is a 37 y o  female  status post sleeve gastrectomy performed in 2015 by Mila Cao  Tolerating diet without issues; denies N/V, dysphagia, reflux  Overall doing Fair  Initial:226  Current:226 5  EWL: (Weight loss is ahead of schedule at this post surgical period )  Fabrice: 185  Current BMI is Body mass index is 41 34 kg/m²  · Tolerating a regular diet-yes  · Eating at least 60 grams of protein per day-yes  · Following 30/60 minute rule with liquids-yes  · Drinking at least 64 ounces of fluid per day-yes  · Drinking carbonated beverages-no  · Sufficient exercise-just restarted gym  · Using NSAIDs regularly-occasionally - avoids tylenol due to fatty liver  · Using nicotine-no  · Using alcohol-no  · Supplements: calcium + regular mvi     UGI 8/2019  IMPRESSION:     Findings in keeping with sleeve gastrectomy      Small sliding hiatal hernia  EGD 9/2019   IMPRESSION:  LA grade a esophagitis   Sleeve pouchitis    The following portions of the patient's history were reviewed and updated as appropriate: allergies, current medications, past family history, past medical history, past social history, past surgical history and problem list     Review of Systems   Constitutional: Negative  Respiratory: Negative  Cardiovascular: Negative  Gastrointestinal: Negative  Musculoskeletal: Negative  Skin: Negative  Neurological: Negative  Psychiatric/Behavioral: Negative  Objective:    /78 (BP Location: Left arm, Patient Position: Sitting)   Pulse 100   Temp 98 °F (36 7 °C)   Ht 5' 2" (1 575 m)   Wt 103 kg (226 lb)   BMI 41 34 kg/m²      Physical Exam  Vitals signs and nursing note reviewed  Constitutional:       Appearance: Normal appearance  She is obese  HENT:      Head: Normocephalic and atraumatic  Eyes:      Extraocular Movements: Extraocular movements intact        Pupils: Pupils are equal, round, and reactive to light  Neck:      Musculoskeletal: Normal range of motion  Cardiovascular:      Rate and Rhythm: Normal rate and regular rhythm  Pulmonary:      Effort: Pulmonary effort is normal       Breath sounds: Normal breath sounds  Abdominal:      General: Bowel sounds are normal    Musculoskeletal: Normal range of motion  Skin:     General: Skin is warm and dry  Neurological:      General: No focal deficit present  Mental Status: She is alert and oriented to person, place, and time     Psychiatric:         Mood and Affect: Mood normal          Behavior: Behavior normal

## 2021-04-06 NOTE — PATIENT INSTRUCTIONS
· Follow-up with Dr Megan Fontenot  We kindly ask that your arrive 15 minutes before your scheduled appointment time with your provider to allow our staff to room you, get your vital signs and update your chart  · Get lab work done  Please call the office if you need a script  It is recommended to check with your insurance BEFORE getting labs done to make sure they are covered by your policy  · Call our office if you have any problems with abdominal pain especially associated with fever, chills, nausea, vomiting or any other concerns  · All  Post-bariatric surgery patients should be aware that very small quantities of any alcohol can cause impairment and it is very possible not to feel the effect  The effect can be in the system for several hours  It is also a stomach irritant  · It is advised to AVOID alcohol, Nonsteroidal antiinflammatory drugs (NSAIDS) and nicotine of all forms   Any of these can cause stomach irritation/pain  · Discussed the effects of alcohol on a bariatric patient and the increased impairment risk  · Keep up the good work!

## 2021-04-07 ENCOUNTER — APPOINTMENT (OUTPATIENT)
Dept: LAB | Facility: CLINIC | Age: 41
End: 2021-04-07
Payer: COMMERCIAL

## 2021-04-07 ENCOUNTER — HOSPITAL ENCOUNTER (EMERGENCY)
Facility: HOSPITAL | Age: 41
Discharge: HOME | End: 2021-04-07
Attending: EMERGENCY MEDICINE
Payer: COMMERCIAL

## 2021-04-07 ENCOUNTER — OFFICE VISIT (OUTPATIENT)
Dept: URGENT CARE | Facility: CLINIC | Age: 41
End: 2021-04-07
Payer: COMMERCIAL

## 2021-04-07 ENCOUNTER — APPOINTMENT (EMERGENCY)
Dept: CT IMAGING | Facility: HOSPITAL | Age: 41
End: 2021-04-07
Payer: COMMERCIAL

## 2021-04-07 ENCOUNTER — TRANSCRIBE ORDERS (OUTPATIENT)
Dept: LAB | Facility: CLINIC | Age: 41
End: 2021-04-07

## 2021-04-07 VITALS
RESPIRATION RATE: 20 BRPM | OXYGEN SATURATION: 96 % | HEART RATE: 82 BPM | SYSTOLIC BLOOD PRESSURE: 135 MMHG | WEIGHT: 226 LBS | DIASTOLIC BLOOD PRESSURE: 72 MMHG | BODY MASS INDEX: 42.67 KG/M2 | HEIGHT: 61 IN | TEMPERATURE: 98.2 F

## 2021-04-07 VITALS
TEMPERATURE: 97.9 F | SYSTOLIC BLOOD PRESSURE: 135 MMHG | HEART RATE: 93 BPM | OXYGEN SATURATION: 97 % | RESPIRATION RATE: 16 BRPM | DIASTOLIC BLOOD PRESSURE: 89 MMHG

## 2021-04-07 DIAGNOSIS — R35.0 FREQUENCY OF MICTURITION: ICD-10-CM

## 2021-04-07 DIAGNOSIS — Z98.84 BARIATRIC SURGERY STATUS: ICD-10-CM

## 2021-04-07 DIAGNOSIS — E66.01 OBESITY, CLASS III, BMI 40-49.9 (MORBID OBESITY) (HCC): ICD-10-CM

## 2021-04-07 DIAGNOSIS — E88.81 INSULIN RESISTANCE: ICD-10-CM

## 2021-04-07 DIAGNOSIS — M54.9 BACK PAIN: Primary | ICD-10-CM

## 2021-04-07 DIAGNOSIS — K91.2 POSTSURGICAL MALABSORPTION: ICD-10-CM

## 2021-04-07 DIAGNOSIS — E88.81 INSULIN RESISTANCE: Primary | ICD-10-CM

## 2021-04-07 DIAGNOSIS — R10.9 RIGHT FLANK PAIN: Primary | ICD-10-CM

## 2021-04-07 DIAGNOSIS — Z48.815 ENCOUNTER FOR SURGICAL AFTERCARE FOLLOWING SURGERY OF DIGESTIVE SYSTEM: ICD-10-CM

## 2021-04-07 LAB
BILIRUB UR QL STRIP: NEGATIVE
CLARITY UR: ABNORMAL
COLOR UR: YELLOW
EST. AVERAGE GLUCOSE BLD GHB EST-MCNC: 123 MG/DL
FERRITIN SERPL-MCNC: 22 NG/ML (ref 8–388)
FOLATE SERPL-MCNC: 17.5 NG/ML (ref 3.1–17.5)
GLUCOSE SERPL-MCNC: 97 MG/DL (ref 65–140)
GLUCOSE UR STRIP-MCNC: NEGATIVE MG/DL
HBA1C MFR BLD: 5.9 %
HGB UR QL STRIP.AUTO: NEGATIVE
INSULIN SERPL-ACNC: 20.7 MU/L (ref 3–25)
IRON SATN MFR SERPL: 17 %
IRON SERPL-MCNC: 68 UG/DL (ref 50–170)
KETONES UR STRIP-MCNC: NEGATIVE MG/DL
LEUKOCYTE ESTERASE UR QL STRIP: NEGATIVE
NITRITE UR QL STRIP: NEGATIVE
PH UR STRIP.AUTO: 7 [PH]
PROT UR STRIP-MCNC: NEGATIVE MG/DL
SL AMB  POCT GLUCOSE, UA: NORMAL
SL AMB LEUKOCYTE ESTERASE,UA: NORMAL
SL AMB POCT BILIRUBIN,UA: NORMAL
SL AMB POCT BLOOD,UA: NORMAL
SL AMB POCT CLARITY,UA: CLEAR
SL AMB POCT COLOR,UA: YELLOW
SL AMB POCT KETONES,UA: NORMAL
SL AMB POCT NITRITE,UA: NORMAL
SL AMB POCT PH,UA: 7.5
SL AMB POCT SPECIFIC GRAVITY,UA: 1.01
SL AMB POCT URINE PROTEIN: NORMAL
SL AMB POCT UROBILINOGEN: 0.2
SP GR UR STRIP.AUTO: 1.02 (ref 1–1.03)
TIBC SERPL-MCNC: 396 UG/DL (ref 250–450)
UROBILINOGEN UR QL STRIP.AUTO: 0.2 E.U./DL
VIT B12 SERPL-MCNC: 303 PG/ML (ref 100–900)

## 2021-04-07 PROCEDURE — G1004 CDSM NDSC: HCPCS

## 2021-04-07 PROCEDURE — 83540 ASSAY OF IRON: CPT

## 2021-04-07 PROCEDURE — 99203 OFFICE O/P NEW LOW 30 MIN: CPT | Performed by: PHYSICIAN ASSISTANT

## 2021-04-07 PROCEDURE — 83036 HEMOGLOBIN GLYCOSYLATED A1C: CPT

## 2021-04-07 PROCEDURE — 83525 ASSAY OF INSULIN: CPT

## 2021-04-07 PROCEDURE — 99284 EMERGENCY DEPT VISIT MOD MDM: CPT | Performed by: PHYSICIAN ASSISTANT

## 2021-04-07 PROCEDURE — 36415 COLL VENOUS BLD VENIPUNCTURE: CPT

## 2021-04-07 PROCEDURE — 82607 VITAMIN B-12: CPT

## 2021-04-07 PROCEDURE — 84425 ASSAY OF VITAMIN B-1: CPT

## 2021-04-07 PROCEDURE — 81002 URINALYSIS NONAUTO W/O SCOPE: CPT | Performed by: PHYSICIAN ASSISTANT

## 2021-04-07 PROCEDURE — 84630 ASSAY OF ZINC: CPT

## 2021-04-07 PROCEDURE — 3044F HG A1C LEVEL LT 7.0%: CPT | Performed by: PHYSICIAN ASSISTANT

## 2021-04-07 PROCEDURE — G0382 LEV 3 HOSP TYPE B ED VISIT: HCPCS | Performed by: PHYSICIAN ASSISTANT

## 2021-04-07 PROCEDURE — 83550 IRON BINDING TEST: CPT

## 2021-04-07 PROCEDURE — 99284 EMERGENCY DEPT VISIT MOD MDM: CPT

## 2021-04-07 PROCEDURE — 99283 EMERGENCY DEPT VISIT LOW MDM: CPT | Performed by: PHYSICIAN ASSISTANT

## 2021-04-07 PROCEDURE — 96372 THER/PROPH/DIAG INJ SC/IM: CPT

## 2021-04-07 PROCEDURE — 72128 CT CHEST SPINE W/O DYE: CPT

## 2021-04-07 PROCEDURE — 84590 ASSAY OF VITAMIN A: CPT

## 2021-04-07 PROCEDURE — 81003 URINALYSIS AUTO W/O SCOPE: CPT | Performed by: EMERGENCY MEDICINE

## 2021-04-07 PROCEDURE — 82746 ASSAY OF FOLIC ACID SERUM: CPT

## 2021-04-07 PROCEDURE — 82947 ASSAY GLUCOSE BLOOD QUANT: CPT

## 2021-04-07 PROCEDURE — 82728 ASSAY OF FERRITIN: CPT

## 2021-04-07 RX ORDER — KETOROLAC TROMETHAMINE 30 MG/ML
30 INJECTION, SOLUTION INTRAMUSCULAR; INTRAVENOUS ONCE
Status: COMPLETED | OUTPATIENT
Start: 2021-04-07 | End: 2021-04-07

## 2021-04-07 RX ADMIN — KETOROLAC TROMETHAMINE 30 MG: 30 INJECTION, SOLUTION INTRAMUSCULAR; INTRAVENOUS at 11:24

## 2021-04-07 NOTE — ED PROVIDER NOTES
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 Taking? ALPRAZolam (XANAX) 0 25 mg tablet   Yes No   Sig: Take 0 25 mg by mouth daily at bedtime as needed    AURELIO MICROLET LANCETS lancets   Yes No   Sig: by Does not apply route daily   Blood Glucose Monitoring Suppl ([employer] CONTOUR MONITOR) w/Device KIT   Yes No   Sig: by Does not apply route   Glucose Blood (AURELIO CONTOUR TEST VI)   Yes No   Sig: by In Vitro route daily   anastrozole (ARIMIDEX) 1 mg tablet   No No   Sig: Take 1 tablet (1 mg total) by mouth daily   calcium-vitamin D (OSCAL 500 + D) 500 mg-200 units per tablet   Yes No   Sig: Take 1 tablet by mouth daily     cyclobenzaprine (FLEXERIL) 10 mg tablet   No No   Sig: Take 1 tablet (10 mg total) by mouth 3 (three) times a day as needed for muscle spasms for up to 14 days   denosumab (PROLIA) 60 mg/mL   Yes No   Sig: Inject 60 mg under the skin once   diclofenac sodium (VOLTAREN) 1 %   Yes No   Sig: Apply 1 application topically daily as needed    ergocalciferol (VITAMIN D2) 50,000 units   No No   Sig: Take 1 capsule (50,000 Units total) by mouth once a week   ibuprofen (MOTRIN) 600 mg tablet   No No   Sig: Take 1 tablet (600 mg total) by mouth every 6 (six) hours as needed for mild pain   levothyroxine 75 mcg tablet   No No   Sig: Take 1 tablet (75 mcg total) by mouth daily   metFORMIN (GLUCOPHAGE) 500 mg tablet   No No   Sig: Take 1 tablet (500 mg total) by mouth daily   nystatin (MYCOSTATIN) powder   No No   Sig: Apply 1 application topically 2 (two) times a day Abdominal fold   omeprazole (PriLOSEC) 40 MG capsule   No No   Sig: Take 1 capsule (40 mg total) by mouth daily   simvastatin (ZOCOR) 20 mg tablet   No No   Sig: Take 1 tablet (20 mg total) by mouth daily at bedtime      Facility-Administered Medications: None       Past Medical History:   Diagnosis Date    Allergy     Anxiety     Arthritis     [facility] cancer ([facility] )     Candidiasis, cutaneous     Cervical cancer (HCC)     Depression     Diabetes mellitus ([facility] )     Disease of thyroid gland     hypothyroid    Disorder of urinary tract     Fatty liver     Hemorrhoids     History of chemotherapy     History of radiation therapy     Influenza A     Obesity     PONV (postoperative nausea and vomiting)     Port-A-Cath in place        Past Surgical History:   Procedure Laterality Date    BILATERAL SALPINGOOPHORECTOMY      BREAST BIOPSY      needle core    GASTRECTOMY SLEEVE LAPAROSCOPIC      Onset: 1/26/15  Dr Anahy Cox  04/07/2014    IR PORT REMOVAL  11/21/2019    MASTECTOMY Left 12/19/2013    Lymph nodes removed       Family History   Problem Relation Age of Onset    Diabetes Mother     Diabetes Father     Ovarian cancer Sister 29    Hypertension Maternal Grandfather     Hodgkin's lymphoma Paternal Grandmother     Breast cancer Family     Diabetes Family     Hypertension Family     Uterine cancer Maternal Aunt 64    Leukemia Son         chronic myeloid     I have reviewed and agree with the history as documented      E-Cigarette/Vaping    E-Cigarette Use Never User      E-Cigarette/Vaping Substances    Nicotine No     THC No     CBD No     Flavoring No     Other No     Unknown No      Social History     Tobacco Use    Smoking status: Former Smoker     Types: Cigarettes     Quit date: 2013     Years since quittin 0    Smokeless tobacco: Never Used   Substance Use Topics    Alcohol use: No    Drug use: No       Review of Systems   Constitutional: Negative for chills, fatigue and fever  HENT: Negative for congestion, ear pain, rhinorrhea, sinus pressure, sneezing, sore throat and trouble swallowing  Eyes: Negative for discharge and itching  Respiratory: Negative for cough, chest tightness, shortness of breath, wheezing and stridor  Cardiovascular: Negative for chest pain and palpitations  Gastrointestinal: Negative for abdominal pain, diarrhea, nausea and vomiting  Musculoskeletal: Positive for back pain  Neurological: Negative for dizziness, syncope, numbness and headaches  All other systems reviewed and are negative  Physical Exam  Physical Exam  Vitals signs and nursing note reviewed  Constitutional:       General: She is not in acute distress  Appearance: She is well-developed  She is not diaphoretic  HENT:      Head: Normocephalic and atraumatic  Right Ear: External ear normal       Left Ear: External ear normal       Nose: Nose normal    Eyes:      Conjunctiva/sclera: Conjunctivae normal       Pupils: Pupils are equal, round, and reactive to light  Neck:      Musculoskeletal: Normal range of motion  Cardiovascular:      Rate and Rhythm: Normal rate and regular rhythm  Heart sounds: Normal heart sounds  No murmur  No friction rub  No gallop  Pulmonary:      Effort: Pulmonary effort is normal  No respiratory distress  Breath sounds: Normal breath sounds  No stridor  No wheezing or rales  Abdominal:      General: Bowel sounds are normal  There is no distension  Palpations: Abdomen is soft  Tenderness: There is no abdominal tenderness  There is no guarding  Musculoskeletal: Normal range of motion  General: No tenderness          Back:    Skin: General: Skin is warm  Capillary Refill: Capillary refill takes less than 2 seconds  Neurological:      Mental Status: She is alert and oriented to person, place, and time  Vital Signs  ED Triage Vitals [04/07/21 1056]   Temperature Pulse Respirations Blood Pressure SpO2   97 9 °F (36 6 °C) 93 16 135/89 97 %      Temp Source Heart Rate Source Patient Position - Orthostatic VS BP Location FiO2 (%)   Tympanic Monitor Sitting Right arm --      Pain Score       8           Vitals:    04/07/21 1056   BP: 135/89   Pulse: 93   Patient Position - Orthostatic VS: Sitting         Visual Acuity      ED Medications  Medications   ketorolac (TORADOL) injection 30 mg (30 mg Intramuscular Given 4/7/21 1124)       Diagnostic Studies  Results Reviewed     Procedure Component Value Units Date/Time    UA w Reflex to Microscopic w Reflex to Culture [[id number]]  (Abnormal) Collected: 04/07/21 1104    Lab Status: Final result Specimen: Urine, Clean Catch Updated: 04/07/21 1120     Color, UA Yellow     Clarity, UA Slightly Cloudy     Specific [place], UA 1 020     pH, UA 7 0     Leukocytes, UA Negative     Nitrite, UA Negative     Protein, UA Negative mg/dl      Glucose, UA Negative mg/dl      Ketones, UA Negative mg/dl      Urobilinogen, UA 0 2 E U /dl      Bilirubin, UA Negative     Blood, UA Negative                 CT spine thoracic without contrast   Final Result by Mallika Falcon MD (04/07 1316)   No evidence of metastatic disease to the spine  Stable mild degenerative changes  Workstation performed: [id number]                    Procedures  Procedures         ED Course                             SBIRT 22yo+      Most Recent Value   SBIRT (23 yo +)   In order to provide better care to our patients, we are screening all of our patients for alcohol and drug use  Would it be okay to ask you these screening questions? No Filed at: 04/07/2021 1114   KRISTIE: How many times in the past year have you       Used an illegal drug or used a prescription medication for non-medical reasons? Never Filed at: 04/07/2021 1114                    [facility]  Number of Diagnoses or Management Options  Back pain:   Diagnosis management comments: Right-sided midthoracic back tenderness  Paraspinal tenderness  Thoracic CT unremarkable for acute pathology  Likely muscle spasm  Follow up with physical therapy  Patient educated regarding their diagnosis and given return and follow-up instructions  Patient was advised to returned to the ED with worsening symptoms or concerns  Patient is understanding of and in agreement with the treatment plan  There are no questions at the time of discharge  Amount and/or Complexity of Data Reviewed  Tests in the radiology section of CPT®: ordered and reviewed    Risk of Complications, Morbidity, and/or Mortality  Presenting problems: low  Diagnostic procedures: low  Management options: low    Patient Progress  Patient progress: stable      Disposition  Final diagnoses:   Back pain     Time reflects when diagnosis was documented in both MDM as applicable and the Disposition within this note     Time User Action Codes Description Comment    4/7/2021  1:19 PM Armida Quintero Add [M54 9] Back pain       ED Disposition     ED Disposition Condition Date/Time Comment    Discharge Stable Wed Apr 7, 2021  1:20 PM Bertha Beckett discharge to home/self care              Follow-up Information     Follow up With Specialties Details Why Contact Info    Nita Saint, DO Internal Medicine   [address] [facility]  [phone]            Discharge Medication List as of 4/7/2021  1:20 PM      CONTINUE these medications which have NOT CHANGED    Details   ALPRAZolam (XANAX) 0 25 mg tablet Take 0 25 mg by mouth daily at bedtime as needed , Historical Med      anastrozole (ARIMIDEX) 1 mg tablet Take 1 tablet (1 mg total) by mouth daily, Starting Wed 2/24/2021, Normal      AURELIO MICROLET LANCETS lancets by Does not apply route daily, Starting Fri 11/3/2017, Historical Med      Blood Glucose Monitoring Suppl ([employer] CONTOUR MONITOR) w/Device KIT by Does not apply route, Starting Fri 11/3/2017, Historical Med      calcium-vitamin D (OSCAL 500 + D) 500 mg-200 units per tablet Take 1 tablet by mouth daily  , Until Discontinued, Historical Med      cyclobenzaprine (FLEXERIL) 10 mg tablet Take 1 tablet (10 mg total) by mouth 3 (three) times a day as needed for muscle spasms for up to 14 days, Starting Fri 2/7/2020, Until Thu 10/1/2020, Normal      denosumab (PROLIA) 60 mg/mL Inject 60 mg under the skin once, Historical Med      diclofenac sodium (VOLTAREN) 1 % Apply 1 application topically daily as needed , Starting Tue 5/8/2018, Until Fri 6/5/2020, Historical Med      ergocalciferol (VITAMIN D2) 50,000 units Take 1 capsule (50,000 Units total) by mouth once a week, Starting Wed 2/24/2021, Normal      Glucose Blood ([employer]IO CONTOUR TEST VI) by In Vitro route daily, Starting Fri 11/3/2017, Historical Med      ibuprofen (MOTRIN) 600 mg tablet Take 1 tablet (600 mg total) by mouth every 6 (six) hours as needed for mild pain, Starting Sun 1/26/2020, Normal      levothyroxine 75 mcg tablet Take 1 tablet (75 mcg total) by mouth daily, Starting Wed 2/24/2021, Normal      metFORMIN (GLUCOPHAGE) 500 mg tablet Take 1 tablet (500 mg total) by mouth daily, Starting Wed 2/24/2021, Until Mon 8/23/2021, Normal      nystatin (MYCOSTATIN) powder Apply 1 application topically 2 (two) times a day Abdominal fold, Starting Wed 2/24/2021, Normal      omeprazole (PriLOSEC) 40 MG capsule Take 1 capsule (40 mg total) by mouth daily, Starting Wed 2/24/2021, Until Mon 8/23/2021, Normal      simvastatin (ZOCOR) 20 mg tablet Take 1 tablet (20 mg total) by mouth daily at bedtime, Starting Wed 2/24/2021, Normal               PDMP Review     None          ED Provider  Electronically Signed by           Jocelynn Espinoza PA-C  04/07/21 7968

## 2021-04-07 NOTE — PROGRESS NOTES
Nadia Lion          NAME: Ubaldo Carroll is a 37 y o  female  : 1980    MRN: [id number]  DATE: 2021  TIME: 10:24 AM    Assessment and Plan   Right flank pain [R10 9]  1  Right flank pain  Transfer to other facility   2  Frequency of micturition  POCT urine dip    Transfer to other facility       Patient Instructions   Urine dip clean  Due to severity of pain and patient's hx of breast CA to proceed to ER for further workup via private vehicle  Patient agreeable to plan  Proceed to Er  Chief Complaint     Chief Complaint   Patient presents with    Chest Pain     c/o posterior R rib cage pain x 1 week No injury         History of Present Illness   Bertha Beckett presents to the clinic c/o    Denies any trauma to area  Hx of breast CA  Reports R flank pain is very severe 9/10 currently and getting worse  No hx of kidney stones  No nausea or vomiting  No increased fatigue or weight loss  Back Pain  This is a new problem  The current episode started in the past 7 days  The problem occurs constantly  The problem has been rapidly worsening since onset  Pain location: right flank  Quality: sharp  The pain does not radiate  The pain is moderate  Pertinent negatives include no abdominal pain, bladder incontinence, bowel incontinence, chest pain, dysuria, fever, headaches, leg pain, numbness, pelvic pain, perianal numbness, tingling or weakness  Risk factors include history of cancer (denies any trauma to the area)  She has tried ice for the symptoms  The treatment provided no relief  Review of Systems   Review of Systems   Constitutional: Negative for chills, diaphoresis, fatigue and fever  HENT: Negative for congestion, ear discharge, ear pain and facial swelling  Eyes: Negative for photophobia, pain, discharge, redness, itching and visual disturbance  Respiratory: Negative for apnea, cough, chest tightness, shortness of breath and wheezing      Cardiovascular: Negative for chest pain and palpitations  Gastrointestinal: Negative for abdominal pain and bowel incontinence  Genitourinary: Positive for flank pain and frequency  Negative for bladder incontinence, difficulty urinating, dysuria, pelvic pain and urgency  Musculoskeletal: Positive for back pain  Negative for arthralgias  Skin: Negative for color change, rash and wound  Neurological: Negative for dizziness, tingling, weakness, numbness and headaches  Hematological: Negative for adenopathy           Current Medications     Long-Term Medications   Medication Sig Dispense Refill    anastrozole (ARIMIDEX) 1 mg tablet Take 1 tablet (1 mg total) by mouth daily 90 tablet 1    AURELIO MICROLET LANCETS lancets by Does not apply route daily      Blood Glucose Monitoring Suppl ([employer] CONTOUR MONITOR) w/Device KIT by Does not apply route      ergocalciferol (VITAMIN D2) 50,000 units Take 1 capsule (50,000 Units total) by mouth once a week 12 capsule 1    ibuprofen (MOTRIN) 600 mg tablet Take 1 tablet (600 mg total) by mouth every 6 (six) hours as needed for mild pain 30 tablet 0    levothyroxine 75 mcg tablet Take 1 tablet (75 mcg total) by mouth daily 90 tablet 1    metFORMIN (GLUCOPHAGE) 500 mg tablet Take 1 tablet (500 mg total) by mouth daily 90 tablet 1    nystatin (MYCOSTATIN) powder Apply 1 application topically 2 (two) times a day Abdominal fold 15 g 0    omeprazole (PriLOSEC) 40 MG capsule Take 1 capsule (40 mg total) by mouth daily 90 capsule 1    simvastatin (ZOCOR) 20 mg tablet Take 1 tablet (20 mg total) by mouth daily at bedtime 90 tablet 1    cyclobenzaprine (FLEXERIL) 10 mg tablet Take 1 tablet (10 mg total) by mouth 3 (three) times a day as needed for muscle spasms for up to 14 days 42 tablet 0    diclofenac sodium (VOLTAREN) 1 % Apply 1 application topically daily as needed          Current Allergies     Allergies as of 04/07/2021 - Reviewed 04/07/2021   Allergen Reaction Noted    Paclitaxel Anaphylaxis 2016            The following portions of the patient's history were reviewed and updated as appropriate: allergies, current medications, past family history, past medical history, past social history, past surgical history and problem list   Past Medical History:   Diagnosis Date    Allergy     Anxiety     Arthritis     [facility]     Candidiasis, cutaneous     [facility]     Depression     [facility] mellitus ([facility] )     Disease of thyroid gland     hypothyroid    Disorder of urinary tract     Fatty liver     Hemorrhoids     History of chemotherapy     History of radiation therapy     Influenza A     Obesity     PONV (postoperative nausea and vomiting)     Port-A-Cath in place      Past Surgical History:   Procedure Laterality Date    BILATERAL SALPINGOOPHORECTOMY      BREAST BIOPSY      needle core    GASTRECTOMY SLEEVE LAPAROSCOPIC      Onset: 1/26/15  Dr Silvana Hassan  2014    IR PORT REMOVAL  2019    MASTECTOMY Left 2013    Lymph nodes removed     Social History     Socioeconomic History    Marital status: /Civil Union     Spouse name: Not on file    Number of children: Not on file    Years of education: Not on file    Highest education level: Not on file   Occupational History    Occupation: LPN   Social Needs    Financial resource strain: Not on file    Food insecurity     Worry: Not on file     Inability: Not on file   [employer] needs     Medical: Not on file     Non-medical: Not on file   Tobacco Use    Smoking status: Former Smoker     Types: Cigarettes     Quit date: 2013     Years since quittin 0    Smokeless tobacco: Never Used   Substance and Sexual Activity    Alcohol use: No    Drug use: No    Sexual activity: Not on file   Lifestyle    Physical activity     Days per week: Not on file     Minutes per session: Not on file    Stress: Not on file   Relationships  Social connections     Talks on phone: Not on file     Gets together: Not on file     Attends Islam service: Not on file     Active member of club or organization: Not on file     Attends meetings of clubs or organizations: Not on file     Relationship status: Not on file    Intimate partner violence     Fear of current or ex partner: Not on file     Emotionally abused: Not on file     Physically abused: Not on file     Forced sexual activity: Not on file   Other Topics Concern    Not on file   Social History Narrative    EMPLOYED       Objective   /72   Pulse 82   Temp 98 2 °F (36 8 °C)   Resp 20   Ht 5' 1" (1 549 m)   Wt 103 kg (226 lb)   SpO2 96%   BMI 42 70 kg/m²      Physical Exam     Physical Exam  Vitals signs and nursing note reviewed  Constitutional:       General: She is not in acute distress  Appearance: She is well-developed  She is not diaphoretic  HENT:      Head: Normocephalic and atraumatic  Right Ear: External ear normal       Left Ear: External ear normal       Nose: Nose normal    Eyes:      General: No scleral icterus  Right eye: No discharge  Left eye: No discharge  Conjunctiva/sclera: Conjunctivae normal    Cardiovascular:      Rate and Rhythm: Normal rate and regular rhythm  Heart sounds: Normal heart sounds  No murmur  No friction rub  No gallop  Pulmonary:      Effort: Pulmonary effort is normal  No respiratory distress  Breath sounds: Normal breath sounds  No decreased breath sounds, wheezing, rhonchi or rales  Abdominal:      General: Bowel sounds are normal       Palpations: Abdomen is soft  Tenderness: There is no abdominal tenderness  There is right CVA tenderness  There is no left CVA tenderness, guarding or rebound  Skin:     General: Skin is warm and dry  Coloration: Skin is not pale  Findings: No erythema or rash     Neurological:      Mental Status: She is alert and oriented to person, place, and time    Psychiatric:         Behavior: Behavior normal          Thought Content:  Thought content normal          Judgment: Judgment normal          Heddy NASRIN Lambert

## 2021-04-08 ENCOUNTER — NURSE TRIAGE (OUTPATIENT)
Dept: PHYSICAL THERAPY | Facility: OTHER | Age: 41
End: 2021-04-08

## 2021-04-08 DIAGNOSIS — M54.6 ACUTE RIGHT-SIDED THORACIC BACK PAIN: Primary | ICD-10-CM

## 2021-04-08 NOTE — TELEPHONE ENCOUNTER
Additional Information   Negative: Is this related to a work injury?  Negative: Is this related to an MVA?  Negative: Are you currently recieving homecare services?  Negative: Does the patient have a fever?  Negative: Has the patient had unexplained weight loss?  Negative: Is the patient experiencing blood in sputum?  Negative: Has the patient experienced major trauma? (fall from height, high speed collision, direct blow to spine) and is also experiencing nausea, light-headedness, or loss of consciousness?  Negative: Is the patient experiencing urine retention?  Negative: Is the patient experiencing acute drop foot or paralysis?  Negative: Is this a chronic condition? Background - Initial Assessment  Clinical complaint: right sided mid thoracic back pain  Date of onset: 1 week ago NKI  Frequency of pain: constant  Quality of pain: sharp    Protocols used: SL AMB COMPREHENSIVE SPINE PROGRAM PROTOCOL    Patient seen in ED yesterday for above complaints-PLEASE SEE NOTES  Triaged for same and NO RF s/s present  Referral entered for [facility] site and contact info given to her as well

## 2021-04-09 ENCOUNTER — HOSPITAL ENCOUNTER (OUTPATIENT)
Dept: RADIOLOGY | Facility: HOSPITAL | Age: 41
Discharge: HOME | End: 2021-04-09
Payer: COMMERCIAL

## 2021-04-09 DIAGNOSIS — E66.01 OBESITY, CLASS III, BMI 40-49.9 (MORBID OBESITY) (HCC): ICD-10-CM

## 2021-04-09 DIAGNOSIS — Z48.815 ENCOUNTER FOR SURGICAL AFTERCARE FOLLOWING SURGERY OF DIGESTIVE SYSTEM: ICD-10-CM

## 2021-04-09 DIAGNOSIS — Z98.84 BARIATRIC SURGERY STATUS: ICD-10-CM

## 2021-04-09 PROCEDURE — 74240 X-RAY XM UPR GI TRC 1CNTRST: CPT

## 2021-04-10 LAB — ZINC SERPL-MCNC: 99 UG/DL (ref 44–115)

## 2021-04-14 LAB — VIT B1 BLD-SCNC: 107.4 NMOL/L (ref 66.5–200)

## 2021-04-16 LAB — VIT A SERPL-MCNC: 31.9 UG/DL (ref 20.1–62)

## 2021-04-29 ENCOUNTER — OFFICE VISIT (OUTPATIENT)
Dept: BARIATRICS | Facility: CLINIC | Age: 41
End: 2021-04-29
Payer: COMMERCIAL

## 2021-04-29 VITALS — BODY MASS INDEX: 42.67 KG/M2 | HEIGHT: 61 IN | WEIGHT: 226 LBS

## 2021-04-29 DIAGNOSIS — E66.01 MORBID (SEVERE) OBESITY DUE TO EXCESS CALORIES (HCC): Primary | ICD-10-CM

## 2021-04-29 PROCEDURE — 99214 OFFICE O/P EST MOD 30 MIN: CPT | Performed by: SURGERY

## 2021-04-29 PROCEDURE — 3008F BODY MASS INDEX DOCD: CPT | Performed by: PHYSICIAN ASSISTANT

## 2021-04-29 NOTE — PROGRESS NOTES
Follow Up - Bariatric Surgery   Bertha Smith 37 y o  female MRN: [id number]  Unit/Bed#:  Encounter: [id number]            Subjective: 45-year-old female status post laparoscopic sleeve gastrectomy that was performed in 2015 patient failed to achieve any sustainable of meaningful weight loss and she was seen prior to [facility] for a revision and was supposed to get enrolled in our medical weight loss program however because of COVID she failed to do that  Her workup so far included an upper GI which showed a small sliding hernia no evidence of reflux upper endoscopy showed  LA grade A esophagitis  But no other abnormalities  Objective:         Lab, Imaging and other studies: I have personally reviewed pertinent labs  Family History: non-contributory    Meds/Allergies   all medications and allergies reviewed    Vitals: Height 5' 1" (1 549 m), weight 103 kg (226 lb)  ,Body mass index is 42 7 kg/m²  [unfilled]    Invasive Devices     None                 Physical Exam:     NAD  Abdomen soft non-tender, incisions well healed  No palpable hernias    Assessment/Plan:     I had a long discussion with the patient today and recommended that she gets enrolled in our 3 month physician supervised medical weight loss program and in the meantime and will complete the workup by ordering a manometry and 24 pH study of the patient 24 pH study is positive then she will require a conversion to a Maulik-en-Y gastric bypass however if her 24 pH study is negative and she has normal esophageal motility then a single anastomosis duodenal ileostomy or JAEL may be an appropriate option as well                Jeff Orona MD

## 2021-05-10 ENCOUNTER — TELEPHONE (OUTPATIENT)
Dept: INTERNAL MEDICINE CLINIC | Facility: CLINIC | Age: 41
End: 2021-05-10

## 2021-05-10 NOTE — TELEPHONE ENCOUNTER
Bertha called and was looking for the paper work for her dental work  I didn;t see anything in the chart, wasn't sure if Dr Gabi Horne still has the paperwork to be filled out

## 2021-05-11 NOTE — TELEPHONE ENCOUNTER
LM to contact Insurance on form   I contacted [facility] they stated that dentist must fill out form

## 2021-05-13 ENCOUNTER — TELEPHONE (OUTPATIENT)
Dept: INTERNAL MEDICINE CLINIC | Facility: CLINIC | Age: 41
End: 2021-05-13

## 2021-05-25 ENCOUNTER — OFFICE VISIT (OUTPATIENT)
Dept: INTERNAL MEDICINE CLINIC | Facility: CLINIC | Age: 41
End: 2021-05-25
Payer: COMMERCIAL

## 2021-05-25 VITALS
WEIGHT: 226.25 LBS | OXYGEN SATURATION: 97 % | BODY MASS INDEX: 42.72 KG/M2 | TEMPERATURE: 98.8 F | HEART RATE: 101 BPM | HEIGHT: 61 IN | SYSTOLIC BLOOD PRESSURE: 132 MMHG | DIASTOLIC BLOOD PRESSURE: 78 MMHG

## 2021-05-25 DIAGNOSIS — E66.01 OBESITY, CLASS III, BMI 40-49.9 (MORBID OBESITY) (HCC): ICD-10-CM

## 2021-05-25 DIAGNOSIS — E11.9 TYPE 2 DIABETES MELLITUS WITHOUT COMPLICATION, WITHOUT LONG-TERM CURRENT USE OF INSULIN (HCC): Primary | ICD-10-CM

## 2021-05-25 DIAGNOSIS — E55.9 VITAMIN D DEFICIENCY: ICD-10-CM

## 2021-05-25 DIAGNOSIS — E06.3 HYPOTHYROIDISM DUE TO HASHIMOTO'S THYROIDITIS: ICD-10-CM

## 2021-05-25 DIAGNOSIS — E03.8 HYPOTHYROIDISM DUE TO HASHIMOTO'S THYROIDITIS: ICD-10-CM

## 2021-05-25 PROBLEM — E88.810 METABOLIC SYNDROME: Status: ACTIVE | Noted: 2021-02-04

## 2021-05-25 PROBLEM — K76.0 FATTY LIVER: Status: ACTIVE | Noted: 2021-02-04

## 2021-05-25 PROBLEM — E88.81 METABOLIC SYNDROME: Status: ACTIVE | Noted: 2021-02-04

## 2021-05-25 PROBLEM — D73.4 SPLENIC CYST: Status: ACTIVE | Noted: 2020-12-14

## 2021-05-25 PROCEDURE — 99214 OFFICE O/P EST MOD 30 MIN: CPT | Performed by: INTERNAL MEDICINE

## 2021-05-25 PROCEDURE — 3008F BODY MASS INDEX DOCD: CPT | Performed by: INTERNAL MEDICINE

## 2021-05-25 PROCEDURE — 1036F TOBACCO NON-USER: CPT | Performed by: INTERNAL MEDICINE

## 2021-05-25 PROCEDURE — 3725F SCREEN DEPRESSION PERFORMED: CPT | Performed by: INTERNAL MEDICINE

## 2021-05-25 NOTE — PROGRESS NOTES
Assessment/Plan:    Problem List Items Addressed This Visit        Endocrine    Diabetes mellitus ([facility] ) - Primary    Hypothyroidism       Other    Vitamin D deficiency    Obesity, Class III, BMI 40-49 9 (morbid obesity) ([facility] )           Diagnoses and all orders for this visit:    Type 2 diabetes mellitus without complication, without long-term current use of insulin ([facility] )    Hypothyroidism due to Hashimoto's thyroiditis    Obesity, Class III, BMI 40-49 9 (morbid obesity) ([facility] )    Vitamin D deficiency        No problem-specific Assessment & Plan notes found for this encounter  BMI Counseling: Body mass index is 42 75 kg/m²  The BMI is above normal  Exercise recommendations include exercising 3-5 times per week  No pharmacotherapy was ordered  Subjective: Follow up regarding DM  Patient ID: Karon Reyes is a 37 y o  female  The patient was seen and examined and notes that she is tolerating the current medications  Her last A1c was noted to be good at 6 0%  The patient is doing well with the current medications and notes no issues with Vitamin D supplementation  He is noted to have a low vitamin d, but notes that she has not been compliant with her medication  BP is noted to be good  The following portions of the patient's history were reviewed and updated as appropriate:   She has a past medical history of Allergy, Anxiety, Arthritis, Breast cancer ([facility] ), Candidiasis, cutaneous, Cervical cancer ([facility] ), Depression, Diabetes mellitus ([facility] ), Disease of thyroid gland, Disorder of urinary tract, Fatty liver, Hemorrhoids, History of chemotherapy, History of radiation therapy, Influenza A, Obesity, PONV (postoperative nausea and vomiting), and Port-A-Cath in place  ,  does not have any pertinent problems on file  ,   has a past surgical history that includes Breast biopsy; Mastectomy (Left, 12/19/2013); Hysterectomy (04/07/2014);  Bilateral salpingoophorectomy; GASTRECTOMY SLEEVE LAPAROSCOPIC; and IR port removal (11/21/2019)  ,  family history includes Breast cancer in her family; Diabetes in her family, father, and mother; Hodgkin's lymphoma in her paternal grandmother; Hypertension in her family and maternal grandfather; Leukemia in her son; Ovarian cancer (age of onset: 29) in her sister; Uterine cancer (age of onset: 64) in her maternal aunt  ,   reports that she quit smoking about 8 years ago  Her smoking use included cigarettes  She has never used smokeless tobacco  She reports that she does not drink alcohol or use drugs  ,  is allergic to paclitaxel     Current Outpatient Medications   Medication Sig Dispense Refill    ALPRAZolam (XANAX) 0 25 mg tablet Take 0 25 mg by mouth daily at bedtime as needed       anastrozole (ARIMIDEX) 1 mg tablet Take 1 tablet (1 mg total) by mouth daily 90 tablet 1    calcium-vitamin D (OSCAL 500 + D) 500 mg-200 units per tablet Take 1 tablet by mouth daily        cyclobenzaprine (FLEXERIL) 10 mg tablet Take 1 tablet (10 mg total) by mouth 3 (three) times a day as needed for muscle spasms for up to 14 days 42 tablet 0    denosumab (PROLIA) 60 mg/mL Inject 60 mg under the skin once      diclofenac sodium (VOLTAREN) 1 % Apply 1 application topically daily as needed       ergocalciferol (VITAMIN D2) 50,000 units Take 1 capsule (50,000 Units total) by mouth once a week 12 capsule 1    ibuprofen (MOTRIN) 600 mg tablet Take 1 tablet (600 mg total) by mouth every 6 (six) hours as needed for mild pain 30 tablet 0    levothyroxine 75 mcg tablet Take 1 tablet (75 mcg total) by mouth daily 90 tablet 1    metFORMIN (GLUCOPHAGE) 500 mg tablet Take 1 tablet (500 mg total) by mouth daily 90 tablet 1    nystatin (MYCOSTATIN) powder Apply 1 application topically 2 (two) times a day Abdominal fold 15 g 0    omeprazole (PriLOSEC) 40 MG capsule Take 1 capsule (40 mg total) by mouth daily 90 capsule 1    simvastatin (ZOCOR) 20 mg tablet Take 1 tablet (20 mg total) by mouth daily at bedtime 90 tablet 1    AURELIO MICROLET LANCETS lancets by Does not apply route daily      Blood Glucose Monitoring Suppl ([place] CONTOUR MONITOR) w/Device KIT by Does not apply route      Glucose Blood ([place] CONTOUR TEST VI) by In Vitro route daily       No current facility-administered medications for this visit  Review of Systems   Constitutional: Negative for chills, fatigue and fever  HENT: Negative  Respiratory: Negative for cough, chest tightness and shortness of breath  Cardiovascular: Negative for chest pain, palpitations and leg swelling  Gastrointestinal: Negative for abdominal pain, constipation, diarrhea, nausea and vomiting  Genitourinary: Negative  Musculoskeletal: Negative for arthralgias, back pain and myalgias  Skin: Negative  Neurological: Negative  Psychiatric/Behavioral: Negative  Objective:  Vitals:    05/25/21 1524   BP: 132/78   Pulse: 101   Temp: 98 8 °F (37 1 °C)   SpO2: 97%   Weight: 103 kg (226 lb 4 oz)   Height: 5' 1" (1 549 m)     Body mass index is 42 75 kg/m²  Physical Exam  Vitals signs and nursing note reviewed  Constitutional:       Appearance: She is well-developed  HENT:      Head: Normocephalic and atraumatic  Eyes:      Pupils: Pupils are equal, round, and reactive to light  Neck:      Musculoskeletal: Normal range of motion and neck supple  Cardiovascular:      Rate and Rhythm: Normal rate and regular rhythm  Heart sounds: Normal heart sounds  No murmur  Pulmonary:      Effort: Pulmonary effort is normal  No respiratory distress  Breath sounds: Normal breath sounds  No wheezing  Abdominal:      General: Bowel sounds are normal       Palpations: Abdomen is soft  Tenderness: There is no abdominal tenderness  Musculoskeletal: Normal range of motion  Skin:     General: Skin is warm and dry  Neurological:      Mental Status: She is alert and oriented to person, place, and time            PHQ-9 Depression Screening    PHQ-9:   Frequency of the following problems over the past two weeks:      Little interest or pleasure in doing things: 0 - not at all  Feeling down, depressed, or hopeless: 0 - not at all  Trouble falling or staying asleep, or sleeping too much: 0 - not at all  Feeling tired or having little energy: 0 - not at all  Poor appetite or overeatin - not at all  Feeling bad about yourself - or that you are a failure or have let yourself or your family down: 0 - not at all  Trouble concentrating on things, such as reading the newspaper or watching television: 0 - not at all  Moving or speaking so slowly that other people could have noticed   Or the opposite - being so fidgety or restless that you have been moving around a lot more than usual: 0 - not at all  Thoughts that you would be better off dead, or of hurting yourself in some way: 0 - not at all  PHQ-2 Score: 0  PHQ-9 Score: 0

## 2021-05-26 ENCOUNTER — TELEPHONE (OUTPATIENT)
Dept: INTERNAL MEDICINE CLINIC | Facility: CLINIC | Age: 41
End: 2021-05-26

## 2021-06-10 ENCOUNTER — HOSPITAL ENCOUNTER (OUTPATIENT)
Dept: GASTROENTEROLOGY | Facility: HOSPITAL | Age: 41
Discharge: HOME | End: 2021-06-10
Attending: SURGERY

## 2021-06-10 DIAGNOSIS — E03.9 HYPOTHYROIDISM, UNSPECIFIED TYPE: ICD-10-CM

## 2021-06-11 RX ORDER — LEVOTHYROXINE SODIUM 0.07 MG/1
TABLET ORAL
Qty: 90 TABLET | Refills: 1 | Status: SHIPPED | OUTPATIENT
Start: 2021-06-11 | End: 2021-06-15 | Stop reason: SDUPTHER

## 2021-06-15 DIAGNOSIS — R21 RASH: ICD-10-CM

## 2021-06-15 DIAGNOSIS — K21.00 GASTROESOPHAGEAL REFLUX DISEASE WITH ESOPHAGITIS WITHOUT HEMORRHAGE: ICD-10-CM

## 2021-06-15 DIAGNOSIS — E55.9 VITAMIN D DEFICIENCY: ICD-10-CM

## 2021-06-15 DIAGNOSIS — Z17.0 MALIGNANT NEOPLASM OF LEFT BREAST IN FEMALE, ESTROGEN RECEPTOR POSITIVE, UNSPECIFIED SITE OF BREAST (HCC): ICD-10-CM

## 2021-06-15 DIAGNOSIS — E78.5 HYPERLIPIDEMIA, UNSPECIFIED HYPERLIPIDEMIA TYPE: ICD-10-CM

## 2021-06-15 DIAGNOSIS — E03.9 HYPOTHYROIDISM, UNSPECIFIED TYPE: ICD-10-CM

## 2021-06-15 DIAGNOSIS — M62.838 CERVICAL PARASPINAL MUSCLE SPASM: ICD-10-CM

## 2021-06-15 DIAGNOSIS — C50.912 MALIGNANT NEOPLASM OF LEFT BREAST IN FEMALE, ESTROGEN RECEPTOR POSITIVE, UNSPECIFIED SITE OF BREAST (HCC): ICD-10-CM

## 2021-06-15 DIAGNOSIS — E11.9 TYPE 2 DIABETES MELLITUS WITHOUT COMPLICATION, WITHOUT LONG-TERM CURRENT USE OF INSULIN (HCC): ICD-10-CM

## 2021-06-15 RX ORDER — OMEPRAZOLE 40 MG/1
40 CAPSULE, DELAYED RELEASE ORAL DAILY
Qty: 90 CAPSULE | Refills: 0 | Status: SHIPPED | OUTPATIENT
Start: 2021-06-15 | End: 2021-07-13 | Stop reason: SDUPTHER

## 2021-06-15 RX ORDER — ANASTROZOLE 1 MG/1
1 TABLET ORAL DAILY
Qty: 90 TABLET | Refills: 0 | Status: SHIPPED | OUTPATIENT
Start: 2021-06-15 | End: 2021-07-13 | Stop reason: SDUPTHER

## 2021-06-15 RX ORDER — NYSTATIN 100000 [USP'U]/G
1 POWDER TOPICAL 2 TIMES DAILY
Qty: 15 G | Refills: 0 | Status: SHIPPED | OUTPATIENT
Start: 2021-06-15 | End: 2021-07-13 | Stop reason: SDUPTHER

## 2021-06-15 RX ORDER — LEVOTHYROXINE SODIUM 0.07 MG/1
75 TABLET ORAL DAILY
Qty: 90 TABLET | Refills: 0 | Status: SHIPPED | OUTPATIENT
Start: 2021-06-15 | End: 2021-07-13 | Stop reason: SDUPTHER

## 2021-06-15 RX ORDER — CYCLOBENZAPRINE HCL 10 MG
10 TABLET ORAL 3 TIMES DAILY PRN
Qty: 42 TABLET | Refills: 0 | Status: SHIPPED | OUTPATIENT
Start: 2021-06-15 | End: 2021-07-13 | Stop reason: SDUPTHER

## 2021-06-15 RX ORDER — ERGOCALCIFEROL 1.25 MG/1
50000 CAPSULE ORAL WEEKLY
Qty: 12 CAPSULE | Refills: 0 | Status: SHIPPED | OUTPATIENT
Start: 2021-06-15 | End: 2021-07-13 | Stop reason: SDUPTHER

## 2021-06-15 RX ORDER — SIMVASTATIN 20 MG
20 TABLET ORAL
Qty: 90 TABLET | Refills: 0 | Status: SHIPPED | OUTPATIENT
Start: 2021-06-15 | End: 2021-07-13 | Stop reason: SDUPTHER

## 2021-06-24 ENCOUNTER — TELEPHONE (OUTPATIENT)
Dept: GASTROENTEROLOGY | Facility: HOSPITAL | Age: 41
End: 2021-06-24

## 2021-07-13 DIAGNOSIS — R21 RASH: ICD-10-CM

## 2021-07-13 DIAGNOSIS — E55.9 VITAMIN D DEFICIENCY: ICD-10-CM

## 2021-07-13 DIAGNOSIS — K21.00 GASTROESOPHAGEAL REFLUX DISEASE WITH ESOPHAGITIS WITHOUT HEMORRHAGE: ICD-10-CM

## 2021-07-13 DIAGNOSIS — Z17.0 MALIGNANT NEOPLASM OF LEFT BREAST IN FEMALE, ESTROGEN RECEPTOR POSITIVE, UNSPECIFIED SITE OF BREAST (HCC): ICD-10-CM

## 2021-07-13 DIAGNOSIS — E11.9 TYPE 2 DIABETES MELLITUS WITHOUT COMPLICATION, WITHOUT LONG-TERM CURRENT USE OF INSULIN (HCC): ICD-10-CM

## 2021-07-13 DIAGNOSIS — C50.912 MALIGNANT NEOPLASM OF LEFT BREAST IN FEMALE, ESTROGEN RECEPTOR POSITIVE, UNSPECIFIED SITE OF BREAST (HCC): ICD-10-CM

## 2021-07-13 DIAGNOSIS — M62.838 CERVICAL PARASPINAL MUSCLE SPASM: ICD-10-CM

## 2021-07-13 DIAGNOSIS — E78.5 HYPERLIPIDEMIA, UNSPECIFIED HYPERLIPIDEMIA TYPE: ICD-10-CM

## 2021-07-13 DIAGNOSIS — E03.9 HYPOTHYROIDISM, UNSPECIFIED TYPE: ICD-10-CM

## 2021-07-13 RX ORDER — CYCLOBENZAPRINE HCL 10 MG
10 TABLET ORAL 3 TIMES DAILY PRN
Qty: 42 TABLET | Refills: 0 | Status: SHIPPED | OUTPATIENT
Start: 2021-07-13 | End: 2022-04-16 | Stop reason: SDUPTHER

## 2021-07-13 RX ORDER — LEVOTHYROXINE SODIUM 0.07 MG/1
75 TABLET ORAL DAILY
Qty: 90 TABLET | Refills: 0 | Status: SHIPPED | OUTPATIENT
Start: 2021-07-13 | End: 2021-09-27 | Stop reason: SDUPTHER

## 2021-07-13 RX ORDER — SIMVASTATIN 20 MG
20 TABLET ORAL
Qty: 90 TABLET | Refills: 0 | Status: SHIPPED | OUTPATIENT
Start: 2021-07-13 | End: 2021-09-27 | Stop reason: SDUPTHER

## 2021-07-13 RX ORDER — ANASTROZOLE 1 MG/1
1 TABLET ORAL DAILY
Qty: 90 TABLET | Refills: 0 | Status: SHIPPED | OUTPATIENT
Start: 2021-07-13 | End: 2021-09-27

## 2021-07-13 RX ORDER — ERGOCALCIFEROL 1.25 MG/1
50000 CAPSULE ORAL WEEKLY
Qty: 12 CAPSULE | Refills: 0 | Status: SHIPPED | OUTPATIENT
Start: 2021-07-13 | End: 2022-01-07 | Stop reason: SDUPTHER

## 2021-07-13 RX ORDER — NYSTATIN 100000 [USP'U]/G
1 POWDER TOPICAL 2 TIMES DAILY
Qty: 15 G | Refills: 0 | Status: SHIPPED | OUTPATIENT
Start: 2021-07-13 | End: 2021-10-18 | Stop reason: SDUPTHER

## 2021-07-13 RX ORDER — OMEPRAZOLE 40 MG/1
40 CAPSULE, DELAYED RELEASE ORAL DAILY
Qty: 90 CAPSULE | Refills: 0 | Status: SHIPPED | OUTPATIENT
Start: 2021-07-13 | End: 2022-01-07 | Stop reason: SDUPTHER

## 2021-07-15 ENCOUNTER — HOSPITAL ENCOUNTER (OUTPATIENT)
Dept: GASTROENTEROLOGY | Facility: HOSPITAL | Age: 41
Discharge: HOME | End: 2021-07-15
Attending: SURGERY
Payer: COMMERCIAL

## 2021-07-15 VITALS
RESPIRATION RATE: 16 BRPM | SYSTOLIC BLOOD PRESSURE: 140 MMHG | DIASTOLIC BLOOD PRESSURE: 85 MMHG | HEART RATE: 90 BPM | OXYGEN SATURATION: 95 % | TEMPERATURE: 98.5 F

## 2021-07-15 DIAGNOSIS — E66.01 MORBID (SEVERE) OBESITY DUE TO EXCESS CALORIES (HCC): ICD-10-CM

## 2021-07-15 PROCEDURE — 91020 GASTRIC MOTILITY STUDIES: CPT

## 2021-07-15 PROCEDURE — 91038 ESOPH IMPED FUNCT TEST > 1HR: CPT

## 2021-07-15 NOTE — PERIOPERATIVE NURSING NOTE
Patient brought in the room and educated on procedure  Lidocaine 2% topical solution inserted via nostrils  Manometry catheter inserted via right nostril and secured  10 liquid swallows, 10 viscous swallow , 1 rapid drink challenge with 150 cc waterand 1 rapid swallow performed  Patient Tolerated procedure  Catheter removed intact   PH probe inserted via  Right nostril and secured  Zephr recorder teachback performed and patient verbalized understanding  Patient instructed to return next day to have probe remove  Discharge instructions given and patient ambulated out of room in stable condition

## 2021-07-18 ENCOUNTER — APPOINTMENT (EMERGENCY)
Dept: RADIOLOGY | Facility: HOSPITAL | Age: 41
End: 2021-07-18
Payer: COMMERCIAL

## 2021-07-18 ENCOUNTER — APPOINTMENT (EMERGENCY)
Dept: CT IMAGING | Facility: HOSPITAL | Age: 41
End: 2021-07-18
Payer: COMMERCIAL

## 2021-07-18 ENCOUNTER — HOSPITAL ENCOUNTER (EMERGENCY)
Facility: HOSPITAL | Age: 41
Discharge: HOME | End: 2021-07-19
Attending: EMERGENCY MEDICINE | Admitting: EMERGENCY MEDICINE
Payer: COMMERCIAL

## 2021-07-18 VITALS
DIASTOLIC BLOOD PRESSURE: 68 MMHG | BODY MASS INDEX: 43.06 KG/M2 | RESPIRATION RATE: 16 BRPM | WEIGHT: 234 LBS | HEART RATE: 80 BPM | TEMPERATURE: 98.3 F | SYSTOLIC BLOOD PRESSURE: 110 MMHG | OXYGEN SATURATION: 95 % | HEIGHT: 62 IN

## 2021-07-18 DIAGNOSIS — R93.5 ABNORMAL CT OF THE ABDOMEN: ICD-10-CM

## 2021-07-18 DIAGNOSIS — R10.9 FLANK PAIN: Primary | ICD-10-CM

## 2021-07-18 LAB
ALBUMIN SERPL BCP-MCNC: 4 G/DL (ref 3.5–5.7)
ALP SERPL-CCNC: 39 U/L (ref 40–150)
ALT SERPL W P-5'-P-CCNC: 30 U/L (ref 7–52)
ANION GAP SERPL CALCULATED.3IONS-SCNC: 5 MMOL/L (ref 4–13)
APTT PPP: 26 SECONDS (ref 23–37)
AST SERPL W P-5'-P-CCNC: 18 U/L (ref 13–39)
BACTERIA UR QL AUTO: NORMAL /HPF
BASOPHILS # BLD AUTO: 0 THOUSANDS/ΜL (ref 0–0.1)
BASOPHILS NFR BLD AUTO: 0 % (ref 0–2)
BILIRUB SERPL-MCNC: 0.5 MG/DL (ref 0.2–1)
BILIRUB UR QL STRIP: NEGATIVE
BUN SERPL-MCNC: 13 MG/DL (ref 7–25)
CALCIUM SERPL-MCNC: 9.1 MG/DL (ref 8.6–10.5)
CHLORIDE SERPL-SCNC: 104 MMOL/L (ref 98–107)
CLARITY UR: CLEAR
CO2 SERPL-SCNC: 29 MMOL/L (ref 21–31)
COLOR UR: YELLOW
CREAT SERPL-MCNC: 0.78 MG/DL (ref 0.6–1.2)
D DIMER PPP FEU-MCNC: <0.27 UG/ML FEU
EOSINOPHIL # BLD AUTO: 0.3 THOUSAND/ΜL (ref 0–0.61)
EOSINOPHIL NFR BLD AUTO: 4 % (ref 0–5)
ERYTHROCYTE [DISTWIDTH] IN BLOOD BY AUTOMATED COUNT: 14.1 % (ref 11.5–14.5)
EXT PREG TEST URINE: NEGATIVE
EXT. CONTROL ED NAV: NORMAL
GFR SERPL CREATININE-BSD FRML MDRD: 95 ML/MIN/1.73SQ M
GLUCOSE SERPL-MCNC: 112 MG/DL (ref 65–99)
GLUCOSE UR STRIP-MCNC: NEGATIVE MG/DL
HCT VFR BLD AUTO: 37.4 % (ref 42–47)
HGB BLD-MCNC: 12.6 G/DL (ref 12–16)
HGB UR QL STRIP.AUTO: ABNORMAL
INR PPP: 0.96 (ref 0.84–1.19)
KETONES UR STRIP-MCNC: NEGATIVE MG/DL
LEUKOCYTE ESTERASE UR QL STRIP: NEGATIVE
LYMPHOCYTES # BLD AUTO: 2.7 THOUSANDS/ΜL (ref 0.6–4.47)
LYMPHOCYTES NFR BLD AUTO: 33 % (ref 21–51)
MCH RBC QN AUTO: 29.6 PG (ref 26–34)
MCHC RBC AUTO-ENTMCNC: 33.6 G/DL (ref 31–37)
MCV RBC AUTO: 88 FL (ref 81–99)
MONOCYTES # BLD AUTO: 0.6 THOUSAND/ΜL (ref 0.17–1.22)
MONOCYTES NFR BLD AUTO: 7 % (ref 2–12)
NEUTROPHILS # BLD AUTO: 4.7 THOUSANDS/ΜL (ref 1.4–6.5)
NEUTS SEG NFR BLD AUTO: 56 % (ref 42–75)
NITRITE UR QL STRIP: NEGATIVE
NON-SQ EPI CELLS URNS QL MICRO: NORMAL /HPF
PH UR STRIP.AUTO: 7 [PH]
PLATELET # BLD AUTO: 280 THOUSANDS/UL (ref 149–390)
PMV BLD AUTO: 7.3 FL (ref 8.6–11.7)
POTASSIUM SERPL-SCNC: 4 MMOL/L (ref 3.5–5.5)
PROT SERPL-MCNC: 6.9 G/DL (ref 6.4–8.9)
PROT UR STRIP-MCNC: NEGATIVE MG/DL
PROTHROMBIN TIME: 12.7 SECONDS (ref 11.6–14.5)
RBC # BLD AUTO: 4.24 MILLION/UL (ref 3.9–5.2)
RBC #/AREA URNS AUTO: NORMAL /HPF
SODIUM SERPL-SCNC: 138 MMOL/L (ref 134–143)
SP GR UR STRIP.AUTO: 1.02 (ref 1–1.03)
TROPONIN I SERPL-MCNC: <0.03 NG/ML
UROBILINOGEN UR QL STRIP.AUTO: 0.2 E.U./DL
WBC # BLD AUTO: 8.4 THOUSAND/UL (ref 4.8–10.8)
WBC #/AREA URNS AUTO: NORMAL /HPF

## 2021-07-18 PROCEDURE — 81025 URINE PREGNANCY TEST: CPT | Performed by: EMERGENCY MEDICINE

## 2021-07-18 PROCEDURE — 84484 ASSAY OF TROPONIN QUANT: CPT | Performed by: EMERGENCY MEDICINE

## 2021-07-18 PROCEDURE — 36415 COLL VENOUS BLD VENIPUNCTURE: CPT | Performed by: EMERGENCY MEDICINE

## 2021-07-18 PROCEDURE — 99284 EMERGENCY DEPT VISIT MOD MDM: CPT

## 2021-07-18 PROCEDURE — 99285 EMERGENCY DEPT VISIT HI MDM: CPT | Performed by: EMERGENCY MEDICINE

## 2021-07-18 PROCEDURE — 85730 THROMBOPLASTIN TIME PARTIAL: CPT | Performed by: EMERGENCY MEDICINE

## 2021-07-18 PROCEDURE — 80053 COMPREHEN METABOLIC PANEL: CPT | Performed by: EMERGENCY MEDICINE

## 2021-07-18 PROCEDURE — 85379 FIBRIN DEGRADATION QUANT: CPT | Performed by: EMERGENCY MEDICINE

## 2021-07-18 PROCEDURE — 96375 TX/PRO/DX INJ NEW DRUG ADDON: CPT

## 2021-07-18 PROCEDURE — 85610 PROTHROMBIN TIME: CPT | Performed by: EMERGENCY MEDICINE

## 2021-07-18 PROCEDURE — 85025 COMPLETE CBC W/AUTO DIFF WBC: CPT | Performed by: EMERGENCY MEDICINE

## 2021-07-18 PROCEDURE — 81001 URINALYSIS AUTO W/SCOPE: CPT | Performed by: EMERGENCY MEDICINE

## 2021-07-18 PROCEDURE — 71045 X-RAY EXAM CHEST 1 VIEW: CPT

## 2021-07-18 PROCEDURE — 74176 CT ABD & PELVIS W/O CONTRAST: CPT

## 2021-07-18 PROCEDURE — 96374 THER/PROPH/DIAG INJ IV PUSH: CPT

## 2021-07-18 RX ORDER — MORPHINE SULFATE 4 MG/ML
4 INJECTION, SOLUTION INTRAMUSCULAR; INTRAVENOUS ONCE
Status: COMPLETED | OUTPATIENT
Start: 2021-07-18 | End: 2021-07-18

## 2021-07-18 RX ORDER — KETOROLAC TROMETHAMINE 30 MG/ML
15 INJECTION, SOLUTION INTRAMUSCULAR; INTRAVENOUS ONCE
Status: COMPLETED | OUTPATIENT
Start: 2021-07-18 | End: 2021-07-18

## 2021-07-18 RX ADMIN — MORPHINE SULFATE 4 MG: 4 INJECTION INTRAVENOUS at 20:32

## 2021-07-18 RX ADMIN — KETOROLAC TROMETHAMINE 15 MG: 30 INJECTION, SOLUTION INTRAMUSCULAR; INTRAVENOUS at 20:31

## 2021-07-19 ENCOUNTER — OFFICE VISIT (OUTPATIENT)
Dept: INTERNAL MEDICINE CLINIC | Facility: CLINIC | Age: 41
End: 2021-07-19
Payer: COMMERCIAL

## 2021-07-19 ENCOUNTER — OFFICE VISIT (OUTPATIENT)
Dept: URGENT CARE | Facility: CLINIC | Age: 41
End: 2021-07-19
Payer: COMMERCIAL

## 2021-07-19 ENCOUNTER — APPOINTMENT (OUTPATIENT)
Dept: RADIOLOGY | Facility: CLINIC | Age: 41
End: 2021-07-19
Payer: COMMERCIAL

## 2021-07-19 VITALS
TEMPERATURE: 98.1 F | RESPIRATION RATE: 18 BRPM | SYSTOLIC BLOOD PRESSURE: 142 MMHG | HEART RATE: 92 BPM | OXYGEN SATURATION: 94 % | DIASTOLIC BLOOD PRESSURE: 97 MMHG

## 2021-07-19 VITALS
DIASTOLIC BLOOD PRESSURE: 84 MMHG | TEMPERATURE: 98.1 F | SYSTOLIC BLOOD PRESSURE: 126 MMHG | BODY MASS INDEX: 41.59 KG/M2 | WEIGHT: 226 LBS | HEIGHT: 62 IN | RESPIRATION RATE: 18 BRPM | OXYGEN SATURATION: 98 % | HEART RATE: 100 BPM

## 2021-07-19 DIAGNOSIS — Z01.00 DIABETIC EYE EXAM (HCC): Primary | ICD-10-CM

## 2021-07-19 DIAGNOSIS — R10.9 FLANK PAIN: ICD-10-CM

## 2021-07-19 DIAGNOSIS — R31.9 HEMATURIA, UNSPECIFIED TYPE: ICD-10-CM

## 2021-07-19 DIAGNOSIS — M54.6 ACUTE LEFT-SIDED THORACIC BACK PAIN: Primary | ICD-10-CM

## 2021-07-19 DIAGNOSIS — E11.9 DIABETIC EYE EXAM (HCC): Primary | ICD-10-CM

## 2021-07-19 DIAGNOSIS — M54.6 ACUTE LEFT-SIDED THORACIC BACK PAIN: ICD-10-CM

## 2021-07-19 DIAGNOSIS — Z12.31 ENCOUNTER FOR SCREENING MAMMOGRAM FOR MALIGNANT NEOPLASM OF BREAST: ICD-10-CM

## 2021-07-19 LAB
SL AMB  POCT GLUCOSE, UA: NEGATIVE
SL AMB LEUKOCYTE ESTERASE,UA: NEGATIVE
SL AMB POCT BILIRUBIN,UA: NEGATIVE
SL AMB POCT BLOOD,UA: ABNORMAL
SL AMB POCT CLARITY,UA: CLEAR
SL AMB POCT COLOR,UA: YELLOW
SL AMB POCT KETONES,UA: NEGATIVE
SL AMB POCT NITRITE,UA: NEGATIVE
SL AMB POCT PH,UA: 6
SL AMB POCT SPECIFIC GRAVITY,UA: 1.03
SL AMB POCT URINE PROTEIN: NEGATIVE
SL AMB POCT UROBILINOGEN: NORMAL

## 2021-07-19 PROCEDURE — 3008F BODY MASS INDEX DOCD: CPT | Performed by: INTERNAL MEDICINE

## 2021-07-19 PROCEDURE — 99213 OFFICE O/P EST LOW 20 MIN: CPT | Performed by: PHYSICIAN ASSISTANT

## 2021-07-19 PROCEDURE — 1036F TOBACCO NON-USER: CPT | Performed by: INTERNAL MEDICINE

## 2021-07-19 PROCEDURE — 81002 URINALYSIS NONAUTO W/O SCOPE: CPT | Performed by: INTERNAL MEDICINE

## 2021-07-19 PROCEDURE — 72072 X-RAY EXAM THORAC SPINE 3VWS: CPT

## 2021-07-19 PROCEDURE — 99213 OFFICE O/P EST LOW 20 MIN: CPT | Performed by: INTERNAL MEDICINE

## 2021-07-19 PROCEDURE — 87086 URINE CULTURE/COLONY COUNT: CPT | Performed by: INTERNAL MEDICINE

## 2021-07-19 PROCEDURE — 3061F NEG MICROALBUMINURIA REV: CPT | Performed by: INTERNAL MEDICINE

## 2021-07-19 RX ORDER — DICLOFENAC SODIUM 75 MG/1
75 TABLET, DELAYED RELEASE ORAL 2 TIMES DAILY PRN
Qty: 60 TABLET | Refills: 0 | Status: SHIPPED | OUTPATIENT
Start: 2021-07-19 | End: 2022-04-16 | Stop reason: SDUPTHER

## 2021-07-19 NOTE — PATIENT INSTRUCTIONS
Take diclofenac as prescribed by primary care doctor  Follow up with [facility] as recommended by the ER  Follow up with PCP in 3-5 days  Proceed to  ER if symptoms worsen  Back Pain   WHAT YOU NEED TO KNOW:   Back pain is common  It can be caused by many conditions, such as arthritis or the breakdown of spinal discs  Your risk for back pain is increased by injuries, lack of activity, or repeated bending and twisting  You may feel sore or stiff on one or both sides of your back  The pain may spread to your buttocks or thighs  DISCHARGE INSTRUCTIONS:   Return to the emergency department if:   · You have pain, numbness, or weakness in one or both legs  · Your pain becomes so severe that you cannot walk  · You cannot control your urine or bowel movements  · You have severe back pain with chest pain  · You have severe back pain, nausea, and vomiting  · You have severe back pain that spreads to your side or genital area  Contact your healthcare provider if:   · You have back pain that does not get better with rest and pain medicine  · You have a fever  · You have pain that worsens when you are on your back or when you rest     · You have pain that worsens when you cough or sneeze  · You lose weight without trying  · You have questions or concerns about your condition or care  Medicines:   · NSAIDs  help decrease swelling and pain  This medicine is available with or without a doctor's order  NSAIDs can cause stomach bleeding or kidney problems in certain people  If you take blood thinner medicine, always ask your healthcare provider if NSAIDs are safe for you  Always read the medicine label and follow directions  · Acetaminophen  decreases pain and fever  It is available without a doctor's order  Ask how much to take and how often to take it  Follow directions   Read the labels of all other medicines you are using to see if they also contain acetaminophen, or ask your doctor or pharmacist  Acetaminophen can cause liver damage if not taken correctly  Do not use more than 4 grams (4,000 milligrams) total of acetaminophen in one day  · Muscle relaxers  help decrease muscle spasms and back pain  · Prescription pain medicine  may be given  Ask your healthcare provider how to take this medicine safely  Some prescription pain medicines contain acetaminophen  Do not take other medicines that contain acetaminophen without talking to your healthcare provider  Too much acetaminophen may cause liver damage  Prescription pain medicine may cause constipation  Ask your healthcare provider how to prevent or treat constipation  · Take your medicine as directed  Contact your healthcare provider if you think your medicine is not helping or if you have side effects  Tell him or her if you are allergic to any medicine  Keep a list of the medicines, vitamins, and herbs you take  Include the amounts, and when and why you take them  Bring the list or the pill bottles to follow-up visits  Carry your medicine list with you in case of an emergency  How to manage your back pain:   · Apply ice  on your back for 15 to 20 minutes every hour or as directed  Use an ice pack, or put crushed ice in a plastic bag  Cover it with a towel before you apply it to your skin  Ice helps prevent tissue damage and decreases pain  · Apply heat  on your back for 20 to 30 minutes every 2 hours for as many days as directed  Heat helps decrease pain and muscle spasms  · Stay active  as much as you can without causing more pain  Bed rest could make your back pain worse  Avoid heavy lifting until your pain is gone  · Go to physical therapy as directed  A physical therapist can teach you exercises to help improve movement and strength, and to decrease pain  Follow up with your healthcare provider in 2 weeks, or as directed:  Write down your questions so you remember to ask them during your visits    © Copyright [address] Information is for [employer] use only and may not be sold, redistributed or otherwise used for commercial purposes  All illustrations and images included in CareNotes® are the copyrighted property of [employer]  or [employer]  The above information is an  only  It is not intended as medical advice for individual conditions or treatments  Talk to your doctor, nurse or pharmacist before following any medical regimen to see if it is safe and effective for you

## 2021-07-19 NOTE — ED PROVIDER NOTES
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 presents the emergency department with left-sided back pain/flank pain times 72 hours  Patient denies any recent trauma  Patient has been using a hot called patch without relief and using flexeril  without any decrease in her pain  Patient denies any urinary complaints, bowel complaints, bowel incontinence or urinary incontinence  Patient denies any fever, chills, nausea, rash or falls  Patient has a history of breast cancer stage III where she was initially diagnosed in 2013  Polyp patient is followed for this at [facility]  Patient is currently taking Arimidex daily for this condition  Patient denies any pleuritic chest pain, vomiting, abdominal pain, weight loss, fever, chills or COVID-19 exposure  Prior to Admission Medications   Prescriptions Last Dose Informant Patient Reported? Taking? ALPRAZolam (XANAX) 0 25 mg tablet   Yes No   Sig: Take 0 25 mg by mouth daily at bedtime as needed    AURELIO MICROLET LANCETS lancets   Yes No   Sig: by Does not apply route daily   Blood Glucose Monitoring Suppl (SNADEC CONTOUR MONITOR) w/Device KIT   Yes No   Sig: by Does not apply route   Glucose Blood (AURELIO CONTOUR TEST VI)   Yes No   Sig: by In Vitro route daily   anastrozole (ARIMIDEX) 1 mg tablet   No No   Sig: Take 1 tablet (1 mg total) by mouth daily   calcium-vitamin D (OSCAL 500 + D) 500 mg-200 units per tablet   Yes No   Sig: Take 1 tablet by mouth daily     cyclobenzaprine (FLEXERIL) 10 mg tablet   No No   Sig: Take 1 tablet (10 mg total) by mouth 3 (three) times a day as needed for muscle spasms for up to 14 days   denosumab (PROLIA) 60 mg/mL   Yes No Sig: Inject 60 mg under the skin once   diclofenac sodium (VOLTAREN) 1 %   Yes No   Sig: Apply 1 application topically daily as needed    ergocalciferol (VITAMIN D2) 50,000 units   No No   Sig: Take 1 capsule (50,000 Units total) by mouth once a week   ibuprofen (MOTRIN) 600 mg tablet   No No   Sig: Take 1 tablet (600 mg total) by mouth every 6 (six) hours as needed for mild pain   levothyroxine 75 mcg tablet   No No   Sig: Take 1 tablet (75 mcg total) by mouth daily   metFORMIN (GLUCOPHAGE) 500 mg tablet   No No   Sig: Take 1 tablet (500 mg total) by mouth daily   nystatin (MYCOSTATIN) powder   No No   Sig: Apply 1 application topically 2 (two) times a day Abdominal fold   omeprazole (PriLOSEC) 40 MG capsule   No No   Sig: Take 1 capsule (40 mg total) by mouth daily   simvastatin (ZOCOR) 20 mg tablet   No No   Sig: Take 1 tablet (20 mg total) by mouth daily at bedtime      Facility-Administered Medications: None       Past Medical History:   Diagnosis Date    Allergy     Anxiety     Arthritis     Breast cancer (HCC)     Candidiasis, cutaneous     Cervical cancer (HCC)     Depression     Diabetes mellitus (HCC)     Disease of thyroid gland     hypothyroid    Disorder of urinary tract     Fatty liver     Hemorrhoids     History of chemotherapy     History of radiation therapy     Influenza A     Obesity     PONV (postoperative nausea and vomiting)     Port-A-Cath in place        Past Surgical History:   Procedure Laterality Date    BILATERAL SALPINGOOPHORECTOMY      BREAST BIOPSY      needle core    GASTRECTOMY SLEEVE LAPAROSCOPIC      Onset: 1/26/15  Dr Ashley Tabor  04/07/2014    IR PORT REMOVAL  11/21/2019    MASTECTOMY Left 12/19/2013    Lymph nodes removed       Family History   Problem Relation Age of Onset    Diabetes Mother     Diabetes Father     Ovarian cancer Sister 29    Hypertension Maternal Grandfather     Hodgkin's lymphoma Paternal Grandmother    [employer] Breast cancer Family     Diabetes Family     Hypertension Family     Uterine cancer Maternal Aunt 64    Leukemia Son         chronic myeloid     I have reviewed and agree with the history as documented  E-Cigarette/Vaping    E-Cigarette Use Never User      E-Cigarette/Vaping Substances    Nicotine No     THC No     CBD No     Flavoring No     Other No     Unknown No      Social History     Tobacco Use    Smoking status: Former Smoker     Types: Cigarettes     Quit date: 2013     Years since quittin 3    Smokeless tobacco: Never Used   Vaping Use    Vaping Use: Never used   Substance Use Topics    Alcohol use: No    Drug use: No       Review of Systems   Constitutional: Negative  HENT: Negative  Eyes: Negative  Respiratory: Negative  Cardiovascular: Negative  Gastrointestinal: Negative  Endocrine: Negative  Genitourinary: Negative  Musculoskeletal: Positive for back pain  Skin: Negative  Allergic/Immunologic: Negative  Neurological: Negative  Hematological: Negative  Psychiatric/Behavioral: Negative  Physical Exam  Physical Exam  Vitals and nursing note reviewed  Constitutional:       Appearance: Normal appearance  She is normal weight  HENT:      Head: Normocephalic and atraumatic  Right Ear: External ear normal       Left Ear: External ear normal       Nose: Nose normal       Mouth/Throat:      Mouth: Mucous membranes are moist       Pharynx: Oropharynx is clear  Eyes:      Extraocular Movements: Extraocular movements intact  Conjunctiva/sclera: Conjunctivae normal       Pupils: Pupils are equal, round, and reactive to light  Cardiovascular:      Rate and Rhythm: Normal rate and regular rhythm  Pulses: Normal pulses  Heart sounds: Normal heart sounds  Pulmonary:      Effort: Pulmonary effort is normal       Breath sounds: Normal breath sounds  Abdominal:      General: Abdomen is flat   Bowel sounds are normal  Palpations: Abdomen is soft  Musculoskeletal:         General: Normal range of motion  Cervical back: Normal range of motion  Skin:     General: Skin is warm  Capillary Refill: Capillary refill takes less than 2 seconds  Comments: No focal rash posteriorly   Neurological:      General: No focal deficit present  Mental Status: She is alert and oriented to person, place, and time  Mental status is at baseline  Psychiatric:         Mood and Affect: Mood normal          Behavior: Behavior normal          Thought Content:  Thought content normal          Judgment: Judgment normal          Vital Signs  ED Triage Vitals   Temperature Pulse Respirations Blood Pressure SpO2   07/18/21 1946 07/18/21 1947 07/18/21 1947 07/18/21 1947 07/18/21 1947   98 3 °F (36 8 °C) 84 16 (!) 140/102 98 %      Temp Source Heart Rate Source Patient Position - Orthostatic VS BP Location FiO2 (%)   07/18/21 1946 -- 07/18/21 1947 07/18/21 1947 --   Temporal  Sitting Left arm       Pain Score       07/18/21 1946       Worst Possible Pain           Vitals:    07/18/21 1947 07/18/21 2100 07/18/21 2130   BP: (!) 140/102 120/72 110/68   Pulse: 84 79 80   Patient Position - Orthostatic VS: Sitting           Visual Acuity      ED Medications  Medications   ketorolac (TORADOL) injection 15 mg (15 mg Intravenous Given 7/18/21 2031)   morphine (PF) 4 mg/mL injection 4 mg (4 mg Intravenous Given 7/18/21 2032)       Diagnostic Studies  Results Reviewed     Procedure Component Value Units Date/Time    D-Dimer [[id number]]  (Normal) Collected: 07/18/21 2032    Lab Status: Final result Specimen: Blood from Arm, Right Updated: 07/18/21 2058     D-Dimer, Quant <0 27 ug/ml U     Comprehensive metabolic panel [[id number]]  (Abnormal) Collected: 07/18/21 2032    Lab Status: Final result Specimen: Blood from Arm, Right Updated: 07/18/21 2058     Sodium 138 mmol/L      Potassium 4 0 mmol/L      Chloride 104 mmol/L      CO2 29 mmol/L      ANION GAP 5 mmol/L      BUN 13 mg/dL      Creatinine 0 78 mg/dL      Glucose 112 mg/dL      Calcium 9 1 mg/dL      AST 18 U/L      ALT 30 U/L      Alkaline Phosphatase 39 U/L      Total Protein 6 9 g/dL      Albumin 4 0 g/dL      Total Bilirubin 0 50 mg/dL      eGFR 95 ml/min/1 73sq m     Narrative:      [place] guidelines for Chronic Kidney Disease (CKD):     Stage 1 with normal or high GFR (GFR > 90 mL/min/1 73 square meters)    Stage 2 Mild CKD (GFR = 60-89 mL/min/1 73 square meters)    Stage 3A Moderate CKD (GFR = 45-59 mL/min/1 73 square meters)    Stage 3B Moderate CKD (GFR = 30-44 mL/min/1 73 square meters)    Stage 4 Severe CKD (GFR = 15-29 mL/min/1 73 square meters)    Stage 5 End Stage CKD (GFR <15 mL/min/1 73 square meters)  Note: GFR calculation is accurate only with a steady state creatinine    Troponin I [[id number]]  (Normal) Collected: 07/18/21 2032    Lab Status: Final result Specimen: Blood from Arm, Right Updated: 07/18/21 2054     Troponin I <0 03 ng/mL     Urine Microscopic [[id number]]  (Normal) Collected: 07/18/21 2032    Lab Status: Final result Specimen: Urine, Clean Catch Updated: 07/18/21 2048     RBC, UA 2-4 /hpf      WBC, UA 0-1 /hpf      Epithelial Cells Occasional /hpf      Bacteria, UA Occasional /hpf     Protime-INR [[id number]]  (Normal) Collected: 07/18/21 2032    Lab Status: Final result Specimen: Blood from Arm, Right Updated: 07/18/21 2047     Protime 12 7 seconds      INR 0 96    APTT [[id number]]  (Normal) Collected: 07/18/21 2032    Lab Status: Final result Specimen: Blood from Arm, Right Updated: 07/18/21 2047     PTT 26 seconds     UA w Reflex to Microscopic w Reflex to Culture [[id number]]  (Abnormal) Collected: 07/18/21 2032    Lab Status: Final result Specimen: Urine, Clean Catch Updated: 07/18/21 2041     Color, UA Yellow     Clarity, UA Clear     Specific [place], UA 1 020     pH, UA 7 0     Leukocytes, UA Negative     Nitrite, UA Negative Protein, UA Negative mg/dl      Glucose, UA Negative mg/dl      Ketones, UA Negative mg/dl      Urobilinogen, UA 0 2 E U /dl      Bilirubin, UA Negative     Blood, UA Trace-lysed    CBC and differential [[id number]]  (Abnormal) Collected: 07/18/21 2032    Lab Status: Final result Specimen: Blood from Arm, Right Updated: 07/18/21 2038     WBC 8 40 Thousand/uL      RBC 4 24 Million/uL      Hemoglobin 12 6 g/dL      Hematocrit 37 4 %      MCV 88 fL      MCH 29 6 pg      MCHC 33 6 g/dL      RDW 14 1 %      MPV 7 3 fL      Platelets 953 Thousands/uL      Neutrophils Relative 56 %      Lymphocytes Relative 33 %      Monocytes Relative 7 %      Eosinophils Relative 4 %      Basophils Relative 0 %      Neutrophils Absolute 4 70 Thousands/µL      Lymphocytes Absolute 2 70 Thousands/µL      Monocytes Absolute 0 60 Thousand/µL      Eosinophils Absolute 0 30 Thousand/µL      Basophils Absolute 0 00 Thousands/µL     POCT pregnancy, urine [[id number]]  (Normal) Resulted: 07/18/21 2037    Lab Status: Final result Updated: 07/18/21 2037     EXT PREG TEST UR (Ref: Negative) negative     Control valid                 CT renal stone study abdomen pelvis without contrast   Final Result by Valerio Suarez DO (07/18 9779)      Mottled appearance of the posterior left 8th rib (axial image 2, series 2) with associated cortical irregularity, nonspecific but suspicious for an aggressive bony process including infection or primary or secondary neoplasm  Given the patient's history    of malignancy, metastatic disease is the primary differential consideration Correlation with patient's symptoms recommended  Nuclear medicine bone scan may be of benefit for further evaluation  No nephrolithiasis or hydronephrosis  Status post gastric sleeve  Small hiatal hernia  No evidence of bowel destruction  Fatty infiltration of the liver is suspected    In the setting of abdominal pain and/or elevated liver function tests, consider steatohepatitis  Other findings as above  Workstation performed: [id number]         XR chest 1 view portable   ED Interpretation by Yuliet Peguero III, DO (07/18 2046)   Portable chest x-ray, poor inspiratory effort, no acute infiltrate  Procedures  ECG 12 Lead Documentation Only    Date/Time: 7/18/2021 8:52 PM  Performed by: Yuliet Peguero III, DO  Authorized by: Yuliet Peguero III, DO     Indications / Diagnosis:  Back pain  ECG reviewed by me, the ED Provider: yes    Patient location:  ED  Comments:      Personally reviewed this EKG is performed the patient July 18, 2021  EKG was completed at 8:51 p m  And interpreted by me at 8:52 p m   Normal sinus rhythm with no acute ST abnormalities  ED Course  ED Course as of Jul 19 0024   Mon Jul 19, 2021   0016 Reviewed CT imaging results with the patient: Mottled appearance of the posterior left 8th rib (axial image 2, series 2) with associated cortical irregularity, nonspecific but suspicious for an aggressive bony process including infection or primary or secondary neoplasm  This discussion occurred at the bedside in room 7  Finding correlates with the patient's pain  [facility], advised patient that she needs to follow up with her oncologist as soon as possible through [facility] regarding this finding                  HEART Risk Score      Most Recent Value   Heart Score Risk Calculator   History  0 Filed at: 07/19/2021 0016   ECG  0 Filed at: 07/19/2021 0016   Age  0 Filed at: 07/19/2021 0016   Risk Factors  1 Filed at: 07/19/2021 0016   Troponin  0 Filed at: 07/19/2021 0016   HEART Score  1 Filed at: 07/19/2021 0016              PERC Rule for PE      Most Recent Value   PERC Rule for PE   Age >=50  0 Filed at: 07/19/2021 0016   HR >=100  0 Filed at: 07/19/2021 0016   O2 Sat on room air < 95%  0 Filed at: 07/19/2021 0016   History of PE or DVT  0 Filed at: 07/19/2021 0016   Recent trauma or surgery  0 Filed at: 07/19/2021 0016   Hemoptysis  0 Filed at: 07/19/2021 0016   Exogenous estrogen  0 Filed at: 07/19/2021 0016   Unilateral leg swelling  0 Filed at: 07/19/2021 0016   PERC Rule for PE Results  0 Filed at: 07/19/2021 6458                  Wells' Criteria for PE      Most Recent Value   Wells' Criteria for PE   Clinical signs and symptoms of DVT  0 Filed at: 07/19/2021 9190   PE is primary diagnosis or equally likely  0 Filed at: 07/19/2021 0016   HR >100  0 Filed at: 07/19/2021 0016   Immobilization at least 3 days or Surgery in the previous 4 weeks  0 Filed at: 07/19/2021 0016   Previous, objectively diagnosed PE or DVT  0 Filed at: 07/19/2021 0016   Hemoptysis  0 Filed at: 07/19/2021 0016   Malignancy with treatment within 6 months or palliative  0 Filed at: 07/19/2021 0016   Wells' Criteria Total  0 Filed at: 07/19/2021 0016                MDM  Number of Diagnoses or Management Options  Abnormal CT of the abdomen  Flank pain  Diagnosis management comments: This is a very pleasant, nontoxic, 52-year-old female with a history of stage III breast cancer diagnosed in 2013 and followed closely at [facility] at [place] [address] (followed from a surveillance stand), presents emergency department with 3 day history of left flank pain  Mild hematuria on urinalysis, D-dimer negative, PERC score low risk, [place] score low risk, Wells criteria low risk, the CT showed: Mottled appearance of the posterior left 8th rib (axial image 2, series 2) with associated cortical irregularity, nonspecific but suspicious for an aggressive bony process including infection or primary or secondary neoplasm, I personally reviewed these imaging results with the patient and gave very strong recommendation she needs to follow up with her oncologist at [facility] about this  Alk-phos normal, calcium levels on labs unremarkable, EKG unremarkable    Patient's reports her pain is improved after Toradol and morphine  Portions of the record may have been created with voice recognition software  Occasional wrong word or "sound a like" substitutions may have occurred due to the inherent limitations of voice recognition software  Read the chart carefully and recognize, using context, where substitutions have occurred  Counseling: I had a detailed discussion with the patient and/or guardian regarding: the historical points, exam findings, and any diagnostic results supporting the discharge diagnosis, lab results, radiology results, discharge instructions reviewed with patient and/or family/caregiver and understanding was verbalized  Instructions given to return to the emergency department if symptoms worsen or persist, or if there are any questions or concerns that arise at home         Amount and/or Complexity of Data Reviewed  Clinical lab tests: ordered and reviewed  Tests in the radiology section of CPT®: ordered and reviewed  Tests in the medicine section of CPT®: ordered and reviewed        Disposition  Final diagnoses:   Flank pain   Abnormal CT of the abdomen     Time reflects when diagnosis was documented in both MDM as applicable and the Disposition within this note     Time User Action Codes Description Comment    7/19/2021 12:18 AM Ness Cano [R10 9] Flank pain     7/19/2021 12:18 AM Ness Cano [R93 5] Abnormal CT of the abdomen       ED Disposition     ED Disposition Condition Date/Time Comment    Discharge Stable Mon Jul 19, 2021 12:18 AM Bertha Beckett discharge to home/self care  Follow-up Information     Follow up With Specialties Details Why [address], DO Internal Medicine   [address]  [phone]            Patient's Medications   Discharge Prescriptions    No medications on file     No discharge procedures on file      PDMP Review     None          ED Provider  Electronically Signed by           Gilson Wolff III,   07/19/21 6960

## 2021-07-19 NOTE — DISCHARGE INSTRUCTIONS
Please follow-up with your oncologist at [address] [place] [address] as soon as possible regarding the CT scan which showed: (Official read from CT imaging): Mottled appearance of the posterior left 8th rib (axial image 2, series 2) with associated cortical irregularity, nonspecific but suspicious for an bony process including infection or primary or secondary neoplasm

## 2021-07-19 NOTE — PROGRESS NOTES
[facility]        NAME: Laxmi Luis is a 37 y o  female  : 1980    MRN: [id number]  DATE: 2021  TIME: 5:28 PM    Assessment and Plan   Acute left-sided thoracic back pain [M54 6]  1  Acute left-sided thoracic back pain  XR spine thoracic 3 vw     Following patient H&P, patient was brought back for thoracic XR  Reviewed chart review and ER report while patient was brought back into radiology room  Patient had full workup including urine dip/HCG, EKG, blood work and CT of abdomen and pelvis  CT radiology result demonstrated: Mottled appearance of the posterior left 8th rib (axial image 2, series 2) with associated cortical irregularity, nonspecific but suspicious for an aggressive bony process including infection or primary or secondary neoplasm  Given the patient's history  of malignancy, metastatic disease is the primary differential consideration Correlation with patient's symptoms recommended  Nuclear medicine bone scan may be of benefit for further evaluation  ER report states the results were reviewed with the patient at bedside with a recommendation to follow up with [facility] oncologist  I reiterated these findings and recommendation with the patient  She verbalized understanding  She states they want her to have a mammogram and another scan done before her next appointment  XR: no acute fracture or dislocation  Hyperlucency in LUQ region where patient had prior gastrectomy  Patient Instructions     Take diclofenac as prescribed by primary care doctor  Follow up with [facility] as recommended by the ER  Follow up with PCP in 3-5 days  Proceed to  ER if symptoms worsen  Chief Complaint   No chief complaint on file  History of Present Illness       Patient has a history of osteoporosis and Stage 3 breast CA  She has been in remission x    States she is here today because I have pain "near my left 8th rib" and states she is here for an XR "to make sure there isn't a fracture"  Patient informed me that she was seen in the ED yesterday and given morphine  She was seen by PCP today who rx diclofenac  States she has not yet taken the medication  Back Pain  This is a new problem  Episode onset: this weekend after sliding down a waterpark slide  The problem has been gradually worsening since onset  Pain location: L thoracic  The quality of the pain is described as aching  The pain does not radiate  The pain is moderate  Pertinent negatives include no abdominal pain, bladder incontinence, bowel incontinence, chest pain, dysuria, fever, headaches, numbness, pelvic pain, tingling or weakness  She has tried nothing for the symptoms  Review of Systems   Review of Systems   Constitutional: Positive for activity change  Negative for appetite change, chills and fever  Respiratory: Negative for cough and shortness of breath  Cardiovascular: Negative for chest pain and palpitations  Gastrointestinal: Negative for abdominal pain, anal bleeding, blood in stool, bowel incontinence, constipation, diarrhea and nausea  Genitourinary: Negative  Negative for bladder incontinence, dysuria and pelvic pain  Musculoskeletal: Positive for back pain  Negative for arthralgias, joint swelling and myalgias  Skin: Negative for color change, rash and wound  Neurological: Negative for tingling, weakness, light-headedness, numbness and headaches           Current Medications       Current Outpatient Medications:     ALPRAZolam (XANAX) 0 25 mg tablet, Take 0 25 mg by mouth daily at bedtime as needed , Disp: , Rfl:     anastrozole (ARIMIDEX) 1 mg tablet, Take 1 tablet (1 mg total) by mouth daily, Disp: 90 tablet, Rfl: 0    AURELIO MICROLET LANCETS lancets, by Does not apply route daily, Disp: , Rfl:     Blood Glucose Monitoring Suppl (AURELIO CONTOUR MONITOR) w/Device KIT, by Does not apply route, Disp: , Rfl:     calcium-vitamin D (OSCAL 500 + D) 500 mg-200 units per tablet, Take 1 tablet by mouth daily  , Disp: , Rfl:     cyclobenzaprine (FLEXERIL) 10 mg tablet, Take 1 tablet (10 mg total) by mouth 3 (three) times a day as needed for muscle spasms for up to 14 days, Disp: 42 tablet, Rfl: 0    denosumab (PROLIA) 60 mg/mL, Inject 60 mg under the skin once, Disp: , Rfl:     diclofenac (VOLTAREN) 75 mg EC tablet, Take 1 tablet (75 mg total) by mouth 2 (two) times a day as needed (Muscular skeletal pain), Disp: 60 tablet, Rfl: 0    diclofenac sodium (VOLTAREN) 1 %, Apply 1 application topically daily as needed , Disp: , Rfl:     ergocalciferol (VITAMIN D2) 50,000 units, Take 1 capsule (50,000 Units total) by mouth once a week, Disp: 12 capsule, Rfl: 0    Glucose Blood (AURELIO CONTOUR TEST VI), by In Vitro route daily, Disp: , Rfl:     ibuprofen (MOTRIN) 600 mg tablet, Take 1 tablet (600 mg total) by mouth every 6 (six) hours as needed for mild pain, Disp: 30 tablet, Rfl: 0    levothyroxine 75 mcg tablet, Take 1 tablet (75 mcg total) by mouth daily, Disp: 90 tablet, Rfl: 0    metFORMIN (GLUCOPHAGE) 500 mg tablet, Take 1 tablet (500 mg total) by mouth daily, Disp: 90 tablet, Rfl: 0    nystatin (MYCOSTATIN) powder, Apply 1 application topically 2 (two) times a day Abdominal fold, Disp: 15 g, Rfl: 0    omeprazole (PriLOSEC) 40 MG capsule, Take 1 capsule (40 mg total) by mouth daily, Disp: 90 capsule, Rfl: 0    simvastatin (ZOCOR) 20 mg tablet, Take 1 tablet (20 mg total) by mouth daily at bedtime, Disp: 90 tablet, Rfl: 0  No current facility-administered medications for this visit      Current Allergies     Allergies as of 07/19/2021 - Reviewed 07/19/2021   Allergen Reaction Noted    Paclitaxel Anaphylaxis 02/26/2016            The following portions of the patient's history were reviewed and updated as appropriate: allergies, current medications, past family history, past medical history, past social history, past surgical history and problem list      Past Medical History: Diagnosis Date    Allergy     Anxiety     Arthritis     [facility]     Candidiasis, cutaneous     [facility]     Depression     Diabetes mellitus ([facility] )     Disease of thyroid gland     hypothyroid    Disorder of urinary tract     Fatty liver     Hemorrhoids     History of chemotherapy     History of radiation therapy     Influenza A     Obesity     PONV (postoperative nausea and vomiting)     Port-A-Cath in place        Past Surgical History:   Procedure Laterality Date    BILATERAL SALPINGOOPHORECTOMY      BREAST BIOPSY      needle core    GASTRECTOMY SLEEVE LAPAROSCOPIC      Onset: 1/26/15  Dr Art Casillas  04/07/2014    IR PORT REMOVAL  11/21/2019    MASTECTOMY Left 12/19/2013    Lymph nodes removed       Family History   Problem Relation Age of Onset    Diabetes Mother     Diabetes Father     Ovarian cancer Sister 29    Hypertension Maternal Grandfather     Hodgkin's lymphoma Paternal Grandmother     Breast cancer Family     Diabetes Family     Hypertension Family     Uterine cancer Maternal Aunt 64    Leukemia Son         chronic myeloid         Medications have been verified  Objective   /97   Pulse 92   Temp 98 1 °F (36 7 °C)   Resp 18   SpO2 94%   No LMP recorded  Patient has had a hysterectomy  Physical Exam     Physical Exam  Vitals reviewed  Constitutional:       General: She is not in acute distress  Appearance: She is well-developed  Cardiovascular:      Rate and Rhythm: Normal rate and regular rhythm  Heart sounds: Normal heart sounds  No murmur heard  No friction rub  No gallop  Pulmonary:      Effort: Pulmonary effort is normal  No respiratory distress  Breath sounds: Normal breath sounds  No wheezing or rales  Chest:      Chest wall: No tenderness  Abdominal:      Palpations: Abdomen is soft  Tenderness: There is no abdominal tenderness     Musculoskeletal: General: Tenderness present  No deformity  Normal range of motion  Skin:     General: Skin is warm  Findings: No erythema or rash  Neurological:      Mental Status: She is alert and oriented to person, place, and time  Sensory: No sensory deficit  Coordination: Coordination normal       Deep Tendon Reflexes: Reflexes are normal and symmetric  Reflexes normal    Psychiatric:         Behavior: Behavior normal          Thought Content:  Thought content normal          Judgment: Judgment normal

## 2021-07-19 NOTE — PROGRESS NOTES
Assessment/Plan:    Problem List Items Addressed This Visit     None      Visit Diagnoses     Diabetic eye exam ([facility] [address] )    -  Primary    Relevant Orders    Ambulatory referral to Ophthalmology    Encounter for screening mammogram for malignant neoplasm of breast        Relevant Orders    Mammo screening bilateral w 3d & cad    Flank pain        Relevant Medications    diclofenac (VOLTAREN) 75 mg EC tablet    Other Relevant Orders    POCT urine dip (Completed)    Urine culture    Hematuria, unspecified type        Relevant Orders    Urine culture           Diagnoses and all orders for this visit:    Diabetic eye exam [facility])  -     Ambulatory referral to Ophthalmology; Future    Encounter for screening mammogram for malignant neoplasm of breast  -     Mammo screening bilateral w 3d & cad; Future    Flank pain  -     POCT urine dip  -     Urine culture; Future  -     Urine culture  -     diclofenac (VOLTAREN) 75 mg EC tablet; Take 1 tablet (75 mg total) by mouth 2 (two) times a day as needed (Muscular skeletal pain)    Hematuria, unspecified type  -     Urine culture; Future  -     Urine culture        No problem-specific Assessment & Plan notes found for this encounter  Subjective: The patient was noted to have issues with pain in the right left 8th rib that started spontaneously  Patient ID: Jayro Espinoza is a 37 y o  female  The patinet was seen and examined and noted to have issues with 8th rib pain  The patient is noted to have issues with Breast CA  There is pain in the area of the left rib and we will follow up on that  The patient states there are no other issues at this time    She is interested in medication for the pain and we will try Diclofenac first         The following portions of the patient's history were reviewed and updated as appropriate:   She has a past medical history of Allergy, Anxiety, Arthritis, Breast cancer ([facility] ), Candidiasis, cutaneous, Cervical cancer ([facility] [address] ), Depression, Diabetes mellitus ([facility] ), Disease of thyroid gland, Disorder of urinary tract, Fatty liver, Hemorrhoids, History of chemotherapy, History of radiation therapy, Influenza A, Obesity, PONV (postoperative nausea and vomiting), and Port-A-Cath in place  ,  does not have any pertinent problems on file  ,   has a past surgical history that includes Mastectomy (Left, 12/19/2013); Hysterectomy (04/07/2014); Bilateral salpingoophorectomy; GASTRECTOMY SLEEVE LAPAROSCOPIC; IR port removal (11/21/2019); and Breast biopsy  ,  family history includes Breast cancer in her family; Diabetes in her family, father, and mother; Hodgkin's lymphoma in her paternal grandmother; Hypertension in her family and maternal grandfather; Leukemia in her son; Ovarian cancer (age of onset: 29) in her sister; Uterine cancer (age of onset: 64) in her maternal aunt  ,   reports that she quit smoking about 8 years ago  Her smoking use included cigarettes  She has never used smokeless tobacco  She reports that she does not drink alcohol and does not use drugs  ,  is allergic to paclitaxel     Current Outpatient Medications   Medication Sig Dispense Refill    ALPRAZolam (XANAX) 0 25 mg tablet Take 0 25 mg by mouth daily at bedtime as needed       anastrozole (ARIMIDEX) 1 mg tablet Take 1 tablet (1 mg total) by mouth daily 90 tablet 0    [employer] MICROLET LANCETS lancets by Does not apply route daily      Blood Glucose Monitoring Suppl ([employer] CONTOUR MONITOR) w/Device KIT by Does not apply route      calcium-vitamin D (OSCAL 500 + D) 500 mg-200 units per tablet Take 1 tablet by mouth daily        cyclobenzaprine (FLEXERIL) 10 mg tablet Take 1 tablet (10 mg total) by mouth 3 (three) times a day as needed for muscle spasms for up to 14 days 42 tablet 0    denosumab (PROLIA) 60 mg/mL Inject 60 mg under the skin once      ergocalciferol (VITAMIN D2) 50,000 units Take 1 capsule (50,000 Units total) by mouth once a week 12 capsule 0    Glucose Blood ([employer] CONTOUR TEST VI) by In Vitro route daily      ibuprofen (MOTRIN) 600 mg tablet Take 1 tablet (600 mg total) by mouth every 6 (six) hours as needed for mild pain 30 tablet 0    levothyroxine 75 mcg tablet Take 1 tablet (75 mcg total) by mouth daily 90 tablet 0    metFORMIN (GLUCOPHAGE) 500 mg tablet Take 1 tablet (500 mg total) by mouth daily 90 tablet 0    nystatin (MYCOSTATIN) powder Apply 1 application topically 2 (two) times a day Abdominal fold 15 g 0    omeprazole (PriLOSEC) 40 MG capsule Take 1 capsule (40 mg total) by mouth daily 90 capsule 0    simvastatin (ZOCOR) 20 mg tablet Take 1 tablet (20 mg total) by mouth daily at bedtime 90 tablet 0    diclofenac (VOLTAREN) 75 mg EC tablet Take 1 tablet (75 mg total) by mouth 2 (two) times a day as needed (Muscular skeletal pain) 60 tablet 0    diclofenac sodium (VOLTAREN) 1 % Apply 1 application topically daily as needed        No current facility-administered medications for this visit  Review of Systems   Musculoskeletal: Positive for myalgias  Objective:  Vitals:    07/19/21 1424   BP: 126/84   Pulse: 100   Resp: 18   Temp: 98 1 °F (36 7 °C)   TempSrc: Tympanic   SpO2: 98%   Weight: 103 kg (226 lb)   Height: 5' 2" (1 575 m)     Body mass index is 41 34 kg/m²  Physical Exam  Musculoskeletal:         General: Tenderness present          Arms:           PHQ-9 Depression Screening    PHQ-9:   Frequency of the following problems over the past two weeks:

## 2021-07-20 LAB — BACTERIA UR CULT: NORMAL

## 2021-07-21 ENCOUNTER — APPOINTMENT (OUTPATIENT)
Dept: LAB | Facility: CLINIC | Age: 41
End: 2021-07-21
Payer: COMMERCIAL

## 2021-07-21 ENCOUNTER — TELEPHONE (OUTPATIENT)
Dept: BARIATRICS | Facility: CLINIC | Age: 41
End: 2021-07-21

## 2021-07-21 NOTE — TELEPHONE ENCOUNTER
Spoke to the patient and advised of the below  Pt verbalized understanding  Will reach out to the office with any additional questions

## 2021-07-21 NOTE — TELEPHONE ENCOUNTER
----- Message from Trung Rahman PA-C sent at 7/21/2021  1:52 PM EDT -----  I can order them, but I would advise patient to call insurance to make sure they will be covered  She just had a CMP on 7/18 and a zinc on 4/7  I'll put in the orders but if you can let her know  Thanks    ----- Message -----  From: Fredricka Olszewski  Sent: 7/21/2021   1:38 PM EDT  To: Roslyn Lockhart PA-C    Pt called asking if she would be able to have her zinc, magnesium and alk phos redrawn  Pt states they ' seem off'      Thanks,  Tre Palacio

## 2021-07-23 ENCOUNTER — HOSPITAL ENCOUNTER (OUTPATIENT)
Dept: CT IMAGING | Facility: HOSPITAL | Age: 41
Discharge: HOME | End: 2021-07-23
Payer: COMMERCIAL

## 2021-07-23 ENCOUNTER — HOSPITAL ENCOUNTER (OUTPATIENT)
Dept: NUCLEAR MEDICINE | Facility: HOSPITAL | Age: 41
Discharge: HOME | End: 2021-07-23
Payer: COMMERCIAL

## 2021-07-23 ENCOUNTER — APPOINTMENT (OUTPATIENT)
Dept: CT IMAGING | Facility: HOSPITAL | Age: 41
End: 2021-07-23
Payer: COMMERCIAL

## 2021-07-23 DIAGNOSIS — M89.9 RIB LESION: ICD-10-CM

## 2021-07-23 PROCEDURE — A9503 TC99M MEDRONATE: HCPCS

## 2021-07-23 PROCEDURE — G1004 CDSM NDSC: HCPCS

## 2021-07-23 PROCEDURE — 74177 CT ABD & PELVIS W/CONTRAST: CPT

## 2021-07-23 PROCEDURE — 71260 CT THORAX DX C+: CPT

## 2021-07-23 PROCEDURE — 78306 BONE IMAGING WHOLE BODY: CPT

## 2021-07-23 RX ADMIN — IOHEXOL 100 ML: 350 INJECTION, SOLUTION INTRAVENOUS at 13:29

## 2021-08-04 PROCEDURE — 91010 ESOPHAGUS MOTILITY STUDY: CPT | Performed by: INTERNAL MEDICINE

## 2021-09-21 ENCOUNTER — TELEPHONE (OUTPATIENT)
Dept: BARIATRICS | Facility: CLINIC | Age: 41
End: 2021-09-21

## 2021-09-27 ENCOUNTER — OFFICE VISIT (OUTPATIENT)
Dept: INTERNAL MEDICINE CLINIC | Facility: CLINIC | Age: 41
End: 2021-09-27
Payer: MEDICARE

## 2021-09-27 VITALS
TEMPERATURE: 100.4 F | HEIGHT: 62 IN | WEIGHT: 223.2 LBS | BODY MASS INDEX: 41.07 KG/M2 | SYSTOLIC BLOOD PRESSURE: 112 MMHG | HEART RATE: 114 BPM | RESPIRATION RATE: 14 BRPM | DIASTOLIC BLOOD PRESSURE: 88 MMHG | OXYGEN SATURATION: 98 %

## 2021-09-27 DIAGNOSIS — Z78.0 ASYMPTOMATIC POSTMENOPAUSAL ESTROGEN DEFICIENCY: ICD-10-CM

## 2021-09-27 DIAGNOSIS — E78.5 HYPERLIPIDEMIA, UNSPECIFIED HYPERLIPIDEMIA TYPE: ICD-10-CM

## 2021-09-27 DIAGNOSIS — C50.912 MALIGNANT NEOPLASM OF LEFT FEMALE BREAST, UNSPECIFIED ESTROGEN RECEPTOR STATUS, UNSPECIFIED SITE OF BREAST (HCC): ICD-10-CM

## 2021-09-27 DIAGNOSIS — C79.51 BONE METASTASES (HCC): ICD-10-CM

## 2021-09-27 DIAGNOSIS — E03.9 HYPOTHYROIDISM, UNSPECIFIED TYPE: ICD-10-CM

## 2021-09-27 DIAGNOSIS — E11.9 TYPE 2 DIABETES MELLITUS WITHOUT COMPLICATION, WITHOUT LONG-TERM CURRENT USE OF INSULIN (HCC): Primary | ICD-10-CM

## 2021-09-27 LAB — SL AMB POCT HEMOGLOBIN AIC: 5.5 (ref ?–6.5)

## 2021-09-27 PROCEDURE — 83036 HEMOGLOBIN GLYCOSYLATED A1C: CPT | Performed by: INTERNAL MEDICINE

## 2021-09-27 PROCEDURE — 99214 OFFICE O/P EST MOD 30 MIN: CPT | Performed by: INTERNAL MEDICINE

## 2021-09-27 RX ORDER — SIMVASTATIN 20 MG
20 TABLET ORAL
Qty: 90 TABLET | Refills: 0 | Status: SHIPPED | OUTPATIENT
Start: 2021-09-27 | End: 2022-01-07 | Stop reason: SDUPTHER

## 2021-09-27 RX ORDER — LEVOTHYROXINE SODIUM 0.07 MG/1
75 TABLET ORAL DAILY
Qty: 90 TABLET | Refills: 0 | Status: SHIPPED | OUTPATIENT
Start: 2021-09-27 | End: 2022-01-07 | Stop reason: SDUPTHER

## 2021-09-27 RX ORDER — TRAMADOL HYDROCHLORIDE 50 MG/1
50 TABLET ORAL
COMMUNITY
Start: 2021-07-19

## 2021-09-27 RX ORDER — LAMOTRIGINE 25 MG/1
500 TABLET ORAL ONCE
COMMUNITY

## 2021-09-27 RX ORDER — LOPERAMIDE HYDROCHLORIDE 2 MG/1
2 TABLET ORAL 4 TIMES DAILY PRN
COMMUNITY
Start: 2021-09-22 | End: 2021-10-22

## 2021-09-27 NOTE — PROGRESS NOTES
Assessment/Plan:    Problem List Items Addressed This Visit        Endocrine    Diabetes mellitus ([facility] ) - Primary    Relevant Orders    POCT hemoglobin A1c (Completed)    Hypothyroidism    Relevant Medications    levothyroxine 75 mcg tablet       Musculoskeletal and Integument    Bone metastases (HCC)    Relevant Medications    Abemaciclib 150 MG TABS    fulvestrant (Faslodex) 250 mg/5 mL       Other    Breast cancer (HCC)    Relevant Medications    Abemaciclib 150 MG TABS    fulvestrant (Faslodex) 250 mg/5 mL    Hyperlipidemia    Relevant Medications    simvastatin (ZOCOR) 20 mg tablet      Other Visit Diagnoses     Asymptomatic postmenopausal estrogen deficiency               Diagnoses and all orders for this visit:    Type 2 diabetes mellitus without complication, without long-term current use of insulin (HCC)  -     POCT hemoglobin A1c    Asymptomatic postmenopausal estrogen deficiency    Hypothyroidism, unspecified type  -     levothyroxine 75 mcg tablet; Take 1 tablet (75 mcg total) by mouth daily    Hyperlipidemia, unspecified hyperlipidemia type  -     simvastatin (ZOCOR) 20 mg tablet; Take 1 tablet (20 mg total) by mouth daily at bedtime    Bone metastases (HCC)    Malignant neoplasm of left female breast, unspecified estrogen receptor status, unspecified site of breast ([place] [id number] )    Other orders  -     Cancel: influenza vaccine, quadrivalent, 0 5 mL, preservative-free, for adult and pediatric patients 6 mos+ (AFLURIA, FLUARIX, FLULAVAL, FLUZONE)  -     Cancel: DXA bone density spine hip and pelvis; Future  -     Abemaciclib 150 MG TABS; Take 150 mg by mouth 2 (two) times a day (Patient not taking: Reported on 9/27/2021)  -     loperamide (IMODIUM A-D) 2 MG tablet; Take 2 mg by mouth 4 (four) times a day as needed  -     traMADol (ULTRAM) 50 mg tablet;  Take 50 mg by mouth  -     fulvestrant (Faslodex) 250 mg/5 mL; 500 mg by Intramuscular (multi inj) route once        No problem-specific Assessment & Plan notes found for this encounter  Subjective: The patient if following up with [facility] regarding breast CA  The patient's A1c is noted to be 5 1%  Patient ID: Chris Munoz is a 37 y o  female  The patient is following up with is following up with [facility] for rib metastasis  Her primary dates back to 2013  The patient states that he pain has improved and we will continue to follow along with this  Reviewed bone scan and biopsy results from [facility]  The patient A1c is excellent at 5 1%  The patient states that there are no other issues at this time  Her TSH is good from April of this year  and we will refill Levothyroxine  The patient LDL cholesterol is treated with Simvastatin  The patient notes no other issues at this time       The following portions of the patient's history were reviewed and updated as appropriate:   She has a past medical history of Allergy, Anxiety, Arthritis, Breast cancer ([address] ), Candidiasis, cutaneous, Cervical cancer ([address] ), Depression, Diabetes mellitus ([facility] ), Disease of thyroid gland, Disorder of urinary tract, Fatty liver, Hemorrhoids, History of chemotherapy, History of radiation therapy, Influenza A, Obesity, PONV (postoperative nausea and vomiting), and Port-A-Cath in place  ,  does not have any pertinent problems on file  ,   has a past surgical history that includes Mastectomy (Left, 12/19/2013); Hysterectomy (04/07/2014); Bilateral salpingoophorectomy; GASTRECTOMY SLEEVE LAPAROSCOPIC; IR port removal (11/21/2019); and Breast biopsy  ,  family history includes Breast cancer in her family; Diabetes in her family, father, and mother; Hodgkin's lymphoma in her paternal grandmother; Hypertension in her family and maternal grandfather; Leukemia in her son; Ovarian cancer (age of onset: 29) in her sister; Uterine cancer (age of onset: 64) in her maternal aunt  ,   reports that she quit smoking about 8 years ago  Her smoking use included cigarettes   She has never used smokeless tobacco  She reports that she does not drink alcohol and does not use drugs  ,  is allergic to paclitaxel     Current Outpatient Medications   Medication Sig Dispense Refill    ALPRAZolam (XANAX) 0 25 mg tablet Take 0 25 mg by mouth daily at bedtime as needed       AURELIO MICROLET LANCETS lancets by Does not apply route daily      Blood Glucose Monitoring Suppl ([employer] CONTOUR MONITOR) w/Device KIT by Does not apply route      calcium-vitamin D (OSCAL 500 + D) 500 mg-200 units per tablet Take 1 tablet by mouth daily        cyclobenzaprine (FLEXERIL) 10 mg tablet Take 1 tablet (10 mg total) by mouth 3 (three) times a day as needed for muscle spasms for up to 14 days 42 tablet 0    denosumab (PROLIA) 60 mg/mL Inject 60 mg under the skin once      diclofenac (VOLTAREN) 75 mg EC tablet Take 1 tablet (75 mg total) by mouth 2 (two) times a day as needed (Muscular skeletal pain) 60 tablet 0    diclofenac sodium (VOLTAREN) 1 % Apply 1 application topically daily as needed       ergocalciferol (VITAMIN D2) 50,000 units Take 1 capsule (50,000 Units total) by mouth once a week 12 capsule 0    fulvestrant (Faslodex) 250 mg/5 mL 500 mg by Intramuscular (multi inj) route once      Glucose Blood (AURELIO CONTOUR TEST VI) by In Vitro route daily      ibuprofen (MOTRIN) 600 mg tablet Take 1 tablet (600 mg total) by mouth every 6 (six) hours as needed for mild pain 30 tablet 0    levothyroxine 75 mcg tablet Take 1 tablet (75 mcg total) by mouth daily 90 tablet 0    loperamide (IMODIUM A-D) 2 MG tablet Take 2 mg by mouth 4 (four) times a day as needed      metFORMIN (GLUCOPHAGE) 500 mg tablet Take 1 tablet (500 mg total) by mouth daily 90 tablet 0    nystatin (MYCOSTATIN) powder Apply 1 application topically 2 (two) times a day Abdominal fold 15 g 0    omeprazole (PriLOSEC) 40 MG capsule Take 1 capsule (40 mg total) by mouth daily 90 capsule 0    simvastatin (ZOCOR) 20 mg tablet Take 1 tablet (20 mg total) by mouth daily at bedtime 90 tablet 0    traMADol (ULTRAM) 50 mg tablet Take 50 mg by mouth      Abemaciclib 150 MG TABS Take 150 mg by mouth 2 (two) times a day (Patient not taking: Reported on 9/27/2021)       No current facility-administered medications for this visit  Review of Systems   Constitutional: Negative for chills, fatigue and fever  HENT: Negative  Respiratory: Negative for cough, chest tightness and shortness of breath  Cardiovascular: Negative for chest pain, palpitations and leg swelling  Gastrointestinal: Negative for abdominal pain, constipation, diarrhea, nausea and vomiting  Genitourinary: Negative  Musculoskeletal: Positive for myalgias  Negative for arthralgias and back pain  Skin: Negative  Neurological: Negative  Psychiatric/Behavioral: Negative  Objective:  Vitals:    09/27/21 1502   BP: 112/88   BP Location: Right arm   Patient Position: Sitting   Cuff Size: Large   Pulse: (!) 114   Resp: 14   Temp: 100 4 °F (38 °C)   SpO2: 98%   Weight: 101 kg (223 lb 3 2 oz)   Height: 5' 2" (1 575 m)     Body mass index is 40 82 kg/m²  Physical Exam  Vitals and nursing note reviewed  Constitutional:       Appearance: She is well-developed  HENT:      Head: Normocephalic and atraumatic  Eyes:      Pupils: Pupils are equal, round, and reactive to light  Cardiovascular:      Rate and Rhythm: Normal rate and regular rhythm  Heart sounds: Normal heart sounds  No murmur heard  Pulmonary:      Effort: Pulmonary effort is normal  No respiratory distress  Breath sounds: Normal breath sounds  No wheezing  Abdominal:      General: Bowel sounds are normal       Palpations: Abdomen is soft  Tenderness: There is no abdominal tenderness  Musculoskeletal:         General: Normal range of motion  Cervical back: Normal range of motion and neck supple  Skin:     General: Skin is warm and dry     Neurological:      Mental Status: She is alert and oriented to person, place, and time  PHQ-9 Depression Screening    PHQ-9:   Frequency of the following problems over the past two weeks:      Little interest or pleasure in doing things: 0 - not at all  Feeling down, depressed, or hopeless: 1 - several days  Trouble falling or staying asleep, or sleeping too much: 0 - not at all  Feeling tired or having little energy: 0 - not at all  Poor appetite or overeatin - not at all  Feeling bad about yourself - or that you are a failure or have let yourself or your family down: 0 - not at all  Trouble concentrating on things, such as reading the newspaper or watching television: 0 - not at all  Moving or speaking so slowly that other people could have noticed   Or the opposite - being so fidgety or restless that you have been moving around a lot more than usual: 0 - not at all  Thoughts that you would be better off dead, or of hurting yourself in some way: 0 - not at all  PHQ-2 Score: 1  PHQ-9 Score: 1

## 2021-09-27 NOTE — PROGRESS NOTES
Diabetic Foot Exam    Patient's shoes and socks removed  Right Foot/Ankle   Right Foot Inspection  Skin Exam: skin normal and skin intact no dry skin, no warmth, no callus, no erythema, no maceration, no abnormal color, no pre-ulcer, no ulcer and no callus                          Toe Exam:  no right toe deformity  Sensory       Monofilament testing: intact  Vascular  Capillary refills: < 3 seconds  The right DP pulse is 2+  The right PT pulse is 2+  Left Foot/Ankle  Left Foot Inspection  Skin Exam: skin normal and skin intactno dry skin, no warmth, no erythema, no maceration, normal color, no pre-ulcer, no ulcer and no callus                         Toe Exam: no left toe deformity                   Sensory       Monofilament: intact  Vascular  Capillary refills: < 3 seconds  The left DP pulse is 2+  The left PT pulse is 2+  Assign Risk Category:  No deformity present; No loss of protective sensation;  No weak pulses       Risk: 0

## 2021-09-28 ENCOUNTER — TELEPHONE (OUTPATIENT)
Dept: ADMINISTRATIVE | Facility: OTHER | Age: 41
End: 2021-09-28

## 2021-09-28 NOTE — TELEPHONE ENCOUNTER
----- Message from Joey Coma sent at 9/27/2021  3:00 PM EDT -----  09/27/21 3:01 PM    Hello, our patient Christiane Joiner has had DEXA Scan completed/performed  Please assist in updating the patient chart by pulling the Care Everywhere (CE) document  The date of service is 9/13/2021       Thank you,  Reta Saenz PG [facility]

## 2021-09-28 NOTE — TELEPHONE ENCOUNTER
Upon review of the In Basket request we were able to locate, review, and update the patient chart as requested for DEXA Scan  Any additional questions or concerns should be emailed to the Practice Liaisons via [email or web address] email, please do not reply via In Basket      Thank you  Zuri Romero

## 2021-09-29 ENCOUNTER — HOSPITAL ENCOUNTER (OUTPATIENT)
Dept: CT IMAGING | Facility: HOSPITAL | Age: 41
Discharge: HOME | End: 2021-09-29
Payer: MEDICARE

## 2021-09-29 DIAGNOSIS — Z17.0 ESTROGEN RECEPTOR POSITIVE: ICD-10-CM

## 2021-09-29 PROCEDURE — 71260 CT THORAX DX C+: CPT

## 2021-09-29 RX ADMIN — IOHEXOL 85 ML: 350 INJECTION, SOLUTION INTRAVENOUS at 13:20

## 2021-10-14 ENCOUNTER — HOSPITAL ENCOUNTER (OUTPATIENT)
Dept: NON INVASIVE DIAGNOSTICS | Facility: HOSPITAL | Age: 41
Discharge: HOME | End: 2021-10-14
Payer: MEDICARE

## 2021-10-14 VITALS
HEIGHT: 62 IN | WEIGHT: 223 LBS | SYSTOLIC BLOOD PRESSURE: 112 MMHG | DIASTOLIC BLOOD PRESSURE: 88 MMHG | BODY MASS INDEX: 41.04 KG/M2

## 2021-10-14 DIAGNOSIS — Z17.0 ESTROGEN RECEPTOR POSITIVE: ICD-10-CM

## 2021-10-14 LAB
AORTIC ROOT: 3.2 CM
AORTIC VALVE MEAN VELOCITY: 8.1 M/S
APICAL FOUR CHAMBER EJECTION FRACTION: 58 %
ASCENDING AORTA: 3.5 CM
AV LVOT MEAN GRADIENT: 2 MMHG
AV LVOT PEAK GRADIENT: 3 MMHG
AV MEAN GRADIENT: 3 MMHG
AV PEAK GRADIENT: 6 MMHG
DOP CALC AO VTI: 25.7 CM
DOP CALC LVOT PEAK VEL VTI: 20.24 CM
DOP CALC LVOT PEAK VEL: 0.87 M/S
E WAVE DECELERATION TIME: 181 MS
FRACTIONAL SHORTENING: 29 % (ref 28–44)
GLOBAL LONGITUIDAL STRAIN: -16 %
INTERVENTRICULAR SEPTUM IN DIASTOLE (PARASTERNAL SHORT AXIS VIEW): 1.1 CM
LAAS-AP4: 19 CM2
LEFT INTERNAL DIMENSION IN SYSTOLE: 3.7 CM (ref 2.1–4)
LEFT VENTRICULAR INTERNAL DIMENSION IN DIASTOLE: 5.2 CM (ref 5.8–8.64)
LEFT VENTRICULAR POSTERIOR WALL IN END DIASTOLE: 0.9 CM
LEFT VENTRICULAR STROKE VOLUME: 73 ML
LV EF: 49 %
MV E'TISSUE VEL-SEP: 8 CM/S
MV PEAK A VEL: 0.63 M/S
MV PEAK E VEL: 78 CM/S
MV STENOSIS PRESSURE HALF TIME: 0 MS
RIGHT VENTRICLE ID DIMENSION: 3.7 CM
SL CV LV EF: 55
SL CV PED ECHO LEFT VENTRICLE DIASTOLIC VOLUME (MOD BIPLANE) 2D: 130 ML
SL CV PED ECHO LEFT VENTRICLE SYSTOLIC VOLUME (MOD BIPLANE) 2D: 57 ML
TRICUSPID VALVE S': 49 CM/S
Z-SCORE OF LEFT VENTRICULAR DIMENSION IN END SYSTOLE: -3.08

## 2021-10-14 PROCEDURE — 93356 MYOCRD STRAIN IMG SPCKL TRCK: CPT | Performed by: INTERNAL MEDICINE

## 2021-10-14 PROCEDURE — 93306 TTE W/DOPPLER COMPLETE: CPT | Performed by: INTERNAL MEDICINE

## 2021-10-14 PROCEDURE — 93356 MYOCRD STRAIN IMG SPCKL TRCK: CPT

## 2021-10-14 PROCEDURE — 93306 TTE W/DOPPLER COMPLETE: CPT

## 2021-10-18 DIAGNOSIS — R21 RASH: ICD-10-CM

## 2021-10-19 RX ORDER — NYSTATIN 100000 [USP'U]/G
1 POWDER TOPICAL 2 TIMES DAILY
Qty: 15 G | Refills: 0 | Status: SHIPPED | OUTPATIENT
Start: 2021-10-19 | End: 2022-01-07 | Stop reason: SDUPTHER

## 2021-10-27 ENCOUNTER — OFFICE VISIT (OUTPATIENT)
Dept: URGENT CARE | Facility: CLINIC | Age: 41
End: 2021-10-27
Payer: MEDICARE

## 2021-10-27 VITALS
HEART RATE: 81 BPM | HEIGHT: 62 IN | OXYGEN SATURATION: 98 % | RESPIRATION RATE: 18 BRPM | TEMPERATURE: 97.8 F | WEIGHT: 223 LBS | BODY MASS INDEX: 41.04 KG/M2

## 2021-10-27 DIAGNOSIS — L03.012 PARONYCHIA OF FINGER OF LEFT HAND: Primary | ICD-10-CM

## 2021-10-27 PROCEDURE — G0463 HOSPITAL OUTPT CLINIC VISIT: HCPCS | Performed by: PHYSICIAN ASSISTANT

## 2021-10-27 PROCEDURE — 99214 OFFICE O/P EST MOD 30 MIN: CPT | Performed by: PHYSICIAN ASSISTANT

## 2021-10-27 RX ORDER — CEPHALEXIN 500 MG/1
500 CAPSULE ORAL EVERY 6 HOURS SCHEDULED
Qty: 28 CAPSULE | Refills: 0 | Status: SHIPPED | OUTPATIENT
Start: 2021-10-27 | End: 2021-11-03

## 2021-10-28 ENCOUNTER — HOSPITAL ENCOUNTER (OUTPATIENT)
Dept: ULTRASOUND IMAGING | Facility: HOSPITAL | Age: 41
Discharge: HOME | End: 2021-10-28
Payer: MEDICARE

## 2021-10-28 DIAGNOSIS — Z17.0 ESTROGEN RECEPTOR POSITIVE: ICD-10-CM

## 2021-10-28 DIAGNOSIS — C79.51 SECONDARY MALIGNANT NEOPLASM OF BONE (HCC): ICD-10-CM

## 2021-10-28 DIAGNOSIS — C50.912 MALIGNANT NEOPLASM OF UNSPECIFIED SITE OF LEFT FEMALE BREAST (HCC): ICD-10-CM

## 2021-10-28 DIAGNOSIS — Z17.0 ESTROGEN RECEPTOR POSITIVE STATUS (ER+): ICD-10-CM

## 2021-10-28 PROCEDURE — 76700 US EXAM ABDOM COMPLETE: CPT

## 2021-11-30 ENCOUNTER — OFFICE VISIT (OUTPATIENT)
Dept: INTERNAL MEDICINE CLINIC | Facility: CLINIC | Age: 41
End: 2021-11-30
Payer: MEDICARE

## 2021-11-30 VITALS
OXYGEN SATURATION: 98 % | DIASTOLIC BLOOD PRESSURE: 86 MMHG | TEMPERATURE: 100.2 F | HEIGHT: 62 IN | SYSTOLIC BLOOD PRESSURE: 122 MMHG | HEART RATE: 88 BPM | RESPIRATION RATE: 12 BRPM | WEIGHT: 227.6 LBS | BODY MASS INDEX: 41.88 KG/M2

## 2021-11-30 DIAGNOSIS — C50.912 MALIGNANT NEOPLASM OF LEFT BREAST IN FEMALE, ESTROGEN RECEPTOR POSITIVE, UNSPECIFIED SITE OF BREAST (HCC): ICD-10-CM

## 2021-11-30 DIAGNOSIS — Z23 NEED FOR TUBERCULOSIS VACCINATION: Primary | ICD-10-CM

## 2021-11-30 DIAGNOSIS — Z23 NEED FOR PROPHYLACTIC VACCINATION AGAINST STREPTOCOCCUS PNEUMONIAE (PNEUMOCOCCUS): ICD-10-CM

## 2021-11-30 DIAGNOSIS — E11.9 TYPE 2 DIABETES MELLITUS WITHOUT COMPLICATION, WITHOUT LONG-TERM CURRENT USE OF INSULIN (HCC): ICD-10-CM

## 2021-11-30 DIAGNOSIS — Z17.0 MALIGNANT NEOPLASM OF LEFT BREAST IN FEMALE, ESTROGEN RECEPTOR POSITIVE, UNSPECIFIED SITE OF BREAST (HCC): ICD-10-CM

## 2021-11-30 PROCEDURE — 86580 TB INTRADERMAL TEST: CPT | Performed by: INTERNAL MEDICINE

## 2021-11-30 PROCEDURE — G0009 ADMIN PNEUMOCOCCAL VACCINE: HCPCS | Performed by: INTERNAL MEDICINE

## 2021-11-30 PROCEDURE — 90732 PPSV23 VACC 2 YRS+ SUBQ/IM: CPT | Performed by: INTERNAL MEDICINE

## 2021-11-30 PROCEDURE — 99214 OFFICE O/P EST MOD 30 MIN: CPT | Performed by: INTERNAL MEDICINE

## 2021-12-08 ENCOUNTER — APPOINTMENT (OUTPATIENT)
Dept: RADIOLOGY | Facility: CLINIC | Age: 41
End: 2021-12-08
Payer: MEDICARE

## 2021-12-08 DIAGNOSIS — Z17.0 MALIGNANT NEOPLASM OF LEFT BREAST IN FEMALE, ESTROGEN RECEPTOR POSITIVE, UNSPECIFIED SITE OF BREAST (HCC): ICD-10-CM

## 2021-12-08 DIAGNOSIS — C79.51 BONE METASTASES (HCC): ICD-10-CM

## 2021-12-08 DIAGNOSIS — C50.912 MALIGNANT NEOPLASM OF LEFT BREAST IN FEMALE, ESTROGEN RECEPTOR POSITIVE, UNSPECIFIED SITE OF BREAST (HCC): ICD-10-CM

## 2021-12-08 DIAGNOSIS — R07.81 RIB PAIN: ICD-10-CM

## 2021-12-08 PROCEDURE — 71111 X-RAY EXAM RIBS/CHEST4/> VWS: CPT

## 2021-12-14 ENCOUNTER — CLINICAL SUPPORT (OUTPATIENT)
Dept: INTERNAL MEDICINE CLINIC | Facility: CLINIC | Age: 41
End: 2021-12-14
Payer: MEDICARE

## 2021-12-14 DIAGNOSIS — Z23 NEED FOR TUBERCULOSIS VACCINATION: Primary | ICD-10-CM

## 2021-12-14 PROCEDURE — 86580 TB INTRADERMAL TEST: CPT

## 2022-01-05 ENCOUNTER — HOSPITAL ENCOUNTER (OUTPATIENT)
Dept: NUCLEAR MEDICINE | Facility: HOSPITAL | Age: 42
Discharge: HOME | End: 2022-01-05
Payer: COMMERCIAL

## 2022-01-05 ENCOUNTER — HOSPITAL ENCOUNTER (OUTPATIENT)
Dept: CT IMAGING | Facility: HOSPITAL | Age: 42
Discharge: HOME | End: 2022-01-05
Payer: COMMERCIAL

## 2022-01-05 DIAGNOSIS — C50.912 MALIGNANT NEOPLASM OF LEFT FEMALE BREAST, UNSPECIFIED ESTROGEN RECEPTOR STATUS, UNSPECIFIED SITE OF BREAST (HCC): ICD-10-CM

## 2022-01-05 DIAGNOSIS — D73.4 SPLENIC CYST: ICD-10-CM

## 2022-01-05 DIAGNOSIS — C79.51 BONE METASTASES (HCC): ICD-10-CM

## 2022-01-05 DIAGNOSIS — C17.0 CANCER OF DUODENUM (HCC): ICD-10-CM

## 2022-01-05 PROCEDURE — G1004 CDSM NDSC: HCPCS

## 2022-01-05 PROCEDURE — 74177 CT ABD & PELVIS W/CONTRAST: CPT

## 2022-01-05 PROCEDURE — 78306 BONE IMAGING WHOLE BODY: CPT

## 2022-01-05 PROCEDURE — A9503 TC99M MEDRONATE: HCPCS

## 2022-01-05 PROCEDURE — 71260 CT THORAX DX C+: CPT

## 2022-01-05 RX ADMIN — IOHEXOL 100 ML: 350 INJECTION, SOLUTION INTRAVENOUS at 11:10

## 2022-01-07 ENCOUNTER — OFFICE VISIT (OUTPATIENT)
Dept: URGENT CARE | Facility: CLINIC | Age: 42
End: 2022-01-07
Payer: COMMERCIAL

## 2022-01-07 VITALS
DIASTOLIC BLOOD PRESSURE: 66 MMHG | WEIGHT: 225 LBS | HEIGHT: 62 IN | TEMPERATURE: 97.6 F | BODY MASS INDEX: 41.41 KG/M2 | SYSTOLIC BLOOD PRESSURE: 131 MMHG | HEART RATE: 94 BPM | RESPIRATION RATE: 18 BRPM | OXYGEN SATURATION: 97 %

## 2022-01-07 DIAGNOSIS — R07.9 CHEST PAIN, UNSPECIFIED TYPE: ICD-10-CM

## 2022-01-07 DIAGNOSIS — E03.9 HYPOTHYROIDISM, UNSPECIFIED TYPE: ICD-10-CM

## 2022-01-07 DIAGNOSIS — R05.9 COUGH: Primary | ICD-10-CM

## 2022-01-07 DIAGNOSIS — K21.00 GASTROESOPHAGEAL REFLUX DISEASE WITH ESOPHAGITIS WITHOUT HEMORRHAGE: ICD-10-CM

## 2022-01-07 DIAGNOSIS — E55.9 VITAMIN D DEFICIENCY: ICD-10-CM

## 2022-01-07 DIAGNOSIS — R21 RASH: ICD-10-CM

## 2022-01-07 DIAGNOSIS — E78.5 HYPERLIPIDEMIA, UNSPECIFIED HYPERLIPIDEMIA TYPE: ICD-10-CM

## 2022-01-07 PROCEDURE — 93005 ELECTROCARDIOGRAM TRACING: CPT

## 2022-01-07 PROCEDURE — S9088 SERVICES PROVIDED IN URGENT: HCPCS

## 2022-01-07 PROCEDURE — U0003 INFECTIOUS AGENT DETECTION BY NUCLEIC ACID (DNA OR RNA); SEVERE ACUTE RESPIRATORY SYNDROME CORONAVIRUS 2 (SARS-COV-2) (CORONAVIRUS DISEASE [COVID-19]), AMPLIFIED PROBE TECHNIQUE, MAKING USE OF HIGH THROUGHPUT TECHNOLOGIES AS DESCRIBED BY CMS-2020-01-R: HCPCS

## 2022-01-07 PROCEDURE — 99213 OFFICE O/P EST LOW 20 MIN: CPT

## 2022-01-07 NOTE — PROGRESS NOTES
[facility]        NAME: Lars Moss is a 37 y o  female  : 1980    MRN: [id number]  DATE: 2022  TIME: 4:38 PM    Assessment and Plan   Cough [R05 9]  1  Cough  COVID Only -Office Collect   2  Chest pain, unspecified type  ECG 12 lead     Normal sinus rhythm noted on EKG  Patient left prior to discussing the results of the EKG  Did call patient to return call to review results of EKG  However with normal findings on EKG the plan would not be changed  Patient Instructions     Please call your PCP in get your omeprazole refilled  Please take your omeprazole as prescribed  Please follow with her oncologist on Monday as scheduled  Follow up with PCP in 3-5 days  Proceed to  ER if symptoms worsen  Chief Complaint     Chief Complaint   Patient presents with    Cough     doctor wants her seen         History of Present Illness       Patient is a 26-year-old female with a history of breast cancer currently undergoing treatment who presents to the office today for evaluation after speaking with her oncologist in regards to her recent CT scan  She noted a nodule on her CT and called her oncologist's asking him if some of the side effects of the medication could be the increased phlegm, and occasional cough  He suggests that she come to the emergency room to be evaluated for possible pneumonia  He advised patient to continue taking her chemotherapy at this point  Patient has an appointment for follow-up on Monday  She states that she did not want to go to the emergency room as she already had all of her blood work on her testing done and she did not want always 6 hours of her time waiting in the ER just to be sent home  She states that she has had these symptoms since about new year's  She is vaccinated for COVID as well as booster  She states she also got her pneumonia shot by her family doctor in November  She denies any recent travel or exposure to anyone sick    Of note she said over the past month she has had intermittent chest pain which is worse when she lays down but is relieved when she lays on her side  She states she is supposed to be taking her omeprazole but she has not been taking it for about a month and she is going to call her family doctor for a refill  She states that she has had bad acid reflux and she had her gastric sleeve done  Currently she is chest pain-free and offers no complaints  Review of Systems   Review of Systems   Constitutional: Negative for chills, fatigue and fever  HENT: Negative for congestion, postnasal drip, sinus pressure, sinus pain, sneezing and sore throat  Respiratory: Positive for cough  Negative for shortness of breath  Cardiovascular: Positive for chest pain  Negative for palpitations  Gastrointestinal: Negative for diarrhea, nausea and vomiting  Skin: Negative for rash  Neurological: Negative for dizziness and headaches  All other systems reviewed and are negative  Current Medications       Current Outpatient Medications:     Abemaciclib (Verzenio) 150 MG TABS, Take 150 mg by mouth 2 (two) times a day, Disp: 60 tablet, Rfl: 5    Abemaciclib 150 MG TABS, Take 150 mg by mouth 2 (two) times a day (Patient not taking: Reported on 9/27/2021), Disp: , Rfl:     ALPRAZolam (XANAX) 0 25 mg tablet, Take 0 25 mg by mouth daily at bedtime as needed , Disp: , Rfl:     AURELIO MICROLET LANCETS lancets, by Does not apply route daily, Disp: , Rfl:     Blood Glucose Monitoring Suppl ([place] CONTOUR MONITOR) w/Device KIT, by Does not apply route, Disp: , Rfl:     calcium-vitamin D (OSCAL 500 + D) 500 mg-200 units per tablet, Take 1 tablet by mouth daily  , Disp: , Rfl:     cyclobenzaprine (FLEXERIL) 10 mg tablet, Take 1 tablet (10 mg total) by mouth 3 (three) times a day as needed for muscle spasms for up to 14 days, Disp: 42 tablet, Rfl: 0    denosumab (PROLIA) 60 mg/mL, Inject 60 mg under the skin once, Disp: , Rfl:     diclofenac (VOLTAREN) 75 mg EC tablet, Take 1 tablet (75 mg total) by mouth 2 (two) times a day as needed (Muscular skeletal pain), Disp: 60 tablet, Rfl: 0    diclofenac sodium (VOLTAREN) 1 %, Apply 1 application topically daily as needed , Disp: , Rfl:     ergocalciferol (VITAMIN D2) 50,000 units, Take 1 capsule (50,000 Units total) by mouth once a week, Disp: 12 capsule, Rfl: 0    fulvestrant (Faslodex) 250 mg/5 mL, 500 mg by Intramuscular (multi inj) route once, Disp: , Rfl:     Glucose Blood (AURELIO CONTOUR TEST VI), by In Vitro route daily, Disp: , Rfl:     ibuprofen (MOTRIN) 600 mg tablet, Take 1 tablet (600 mg total) by mouth every 6 (six) hours as needed for mild pain, Disp: 30 tablet, Rfl: 0    levothyroxine 75 mcg tablet, Take 1 tablet (75 mcg total) by mouth daily, Disp: 90 tablet, Rfl: 0    metFORMIN (GLUCOPHAGE) 500 mg tablet, Take 1 tablet (500 mg total) by mouth daily, Disp: 90 tablet, Rfl: 0    nystatin (MYCOSTATIN) powder, Apply 1 application topically 2 (two) times a day Abdominal fold, Disp: 15 g, Rfl: 0    omeprazole (PriLOSEC) 40 MG capsule, Take 1 capsule (40 mg total) by mouth daily, Disp: 90 capsule, Rfl: 0    simvastatin (ZOCOR) 20 mg tablet, Take 1 tablet (20 mg total) by mouth daily at bedtime, Disp: 90 tablet, Rfl: 0    traMADol (ULTRAM) 50 mg tablet, Take 50 mg by mouth, Disp: , Rfl:     Current Allergies     Allergies as of 01/07/2022 - Reviewed 01/07/2022   Allergen Reaction Noted    Paclitaxel Anaphylaxis 02/26/2016            The following portions of the patient's history were reviewed and updated as appropriate: allergies, current medications, past family history, past medical history, past social history, past surgical history and problem list      Past Medical History:   Diagnosis Date    Allergy     Anxiety     Arthritis     [facility] [address] [facility]     Candidiasis, cutaneous     Cervical [facility] [address] [facility]     Depression     Diabetes mellitus [facility] [facility]     Disease of thyroid gland     hypothyroid    Disorder of urinary tract     Fatty liver     Hemorrhoids     History of chemotherapy     History of radiation therapy     Influenza A     Obesity     PONV (postoperative nausea and vomiting)     Port-A-Cath in place        Past Surgical History:   Procedure Laterality Date    BILATERAL SALPINGOOPHORECTOMY      BREAST BIOPSY      needle core    GASTRECTOMY SLEEVE LAPAROSCOPIC      Onset: 1/26/15  Dr Amado Gonzalez  04/07/2014    IR PORT REMOVAL  11/21/2019    MASTECTOMY Left 12/19/2013    Lymph nodes removed       Family History   Problem Relation Age of Onset    Diabetes Mother     Diabetes Father     Ovarian cancer Sister 29    Hypertension Maternal Grandfather     Hodgkin's lymphoma Paternal Grandmother     Breast cancer Family     Diabetes Family     Hypertension Family     Uterine cancer Maternal Aunt 64    Leukemia Son         chronic myeloid         Medications have been verified  Objective   /66   Pulse 94   Temp 97 6 °F (36 4 °C)   Resp 18   Ht 5' 2" (1 575 m)   Wt 102 kg (225 lb)   SpO2 97%   BMI 41 15 kg/m²        Physical Exam     Physical Exam  Vitals and nursing note reviewed  Constitutional:       General: She is not in acute distress  Appearance: Normal appearance  She is normal weight  She is not ill-appearing or toxic-appearing  HENT:      Nose: Nose normal       Mouth/Throat:      Mouth: Mucous membranes are moist    Cardiovascular:      Rate and Rhythm: Normal rate and regular rhythm  Pulses: Normal pulses  Heart sounds: Normal heart sounds  No murmur heard  Pulmonary:      Effort: Pulmonary effort is normal       Breath sounds: Normal breath sounds  No wheezing, rhonchi or rales  Musculoskeletal:      Cervical back: No tenderness  Lymphadenopathy:      Cervical: No cervical adenopathy  Skin:     General: Skin is warm and dry        Capillary Refill: Capillary refill takes less than 2 seconds  Neurological:      Mental Status: She is alert

## 2022-01-07 NOTE — PATIENT INSTRUCTIONS
GERD (Gastroesophageal Reflux Disease)   AMBULATORY CARE:   Gastroesophageal reflux disease (GERD)  is reflux that occurs more than twice a week for a few weeks  Reflux means acid and food in the stomach back up into the esophagus  It usually causes heartburn and other symptoms  GERD can cause other health problems over time if it is not treated  Signs and symptoms:   · Heartburn (burning pain in your chest)    · Pain after meals that spreads to your neck, jaw, or shoulder    · Pain that gets better when you change positions    · Bitter or acid taste in your mouth    · A dry cough    · Trouble swallowing or pain with swallowing    · Hoarseness or a sore throat    · Burping or hiccups    · Feeling full soon after you start eating    Call your local emergency number (911 in the [address] [facility]) if:   · You have severe chest pain and sudden trouble breathing  Seek care immediately if:   · You have trouble breathing after you vomit  · You have trouble swallowing, or pain with swallowing  · Your bowel movements are black, bloody, or tarry-looking  · Your vomit looks like coffee grounds or has blood in it  Call your doctor or gastroenterologist if:   · You feel full and cannot burp or vomit  · You vomit large amounts, or you vomit often  · You are losing weight without trying  · Your symptoms get worse or do not improve with treatment  · You have questions or concerns about your condition or care  Treatment for GERD:   · Medicines  are used to decrease stomach acid  Medicine may also be used to help your lower esophageal sphincter and stomach contract (tighten) more  · Surgery  is done to wrap the upper part of the stomach around the esophageal sphincter  This will strengthen the sphincter and prevent reflux  Manage GERD:       · Do not have foods or drinks that may increase heartburn    These include chocolate, peppermint, fried or fatty foods, drinks that contain caffeine, or carbonated drinks (soda)  Other foods include spicy foods, onions, tomatoes, and tomato-based foods  Do not have foods or drinks that can irritate your esophagus, such as citrus fruits, juices, and alcohol  · Do not eat large meals  When you eat a lot of food at one time, your stomach needs more acid to digest it  Eat 6 small meals each day instead of 3 large meals, and eat slowly  Do not eat meals 2 to 3 hours before bedtime  · Elevate the head of your bed  Place 6-inch blocks under the head of your bed frame  You may also use more than one pillow under your head and shoulders while you sleep  · Maintain a healthy weight  If you are overweight, weight loss may help relieve symptoms of GERD  · Do not smoke  Smoking weakens the lower esophageal sphincter and increases the risk of GERD  Ask your healthcare provider for information if you currently smoke and need help to quit  E-cigarettes or smokeless tobacco still contain nicotine  Talk to your healthcare provider before you use these products  · Do not wear clothing that is tight around your waist   Tight clothing can put pressure on your stomach and cause or worsen GERD symptoms  Follow up with your doctor or gastroenterologist as directed:  Write down your questions so you remember to ask them during your visits  © Copyright [employer] 2021 Information is for End User's use only and may not be sold, redistributed or otherwise used for commercial purposes  All illustrations and images included in CareNotes® are the copyrighted property of A D A M [employer] Inc  or Uriah Aviles  The above information is an  only  It is not intended as medical advice for individual conditions or treatments  Talk to your doctor, nurse or pharmacist before following any medical regimen to see if it is safe and effective for you

## 2022-01-10 LAB
ATRIAL RATE: 76 BPM
P AXIS: 50 DEGREES
PR INTERVAL: 146 MS
QRS AXIS: 27 DEGREES
QRSD INTERVAL: 92 MS
QT INTERVAL: 398 MS
QTC INTERVAL: 447 MS
SARS-COV-2 RNA RESP QL NAA+PROBE: NEGATIVE
T WAVE AXIS: 35 DEGREES
VENTRICULAR RATE: 76 BPM

## 2022-01-10 PROCEDURE — 93010 ELECTROCARDIOGRAM REPORT: CPT | Performed by: INTERNAL MEDICINE

## 2022-01-10 RX ORDER — LEVOTHYROXINE SODIUM 0.07 MG/1
75 TABLET ORAL DAILY
Qty: 90 TABLET | Refills: 0 | Status: SHIPPED | OUTPATIENT
Start: 2022-01-10 | End: 2022-04-16 | Stop reason: SDUPTHER

## 2022-01-10 RX ORDER — OMEPRAZOLE 40 MG/1
40 CAPSULE, DELAYED RELEASE ORAL DAILY
Qty: 90 CAPSULE | Refills: 0 | Status: SHIPPED | OUTPATIENT
Start: 2022-01-10 | End: 2022-04-16 | Stop reason: SDUPTHER

## 2022-01-10 RX ORDER — ERGOCALCIFEROL 1.25 MG/1
50000 CAPSULE ORAL WEEKLY
Qty: 12 CAPSULE | Refills: 0 | Status: SHIPPED | OUTPATIENT
Start: 2022-01-10 | End: 2022-04-16 | Stop reason: SDUPTHER

## 2022-01-10 RX ORDER — SIMVASTATIN 20 MG
20 TABLET ORAL
Qty: 90 TABLET | Refills: 0 | Status: SHIPPED | OUTPATIENT
Start: 2022-01-10 | End: 2022-04-16 | Stop reason: SDUPTHER

## 2022-01-10 RX ORDER — NYSTATIN 100000 [USP'U]/G
1 POWDER TOPICAL 2 TIMES DAILY
Qty: 15 G | Refills: 0 | Status: SHIPPED | OUTPATIENT
Start: 2022-01-10 | End: 2022-04-16 | Stop reason: SDUPTHER

## 2022-02-01 ENCOUNTER — APPOINTMENT (OUTPATIENT)
Dept: RADIOLOGY | Facility: CLINIC | Age: 42
End: 2022-02-01
Payer: COMMERCIAL

## 2022-02-01 ENCOUNTER — OFFICE VISIT (OUTPATIENT)
Dept: URGENT CARE | Facility: CLINIC | Age: 42
End: 2022-02-01
Payer: COMMERCIAL

## 2022-02-01 VITALS
TEMPERATURE: 97.6 F | WEIGHT: 220 LBS | BODY MASS INDEX: 40.48 KG/M2 | OXYGEN SATURATION: 99 % | HEART RATE: 107 BPM | HEIGHT: 62 IN | RESPIRATION RATE: 18 BRPM

## 2022-02-01 DIAGNOSIS — S89.91XA RIGHT LEG INJURY, INITIAL ENCOUNTER: ICD-10-CM

## 2022-02-01 DIAGNOSIS — S89.91XA RIGHT LEG INJURY, INITIAL ENCOUNTER: Primary | ICD-10-CM

## 2022-02-01 PROCEDURE — 73552 X-RAY EXAM OF FEMUR 2/>: CPT

## 2022-02-01 PROCEDURE — S9088 SERVICES PROVIDED IN URGENT: HCPCS | Performed by: PHYSICIAN ASSISTANT

## 2022-02-01 PROCEDURE — 99213 OFFICE O/P EST LOW 20 MIN: CPT | Performed by: PHYSICIAN ASSISTANT

## 2022-02-01 PROCEDURE — 73564 X-RAY EXAM KNEE 4 OR MORE: CPT

## 2022-02-01 NOTE — PROGRESS NOTES
[facility]    NAME: Elvin Al is a 40 y o  female  : 1980    MRN: [id number]  DATE: 2022  TIME: 1:14 PM    Assessment and Plan   Right leg injury, initial encounter [S89 91XA]  1  Right leg injury, initial encounter  XR femur 2 vw right    XR knee 4+ vw right injury       Patient Instructions     Patient Instructions   Xray appears negative for any fracture  Will follow up with radiologist report when available  Recommend elevating body part, icing the area every 2 hours for 20-30 minutes, take Ibuprofen every 6-8 hours to reduce inflammation  If not improving over the next week, follow up with PCP or orthopedics  Chief Complaint     Chief Complaint   Patient presents with    Fall     right thigh fell on ice yesterday        History of Present Illness   40year old female here with complaint of right thigh and knee pain, slipped and fell on ice yesterday  Has history of osteoporosis  Pain when lifting leg up onto bed  No pain in hip  No bruising or redness  Review of Systems   Review of Systems   Constitutional: Negative for chills, fatigue and fever  Musculoskeletal:        Right knee and thigh pain  See HPI   Neurological: Positive for weakness  Negative for numbness  Current Medications     Current Outpatient Medications:     Abemaciclib (Verzenio) 150 MG TABS, Take 150 mg by mouth 2 (two) times a day, Disp: 60 tablet, Rfl: 5    Abemaciclib 150 MG TABS, Take 150 mg by mouth 2 (two) times a day (Patient not taking: Reported on 2021), Disp: , Rfl:     ALPRAZolam (XANAX) 0 25 mg tablet, Take 0 25 mg by mouth daily at bedtime as needed , Disp: , Rfl:     AURELIO MICROLET LANCETS lancets, by Does not apply route daily, Disp: , Rfl:     Blood Glucose Monitoring Suppl (AURELIO CONTOUR MONITOR) w/Device KIT, by Does not apply route, Disp: , Rfl:     calcium-vitamin D (OSCAL 500 + D) 500 mg-200 units per tablet, Take 1 tablet by mouth daily  , Disp: , Rfl:     cyclobenzaprine (FLEXERIL) 10 mg tablet, Take 1 tablet (10 mg total) by mouth 3 (three) times a day as needed for muscle spasms for up to 14 days, Disp: 42 tablet, Rfl: 0    denosumab (PROLIA) 60 mg/mL, Inject 60 mg under the skin once, Disp: , Rfl:     diclofenac (VOLTAREN) 75 mg EC tablet, Take 1 tablet (75 mg total) by mouth 2 (two) times a day as needed (Muscular skeletal pain), Disp: 60 tablet, Rfl: 0    diclofenac sodium (VOLTAREN) 1 %, Apply 1 application topically daily as needed , Disp: , Rfl:     ergocalciferol (VITAMIN D2) 50,000 units, Take 1 capsule (50,000 Units total) by mouth once a week, Disp: 12 capsule, Rfl: 0    fulvestrant (Faslodex) 250 mg/5 mL, 500 mg by Intramuscular (multi inj) route once, Disp: , Rfl:     Glucose Blood (AURELIO CONTOUR TEST VI), by In Vitro route daily, Disp: , Rfl:     ibuprofen (MOTRIN) 600 mg tablet, Take 1 tablet (600 mg total) by mouth every 6 (six) hours as needed for mild pain, Disp: 30 tablet, Rfl: 0    levothyroxine 75 mcg tablet, Take 1 tablet (75 mcg total) by mouth daily, Disp: 90 tablet, Rfl: 0    metFORMIN (GLUCOPHAGE) 500 mg tablet, Take 1 tablet (500 mg total) by mouth daily, Disp: 90 tablet, Rfl: 0    nystatin (MYCOSTATIN) powder, Apply 1 application topically 2 (two) times a day Abdominal fold, Disp: 15 g, Rfl: 0    omeprazole (PriLOSEC) 40 MG capsule, Take 1 capsule (40 mg total) by mouth daily, Disp: 90 capsule, Rfl: 0    simvastatin (ZOCOR) 20 mg tablet, Take 1 tablet (20 mg total) by mouth daily at bedtime, Disp: 90 tablet, Rfl: 0    traMADol (ULTRAM) 50 mg tablet, Take 50 mg by mouth, Disp: , Rfl:     Current Allergies     Allergies as of 02/01/2022 - Reviewed 02/01/2022   Allergen Reaction Noted    Paclitaxel Anaphylaxis 02/26/2016          The following portions of the patient's history were reviewed and updated as appropriate: allergies, current medications, past family history, past medical history, past social history, past surgical history and problem list    Past Medical History:   Diagnosis Date    Allergy     Anxiety     Arthritis     [facility] [address] [facility]     Candidiasis, cutaneous     [facility]     Depression     [facility] ([facility]     Disease of thyroid gland     hypothyroid    Disorder of urinary tract     Fatty liver     Hemorrhoids     History of chemotherapy     History of radiation therapy     Influenza A     Obesity     PONV (postoperative nausea and vomiting)     Port-A-Cath in place      Past Surgical History:   Procedure Laterality Date    BILATERAL SALPINGOOPHORECTOMY      BREAST BIOPSY      needle core    GASTRECTOMY SLEEVE LAPAROSCOPIC      Onset: 1/26/15  Dr [facility]  2014    IR PORT REMOVAL  2019    MASTECTOMY Left 2013    Lymph nodes removed     Family History   Problem Relation Age of Onset    Diabetes Mother     Diabetes Father     Ovarian cancer Sister 29    Hypertension Maternal Grandfather     Hodgkin's lymphoma Paternal Grandmother     Breast cancer Family     Diabetes Family     Hypertension Family     Uterine cancer Maternal Aunt 64    Leukemia Son         chronic myeloid     Social History     Socioeconomic History    Marital status: /Civil Union     Spouse name: Not on file    Number of children: Not on file    Years of education: Not on file    Highest education level: Not on file   Occupational History    Occupation: LPN   Tobacco Use    Smoking status: Former Smoker     Types: Cigarettes     Quit date: 2013     Years since quittin 8    Smokeless tobacco: Never Used   Vaping Use    Vaping Use: Never used   Substance and Sexual Activity    Alcohol use: No    Drug use: No    Sexual activity: Not on file   Other Topics Concern    Not on file   Social History Narrative    EMPLOYED     Social Determinants of Health     Financial Resource Strain: Not on file   Food Insecurity: Not on file Transportation Needs: Not on file   Physical Activity: Not on file   Stress: Not on file   Social Connections: Not on file   Intimate Partner Violence: Not on file   Housing Stability: Not on file     Medications have been verified  Objective   Pulse (!) 107   Temp 97 6 °F (36 4 °C)   Resp 18   Ht 5' 2" (1 575 m)   Wt 99 8 kg (220 lb)   SpO2 99%   BMI 40 24 kg/m²      Physical Exam   Physical Exam  Constitutional:       General: She is not in acute distress  Musculoskeletal:      Right hip: No tenderness or bony tenderness  Normal range of motion  Normal strength  Right upper leg: Tenderness and bony tenderness present  No swelling, edema or deformity  Right knee: No swelling, erythema or ecchymosis  Decreased range of motion  Neurological:      Mental Status: She is alert

## 2022-04-16 ENCOUNTER — HOSPITAL ENCOUNTER (OUTPATIENT)
Dept: CT IMAGING | Facility: HOSPITAL | Age: 42
Discharge: HOME | End: 2022-04-16
Payer: COMMERCIAL

## 2022-04-16 DIAGNOSIS — E78.5 HYPERLIPIDEMIA, UNSPECIFIED HYPERLIPIDEMIA TYPE: ICD-10-CM

## 2022-04-16 DIAGNOSIS — E03.9 HYPOTHYROIDISM, UNSPECIFIED TYPE: ICD-10-CM

## 2022-04-16 DIAGNOSIS — R21 RASH: ICD-10-CM

## 2022-04-16 DIAGNOSIS — C50.912 MALIGNANT NEOPLASM OF LEFT BREAST IN FEMALE, ESTROGEN RECEPTOR POSITIVE, UNSPECIFIED SITE OF BREAST (HCC): ICD-10-CM

## 2022-04-16 DIAGNOSIS — K21.00 GASTROESOPHAGEAL REFLUX DISEASE WITH ESOPHAGITIS WITHOUT HEMORRHAGE: ICD-10-CM

## 2022-04-16 DIAGNOSIS — Z17.0 MALIGNANT NEOPLASM OF LEFT BREAST IN FEMALE, ESTROGEN RECEPTOR POSITIVE, UNSPECIFIED SITE OF BREAST (HCC): ICD-10-CM

## 2022-04-16 DIAGNOSIS — R91.1 COIN LESION: ICD-10-CM

## 2022-04-16 DIAGNOSIS — E55.9 VITAMIN D DEFICIENCY: ICD-10-CM

## 2022-04-16 DIAGNOSIS — E11.9 TYPE 2 DIABETES MELLITUS WITHOUT COMPLICATION, WITHOUT LONG-TERM CURRENT USE OF INSULIN (HCC): ICD-10-CM

## 2022-04-16 DIAGNOSIS — M62.838 CERVICAL PARASPINAL MUSCLE SPASM: ICD-10-CM

## 2022-04-16 DIAGNOSIS — R10.9 FLANK PAIN: ICD-10-CM

## 2022-04-16 PROCEDURE — G1004 CDSM NDSC: HCPCS

## 2022-04-16 PROCEDURE — 71250 CT THORAX DX C-: CPT

## 2022-04-19 RX ORDER — DICLOFENAC SODIUM 75 MG/1
75 TABLET, DELAYED RELEASE ORAL 2 TIMES DAILY PRN
Qty: 60 TABLET | Refills: 0 | Status: SHIPPED | OUTPATIENT
Start: 2022-04-19 | End: 2022-07-15

## 2022-04-19 RX ORDER — NYSTATIN 100000 [USP'U]/G
1 POWDER TOPICAL 2 TIMES DAILY
Qty: 15 G | Refills: 0 | Status: SHIPPED | OUTPATIENT
Start: 2022-04-19 | End: 2022-06-13 | Stop reason: SDUPTHER

## 2022-04-19 RX ORDER — SIMVASTATIN 20 MG
20 TABLET ORAL
Qty: 90 TABLET | Refills: 0 | Status: SHIPPED | OUTPATIENT
Start: 2022-04-19 | End: 2022-07-11

## 2022-04-19 RX ORDER — CYCLOBENZAPRINE HCL 10 MG
10 TABLET ORAL 3 TIMES DAILY PRN
Qty: 42 TABLET | Refills: 0 | Status: SHIPPED | OUTPATIENT
Start: 2022-04-19 | End: 2022-07-15

## 2022-04-19 RX ORDER — ERGOCALCIFEROL 1.25 MG/1
50000 CAPSULE ORAL WEEKLY
Qty: 12 CAPSULE | Refills: 0 | Status: SHIPPED | OUTPATIENT
Start: 2022-04-19 | End: 2022-07-05

## 2022-04-19 RX ORDER — LEVOTHYROXINE SODIUM 0.07 MG/1
75 TABLET ORAL DAILY
Qty: 90 TABLET | Refills: 0 | Status: SHIPPED | OUTPATIENT
Start: 2022-04-19 | End: 2022-06-13 | Stop reason: SDUPTHER

## 2022-04-19 RX ORDER — OMEPRAZOLE 40 MG/1
40 CAPSULE, DELAYED RELEASE ORAL DAILY
Qty: 90 CAPSULE | Refills: 1 | Status: SHIPPED | OUTPATIENT
Start: 2022-04-19 | End: 2022-05-06 | Stop reason: SDUPTHER

## 2022-05-04 ENCOUNTER — HOSPITAL ENCOUNTER (OUTPATIENT)
Dept: NUCLEAR MEDICINE | Facility: HOSPITAL | Age: 42
Discharge: HOME | End: 2022-05-04
Payer: COMMERCIAL

## 2022-05-04 DIAGNOSIS — R91.8 OTHER NONSPECIFIC ABNORMAL FINDING OF LUNG FIELD: ICD-10-CM

## 2022-05-04 PROCEDURE — 78815 PET IMAGE W/CT SKULL-THIGH: CPT

## 2022-05-04 PROCEDURE — A9552 F18 FDG: HCPCS

## 2022-05-04 PROCEDURE — 82948 REAGENT STRIP/BLOOD GLUCOSE: CPT

## 2022-05-04 PROCEDURE — G1004 CDSM NDSC: HCPCS

## 2022-05-05 ENCOUNTER — OFFICE VISIT (OUTPATIENT)
Dept: DENTISTRY | Facility: CLINIC | Age: 42
End: 2022-05-05
Payer: COMMERCIAL

## 2022-05-05 ENCOUNTER — TELEPHONE (OUTPATIENT)
Dept: INTERNAL MEDICINE CLINIC | Facility: CLINIC | Age: 42
End: 2022-05-05

## 2022-05-05 VITALS — SYSTOLIC BLOOD PRESSURE: 122 MMHG | DIASTOLIC BLOOD PRESSURE: 82 MMHG

## 2022-05-05 DIAGNOSIS — K02.9 DENTAL CARIES: Primary | ICD-10-CM

## 2022-05-05 LAB — GLUCOSE SERPL-MCNC: 91 MG/DL (ref 65–140)

## 2022-05-05 PROCEDURE — D0210 INTRAORAL - COMPLETE SERIES OF RADIOGRAPHIC IMAGES: HCPCS | Performed by: STUDENT IN AN ORGANIZED HEALTH CARE EDUCATION/TRAINING PROGRAM

## 2022-05-05 PROCEDURE — D0140 LIMITED ORAL EVALUATION - PROBLEM FOCUSED: HCPCS | Performed by: STUDENT IN AN ORGANIZED HEALTH CARE EDUCATION/TRAINING PROGRAM

## 2022-05-05 RX ORDER — AMOXICILLIN 500 MG/1
500 CAPSULE ORAL EVERY 8 HOURS SCHEDULED
Qty: 30 CAPSULE | Refills: 0 | Status: SHIPPED | OUTPATIENT
Start: 2022-05-05 | End: 2022-05-15

## 2022-05-05 NOTE — TELEPHONE ENCOUNTER
Pt called requesting a Referral to GI for Endoscopy stated that PET Scan showed lesion on esophagus  Can this be placed? Prefers [facility]

## 2022-05-05 NOTE — PROGRESS NOTES
Comprehensive Exam    Bertha Blackburn presents for a comprehensive exam  Verbal consent for treatment given in addition to the forms  Reviewed health history - Patient is ASA    Consents signed: Yes    Perio: Generalized, heavy bleeding, heavy calculus buildup  Pain Scale: 3  Caries Assessment: high  Radiographs: FMX    Treatment Plan:  1  Infection control: Ext #17  Pt is on Xgeva which inc risk of osteonecrosis of the jaw after extraction  Patient needs to be treated by oral surgeon that knows her medical history and all risks associated  2  Periodontal therapy: full mouth debridement will be done first, reevaluation done after  Need for SRP? 3  Caries control:resins  Some decay is deep  Talked to pt about lowering her consumption of sugary drinks and snacking less frequently  Talked to patient about increased chances of osteonecrosis and presence of periodontal disease  Talked to patient also about using prevident before bed  Prognosis is Good  Referrals needed: OS referral  Preferred that [facility] goes to [facility] as they are associated with the hospital and are better able to treat her condition     Next visit: Vickey Hwang

## 2022-05-06 ENCOUNTER — OFFICE VISIT (OUTPATIENT)
Dept: GASTROENTEROLOGY | Facility: CLINIC | Age: 42
End: 2022-05-06
Payer: COMMERCIAL

## 2022-05-06 ENCOUNTER — TELEPHONE (OUTPATIENT)
Dept: GASTROENTEROLOGY | Facility: CLINIC | Age: 42
End: 2022-05-06

## 2022-05-06 VITALS
SYSTOLIC BLOOD PRESSURE: 140 MMHG | BODY MASS INDEX: 41.22 KG/M2 | HEART RATE: 82 BPM | WEIGHT: 224 LBS | HEIGHT: 62 IN | DIASTOLIC BLOOD PRESSURE: 86 MMHG

## 2022-05-06 DIAGNOSIS — Z17.0 MALIGNANT NEOPLASM OF LEFT BREAST IN FEMALE, ESTROGEN RECEPTOR POSITIVE, UNSPECIFIED SITE OF BREAST (HCC): ICD-10-CM

## 2022-05-06 DIAGNOSIS — K21.00 GASTROESOPHAGEAL REFLUX DISEASE WITH ESOPHAGITIS WITHOUT HEMORRHAGE: ICD-10-CM

## 2022-05-06 DIAGNOSIS — K22.10 EROSIVE ESOPHAGITIS: Primary | ICD-10-CM

## 2022-05-06 DIAGNOSIS — K22.89 MASS OF ESOPHAGUS: ICD-10-CM

## 2022-05-06 DIAGNOSIS — C50.912 MALIGNANT NEOPLASM OF LEFT BREAST IN FEMALE, ESTROGEN RECEPTOR POSITIVE, UNSPECIFIED SITE OF BREAST (HCC): ICD-10-CM

## 2022-05-06 DIAGNOSIS — C79.51 BONE METASTASES (HCC): ICD-10-CM

## 2022-05-06 PROCEDURE — 99204 OFFICE O/P NEW MOD 45 MIN: CPT | Performed by: INTERNAL MEDICINE

## 2022-05-06 PROCEDURE — 1036F TOBACCO NON-USER: CPT | Performed by: INTERNAL MEDICINE

## 2022-05-06 PROCEDURE — 3008F BODY MASS INDEX DOCD: CPT | Performed by: INTERNAL MEDICINE

## 2022-05-06 RX ORDER — OMEPRAZOLE 40 MG/1
40 CAPSULE, DELAYED RELEASE ORAL DAILY
Qty: 90 CAPSULE | Refills: 1 | Status: SHIPPED | OUTPATIENT
Start: 2022-05-06 | End: 2022-11-02

## 2022-05-06 NOTE — LETTER
May 6, 2022     Lizzie Mccall, [address] [facility] [address]    Patient: Danisha Dean   YOB: 1980   Date of Visit: 5/6/2022       Dear Dr Bonilla: Thank you for referring Bertha Beckett to me for evaluation  Below are my notes for this consultation  If you have questions, please do not hesitate to call me  I look forward to following your patient along with you  Sincerely,        Glenis Bartlett MD        CC: No Recipients  Glenis Bartlett MD  5/6/2022  4:39 PM  Sign when Signing Visit  [address] [facility] - Outpatient Consultation  Bertha Laird 40 y o  female MRN: [id number]  Encounter: [id number]          ASSESSMENT AND PLAN:      1  Abnormal PET scan of esophagus  PET scan for restaging metastatic breast cancer with increased uptake in the esophagus  No history of erosive esophagitis  Has been on and off omeprazole  Suspect related to inflammation rather than malignancy but needs endoscopy  - Ambulatory Referral to Gastroenterology  - EGD; Future    2  Gastroesophageal reflux disease with esophagitis without hemorrhage  History of erosive esophagitis  Late September 2019  GERD and chest pain when stopped omeprazole  Feels better now back on it  History of gastric sleeve bariatric surgery in the past  - omeprazole (PriLOSEC) 40 MG capsule; Take 1 capsule (40 mg total) by mouth daily  Dispense: 90 capsule; Refill: 1    3  Malignant neoplasm of left breast in female, estrogen receptor positive, unspecified site of breast ([facility] [address] )  4  Bone metastases ([facility] [address] )  History of mastectomy, radiation, and chemotherapy 2013  Recurrent bone metastases and now with questionable lung nodules  Currently undergoing radiation therapy at [facility]     ______________________________________________________________________    HPI:  The patient is seen in the office per the request of her PCP for evaluation of an abnormal PET scan    She was diagnosed 2013 with breast cancer  She underwent surgery, chemotherapy, and radiation  She did well initially but then recently has had recurrence at least in the ribs and now lung nodules  She is undergoing radiation to the ribs  As part of the restaging she had a PET scan which showed increased uptake in the distal esophagus  She has a history of erosive esophagitis  Her last EGD was 2019  She has also had gastric sleeve surgery as part of a bariatric procedure  She reports when she has tried to wean off the omeprazole she has had issues with GERD and chest pain  She denies any dysphagia, odynophagia, early satiety  Her weight is relatively stable  She is back on omeprazole and she denies any significant upper GI symptoms  REVIEW OF SYSTEMS:    CONSTITUTIONAL: Denies any fever, chills, rigors, and weight loss  HEENT: No earache or tinnitus  Denies hearing loss or visual disturbances  CARDIOVASCULAR: No chest pain or palpitations  RESPIRATORY: Denies any cough, hemoptysis, shortness of breath or dyspnea on exertion  GASTROINTESTINAL: As noted in the History of Present Illness  GENITOURINARY: No problems with urination  Denies any hematuria or dysuria  NEUROLOGIC: No dizziness or vertigo, denies headaches  MUSCULOSKELETAL:  Bone pain related to metastatic disease  SKIN: Denies skin rashes or itching  ENDOCRINE: Denies excessive thirst  Denies intolerance to heat or cold  PSYCHOSOCIAL: Denies depression or anxiety  Denies any recent memory loss         Historical Information   Past Medical History:   Diagnosis Date    Allergy     Anxiety     Arthritis     [facility]     Candidiasis, cutaneous     Cervical cancer [facility] [address] [facility]     Depression     [facility]     Disease of thyroid gland     hypothyroid    Disorder of urinary tract     Fatty liver     Hemorrhoids     History of chemotherapy     History of radiation therapy     Influenza A     Obesity     PONV (postoperative nausea and vomiting)     Port-A-Cath in place      Past Surgical History:   Procedure Laterality Date    BILATERAL SALPINGOOPHORECTOMY      BREAST BIOPSY      needle core    GASTRECTOMY SLEEVE LAPAROSCOPIC      Onset: 1/26/15  Dr Burns Chimera  2014    IR PORT REMOVAL  2019    MASTECTOMY Left 2013    Lymph nodes removed     Social History   Social History     Substance and Sexual Activity   Alcohol Use No     Social History     Substance and Sexual Activity   Drug Use Never     Social History     Tobacco Use   Smoking Status Former Smoker    Types: Cigarettes    Quit date: 2013    Years since quittin 1   Smokeless Tobacco Never Used     Family History   Problem Relation Age of Onset    Diabetes Mother     Diabetes Father     Ovarian cancer Sister 29    Hypertension Maternal Grandfather     Hodgkin's lymphoma Paternal Grandmother     Breast cancer Family     Diabetes Family     Hypertension Family     Uterine cancer Maternal Aunt 64    Leukemia Son         chronic myeloid       Meds/Allergies       Current Outpatient Medications:     Abemaciclib (Verzenio) 150 MG TABS    ALPRAZolam (XANAX) 0 25 mg tablet    amoxicillin (AMOXIL) 500 mg capsule    [employer] MICROLET LANCETS lancets    Blood Glucose Monitoring Suppl ([employer] CONTOUR MONITOR) w/Device KIT    calcium-vitamin D (OSCAL 500 + D) 500 mg-200 units per tablet    cyclobenzaprine (FLEXERIL) 10 mg tablet    Denosumab (XGEVA SC)    diclofenac (VOLTAREN) 75 mg EC tablet    ergocalciferol (VITAMIN D2) 50,000 units    fulvestrant (Faslodex) 250 mg/5 mL    Glucose Blood (AURELIO CONTOUR TEST VI)    ibuprofen (MOTRIN) 600 mg tablet    levothyroxine 75 mcg tablet    nystatin (MYCOSTATIN) powder    omeprazole (PriLOSEC) 40 MG capsule    simvastatin (ZOCOR) 20 mg tablet    Sodium Fluoride 1 1 % PSTE    traMADol (ULTRAM) 50 mg tablet    Abemaciclib 150 MG TABS    denosumab (PROLIA) 60 mg/mL    diclofenac sodium (VOLTAREN) 1 %    metFORMIN (GLUCOPHAGE) 500 mg tablet    Allergies   Allergen Reactions    Paclitaxel Anaphylaxis     Pt states she needs a steroid before she can take this medication  Objective     Blood pressure 140/86, pulse 82, height 5' 2" (1 575 m), weight 102 kg (224 lb)  Body mass index is 40 97 kg/m²  PHYSICAL EXAM:      General Appearance:   Alert, cooperative, no distress   HEENT:   Normocephalic, atraumatic, anicteric      Neck:  Supple, symmetrical, trachea midline   Lungs:   Clear to auscultation bilaterally; no rales, rhonchi or wheezing; respirations unlabored    Heart[de-identified]   Regular rate and rhythm; no murmur, rub, or gallop  Abdomen:   Soft, non-tender, non-distended; normal bowel sounds; no masses, no organomegaly    Genitalia:   Deferred    Rectal:   Deferred    Extremities:  No cyanosis, clubbing or edema    Pulses:  2+ and symmetric    Skin:  No jaundice, rashes, or lesions    Lymph nodes:  No palpable cervical lymphadenopathy        Lab Results:   No visits with results within 1 Day(s) from this visit  Latest known visit with results is:   Hospital Outpatient Visit on 05/04/2022   Component Date Value    POC Glucose 05/04/2022 91          Radiology Results:   CT chest wo contrast  Result Date: 4/18/2022  Impression: Increased left basilar opacity compared to 1/5/2022  Morphology and location adjacent to a chronic left 8th rib region with pleural thickening are suggestive of benign round atelectasis  It is difficult to completely exclude a developing neoplasm given  the history of malignancy  Consider PET/CT for further evaluation  Other nonemergent findings similar to the prior study above  NM PET CT skull base to mid thigh  Result Date: 5/4/2022  Impression: 1  Mild to moderate patchy radiotracer uptake at the left posterior 8th rib and adjacent pleural soft tissue thickening question related to post treatment changes    Residual metastasis cannot be excluded  2   Focal radiotracer uptake at the distal esophagus, underlying lesion should be excluded  Correlate with endoscopy  3  No suspicious hypermetabolic lesions in the neck, abdomen and pelvis

## 2022-05-06 NOTE — PROGRESS NOTES
[address] [facility] - Outpatient Consultation  Aric Dukes 40 y o  female MRN: [id number]  Encounter: [id number]          ASSESSMENT AND PLAN:      1  Abnormal PET scan of esophagus  PET scan for restaging metastatic breast cancer with increased uptake in the esophagus  No history of erosive esophagitis  Has been on and off omeprazole  Suspect related to inflammation rather than malignancy but needs endoscopy  - Ambulatory Referral to Gastroenterology  - EGD; Future    2  Gastroesophageal reflux disease with esophagitis without hemorrhage  History of erosive esophagitis  Late September 2019  GERD and chest pain when stopped omeprazole  Feels better now back on it  History of gastric sleeve bariatric surgery in the past  - omeprazole (PriLOSEC) 40 MG capsule; Take 1 capsule (40 mg total) by mouth daily  Dispense: 90 capsule; Refill: 1    3  Malignant neoplasm of left breast in female, estrogen receptor positive, unspecified site of breast ([facility] [address] )  4  [facility]  History of mastectomy, radiation, and chemotherapy 2013  Recurrent bone metastases and now with questionable lung nodules  Currently undergoing radiation therapy at [facility]     ______________________________________________________________________    HPI:  The patient is seen in the office per the request of her PCP for evaluation of an abnormal PET scan  She was diagnosed 2013 with breast cancer  She underwent surgery, chemotherapy, and radiation  She did well initially but then recently has had recurrence at least in the ribs and now lung nodules  She is undergoing radiation to the ribs  As part of the restaging she had a PET scan which showed increased uptake in the distal esophagus  She has a history of erosive esophagitis  Her last EGD was 2019  She has also had gastric sleeve surgery as part of a bariatric procedure    She reports when she has tried to wean off the omeprazole she has had issues with GERD and chest pain  She denies any dysphagia, odynophagia, early satiety  Her weight is relatively stable  She is back on omeprazole and she denies any significant upper GI symptoms  REVIEW OF SYSTEMS:    CONSTITUTIONAL: Denies any fever, chills, rigors, and weight loss  HEENT: No earache or tinnitus  Denies hearing loss or visual disturbances  CARDIOVASCULAR: No chest pain or palpitations  RESPIRATORY: Denies any cough, hemoptysis, shortness of breath or dyspnea on exertion  GASTROINTESTINAL: As noted in the History of Present Illness  GENITOURINARY: No problems with urination  Denies any hematuria or dysuria  NEUROLOGIC: No dizziness or vertigo, denies headaches  MUSCULOSKELETAL:  Bone pain related to metastatic disease  SKIN: Denies skin rashes or itching  ENDOCRINE: Denies excessive thirst  Denies intolerance to heat or cold  PSYCHOSOCIAL: Denies depression or anxiety  Denies any recent memory loss         Historical Information   Past Medical History:   Diagnosis Date    Allergy     Anxiety     Arthritis     [facility]     Candidiasis, cutaneous     Cervical cancer ([facility] )     Depression     Diabetes mellitus ([facility] )     Disease of thyroid gland     hypothyroid    Disorder of urinary tract     Fatty liver     Hemorrhoids     History of chemotherapy     History of radiation therapy     Influenza A     Obesity     PONV (postoperative nausea and vomiting)     Port-A-Cath in place      Past Surgical History:   Procedure Laterality Date    BILATERAL SALPINGOOPHORECTOMY      BREAST BIOPSY      needle core    GASTRECTOMY SLEEVE LAPAROSCOPIC      Onset: 1/26/15  Dr Germain Loss  04/07/2014    IR PORT REMOVAL  11/21/2019    MASTECTOMY Left 12/19/2013    Lymph nodes removed     Social History   Social History     Substance and Sexual Activity   Alcohol Use No     Social History     Substance and Sexual Activity   Drug Use Never     Social History Tobacco Use   Smoking Status Former Smoker    Types: Cigarettes    Quit date: 2013    Years since quittin 1   Smokeless Tobacco Never Used     Family History   Problem Relation Age of Onset    Diabetes Mother     Diabetes Father     Ovarian cancer Sister 29    Hypertension Maternal Grandfather     Hodgkin's lymphoma Paternal Grandmother     Breast cancer Family     Diabetes Family     Hypertension Family     Uterine cancer Maternal Aunt 64    Leukemia Son         chronic myeloid       Meds/Allergies       Current Outpatient Medications:     Abemaciclib (Verzenio) 150 MG TABS    ALPRAZolam (XANAX) 0 25 mg tablet    amoxicillin (AMOXIL) 500 mg capsule    [employer] MICROLET LANCETS lancets    Blood Glucose Monitoring Suppl ([employer] CONTOUR MONITOR) w/Device KIT    calcium-vitamin D (OSCAL 500 + D) 500 mg-200 units per tablet    cyclobenzaprine (FLEXERIL) 10 mg tablet    Denosumab (XGEVA SC)    diclofenac (VOLTAREN) 75 mg EC tablet    ergocalciferol (VITAMIN D2) 50,000 units    fulvestrant (Faslodex) 250 mg/5 mL    Glucose Blood (AURELIO CONTOUR TEST VI)    ibuprofen (MOTRIN) 600 mg tablet    levothyroxine 75 mcg tablet    nystatin (MYCOSTATIN) powder    omeprazole (PriLOSEC) 40 MG capsule    simvastatin (ZOCOR) 20 mg tablet    Sodium Fluoride 1 1 % PSTE    traMADol (ULTRAM) 50 mg tablet    Abemaciclib 150 MG TABS    denosumab (PROLIA) 60 mg/mL    diclofenac sodium (VOLTAREN) 1 %    metFORMIN (GLUCOPHAGE) 500 mg tablet    Allergies   Allergen Reactions    Paclitaxel Anaphylaxis     Pt states she needs a steroid before she can take this medication  Objective     Blood pressure 140/86, pulse 82, height 5' 2" (1 575 m), weight 102 kg (224 lb)  Body mass index is 40 97 kg/m²          PHYSICAL EXAM:      General Appearance:   Alert, cooperative, no distress   HEENT:   Normocephalic, atraumatic, anicteric      Neck:  Supple, symmetrical, trachea midline   Lungs:   Clear to auscultation bilaterally; no rales, rhonchi or wheezing; respirations unlabored    Heart[de-identified]   Regular rate and rhythm; no murmur, rub, or gallop  Abdomen:   Soft, non-tender, non-distended; normal bowel sounds; no masses, no organomegaly    Genitalia:   Deferred    Rectal:   Deferred    Extremities:  No cyanosis, clubbing or edema    Pulses:  2+ and symmetric    Skin:  No jaundice, rashes, or lesions    Lymph nodes:  No palpable cervical lymphadenopathy        Lab Results:   No visits with results within 1 Day(s) from this visit  Latest known visit with results is:   Hospital Outpatient Visit on 05/04/2022   Component Date Value    POC Glucose 05/04/2022 91          Radiology Results:   CT chest wo contrast  Result Date: 4/18/2022  Impression: Increased left basilar opacity compared to 1/5/2022  Morphology and location adjacent to a chronic left 8th rib region with pleural thickening are suggestive of benign round atelectasis  It is difficult to completely exclude a developing neoplasm given  the history of malignancy  Consider PET/CT for further evaluation  Other nonemergent findings similar to the prior study above  NM PET CT skull base to mid thigh  Result Date: 5/4/2022  Impression: 1  Mild to moderate patchy radiotracer uptake at the left posterior 8th rib and adjacent pleural soft tissue thickening question related to post treatment changes  Residual metastasis cannot be excluded  2   Focal radiotracer uptake at the distal esophagus, underlying lesion should be excluded  Correlate with endoscopy  3  No suspicious hypermetabolic lesions in the neck, abdomen and pelvis

## 2022-05-06 NOTE — TELEPHONE ENCOUNTER
Scheduled date of EGD(as of today):6/15/22  Physician performing EGD:Dr Donald Herrera  Location of EGD:[facility]  Instructions reviewed with patient by:Nessa HINDS  Clearances: N

## 2022-06-07 ENCOUNTER — RA CDI HCC (OUTPATIENT)
Dept: OTHER | Facility: HOSPITAL | Age: 42
End: 2022-06-07

## 2022-06-07 NOTE — PROGRESS NOTES
[facility]  coding opportunities     [id number]     Chart Reviewed number of suggestions sent to Provider: 1     Patients Insurance     Medicare Insurance: [address] [place] [address]

## 2022-06-13 ENCOUNTER — OFFICE VISIT (OUTPATIENT)
Dept: INTERNAL MEDICINE CLINIC | Facility: CLINIC | Age: 42
End: 2022-06-13
Payer: COMMERCIAL

## 2022-06-13 ENCOUNTER — TELEPHONE (OUTPATIENT)
Dept: INTERNAL MEDICINE CLINIC | Facility: CLINIC | Age: 42
End: 2022-06-13

## 2022-06-13 VITALS
HEIGHT: 62 IN | OXYGEN SATURATION: 97 % | SYSTOLIC BLOOD PRESSURE: 128 MMHG | WEIGHT: 223.4 LBS | BODY MASS INDEX: 41.11 KG/M2 | DIASTOLIC BLOOD PRESSURE: 84 MMHG | TEMPERATURE: 98.9 F | RESPIRATION RATE: 14 BRPM | HEART RATE: 89 BPM

## 2022-06-13 DIAGNOSIS — E03.9 HYPOTHYROIDISM, UNSPECIFIED TYPE: ICD-10-CM

## 2022-06-13 DIAGNOSIS — R21 RASH: ICD-10-CM

## 2022-06-13 DIAGNOSIS — E11.9 TYPE 2 DIABETES MELLITUS WITHOUT COMPLICATION, WITHOUT LONG-TERM CURRENT USE OF INSULIN (HCC): ICD-10-CM

## 2022-06-13 DIAGNOSIS — L50.9 URTICARIA: ICD-10-CM

## 2022-06-13 DIAGNOSIS — C50.912 MALIGNANT NEOPLASM OF LEFT FEMALE BREAST, UNSPECIFIED ESTROGEN RECEPTOR STATUS, UNSPECIFIED SITE OF BREAST (HCC): ICD-10-CM

## 2022-06-13 DIAGNOSIS — Z12.31 ENCOUNTER FOR SCREENING MAMMOGRAM FOR MALIGNANT NEOPLASM OF BREAST: ICD-10-CM

## 2022-06-13 DIAGNOSIS — Z17.0 MALIGNANT NEOPLASM OF LEFT BREAST IN FEMALE, ESTROGEN RECEPTOR POSITIVE, UNSPECIFIED SITE OF BREAST (HCC): ICD-10-CM

## 2022-06-13 DIAGNOSIS — Z11.59 NEED FOR HEPATITIS C SCREENING TEST: ICD-10-CM

## 2022-06-13 DIAGNOSIS — Z00.00 MEDICARE ANNUAL WELLNESS VISIT, SUBSEQUENT: Primary | ICD-10-CM

## 2022-06-13 DIAGNOSIS — C50.912 MALIGNANT NEOPLASM OF LEFT BREAST IN FEMALE, ESTROGEN RECEPTOR POSITIVE, UNSPECIFIED SITE OF BREAST (HCC): ICD-10-CM

## 2022-06-13 PROBLEM — F11.20 CONTINUOUS OPIOID DEPENDENCE (HCC): Status: ACTIVE | Noted: 2022-06-13

## 2022-06-13 PROBLEM — R91.1 PULMONARY NODULE, LEFT: Status: ACTIVE | Noted: 2022-01-10

## 2022-06-13 PROBLEM — F11.20 CONTINUOUS OPIOID DEPENDENCE (HCC): Status: RESOLVED | Noted: 2022-06-13 | Resolved: 2022-06-13

## 2022-06-13 LAB
CREAT UR-MCNC: 147 MG/DL
MICROALBUMIN UR-MCNC: 33.7 MG/L (ref 0–20)
MICROALBUMIN/CREAT 24H UR: 23 MG/G CREATININE (ref 0–30)
SL AMB POCT HEMOGLOBIN AIC: 5.4 (ref ?–6.5)

## 2022-06-13 PROCEDURE — 82570 ASSAY OF URINE CREATININE: CPT | Performed by: INTERNAL MEDICINE

## 2022-06-13 PROCEDURE — 1036F TOBACCO NON-USER: CPT | Performed by: INTERNAL MEDICINE

## 2022-06-13 PROCEDURE — 82043 UR ALBUMIN QUANTITATIVE: CPT | Performed by: INTERNAL MEDICINE

## 2022-06-13 PROCEDURE — 83036 HEMOGLOBIN GLYCOSYLATED A1C: CPT | Performed by: INTERNAL MEDICINE

## 2022-06-13 PROCEDURE — 3008F BODY MASS INDEX DOCD: CPT | Performed by: INTERNAL MEDICINE

## 2022-06-13 PROCEDURE — 99213 OFFICE O/P EST LOW 20 MIN: CPT | Performed by: INTERNAL MEDICINE

## 2022-06-13 PROCEDURE — 3725F SCREEN DEPRESSION PERFORMED: CPT | Performed by: INTERNAL MEDICINE

## 2022-06-13 PROCEDURE — 3060F POS MICROALBUMINURIA REV: CPT | Performed by: INTERNAL MEDICINE

## 2022-06-13 PROCEDURE — 3044F HG A1C LEVEL LT 7.0%: CPT | Performed by: INTERNAL MEDICINE

## 2022-06-13 PROCEDURE — 96372 THER/PROPH/DIAG INJ SC/IM: CPT | Performed by: INTERNAL MEDICINE

## 2022-06-13 PROCEDURE — G0439 PPPS, SUBSEQ VISIT: HCPCS | Performed by: INTERNAL MEDICINE

## 2022-06-13 RX ORDER — METHYLPREDNISOLONE 4 MG/1
TABLET ORAL
Qty: 21 EACH | Refills: 0 | Status: SHIPPED | OUTPATIENT
Start: 2022-06-13

## 2022-06-13 RX ORDER — LEVOTHYROXINE SODIUM 0.07 MG/1
75 TABLET ORAL DAILY
Qty: 90 TABLET | Refills: 1 | Status: SHIPPED | OUTPATIENT
Start: 2022-06-13 | End: 2022-06-15 | Stop reason: SDUPTHER

## 2022-06-13 RX ORDER — NYSTATIN 100000 [USP'U]/G
1 POWDER TOPICAL 2 TIMES DAILY
Qty: 15 G | Refills: 0 | Status: SHIPPED | OUTPATIENT
Start: 2022-06-13

## 2022-06-13 RX ORDER — TRIAMCINOLONE ACETONIDE 40 MG/ML
40 INJECTION, SUSPENSION INTRA-ARTICULAR; INTRAMUSCULAR ONCE
Status: COMPLETED | OUTPATIENT
Start: 2022-06-13 | End: 2022-06-13

## 2022-06-13 RX ADMIN — TRIAMCINOLONE ACETONIDE 40 MG: 40 INJECTION, SUSPENSION INTRA-ARTICULAR; INTRAMUSCULAR at 12:50

## 2022-06-13 NOTE — PROGRESS NOTES
Assessment/Plan:    Problem List Items Addressed This Visit        Endocrine    Diabetes mellitus ([place] [id number] )       Lab Results   Component Value Date    HGBA1C 5 4 06/13/2022   A1c is essentially normal without medications  We will continue to follow           Relevant Orders    POCT hemoglobin A1c (Completed)    Microalbumin / creatinine urine ratio    Hypothyroidism     Check the TSH and see how things go  Relevant Medications    levothyroxine 75 mcg tablet    methylPREDNISolone 4 MG tablet therapy pack    triamcinolone acetonide (KENALOG-40) 40 mg/mL injection 40 mg (Completed)    Other Relevant Orders    TSH, 3rd generation       Other    Malignant neoplasm of left breast in female, estrogen receptor positive ([place] [address] )     Mammogram ordered             Other Visit Diagnoses     Medicare annual wellness visit, subsequent    -  Primary    Encounter for screening mammogram for malignant neoplasm of breast        Relevant Orders    Mammo screening bilateral w 3d & cad    Need for hepatitis C screening test        Relevant Orders    Hepatitis C antibody    Hepatitis C antibody    Rash        Relevant Medications    nystatin (MYCOSTATIN) powder    Urticaria        Relevant Medications    nystatin (MYCOSTATIN) powder    methylPREDNISolone 4 MG tablet therapy pack    triamcinolone acetonide (KENALOG-40) 40 mg/mL injection 40 mg (Completed)    Malignant neoplasm of left female breast, unspecified estrogen receptor status, unspecified site of breast ([place] [address] )        Relevant Orders    Mammo diagnostic bilateral w cad    Body mass index (BMI) 40 0-44 9, adult ([facility] )        The patient is making healthier lif chic             Diagnoses and all orders for this visit:    Medicare annual wellness visit, subsequent    Type 2 diabetes mellitus without complication, without long-term current use of insulin ([place] [address] )  -     POCT hemoglobin A1c  -     Microalbumin / creatinine urine ratio    Encounter for screening mammogram for malignant neoplasm of breast  -     Mammo screening bilateral w 3d & cad; Future    Need for hepatitis C screening test  -     Hepatitis C antibody; Future  -     Hepatitis C antibody; Future    Rash  -     nystatin (MYCOSTATIN) powder; Apply 1 application topically 2 (two) times a day Abdominal fold    Hypothyroidism, unspecified type  -     levothyroxine 75 mcg tablet; Take 1 tablet (75 mcg total) by mouth daily  -     TSH, 3rd generation; Future    Urticaria  -     methylPREDNISolone 4 MG tablet therapy pack; Use as directed on package  -     triamcinolone acetonide (KENALOG-40) 40 mg/mL injection 40 mg    Malignant neoplasm of left female breast, unspecified estrogen receptor status, unspecified site of breast ([address] )  -     Mammo diagnostic bilateral w cad; Future    Body mass index (BMI) 40 0-44 9, adult ([facility])  Comments: The patient is making healthier lif victoria  Malignant neoplasm of left breast in female, estrogen receptor positive, unspecified site of breast ([address] )      Hypothyroidism  Check the TSH and see how things go  Diabetes mellitus ([address] )    Lab Results   Component Value Date    HGBA1C 5 4 06/13/2022   A1c is essentially normal without medications  We will continue to follow    Malignant neoplasm of left breast in female, estrogen receptor positive ([address] )  Mammogram ordered        Subjective:      Patient ID: Oni Grover is a 40 y o  female  The patient states that she has issues with urticaria  This has been present for several weeks  The patient state that there are no other issues at this time  The patient notes no other issues at this time  She attributes the Urticaria to recent chemo        The following portions of the patient's history were reviewed and updated as appropriate:   She has a past medical history of Allergy, Anxiety, Arthritis, Breast cancer ([address] ), Candidiasis, cutaneous, Cervical cancer ([address] ), Depression, Diabetes mellitus ([address] ), Disease of thyroid gland, Disorder of urinary tract, Fatty liver, Hemorrhoids, History of chemotherapy, History of radiation therapy, Influenza A, Obesity, PONV (postoperative nausea and vomiting), and Port-A-Cath in place  ,  does not have any pertinent problems on file  ,   has a past surgical history that includes Mastectomy (Left, 12/19/2013); Hysterectomy (04/07/2014); Bilateral salpingoophorectomy; GASTRECTOMY SLEEVE LAPAROSCOPIC; IR port removal (11/21/2019); and Breast biopsy  ,  family history includes Breast cancer in her family; Diabetes in her family, father, and mother; Hodgkin's lymphoma in her paternal grandmother; Hypertension in her family and maternal grandfather; Leukemia in her son; Ovarian cancer (age of onset: 29) in her sister; Uterine cancer (age of onset: 64) in her maternal aunt  ,   reports that she quit smoking about 9 years ago  Her smoking use included cigarettes  She has never used smokeless tobacco  She reports that she does not drink alcohol and does not use drugs  ,  is allergic to paclitaxel     Current Outpatient Medications   Medication Sig Dispense Refill    Abemaciclib (Verzenio) 150 MG TABS Take 150 mg by mouth 2 (two) times a day 60 tablet 0    ALPRAZolam (XANAX) 0 25 mg tablet Take 0 25 mg by mouth daily at bedtime as needed       [employer]ia MICROLET LANCETS lancets by Does not apply route daily      Blood Glucose Monitoring Suppl ([employer] CONTOUR MONITOR) w/Device KIT by Does not apply route      calcium-vitamin D (OSCAL 500 + D) 500 mg-200 units per tablet Take 1 tablet by mouth daily        cyclobenzaprine (FLEXERIL) 10 mg tablet Take 1 tablet (10 mg total) by mouth 3 (three) times a day as needed for muscle spasms for up to 14 days 42 tablet 0    diclofenac (VOLTAREN) 75 mg EC tablet Take 1 tablet (75 mg total) by mouth 2 (two) times a day as needed (Muscular skeletal pain) 60 tablet 0    diclofenac sodium (VOLTAREN) 1 % Apply 1 application topically daily as needed       ergocalciferol (VITAMIN D2) 50,000 units Take 1 capsule (50,000 Units total) by mouth once a week 12 capsule 0    fulvestrant (FASLODEX) 250 mg/5 mL 500 mg by Intramuscular (multi inj) route once      Glucose Blood (AURELIO CONTOUR TEST VI) by In Vitro route daily      ibuprofen (MOTRIN) 600 mg tablet Take 1 tablet (600 mg total) by mouth every 6 (six) hours as needed for mild pain 30 tablet 0    levothyroxine 75 mcg tablet Take 1 tablet (75 mcg total) by mouth daily 90 tablet 1    methylPREDNISolone 4 MG tablet therapy pack Use as directed on package 21 each 0    nystatin (MYCOSTATIN) powder Apply 1 application topically 2 (two) times a day Abdominal fold 15 g 0    omeprazole (PriLOSEC) 40 MG capsule Take 1 capsule (40 mg total) by mouth daily 90 capsule 1    simvastatin (ZOCOR) 20 mg tablet Take 1 tablet (20 mg total) by mouth daily at bedtime 90 tablet 0    Sodium Fluoride 1 1 % PSTE Apply 1 Squirt to teeth daily at bedtime Brush daily at bedtime  Spit, do not rinse  Nothing to eat or drink afterwards  100 mL 0    traMADol (ULTRAM) 50 mg tablet Take 50 mg by mouth      denosumab (PROLIA) 60 mg/mL Inject 60 mg under the skin once (Patient not taking: No sig reported)      Denosumab (XGEVA SC) Inject under the skin (Patient not taking: Reported on 6/13/2022)       No current facility-administered medications for this visit  Review of Systems   Constitutional: Negative for chills, fatigue and fever  HENT: Negative  Respiratory: Negative for cough, chest tightness and shortness of breath  Cardiovascular: Negative for chest pain, palpitations and leg swelling  Gastrointestinal: Negative for abdominal pain, constipation, diarrhea, nausea and vomiting  Genitourinary: Negative  Musculoskeletal: Negative for arthralgias, back pain and myalgias  Skin: Negative  Neurological: Negative  Psychiatric/Behavioral: Negative            Objective:  Vitals:    06/13/22 1201   BP: 128/84   BP Location: Right arm   Patient Position: Sitting   Cuff Size: Large   Pulse: 89   Resp: 14   Temp: 98 9 °F (37 2 °C)   SpO2: 97%   Weight: 101 kg (223 lb 6 4 oz)   Height: 5' 2" (1 575 m)     Body mass index is 40 86 kg/m²  Physical Exam  Vitals and nursing note reviewed  Constitutional:       Appearance: She is well-developed  HENT:      Head: Normocephalic and atraumatic  Eyes:      Pupils: Pupils are equal, round, and reactive to light  Cardiovascular:      Rate and Rhythm: Normal rate and regular rhythm  Heart sounds: Normal heart sounds  No murmur heard  Pulmonary:      Effort: Pulmonary effort is normal  No respiratory distress  Breath sounds: Normal breath sounds  No wheezing  Abdominal:      General: Bowel sounds are normal       Palpations: Abdomen is soft  Tenderness: There is no abdominal tenderness  Musculoskeletal:         General: Normal range of motion  Cervical back: Normal range of motion and neck supple  Skin:     General: Skin is warm and dry  Neurological:      Mental Status: She is alert and oriented to person, place, and time  PHQ-2/9 Depression Screening    Little interest or pleasure in doing things: 0 - not at all  Feeling down, depressed, or hopeless: 0 - not at all  Trouble falling or staying asleep, or sleeping too much: 0 - not at all  Feeling tired or having little energy: 0 - not at all  Poor appetite or overeatin - not at all  Feeling bad about yourself - or that you are a failure or have let yourself or your family down: 0 - not at all  Trouble concentrating on things, such as reading the newspaper or watching television: 0 - not at all  Moving or speaking so slowly that other people could have noticed   Or the opposite - being so fidgety or restless that you have been moving around a lot more than usual: 0 - not at all  Thoughts that you would be better off dead, or of hurting yourself in some way: 0 - not at all  PHQ-9 Score: 0   PHQ-9 Interpretation: No or Minimal depression

## 2022-06-13 NOTE — ASSESSMENT & PLAN NOTE
Lab Results   Component Value Date    HGBA1C 5 4 06/13/2022   A1c is essentially normal without medications    We will continue to follow

## 2022-06-13 NOTE — PROGRESS NOTES
Assessment and Plan:     Problem List Items Addressed This Visit        Endocrine    Diabetes mellitus ([facility] )       Lab Results   Component Value Date    HGBA1C 5 4 06/13/2022   A1c is essentially normal without medications  We will continue to follow           Relevant Orders    POCT hemoglobin A1c (Completed)    Microalbumin / creatinine urine ratio    Hypothyroidism     Check the TSH and see how things go  Relevant Medications    levothyroxine 75 mcg tablet    methylPREDNISolone 4 MG tablet therapy pack    triamcinolone acetonide (KENALOG-40) 40 mg/mL injection 40 mg (Completed)    Other Relevant Orders    TSH, 3rd generation       Other    Malignant neoplasm of left breast in female, estrogen receptor positive ([place] [address] )     Mammogram ordered             Other Visit Diagnoses     Medicare annual wellness visit, subsequent    -  Primary    Encounter for screening mammogram for malignant neoplasm of breast        Relevant Orders    Mammo screening bilateral w 3d & cad    Need for hepatitis C screening test        Relevant Orders    Hepatitis C antibody    Hepatitis C antibody    Rash        Relevant Medications    nystatin (MYCOSTATIN) powder    Urticaria        Relevant Medications    nystatin (MYCOSTATIN) powder    methylPREDNISolone 4 MG tablet therapy pack    triamcinolone acetonide (KENALOG-40) 40 mg/mL injection 40 mg (Completed)    Malignant neoplasm of left female breast, unspecified estrogen receptor status, unspecified site of breast ([place] [address] )        Relevant Orders    Mammo diagnostic bilateral w cad    Body mass index (BMI) 40 0-44 9, adult ([facility] )        The patient is making healthier lif victoria  Preventive health issues were discussed with patient, and age appropriate screening tests were ordered as noted in patient's After Visit Summary    Personalized health advice and appropriate referrals for health education or preventive services given if needed, as noted in patient's After Visit Summary       History of Present Illness:     Patient presents for Medicare Annual Wellness visit    Patient Care Team:  Mallika Rockwell DO as PCP - General (Internal Medicine)  Mallika Rockwell DO as PCP - [address] [place] [address] ([place])  Mallika Rockwell DO as PCP - PCP-[employer] ([place])  Tylor Carrera DO as PCP - PCP-[facility] ([place])  DO Herb Brian DO Mariane Bake, MD Trudie Mart, PA-C Lena Kohut, MD Rubin Shams, MD Palma Hoff, MD Jeannett Lust, MD     Problem List:     Patient Active Problem List   Diagnosis    Anxiety    Arthritis of knee, right    Malignant neoplasm of left breast in female, estrogen receptor positive ([address] )    Chronic allergic rhinitis    Depression    Diabetes mellitus ([address] )    Hyperlipidemia    Hypothyroidism    Obesity (BMI 35 0-39 9 without comorbidity)    Osteopenia    Postsurgical malabsorption    Vertigo    Vitamin D deficiency    Leg cramps    Bariatric surgery status    Obesity, Class III, BMI 40-49 9 (morbid obesity) ([address] )    Fatty liver    Metabolic syndrome    Splenic cyst    Bone metastases ([address] )    Erosive esophagitis    Pulmonary nodule, left      Past Medical and Surgical History:     Past Medical History:   Diagnosis Date    Allergy     Anxiety     Arthritis     Breast cancer ([address] )     Candidiasis, cutaneous     Cervical cancer ([address] )     Depression     Diabetes mellitus ([address] )     Disease of thyroid gland     hypothyroid    Disorder of urinary tract     Fatty liver     Hemorrhoids     History of chemotherapy     History of radiation therapy     Influenza A     Obesity     PONV (postoperative nausea and vomiting)     Port-A-Cath in place      Past Surgical History:   Procedure Laterality Date    BILATERAL SALPINGOOPHORECTOMY      BREAST BIOPSY      needle core    GASTRECTOMY SLEEVE LAPAROSCOPIC      Onset: 1/26/15  Dr Mani Dalton  04/07/2014    IR PORT REMOVAL 2019    MASTECTOMY Left 2013    Lymph nodes removed      Family History:     Family History   Problem Relation Age of Onset    Diabetes Mother     Diabetes Father     Ovarian cancer Sister 29    Hypertension Maternal Grandfather     Hodgkin's lymphoma Paternal Grandmother    Elliott Aiken Breast cancer Family     Diabetes Family     Hypertension Family     Uterine cancer Maternal Aunt 64    Leukemia Son         chronic myeloid      Social History:     Social History     Socioeconomic History    Marital status: /Civil Union     Spouse name: None    Number of children: None    Years of education: None    Highest education level: None   Occupational History    Occupation: LPN   Tobacco Use    Smoking status: Former Smoker     Types: Cigarettes     Quit date: 2013     Years since quittin 2    Smokeless tobacco: Never Used   Vaping Use    Vaping Use: Never used   Substance and Sexual Activity    Alcohol use: No    Drug use: Never    Sexual activity: None   Other Topics Concern    None   Social History Narrative    EMPLOYED     Social Determinants of Health     Financial Resource Strain: Not on file   Food Insecurity: Not on file   Transportation Needs: Not on file   Physical Activity: Not on file   Stress: Not on file   Social Connections: Not on file   Intimate Partner Violence: Not on file   Housing Stability: Not on file      Medications and Allergies:     Current Outpatient Medications   Medication Sig Dispense Refill    Abemaciclib (Verzenio) 150 MG TABS Take 150 mg by mouth 2 (two) times a day 60 tablet 0    ALPRAZolam (XANAX) 0 25 mg tablet Take 0 25 mg by mouth daily at bedtime as needed       AURELIO MICROLET LANCETS lancets by Does not apply route daily      Blood Glucose Monitoring Suppl ([employer] CONTOUR MONITOR) w/Device KIT by Does not apply route      calcium-vitamin D (OSCAL 500 + D) 500 mg-200 units per tablet Take 1 tablet by mouth daily        cyclobenzaprine (FLEXERIL) 10 mg tablet Take 1 tablet (10 mg total) by mouth 3 (three) times a day as needed for muscle spasms for up to 14 days 42 tablet 0    diclofenac (VOLTAREN) 75 mg EC tablet Take 1 tablet (75 mg total) by mouth 2 (two) times a day as needed (Muscular skeletal pain) 60 tablet 0    diclofenac sodium (VOLTAREN) 1 % Apply 1 application topically daily as needed       ergocalciferol (VITAMIN D2) 50,000 units Take 1 capsule (50,000 Units total) by mouth once a week 12 capsule 0    fulvestrant (FASLODEX) 250 mg/5 mL 500 mg by Intramuscular (multi inj) route once      Glucose Blood (AURELIO CONTOUR TEST VI) by In Vitro route daily      ibuprofen (MOTRIN) 600 mg tablet Take 1 tablet (600 mg total) by mouth every 6 (six) hours as needed for mild pain 30 tablet 0    levothyroxine 75 mcg tablet Take 1 tablet (75 mcg total) by mouth daily 90 tablet 1    methylPREDNISolone 4 MG tablet therapy pack Use as directed on package 21 each 0    nystatin (MYCOSTATIN) powder Apply 1 application topically 2 (two) times a day Abdominal fold 15 g 0    omeprazole (PriLOSEC) 40 MG capsule Take 1 capsule (40 mg total) by mouth daily 90 capsule 1    simvastatin (ZOCOR) 20 mg tablet Take 1 tablet (20 mg total) by mouth daily at bedtime 90 tablet 0    Sodium Fluoride 1 1 % PSTE Apply 1 Squirt to teeth daily at bedtime Brush daily at bedtime  Spit, do not rinse  Nothing to eat or drink afterwards  100 mL 0    traMADol (ULTRAM) 50 mg tablet Take 50 mg by mouth      denosumab (PROLIA) 60 mg/mL Inject 60 mg under the skin once (Patient not taking: No sig reported)      Denosumab (XGEVA SC) Inject under the skin (Patient not taking: Reported on 6/13/2022)       No current facility-administered medications for this visit  Allergies   Allergen Reactions    Paclitaxel Anaphylaxis     Pt states she needs a steroid before she can take this medication        Immunizations:     Immunization History   Administered Date(s) Administered  COVID-19 MODERNA VACC 0 5 ML IM 01/18/2021, 02/04/2021, 02/15/2021, 03/31/2021, 09/27/2021    INFLUENZA 10/27/2017, 09/12/2018, 09/30/2019, 09/15/2020    Influenza Quadrivalent Preservative Free 3 years and older IM 09/21/2019    Influenza Quadrivalent, 6-35 Months IM 10/01/2017    Influenza, injectable, quadrivalent, preservative free 0 5 mL 09/21/2019, 09/05/2020    Pneumococcal Polysaccharide PPV23 11/30/2021    Tdap 07/10/2019    Tuberculin Skin Test-PPD Intradermal 11/03/2008, 08/07/2019, 08/20/2019, 07/28/2020, 12/08/2020, 12/16/2020, 11/30/2021, 12/14/2021    influenza, injectable, quadrivalent 09/12/2018      Health Maintenance:         Topic Date Due    Hepatitis C Screening  Never done    HIV Screening  Never done    Breast Cancer Screening: Mammogram  01/28/2021    DXA SCAN  09/13/2023         Topic Date Due    Influenza Vaccine (Season Ended) 09/01/2022      Medicare Health Risk Assessment:     /84 (BP Location: Right arm, Patient Position: Sitting, Cuff Size: Large)   Pulse 89   Temp 98 9 °F (37 2 °C)   Resp 14   Ht 5' 2" (1 575 m)   Wt 101 kg (223 lb 6 4 oz)   SpO2 97%   BMI 40 86 kg/m²      Bertha is here for her Initial Wellness visit  Health Risk Assessment:   Patient rates overall health as poor  Patient feels that their physical health rating is slightly worse  Patient is satisfied with their life  Eyesight was rated as same  Hearing was rated as same  Patient feels that their emotional and mental health rating is slightly worse  Patients states they are sometimes angry  Patient states they are sometimes unusually tired/fatigued  Pain experienced in the last 7 days has been none  Patient states that she has experienced no weight loss or gain in last 6 months  Depression Screening:   PHQ-9 Score: 0      Fall Risk Screening:    In the past year, patient has experienced: no history of falling in past year      Urinary Incontinence Screening:   Patient has leaked urine accidently in the last six months  Home Safety:  Patient has trouble with stairs inside or outside of their home  Patient has working smoke alarms and has no working carbon monoxide detector  Home safety hazards include: none  Nutrition:   Current diet is Regular  Medications:   Patient is currently taking over-the-counter supplements  OTC medications include: see medication list  Patient is able to manage medications  Activities of Daily Living (ADLs)/Instrumental Activities of Daily Living (IADLs):   Walk and transfer into and out of bed and chair?: Yes  Dress and groom yourself?: Yes    Bathe or shower yourself?: Yes    Feed yourself? Yes  Do your laundry/housekeeping?: Yes  Manage your money, pay your bills and track your expenses?: Yes  Make your own meals?: Yes    Do your own shopping?: Yes    Previous Hospitalizations:   Any hospitalizations or ED visits within the last 12 months?: No      Advance Care Planning:   Living will: Yes    Durable POA for healthcare:  Yes    Advanced directive: Yes    Advanced directive counseling given: No    Five wishes given: No    Patient declined ACP directive: No    End of Life Decisions reviewed with patient: Yes    Provider agrees with end of life decisions: Yes      Cognitive Screening:   Provider or family/friend/caregiver concerned regarding cognition?: No    PREVENTIVE SCREENINGS      Cardiovascular Screening:    General: Screening Not Indicated and History Lipid Disorder      Diabetes Screening:     General: Screening Not Indicated and History Diabetes      Colorectal Cancer Screening:     General: Screening Not Indicated      Breast Cancer Screening:     General: History Breast Cancer    Due for: Mammogram        Cervical Cancer Screening:    General: History Cervical Cancer      Osteoporosis Screening:    General: Screening Current      Abdominal Aortic Aneurysm (AAA) Screening:        General: Screening Not Indicated      Lung Cancer Screening: General: Screening Not Indicated    Screening, Brief Intervention, and Referral to Treatment (SBIRT)    Screening  Typical number of drinks in a day: 0  Typical number of drinks in a week: 0  Interpretation: Low risk drinking behavior      Single Item Drug Screening:  How often have you used an illegal drug (including marijuana) or a prescription medication for non-medical reasons in the past year? never    Single Item Drug Screen Score: 0  Interpretation: Negative screen for possible drug use disorder    Review of Current Opioid Use    Opioid Risk Tool (ORT) Interpretation: Complete Opioid Risk Tool (ORT)      Minh Stack DO

## 2022-06-13 NOTE — PATIENT INSTRUCTIONS
Medicare Preventive Visit Patient Instructions  Thank you for completing your Welcome to Medicare Visit or Medicare Annual Wellness Visit today  Your next wellness visit will be due in one year (6/14/2023)  The screening/preventive services that you may require over the next 5-10 years are detailed below  Some tests may not apply to you based off risk factors and/or age  Screening tests ordered at today's visit but not completed yet may show as past due  Also, please note that scanned in results may not display below  Preventive Screenings:  Service Recommendations Previous Testing/Comments   Colorectal Cancer Screening  * Colonoscopy    * Fecal Occult Blood Test (FOBT)/Fecal Immunochemical Test (FIT)  * Fecal DNA/Cologuard Test  * Flexible Sigmoidoscopy Age: 54-65 years old   Colonoscopy: every 10 years (may be performed more frequently if at higher risk)  OR  FOBT/FIT: every 1 year  OR  Cologuard: every 3 years  OR  Sigmoidoscopy: every 5 years  Screening may be recommended earlier than age 48 if at higher risk for colorectal cancer  Also, an individualized decision between you and your healthcare provider will decide whether screening between the ages of 74-80 would be appropriate  Colonoscopy: Not on file  FOBT/FIT: Not on file  Cologuard: Not on file  Sigmoidoscopy: Not on file          Breast Cancer Screening Age: 36 years old  Frequency: every 1-2 years  Not required if history of left and right mastectomy Mammogram: 05/03/2016    History Breast Cancer   Cervical Cancer Screening Between the ages of 21-29, pap smear recommended once every 3 years  Between the ages of 33-67, can perform pap smear with HPV co-testing every 5 years     Recommendations may differ for women with a history of total hysterectomy, cervical cancer, or abnormal pap smears in past  Pap Smear: Not on file    History Cervical Cancer   Hepatitis C Screening Once for adults born between [facility]  More frequently in patients at high risk for Hepatitis C Hep C Antibody: Not on file        Diabetes Screening 1-2 times per year if you're at risk for diabetes or have pre-diabetes Fasting glucose: 91 mg/dL   A1C: 5 6 %    Screening Not Indicated  History Diabetes   Cholesterol Screening Once every 5 years if you don't have a lipid disorder  May order more often based on risk factors  Lipid panel: 04/01/2021    Screening Not Indicated  History Lipid Disorder     Other Preventive Screenings Covered by Medicare:  1  Abdominal Aortic Aneurysm (AAA) Screening: covered once if your at risk  You're considered to be at risk if you have a family history of AAA  2  Lung Cancer Screening: covers low dose CT scan once per year if you meet all of the following conditions: (1) Age 50-69; (2) No signs or symptoms of lung cancer; (3) Current smoker or have quit smoking within the last 15 years; (4) You have a tobacco smoking history of at least 30 pack years (packs per day multiplied by number of years you smoked); (5) You get a written order from a healthcare provider  3  Glaucoma Screening: covered annually if you're considered high risk: (1) You have diabetes OR (2) Family history of glaucoma OR (3)  aged 48 and older OR (3)  American aged 72 and older  3  Osteoporosis Screening: covered every 2 years if you meet one of the following conditions: (1) You're estrogen deficient and at risk for osteoporosis based off medical history and other findings; (2) Have a vertebral abnormality; (3) On glucocorticoid therapy for more than 3 months; (4) Have primary hyperparathyroidism; (5) On osteoporosis medications and need to assess response to drug therapy  · Last bone density test (DXA Scan): 09/13/2021   5  HIV Screening: covered annually if you're between the age of 15-65  Also covered annually if you are younger than 13 and older than 72 with risk factors for HIV infection   For pregnant patients, it is covered up to 3 times per pregnancy  Immunizations:  Immunization Recommendations   Influenza Vaccine Annual influenza vaccination during flu season is recommended for all persons aged >= 6 months who do not have contraindications   Pneumococcal Vaccine (Prevnar and Pneumovax)  * Prevnar = PCV13  * Pneumovax = PPSV23   Adults 25-60 years old: 1-3 doses may be recommended based on certain risk factors  Adults 72 years old: Prevnar (PCV13) vaccine recommended followed by Pneumovax (PPSV23) vaccine  If already received PPSV23 since turning 65, then PCV13 recommended at least one year after PPSV23 dose  Hepatitis B Vaccine 3 dose series if at intermediate or high risk (ex: diabetes, end stage renal disease, liver disease)   Tetanus (Td) Vaccine - COST NOT COVERED BY MEDICARE PART B Following completion of primary series, a booster dose should be given every 10 years to maintain immunity against tetanus  Td may also be given as tetanus wound prophylaxis  Tdap Vaccine - COST NOT COVERED BY MEDICARE PART B Recommended at least once for all adults  For pregnant patients, recommended with each pregnancy  Shingles Vaccine (Shingrix) - COST NOT COVERED BY MEDICARE PART B  2 shot series recommended in those aged 48 and above     Health Maintenance Due:      Topic Date Due    Hepatitis C Screening  Never done    HIV Screening  Never done    Breast Cancer Screening: Mammogram  01/28/2021    DXA SCAN  09/13/2023     Immunizations Due:      Topic Date Due    Influenza Vaccine (Season Ended) 09/01/2022     Advance Directives   What are advance directives? Advance directives are legal documents that state your wishes and plans for medical care  These plans are made ahead of time in case you lose your ability to make decisions for yourself  Advance directives can apply to any medical decision, such as the treatments you want, and if you want to donate organs  What are the types of advance directives?   There are many types of advance directives, and each state has rules about how to use them  You may choose a combination of any of the following:  · Living will: This is a written record of the treatment you want  You can also choose which treatments you do not want, which to limit, and which to stop at a certain time  This includes surgery, medicine, IV fluid, and tube feedings  · Durable power of  for healthcare [facility]): This is a written record that states who you want to make healthcare choices for you when you are unable to make them for yourself  This person, called a proxy, is usually a family member or a friend  You may choose more than 1 proxy  · Do not resuscitate (DNR) order:  A DNR order is used in case your heart stops beating or you stop breathing  It is a request not to have certain forms of treatment, such as CPR  A DNR order may be included in other types of advance directives  · Medical directive: This covers the care that you want if you are in a coma, near death, or unable to make decisions for yourself  You can list the treatments you want for each condition  Treatment may include pain medicine, surgery, blood transfusions, dialysis, IV or tube feedings, and a ventilator (breathing machine)  · Values history: This document has questions about your views, beliefs, and how you feel and think about life  This information can help others choose the care that you would choose  Why are advance directives important? An advance directive helps you control your care  Although spoken wishes may be used, it is better to have your wishes written down  Spoken wishes can be misunderstood, or not followed  Treatments may be given even if you do not want them  An advance directive may make it easier for your family to make difficult choices about your care  Urinary Incontinence   Urinary incontinence (UI)  is when you lose control of your bladder  UI develops because your bladder cannot store or empty urine properly   The 3 most common types of UI are stress incontinence, urge incontinence, or both  Medicines:   · May be given to help strengthen your bladder control  Report any side effects of medication to your healthcare provider  Do pelvic muscle exercises often:  Your pelvic muscles help you stop urinating  Squeeze these muscles tight for 5 seconds, then relax for 5 seconds  Gradually work up to squeezing for 10 seconds  Do 3 sets of 15 repetitions a day, or as directed  This will help strengthen your pelvic muscles and improve bladder control  Train your bladder:  Go to the bathroom at set times, such as every 2 hours, even if you do not feel the urge to go  You can also try to hold your urine when you feel the urge to go  For example, hold your urine for 5 minutes when you feel the urge to go  As that becomes easier, hold your urine for 10 minutes  Self-care:   · Keep a UI record  Write down how often you leak urine and how much you leak  Make a note of what you were doing when you leaked urine  · Drink liquids as directed  You may need to limit the amount of liquid you drink to help control your urine leakage  Do not drink any liquid right before you go to bed  Limit or do not have drinks that contain caffeine or alcohol  · Prevent constipation  Eat a variety of high-fiber foods  Good examples are high-fiber cereals, beans, vegetables, and whole-grain breads  Walking is the best way to trigger your intestines to have a bowel movement  · Exercise regularly and maintain a healthy weight  Weight loss and exercise will decrease pressure on your bladder and help you control your leakage  · Use a catheter as directed  to help empty your bladder  A catheter is a tiny, plastic tube that is put into your bladder to drain your urine  · Go to behavior therapy as directed  Behavior therapy may be used to help you learn to control your urge to urinate      Weight Management   Why it is important to manage your weight: Being overweight increases your risk of health conditions such as heart disease, high blood pressure, type 2 diabetes, and certain types of cancer  It can also increase your risk for osteoarthritis, sleep apnea, and other respiratory problems  Aim for a slow, steady weight loss  Even a small amount of weight loss can lower your risk of health problems  How to lose weight safely:  A safe and healthy way to lose weight is to eat fewer calories and get regular exercise  You can lose up about 1 pound a week by decreasing the number of calories you eat by 500 calories each day  Healthy meal plan for weight management:  A healthy meal plan includes a variety of foods, contains fewer calories, and helps you stay healthy  A healthy meal plan includes the following:  · Eat whole-grain foods more often  A healthy meal plan should contain fiber  Fiber is the part of grains, fruits, and vegetables that is not broken down by your body  Whole-grain foods are healthy and provide extra fiber in your diet  Some examples of whole-grain foods are whole-wheat breads and pastas, oatmeal, brown rice, and bulgur  · Eat a variety of vegetables every day  Include dark, leafy greens such as spinach, kale, radha greens, and mustard greens  Eat yellow and orange vegetables such as carrots, sweet potatoes, and winter squash  · Eat a variety of fruits every day  Choose fresh or canned fruit (canned in its own juice or light syrup) instead of juice  Fruit juice has very little or no fiber  · Eat low-fat dairy foods  Drink fat-free (skim) milk or 1% milk  Eat fat-free yogurt and low-fat cottage cheese  Try low-fat cheeses such as mozzarella and other reduced-fat cheeses  · Choose meat and other protein foods that are low in fat  Choose beans or other legumes such as split peas or lentils  Choose fish, skinless poultry (chicken or turkey), or lean cuts of red meat (beef or pork)   Before you cook meat or poultry, cut off any visible fat    · Use less fat and oil  Try baking foods instead of frying them  Add less fat, such as margarine, sour cream, regular salad dressing and mayonnaise to foods  Eat fewer high-fat foods  Some examples of high-fat foods include french fries, doughnuts, ice cream, and cakes  · Eat fewer sweets  Limit foods and drinks that are high in sugar  This includes candy, cookies, regular soda, and sweetened drinks  Exercise:  Exercise at least 30 minutes per day on most days of the week  Some examples of exercise include walking, biking, dancing, and swimming  You can also fit in more physical activity by taking the stairs instead of the elevator or parking farther away from stores  Ask your healthcare provider about the best exercise plan for you  Narcotic (Opioid) Safety    Use narcotics safely:  · Take prescribed narcotics exactly as directed  · Do not give narcotics to others or take narcotics that belong to someone else  · Do not mix narcotics without medicines or alcohol  · Do not drive or operate heavy machinery after you take the narcotic  · Monitor for side effects and notify your healthcare provider if you experienced side effects such as nausea, sleepiness, itching, or trouble thinking clearly  Manage constipation:    Constipation is the most common side effect of narcotic medicine  Constipation is when you have hard, dry bowel movements, or you go longer than usual between bowel movements  Tell your healthcare provider about all changes in your bowel movements while you are taking narcotics  He or she may recommend laxative medicine to help you have a bowel movement  He or she may also change the kind of narcotic you are taking, or change when you take it  The following are more ways you can prevent or relieve constipation:    · Drink liquids as directed  You may need to drink extra liquids to help soften and move your bowels   Ask how much liquid to drink each day and which liquids are best for you   · Eat high-fiber foods  This may help decrease constipation by adding bulk to your bowel movements  High-fiber foods include fruits, vegetables, whole-grain breads and cereals, and beans  Your healthcare provider or dietitian can help you create a high-fiber meal plan  Your provider may also recommend a fiber supplement if you cannot get enough fiber from food  · Exercise regularly  Regular physical activity can help stimulate your intestines  Walking is a good exercise to prevent or relieve constipation  Ask which exercises are best for you  · Schedule a time each day to have a bowel movement  This may help train your body to have regular bowel movements  Bend forward while you are on the toilet to help move the bowel movement out  Sit on the toilet for at least 10 minutes, even if you do not have a bowel movement  Store narcotics safely:   · Store narcotics where others cannot easily get them  Keep them in a locked cabinet or secure area  Do not  keep them in a purse or other bag you carry with you  A person may be looking for something else and find the narcotics  · Make sure narcotics are stored out of the reach of children  A child can easily overdose on narcotics  Narcotics may look like candy to a small child  The best way to dispose of narcotics: The laws vary by country and area  In the [place], the best way is to return the narcotics through a take-back program  This program is offered by the [employer])  The following are options for using the program:  · Take the narcotics to a RUBI collection site  The site is often a law enforcement center  Call your local law enforcement center for scheduled take-back days in your area  You will be given information on where to go if the collection site is in a different location  · Take the narcotics to an approved pharmacy or hospital   A pharmacy or hospital may be set up as a collection site   You will need to ask if it is a RUBI collection site if you were not directed there  A pharmacy or doctor's office may not be able to take back narcotics unless it is a RUBI site  · Use a mail-back system  This means you are given containers to put the narcotics into  You will then mail them in the containers  · Use a take-back drop box  This is a place to leave the narcotics at any time  People and animals will not be able to get into the box  Your local law enforcement agency can tell you where to find a drop box in your area  Other ways to manage pain:   · Ask your healthcare provider about non-narcotic medicines to control pain  Nonprescription medicines include NSAIDs (such as ibuprofen) and acetaminophen  Prescription medicines include muscle relaxers, antidepressants, and steroids  · Pain may be managed without any medicines  Some ways to relieve pain include massage, aromatherapy, or meditation  Physical or occupational therapy may also help  For more information:   · Drug Enforcement Administration  [address] [place] [address] [address]  Phone: 4- 934 - [phone]  Web Address: [facility]/drug_disposal/    · [address] and Drug Administration  [place] [facility] , [address] [facility]  Phone: 6- 983 - [phone]  Web Address: http://[employer]/     © Copyright [employer] 2018 Information is for End User's use only and may not be sold, redistributed or otherwise used for commercial purposes   All illustrations and images included in CareNotes® are the copyrighted property of [employer]  or [address] [place], [place] [address]pe

## 2022-06-13 NOTE — TELEPHONE ENCOUNTER
Nathaly called from [address] called to schedule a diagnostic mammogram  You did order a screening mammo and also diagnostic mammo  She is asking why diagnostic and not screening

## 2022-06-14 ENCOUNTER — VBI (OUTPATIENT)
Dept: ADMINISTRATIVE | Facility: OTHER | Age: 42
End: 2022-06-14

## 2022-06-15 ENCOUNTER — ANESTHESIA (OUTPATIENT)
Dept: GASTROENTEROLOGY | Facility: HOSPITAL | Age: 42
End: 2022-06-15

## 2022-06-15 ENCOUNTER — ANESTHESIA EVENT (OUTPATIENT)
Dept: GASTROENTEROLOGY | Facility: HOSPITAL | Age: 42
End: 2022-06-15

## 2022-06-15 ENCOUNTER — APPOINTMENT (OUTPATIENT)
Dept: LAB | Facility: HOSPITAL | Age: 42
End: 2022-06-15
Payer: COMMERCIAL

## 2022-06-15 ENCOUNTER — TELEPHONE (OUTPATIENT)
Dept: GASTROENTEROLOGY | Facility: CLINIC | Age: 42
End: 2022-06-15

## 2022-06-15 ENCOUNTER — HOSPITAL ENCOUNTER (OUTPATIENT)
Dept: GASTROENTEROLOGY | Facility: HOSPITAL | Age: 42
Setting detail: OUTPATIENT SURGERY
Discharge: HOME | End: 2022-06-15
Attending: INTERNAL MEDICINE | Admitting: INTERNAL MEDICINE
Payer: COMMERCIAL

## 2022-06-15 VITALS
OXYGEN SATURATION: 97 % | DIASTOLIC BLOOD PRESSURE: 83 MMHG | HEART RATE: 75 BPM | TEMPERATURE: 97.4 F | RESPIRATION RATE: 20 BRPM | SYSTOLIC BLOOD PRESSURE: 132 MMHG

## 2022-06-15 DIAGNOSIS — Z11.59 NEED FOR HEPATITIS C SCREENING TEST: ICD-10-CM

## 2022-06-15 DIAGNOSIS — E03.9 HYPOTHYROIDISM, UNSPECIFIED TYPE: ICD-10-CM

## 2022-06-15 DIAGNOSIS — K22.89 MASS OF ESOPHAGUS: ICD-10-CM

## 2022-06-15 DIAGNOSIS — K21.00 GASTROESOPHAGEAL REFLUX DISEASE WITH ESOPHAGITIS WITHOUT HEMORRHAGE: Primary | ICD-10-CM

## 2022-06-15 LAB
HCV AB SER QL: NORMAL
TSH SERPL DL<=0.05 MIU/L-ACNC: 5.73 UIU/ML (ref 0.45–4.5)

## 2022-06-15 PROCEDURE — 88305 TISSUE EXAM BY PATHOLOGIST: CPT | Performed by: PATHOLOGY

## 2022-06-15 PROCEDURE — 43239 EGD BIOPSY SINGLE/MULTIPLE: CPT | Performed by: INTERNAL MEDICINE

## 2022-06-15 PROCEDURE — 88313 SPECIAL STAINS GROUP 2: CPT | Performed by: PATHOLOGY

## 2022-06-15 PROCEDURE — 86803 HEPATITIS C AB TEST: CPT

## 2022-06-15 PROCEDURE — 84443 ASSAY THYROID STIM HORMONE: CPT

## 2022-06-15 PROCEDURE — 36415 COLL VENOUS BLD VENIPUNCTURE: CPT

## 2022-06-15 RX ORDER — FAMOTIDINE 40 MG/1
40 TABLET, FILM COATED ORAL
Qty: 30 TABLET | Refills: 5 | Status: SHIPPED | OUTPATIENT
Start: 2022-06-15 | End: 2022-07-15

## 2022-06-15 RX ORDER — PROPOFOL 10 MG/ML
INJECTION, EMULSION INTRAVENOUS AS NEEDED
Status: DISCONTINUED | OUTPATIENT
Start: 2022-06-15 | End: 2022-06-15

## 2022-06-15 RX ORDER — SODIUM CHLORIDE, SODIUM LACTATE, POTASSIUM CHLORIDE, CALCIUM CHLORIDE 600; 310; 30; 20 MG/100ML; MG/100ML; MG/100ML; MG/100ML
INJECTION, SOLUTION INTRAVENOUS CONTINUOUS PRN
Status: DISCONTINUED | OUTPATIENT
Start: 2022-06-15 | End: 2022-06-15

## 2022-06-15 RX ORDER — LEVOTHYROXINE SODIUM 88 UG/1
88 TABLET ORAL DAILY
Qty: 30 TABLET | Refills: 1 | Status: SHIPPED | OUTPATIENT
Start: 2022-06-15 | End: 2022-08-07

## 2022-06-15 RX ADMIN — PROPOFOL 50 MG: 10 INJECTION, EMULSION INTRAVENOUS at 09:18

## 2022-06-15 RX ADMIN — PROPOFOL 150 MG: 10 INJECTION, EMULSION INTRAVENOUS at 09:14

## 2022-06-15 RX ADMIN — SODIUM CHLORIDE, SODIUM LACTATE, POTASSIUM CHLORIDE, AND CALCIUM CHLORIDE: .6; .31; .03; .02 INJECTION, SOLUTION INTRAVENOUS at 08:50

## 2022-06-15 NOTE — ANESTHESIA PREPROCEDURE EVALUATION
Procedure:  EGD    Past Medical History:   Diagnosis Date    Allergy     Anxiety     Arthritis     [facility]     Candidiasis, cutaneous     Cervical cancer ([facility] )     Depression     Diabetes mellitus ([facility] )     Disease of thyroid gland     hypothyroid    Disorder of urinary tract     Fatty liver     Hemorrhoids     History of chemotherapy     History of radiation therapy     Influenza A     Obesity     PONV (postoperative nausea and vomiting)     Port-A-Cath in place          Physical Exam    Airway    Mallampati score: III  TM Distance: >3 FB  Neck ROM: full     Dental   No notable dental hx     Cardiovascular  Rhythm: regular, Rate: normal,     Pulmonary  Breath sounds clear to auscultation,     Other Findings  Intercisor Distance > 3cm          Anesthesia Plan  ASA Score- 3     Anesthesia Type- IV sedation with anesthesia with ASA Monitors  Additional Monitors:   Airway Plan:     Comment: NPO appropriate  Discussed benefits/risks of monitored anesthetic care and discussed providing a dynamic level of mild to deep sedation  Complications include awareness, airway obstruction, aspiration which may necessitate conversion to general anesthesia  All questions answered  Patient understands and wishes to proceed          Plan Factors-Exercise tolerance (METS): >4 METS  Chart reviewed  EKG reviewed  Existing labs reviewed  Induction-     Postoperative Plan- Plan for postoperative opioid use  Informed Consent- Anesthetic plan and risks discussed with patient  I personally reviewed this patient with the CRNA  Discussed and agreed on the Anesthesia Plan with the CRNA  Preston Juarez

## 2022-06-15 NOTE — TELEPHONE ENCOUNTER
Patient with ongoing problems with GERD and chest pain  Seems to be worse at night  Told her to take her omeprazole prior to the evening meal   She should take famotidine 40 mg in the late evening or before bed  Will phone in prescription  Please inform patient    Thank you also head of the bed at at least 30°, avoid late-night meals, and send GERD diet follow-up in the office 1-2 months

## 2022-06-15 NOTE — ANESTHESIA POSTPROCEDURE EVALUATION
Post-Op Assessment Note    CV Status:  Stable  Pain Score: 0    Pain management: adequate     Mental Status:  Awake   Hydration Status:  Stable   PONV Controlled:  None   Airway Patency:  Patent      Post Op Vitals Reviewed: Yes      Staff: CRNA         No complications documented  /73 (Simultaneous filing  User may not have seen previous data ) (06/15/22 0928)    Temp (!) 97 4 °F (36 3 °C) (06/15/22 0928)    Pulse 97 (Simultaneous filing  User may not have seen previous data ) (06/15/22 0928)   Resp 20 (Simultaneous filing   User may not have seen previous data ) (06/15/22 0928)    SpO2 97 % (06/15/22 0928)

## 2022-06-15 NOTE — H&P
History and Physical -  [facility]  Bertha Dodson Deputy 40 y o  female MRN: [id number]    HPI: Sekou Phillips is a 40y o  year old female who presents for upper endoscopy  She has a history of increasing GERD symptoms    REVIEW OF SYSTEMS: Per the HPI, and otherwise unremarkable      Historical Information   Past Medical History:   Diagnosis Date    Allergy     Anxiety     Arthritis     Breast cancer ([facility] )     Candidiasis, cutaneous     Cervical cancer ([facility] )     Depression     Diabetes mellitus ([facility] )     Disease of thyroid gland     hypothyroid    Disorder of urinary tract     Fatty liver     Hemorrhoids     History of chemotherapy     History of radiation therapy     Influenza A     Obesity     PONV (postoperative nausea and vomiting)     Port-A-Cath in place      Past Surgical History:   Procedure Laterality Date    BILATERAL SALPINGOOPHORECTOMY      BREAST BIOPSY      needle core    GASTRECTOMY SLEEVE LAPAROSCOPIC      Onset: 1/26/15  Dr Sam Barfield  2014    IR PORT REMOVAL  2019    MASTECTOMY Left 2013    Lymph nodes removed     Social History   Social History     Substance and Sexual Activity   Alcohol Use No     Social History     Substance and Sexual Activity   Drug Use Never     Social History     Tobacco Use   Smoking Status Former Smoker    Types: Cigarettes    Quit date: 2013    Years since quittin 2   Smokeless Tobacco Never Used     Family History   Problem Relation Age of Onset    Diabetes Mother     Diabetes Father     Ovarian cancer Sister 29    Hypertension Maternal Grandfather     Hodgkin's lymphoma Paternal Grandmother     Breast cancer Family     Diabetes Family     Hypertension Family     Uterine cancer Maternal Aunt 64    Leukemia Son         chronic myeloid       Meds/Allergies       Current Outpatient Medications:     Abemaciclib (Verzenio) 150 MG TABS    fulvestrant (FASLODEX) 250 mg/5 mL   levothyroxine 75 mcg tablet    omeprazole (PriLOSEC) 40 MG capsule    simvastatin (ZOCOR) 20 mg tablet    ALPRAZolam (XANAX) 0 25 mg tablet    AURELIO MICROLET LANCETS lancets    Blood Glucose Monitoring Suppl ([employer] CONTOUR MONITOR) w/Device KIT    calcium-vitamin D (OSCAL 500 + D) 500 mg-200 units per tablet    cyclobenzaprine (FLEXERIL) 10 mg tablet    denosumab (PROLIA) 60 mg/mL    Denosumab (XGEVA SC)    diclofenac (VOLTAREN) 75 mg EC tablet    diclofenac sodium (VOLTAREN) 1 %    ergocalciferol (VITAMIN D2) 50,000 units    Glucose Blood (AURELIO CONTOUR TEST VI)    ibuprofen (MOTRIN) 600 mg tablet    methylPREDNISolone 4 MG tablet therapy pack    nystatin (MYCOSTATIN) powder    Sodium Fluoride 1 1 % PSTE    traMADol (ULTRAM) 50 mg tablet    Allergies   Allergen Reactions    Paclitaxel Anaphylaxis     Pt states she needs a steroid before she can take this medication  Objective     BP (!) 141/101   Pulse 80   Resp 16   SpO2 96%     PHYSICAL EXAM    Gen: NAD AAOx3  Head: Normocephalic, Atraumatic  CV: S1S2 RRR no m/r/g  CHEST: Clear b/l no c/r/w  ABD: soft, +BS NT/ND  EXT: no edema    ASSESSMENT/PLAN:  This is a 40y o  year old female here for upper endoscopy and she is stable and optimized for her procedure

## 2022-06-16 NOTE — TELEPHONE ENCOUNTER
Spoke with patient and relayed MANUELITO's message  Advised patient the GERD diet was sent to her via [address]

## 2022-06-27 ENCOUNTER — TELEPHONE (OUTPATIENT)
Dept: GASTROENTEROLOGY | Facility: CLINIC | Age: 42
End: 2022-06-27

## 2022-06-27 NOTE — TELEPHONE ENCOUNTER
Zeenat Barrios [phone]) from weight management office called  States patient was told based on EGD needs to see Dr Moni Grajeda (bariatic surgeon)  Please advise

## 2022-06-27 NOTE — TELEPHONE ENCOUNTER
I spoke with patient and advised that I do not see that we have referred her to Dr Willie Gonzalez office  I attempted to call number provided for [facility] but no answer and not able to leave message  She does state that he previously performed her gastric sleeve  She would like a call back from you to discuss path results especially the gastric nodule noted  [phone]

## 2022-06-27 NOTE — TELEPHONE ENCOUNTER
Patient called stating that Dr Federico Rain office said our office would need to call them  She states she has received now results from her EGD and would like to talk to someone

## 2022-06-28 NOTE — RESULT ENCOUNTER NOTE
I called the patient and reviewed biopsy results  Some mild chronic inflammation EG junction  Nodular area in the antrum no major pathology  She had concerns about the pathology comment that for the samples could be obtained if indicated  I do not really think we need to do any more repeat biopsies or sampling of that small lesion  No recall EGD    Thank you

## 2022-06-28 NOTE — TELEPHONE ENCOUNTER
I called bariatric surg office at [phone]  No answer, left msg to have [address] call back in regards to her previous call  Attempt was made on phone # she left but cannot leave message on that line

## 2022-06-28 NOTE — TELEPHONE ENCOUNTER
Call from bariatric office, Maritza Marin is not in today but will return to office 6/29/22  I reviewed that we did not refer pt to their office but her EGD report and path results, along with Paolo Palm result note are available to review  Patient is scheduled in their office for follow up 721/22  If further info is required Jessica may call back

## 2022-06-30 ENCOUNTER — HOSPITAL ENCOUNTER (OUTPATIENT)
Dept: MAMMOGRAPHY | Facility: HOSPITAL | Age: 42
Discharge: HOME | End: 2022-06-30
Attending: INTERNAL MEDICINE
Payer: COMMERCIAL

## 2022-06-30 VITALS — WEIGHT: 222.66 LBS | HEIGHT: 62 IN | BODY MASS INDEX: 40.98 KG/M2

## 2022-06-30 DIAGNOSIS — C50.912 MALIGNANT NEOPLASM OF LEFT FEMALE BREAST, UNSPECIFIED ESTROGEN RECEPTOR STATUS, UNSPECIFIED SITE OF BREAST (HCC): ICD-10-CM

## 2022-06-30 DIAGNOSIS — Z90.12 HISTORY OF LEFT MASTECTOMY: ICD-10-CM

## 2022-06-30 PROCEDURE — 77065 DX MAMMO INCL CAD UNI: CPT

## 2022-06-30 PROCEDURE — G0279 TOMOSYNTHESIS, MAMMO: HCPCS

## 2022-07-05 DIAGNOSIS — E55.9 VITAMIN D DEFICIENCY: ICD-10-CM

## 2022-07-05 RX ORDER — ERGOCALCIFEROL 1.25 MG/1
CAPSULE ORAL
Qty: 12 CAPSULE | Refills: 0 | Status: SHIPPED | OUTPATIENT
Start: 2022-07-05 | End: 2022-09-30

## 2022-07-07 ENCOUNTER — OFFICE VISIT (OUTPATIENT)
Dept: DENTISTRY | Facility: CLINIC | Age: 42
End: 2022-07-07
Payer: COMMERCIAL

## 2022-07-07 VITALS — SYSTOLIC BLOOD PRESSURE: 122 MMHG | DIASTOLIC BLOOD PRESSURE: 82 MMHG

## 2022-07-07 DIAGNOSIS — K02.9 TOOTH DECAY: Primary | ICD-10-CM

## 2022-07-07 PROCEDURE — D2392 RESIN-BASED COMPOSITE - 2 SURFACES, POSTERIOR: HCPCS | Performed by: STUDENT IN AN ORGANIZED HEALTH CARE EDUCATION/TRAINING PROGRAM

## 2022-07-07 NOTE — PROGRESS NOTES
Composite Filling    Bertha Beckett presents for composite filling  PMH reviewed, no changes  Discussed with patient need for RCT if pulp exposure occurs or in future if pulp is inflamed  Pt understands and consents  Applied topical benzocaine, administered 1 carps 2% lido 1:100k epi via IANB  and long buccal     Prepped tooth #31, 30, 28 with 556 carbide on high speed  Caries removed with round carbide on slow speed  Placed sectional matrix  Isolation with cotton rolls and dri-angles    Etch with 37% H2PO4, rinse, dry  Applied Adhese with 20 second scrub once, gentle air dry and light cured for 10s  Restored with Tetric bulk marita shade A2 and light cured  Refined with finishing burs, polished with enhance point  Verified occlusion and contacts  Pt left satisfied        NV: stacy

## 2022-07-11 DIAGNOSIS — E78.5 HYPERLIPIDEMIA, UNSPECIFIED HYPERLIPIDEMIA TYPE: ICD-10-CM

## 2022-07-11 RX ORDER — SIMVASTATIN 20 MG
TABLET ORAL
Qty: 90 TABLET | Refills: 0 | Status: SHIPPED | OUTPATIENT
Start: 2022-07-11 | End: 2022-10-06

## 2022-07-15 ENCOUNTER — TELEMEDICINE (OUTPATIENT)
Dept: INTERNAL MEDICINE CLINIC | Facility: CLINIC | Age: 42
End: 2022-07-15
Payer: COMMERCIAL

## 2022-07-15 VITALS
HEIGHT: 62 IN | HEART RATE: 107 BPM | TEMPERATURE: 97.3 F | OXYGEN SATURATION: 96 % | BODY MASS INDEX: 40.85 KG/M2 | WEIGHT: 222 LBS

## 2022-07-15 DIAGNOSIS — J06.9 VIRAL UPPER RESPIRATORY TRACT INFECTION: Primary | ICD-10-CM

## 2022-07-15 PROCEDURE — 99213 OFFICE O/P EST LOW 20 MIN: CPT | Performed by: INTERNAL MEDICINE

## 2022-07-15 RX ORDER — NAPROXEN 500 MG/1
500 TABLET ORAL 2 TIMES DAILY PRN
Qty: 28 TABLET | Refills: 0 | Status: SHIPPED | OUTPATIENT
Start: 2022-07-15 | End: 2022-07-29

## 2022-07-15 RX ORDER — BENZONATATE 200 MG/1
200 CAPSULE ORAL 3 TIMES DAILY PRN
Qty: 20 CAPSULE | Refills: 0 | Status: SHIPPED | OUTPATIENT
Start: 2022-07-15

## 2022-07-15 RX ORDER — PENTOXIFYLLINE 400 MG/1
400 TABLET, EXTENDED RELEASE ORAL 2 TIMES DAILY
COMMUNITY
Start: 2022-06-22 | End: 2022-08-21

## 2022-07-15 RX ORDER — VITAMIN E 268 MG
400 CAPSULE ORAL 2 TIMES DAILY
COMMUNITY
Start: 2022-06-22 | End: 2022-08-21

## 2022-07-15 NOTE — PROGRESS NOTES
COVID-19 Outpatient Progress Note    Assessment/Plan:    Problem List Items Addressed This Visit    None     Visit Diagnoses     Viral upper respiratory tract infection    -  Primary    Relevant Medications    benzonatate (TESSALON) 200 MG capsule    naproxen (NAPROSYN) 500 mg tablet         Disposition:     Recommended patient to come to the office to test for COVID-19  Patient is fully vaccinated and is not yet due for their booster shot  According to CDC guidelines, quarantine is not required after close contact exposure and they were advised to wear a mask for 10 days after the exposure  If patient were to develop symptoms, they should immediately self isolate and call our office for further guidance  Discussed symptom directed medication options with patient  I have spent 15 minutes directly with the patient  Encounter provider Lucy Lopes DO    Provider located at [address] [place] [address] [address] [place] [id number]    Recent Visits  No visits were found meeting these conditions  Showing recent visits within past 7 days and meeting all other requirements  Today's Visits  Date Type Provider Dept   07/15/22 Telemedicine Lucy Lopes DO  [place] today's visits and meeting all other requirements  Future Appointments  No visits were found meeting these conditions  Showing future appointments within next 150 days and meeting all other requirements     This virtual check-in was done via [address] and patient was informed that this is a secure, HIPAA-compliant platform  She agrees to proceed  Patient agrees to participate in a virtual check in via telephone or video visit instead of presenting to the office to address urgent/immediate medical needs  Patient is aware this is a billable service  After connecting through [facility], the patient was identified by name and date of birth   Jayce Delacruz was informed that this was a telemedicine visit and that the exam was being conducted confidentially over secure lines  Vazquez Mills acknowledged consent and understanding of privacy and security of the telemedicine visit  I informed the patient that I have reviewed her record in Epic and presented the opportunity for her to ask any questions regarding the visit today  The patient agreed to participate  Verification of patient location:  Patient is located in the following state in which I hold an active license: [place]    Subjective:   Vazquez Mills is a 40 y o  female who is concerned about COVID-19   Patient's symptoms include fever, fatigue, nasal congestion, rhinorrhea, cough, shortness of breath, nausea, vomiting, diarrhea and headache      - Date of symptom onset: 7/9/2022      COVID-19 vaccination status: Fully vaccinated with booster    Lab Results   Component Value Date    SARSCOV2 Negative 01/07/2022    SARSCOV2 Lumiradx Sars-Cov-2 AG Test 07/23/2021    SARSCOV2 Negative 02/28/2021     Past Medical History:   Diagnosis Date    Allergy     Anxiety     Arthritis     BRCA negative     Breast cancer ([facility] )     left mastectomy 2013    Candidiasis, cutaneous     Cervical cancer ([facility]     Depression     Diabetes mellitus ([facility]     Disease of thyroid gland     hypothyroid    Disorder of urinary tract     Fatty liver     Hemorrhoids     History of chemotherapy     History of radiation therapy     Influenza A     Obesity     PONV (postoperative nausea and vomiting)     Port-A-Cath in place      Past Surgical History:   Procedure Laterality Date    BILATERAL SALPINGOOPHORECTOMY      BREAST BIOPSY      needle core    GASTRECTOMY SLEEVE LAPAROSCOPIC      Onset: 1/26/15  Dr Sreedhar Vance  04/07/2014    IR PORT REMOVAL  11/21/2019    MASTECTOMY Left 12/19/2013    Lymph nodes removed     Current Outpatient Medications   Medication Sig Dispense Refill    Abemaciclib (Verzenio) 150 MG TABS Take 150 mg by mouth 2 (two) times a day 60 tablet 0    ALPRAZolam (XANAX) 0 25 mg tablet Take 0 25 mg by mouth daily at bedtime as needed       AURELIO MICROLET LANCETS lancets by Does not apply route daily      benzonatate (TESSALON) 200 MG capsule Take 1 capsule (200 mg total) by mouth 3 (three) times a day as needed for cough 20 capsule 0    Blood Glucose Monitoring Suppl ([employer] CONTOUR MONITOR) w/Device KIT by Does not apply route      calcium-vitamin D (OSCAL 500 + D) 500 mg-200 units per tablet Take 1 tablet by mouth daily        cyclobenzaprine (FLEXERIL) 10 mg tablet Take 1 tablet (10 mg total) by mouth 3 (three) times a day as needed for muscle spasms for up to 14 days 42 tablet 0    diclofenac (VOLTAREN) 75 mg EC tablet Take 1 tablet (75 mg total) by mouth 2 (two) times a day as needed (Muscular skeletal pain) 60 tablet 0    diclofenac sodium (VOLTAREN) 1 % Apply 1 application topically daily as needed       ergocalciferol (VITAMIN D2) 50,000 units take 1 capsule by mouth weekly 12 capsule 0    famotidine (PEPCID) 40 MG tablet Take 1 tablet (40 mg total) by mouth daily at bedtime as needed for heartburn 30 tablet 5    fulvestrant (FASLODEX) 250 mg/5 mL 500 mg by Intramuscular (multi inj) route once      Glucose Blood (AURELIO CONTOUR TEST VI) by In Vitro route daily      ibuprofen (MOTRIN) 600 mg tablet Take 1 tablet (600 mg total) by mouth every 6 (six) hours as needed for mild pain 30 tablet 0    levothyroxine 88 mcg tablet Take 1 tablet (88 mcg total) by mouth daily 30 tablet 1    naproxen (NAPROSYN) 500 mg tablet Take 1 tablet (500 mg total) by mouth 2 (two) times a day as needed (Muscular skeletal pain) for up to 14 days 28 tablet 0    nystatin (MYCOSTATIN) powder Apply 1 application topically 2 (two) times a day Abdominal fold 15 g 0    omeprazole (PriLOSEC) 40 MG capsule Take 1 capsule (40 mg total) by mouth daily 90 capsule 1    pentoxifylline (TRENtal) 400 mg ER tablet Take 400 mg by mouth 2 (two) times a day      simvastatin (ZOCOR) 20 mg tablet take 1 tablet by mouth at bedtime 90 tablet 0    Sodium Fluoride 1 1 % PSTE Apply 1 Squirt to teeth daily at bedtime Brush daily at bedtime  Spit, do not rinse  Nothing to eat or drink afterwards  100 mL 0    traMADol (ULTRAM) 50 mg tablet Take 50 mg by mouth      vitamin E, tocopherol, 400 units capsule Take 400 Units by mouth 2 (two) times a day      denosumab (PROLIA) 60 mg/mL Inject 60 mg under the skin once (Patient not taking: No sig reported)      Denosumab (XGEVA SC) Inject under the skin (Patient not taking: No sig reported)      methylPREDNISolone 4 MG tablet therapy pack Use as directed on package (Patient not taking: Reported on 7/15/2022) 21 each 0     No current facility-administered medications for this visit  Allergies   Allergen Reactions    Paclitaxel Anaphylaxis     Pt states she needs a steroid before she can take this medication  Review of Systems   Constitutional: Positive for fatigue and fever  HENT: Positive for congestion and rhinorrhea  Respiratory: Positive for cough and shortness of breath  Gastrointestinal: Positive for diarrhea, nausea and vomiting  Neurological: Positive for headaches  Objective:    Vitals:    07/15/22 0838   Pulse: (!) 107   Temp: (!) 97 3 °F (36 3 °C)   SpO2: 96%   Weight: 101 kg (222 lb)   Height: 5' 2" (1 575 m)       Physical Exam  Vitals and nursing note reviewed  Constitutional:       General: She is not in acute distress  Appearance: She is well-developed  HENT:      Head: Normocephalic and atraumatic  Eyes:      Conjunctiva/sclera: Conjunctivae normal    Pulmonary:      Effort: Pulmonary effort is normal       Breath sounds: Normal breath sounds  Neurological:      Mental Status: She is alert  VIRTUAL VISIT DISCLAIMER    Bertha Beckett verbally agrees to participate in [employer]   Pt is aware that Virtual Care Services could be limited without vital signs or the ability to perform a full hands-on physical exam  Bertha Landers understands she or the provider may request at any time to terminate the video visit and request the patient to seek care or treatment in person

## 2022-07-15 NOTE — PROGRESS NOTES
Virtual Regular Visit    Verification of patient location:    Patient is located in the following state in which I hold an active license { amb virtual patient location:[address]}      Assessment/Plan:    Problem List Items Addressed This Visit    None     Visit Diagnoses     Viral upper respiratory tract infection    -  Primary    Relevant Medications    benzonatate (TESSALON) 200 MG capsule    naproxen (NAPROSYN) 500 mg tablet               Reason for visit is   Chief Complaint   Patient presents with    Virtual Brief Visit     [phone]  Tested COVID (+) home test yesterday  Pt c/o severe sore throat, mucus, cough, nasal congestion, tiredness/fatigue  Pt fully vaccinated   Virtual Regular Visit        Encounter provider Nii Carter DO    Provider located at [address] [phone]      Recent Visits  No visits were found meeting these conditions  Showing recent visits within past 7 days and meeting all other requirements  Today's Visits  Date Type Provider Dept   07/15/22 Telemedicine Nii Carter DO  [facility] today's visits and meeting all other requirements  Future Appointments  No visits were found meeting these conditions  Showing future appointments within next 150 days and meeting all other requirements       The patient was identified by name and date of birth  Britney Rao was informed that this is a telemedicine visit and that the visit is being conducted through {[facility]  {Telemedicine confidentiality :55743} {Telemedicine participants:95784}  She acknowledged consent and understanding of privacy and security of the video platform  The patient has agreed to participate and understands they can discontinue the visit at any time  Patient is aware this is a billable service  Juan Bautista is a 40 y o  female ***         HPI     Past Medical History:   Diagnosis Date    Allergy     Anxiety     Arthritis     BRCA negative     Breast cancer ([facility] )     left mastectomy 2013    Candidiasis, cutaneous     Cervical cancer (HCC)     Depression     Diabetes mellitus ([facility]     Disease of thyroid gland     hypothyroid    Disorder of urinary tract     Fatty liver     Hemorrhoids     History of chemotherapy     History of radiation therapy     Influenza A     Obesity     PONV (postoperative nausea and vomiting)     Port-A-Cath in place        Past Surgical History:   Procedure Laterality Date    BILATERAL SALPINGOOPHORECTOMY      BREAST BIOPSY      needle core    GASTRECTOMY SLEEVE LAPAROSCOPIC      Onset: 1/26/15  Dr Valerie Fregoso  04/07/2014    IR PORT REMOVAL  11/21/2019    MASTECTOMY Left 12/19/2013    Lymph nodes removed       Current Outpatient Medications   Medication Sig Dispense Refill    Abemaciclib (Verzenio) 150 MG TABS Take 150 mg by mouth 2 (two) times a day 60 tablet 0    ALPRAZolam (XANAX) 0 25 mg tablet Take 0 25 mg by mouth daily at bedtime as needed       [employer] MICROLET LANCETS lancets by Does not apply route daily      benzonatate (TESSALON) 200 MG capsule Take 1 capsule (200 mg total) by mouth 3 (three) times a day as needed for cough 20 capsule 0    Blood Glucose Monitoring Suppl ([employer] CONTOUR MONITOR) w/Device KIT by Does not apply route      calcium-vitamin D (OSCAL 500 + D) 500 mg-200 units per tablet Take 1 tablet by mouth daily        cyclobenzaprine (FLEXERIL) 10 mg tablet Take 1 tablet (10 mg total) by mouth 3 (three) times a day as needed for muscle spasms for up to 14 days 42 tablet 0    diclofenac (VOLTAREN) 75 mg EC tablet Take 1 tablet (75 mg total) by mouth 2 (two) times a day as needed (Muscular skeletal pain) 60 tablet 0    diclofenac sodium (VOLTAREN) 1 % Apply 1 application topically daily as needed       ergocalciferol (VITAMIN D2) 50,000 units take 1 capsule by mouth weekly 12 capsule 0    famotidine (PEPCID) 40 MG tablet Take 1 tablet (40 mg total) by mouth daily at bedtime as needed for heartburn 30 tablet 5    fulvestrant (FASLODEX) 250 mg/5 mL 500 mg by Intramuscular (multi inj) route once      Glucose Blood (AURELIO CONTOUR TEST VI) by In Vitro route daily      ibuprofen (MOTRIN) 600 mg tablet Take 1 tablet (600 mg total) by mouth every 6 (six) hours as needed for mild pain 30 tablet 0    levothyroxine 88 mcg tablet Take 1 tablet (88 mcg total) by mouth daily 30 tablet 1    naproxen (NAPROSYN) 500 mg tablet Take 1 tablet (500 mg total) by mouth 2 (two) times a day as needed (Muscular skeletal pain) for up to 14 days 28 tablet 0    nystatin (MYCOSTATIN) powder Apply 1 application topically 2 (two) times a day Abdominal fold 15 g 0    omeprazole (PriLOSEC) 40 MG capsule Take 1 capsule (40 mg total) by mouth daily 90 capsule 1    pentoxifylline (TRENtal) 400 mg ER tablet Take 400 mg by mouth 2 (two) times a day      simvastatin (ZOCOR) 20 mg tablet take 1 tablet by mouth at bedtime 90 tablet 0    Sodium Fluoride 1 1 % PSTE Apply 1 Squirt to teeth daily at bedtime Brush daily at bedtime  Spit, do not rinse  Nothing to eat or drink afterwards  100 mL 0    traMADol (ULTRAM) 50 mg tablet Take 50 mg by mouth      vitamin E, tocopherol, 400 units capsule Take 400 Units by mouth 2 (two) times a day      denosumab (PROLIA) 60 mg/mL Inject 60 mg under the skin once (Patient not taking: No sig reported)      Denosumab (XGEVA SC) Inject under the skin (Patient not taking: No sig reported)      methylPREDNISolone 4 MG tablet therapy pack Use as directed on package (Patient not taking: Reported on 7/15/2022) 21 each 0     No current facility-administered medications for this visit  Allergies   Allergen Reactions    Paclitaxel Anaphylaxis     Pt states she needs a steroid before she can take this medication         Review of Systems    Video Exam    Vitals:    07/15/22 0764 Pulse: (!) 107   Temp: (!) 97 3 °F (36 3 °C)   SpO2: 96%   Weight: 101 kg (222 lb)   Height: 5' 2" (1 575 m)       Physical Exam     {Time Spent:58708}    VIRTUAL VISIT DISCLAIMER      Bertha Beckett verbally agrees to participate in [employer]  Pt is aware that [employer] could be limited without vital signs or the ability to perform a full hands-on physical exam  Bertha Durham understands she or the provider may request at any time to terminate the video visit and request the patient to seek care or treatment in person

## 2022-07-20 ENCOUNTER — TELEPHONE (OUTPATIENT)
Dept: INTERNAL MEDICINE CLINIC | Facility: CLINIC | Age: 42
End: 2022-07-20

## 2022-07-20 NOTE — TELEPHONE ENCOUNTER
Spoke with the pt and explained everything in detail  She is not interested in getting another cough medication

## 2022-07-20 NOTE — TELEPHONE ENCOUNTER
Pt's  Cristiano Zelaya) called nelly because she was prescribed a cough medicine that's not helping her  She tested positive for COVID and wondering why she wasn't prescribed an antiviral medication since she is a [place] pt

## 2022-07-20 NOTE — TELEPHONE ENCOUNTER
She was beyond the 5 days that oral antiviral medications could be prescribed  Change the cough medication?

## 2022-08-07 DIAGNOSIS — E03.9 HYPOTHYROIDISM, UNSPECIFIED TYPE: ICD-10-CM

## 2022-08-07 RX ORDER — LEVOTHYROXINE SODIUM 88 UG/1
TABLET ORAL
Qty: 30 TABLET | Refills: 1 | Status: SHIPPED | OUTPATIENT
Start: 2022-08-07 | End: 2022-10-06

## 2022-08-19 DIAGNOSIS — R19.7 DIARRHEA DUE TO MALABSORPTION: Primary | ICD-10-CM

## 2022-08-19 DIAGNOSIS — K90.9 DIARRHEA DUE TO MALABSORPTION: Primary | ICD-10-CM

## 2022-08-19 RX ORDER — LOPERAMIDE HYDROCHLORIDE 2 MG/1
2 TABLET ORAL 4 TIMES DAILY PRN
Qty: 30 TABLET | Refills: 0 | Status: SHIPPED | OUTPATIENT
Start: 2022-08-19 | End: 2023-05-19 | Stop reason: SDUPTHER

## 2022-08-25 ENCOUNTER — APPOINTMENT (OUTPATIENT)
Dept: LAB | Facility: CLINIC | Age: 42
End: 2022-08-25
Payer: COMMERCIAL

## 2022-08-25 DIAGNOSIS — C50.912 MALIGNANT NEOPLASM OF LEFT BREAST IN FEMALE, ESTROGEN RECEPTOR POSITIVE, UNSPECIFIED SITE OF BREAST (HCC): ICD-10-CM

## 2022-08-25 DIAGNOSIS — E03.9 HYPOTHYROIDISM, UNSPECIFIED TYPE: ICD-10-CM

## 2022-08-25 DIAGNOSIS — E11.9 TYPE 2 DIABETES MELLITUS WITHOUT COMPLICATION, WITHOUT LONG-TERM CURRENT USE OF INSULIN (HCC): ICD-10-CM

## 2022-08-25 DIAGNOSIS — C79.51 BONE METASTASES (HCC): ICD-10-CM

## 2022-08-25 DIAGNOSIS — Z11.59 NEED FOR HEPATITIS C SCREENING TEST: ICD-10-CM

## 2022-08-25 DIAGNOSIS — Z17.0 MALIGNANT NEOPLASM OF LEFT BREAST IN FEMALE, ESTROGEN RECEPTOR POSITIVE, UNSPECIFIED SITE OF BREAST (HCC): ICD-10-CM

## 2022-08-25 LAB
ALBUMIN SERPL BCP-MCNC: 3.6 G/DL (ref 3.5–5)
ALP SERPL-CCNC: 31 U/L (ref 46–116)
ALT SERPL W P-5'-P-CCNC: 25 U/L (ref 12–78)
ANION GAP SERPL CALCULATED.3IONS-SCNC: 4 MMOL/L (ref 4–13)
AST SERPL W P-5'-P-CCNC: 16 U/L (ref 5–45)
BASOPHILS # BLD AUTO: 0.06 THOUSANDS/ΜL (ref 0–0.1)
BASOPHILS NFR BLD AUTO: 1 % (ref 0–1)
BILIRUB SERPL-MCNC: 0.67 MG/DL (ref 0.2–1)
BUN SERPL-MCNC: 13 MG/DL (ref 5–25)
CALCIUM SERPL-MCNC: 9.8 MG/DL (ref 8.3–10.1)
CHLORIDE SERPL-SCNC: 108 MMOL/L (ref 96–108)
CHOLEST SERPL-MCNC: 220 MG/DL
CO2 SERPL-SCNC: 27 MMOL/L (ref 21–32)
CREAT SERPL-MCNC: 0.99 MG/DL (ref 0.6–1.3)
EOSINOPHIL # BLD AUTO: 0.13 THOUSAND/ΜL (ref 0–0.61)
EOSINOPHIL NFR BLD AUTO: 3 % (ref 0–6)
ERYTHROCYTE [DISTWIDTH] IN BLOOD BY AUTOMATED COUNT: 13.2 % (ref 11.6–15.1)
EST. AVERAGE GLUCOSE BLD GHB EST-MCNC: 108 MG/DL
GFR SERPL CREATININE-BSD FRML MDRD: 71 ML/MIN/1.73SQ M
GLUCOSE P FAST SERPL-MCNC: 91 MG/DL (ref 65–99)
HBA1C MFR BLD: 5.4 %
HCT VFR BLD AUTO: 35.3 % (ref 34.8–46.1)
HCV AB SER QL: NORMAL
HDLC SERPL-MCNC: 66 MG/DL
HGB BLD-MCNC: 11.8 G/DL (ref 11.5–15.4)
IMM GRANULOCYTES # BLD AUTO: 0.02 THOUSAND/UL (ref 0–0.2)
IMM GRANULOCYTES NFR BLD AUTO: 0 % (ref 0–2)
LDH SERPL-CCNC: 257 U/L (ref 81–234)
LDLC SERPL CALC-MCNC: 121 MG/DL (ref 0–100)
LYMPHOCYTES # BLD AUTO: 1.75 THOUSANDS/ΜL (ref 0.6–4.47)
LYMPHOCYTES NFR BLD AUTO: 39 % (ref 14–44)
MCH RBC QN AUTO: 34.6 PG (ref 26.8–34.3)
MCHC RBC AUTO-ENTMCNC: 33.4 G/DL (ref 31.4–37.4)
MCV RBC AUTO: 104 FL (ref 82–98)
MONOCYTES # BLD AUTO: 0.41 THOUSAND/ΜL (ref 0.17–1.22)
MONOCYTES NFR BLD AUTO: 9 % (ref 4–12)
NEUTROPHILS # BLD AUTO: 2.17 THOUSANDS/ΜL (ref 1.85–7.62)
NEUTS SEG NFR BLD AUTO: 48 % (ref 43–75)
NRBC BLD AUTO-RTO: 0 /100 WBCS
PLATELET # BLD AUTO: 256 THOUSANDS/UL (ref 149–390)
PMV BLD AUTO: 9.2 FL (ref 8.9–12.7)
POTASSIUM SERPL-SCNC: 4.4 MMOL/L (ref 3.5–5.3)
PROT SERPL-MCNC: 7.5 G/DL (ref 6.4–8.4)
RBC # BLD AUTO: 3.41 MILLION/UL (ref 3.81–5.12)
SODIUM SERPL-SCNC: 139 MMOL/L (ref 135–147)
TRIGL SERPL-MCNC: 167 MG/DL
TSH SERPL DL<=0.05 MIU/L-ACNC: 1.59 UIU/ML (ref 0.45–4.5)
WBC # BLD AUTO: 4.54 THOUSAND/UL (ref 4.31–10.16)

## 2022-08-25 PROCEDURE — 80061 LIPID PANEL: CPT

## 2022-08-25 PROCEDURE — 86803 HEPATITIS C AB TEST: CPT

## 2022-08-25 PROCEDURE — 83615 LACTATE (LD) (LDH) ENZYME: CPT

## 2022-08-25 PROCEDURE — 83036 HEMOGLOBIN GLYCOSYLATED A1C: CPT

## 2022-08-25 PROCEDURE — 85025 COMPLETE CBC W/AUTO DIFF WBC: CPT

## 2022-08-25 PROCEDURE — 84443 ASSAY THYROID STIM HORMONE: CPT

## 2022-08-25 PROCEDURE — 36415 COLL VENOUS BLD VENIPUNCTURE: CPT

## 2022-08-25 PROCEDURE — 80053 COMPREHEN METABOLIC PANEL: CPT

## 2022-08-25 PROCEDURE — 3044F HG A1C LEVEL LT 7.0%: CPT | Performed by: INTERNAL MEDICINE

## 2022-09-29 LAB
LEFT EYE DIABETIC RETINOPATHY: NORMAL
RIGHT EYE DIABETIC RETINOPATHY: NORMAL

## 2022-09-30 ENCOUNTER — VBI (OUTPATIENT)
Dept: ADMINISTRATIVE | Facility: OTHER | Age: 42
End: 2022-09-30

## 2022-09-30 DIAGNOSIS — E55.9 VITAMIN D DEFICIENCY: ICD-10-CM

## 2022-09-30 RX ORDER — ERGOCALCIFEROL 1.25 MG/1
CAPSULE ORAL
Qty: 12 CAPSULE | Refills: 0 | Status: SHIPPED | OUTPATIENT
Start: 2022-09-30

## 2022-10-03 ENCOUNTER — OFFICE VISIT (OUTPATIENT)
Dept: DENTISTRY | Facility: CLINIC | Age: 42
End: 2022-10-03
Payer: COMMERCIAL

## 2022-10-03 VITALS — HEART RATE: 81 BPM | DIASTOLIC BLOOD PRESSURE: 79 MMHG | SYSTOLIC BLOOD PRESSURE: 131 MMHG

## 2022-10-03 DIAGNOSIS — K02.9 TOOTH DECAY: Primary | ICD-10-CM

## 2022-10-03 PROCEDURE — D2392 RESIN-BASED COMPOSITE - 2 SURFACES, POSTERIOR: HCPCS | Performed by: STUDENT IN AN ORGANIZED HEALTH CARE EDUCATION/TRAINING PROGRAM

## 2022-10-03 PROCEDURE — D2332 RESIN-BASED COMPOSITE - 3 SURFACES, ANTERIOR: HCPCS | Performed by: STUDENT IN AN ORGANIZED HEALTH CARE EDUCATION/TRAINING PROGRAM

## 2022-10-03 NOTE — PROGRESS NOTES
Composite Filling    Bertha Beckett presents for composite filling  PMH reviewed, no changes  Discussed with patient need for RCT if pulp exposure occurs or in future if pulp is inflamed  Pt understands and consents  Applied topical benzocaine, administered 1  carps 4% articaine 1:100k epi via infiltration    Prepped tooth #10, 11, 12 with 556 carbide on high speed  Caries removed with round carbide on slow speed  Placed tofflemire/palodent matrix  Isolation with cotton rolls and dri-angles    Etch with 37% H2PO4, rinse, dry  Applied Adhese with 20 second scrub once, gentle air dry and light cured for 10s  Restored with Tetric bulk marita shade A2 and flowable resin shade A2 and light cured  Refined with finishing burs, polished with enhance point  Verified occlusion and contacts  Pt left satisfied      NV: Resins

## 2022-10-06 DIAGNOSIS — E03.9 HYPOTHYROIDISM, UNSPECIFIED TYPE: ICD-10-CM

## 2022-10-06 DIAGNOSIS — E78.5 HYPERLIPIDEMIA, UNSPECIFIED HYPERLIPIDEMIA TYPE: ICD-10-CM

## 2022-10-06 RX ORDER — SIMVASTATIN 20 MG
TABLET ORAL
Qty: 90 TABLET | Refills: 0 | Status: SHIPPED | OUTPATIENT
Start: 2022-10-06

## 2022-10-06 RX ORDER — LEVOTHYROXINE SODIUM 88 UG/1
TABLET ORAL
Qty: 30 TABLET | Refills: 1 | Status: SHIPPED | OUTPATIENT
Start: 2022-10-06

## 2022-10-18 ENCOUNTER — OFFICE VISIT (OUTPATIENT)
Dept: DENTISTRY | Facility: CLINIC | Age: 42
End: 2022-10-18
Payer: COMMERCIAL

## 2022-10-18 VITALS — HEART RATE: 92 BPM | SYSTOLIC BLOOD PRESSURE: 121 MMHG | DIASTOLIC BLOOD PRESSURE: 72 MMHG

## 2022-10-18 DIAGNOSIS — K03.6 ACCRETIONS ON TEETH: Primary | ICD-10-CM

## 2022-10-18 PROCEDURE — D1330 ORAL HYGIENE INSTRUCTIONS: HCPCS

## 2022-10-18 PROCEDURE — D0190 SCREENING OF A PATIENT: HCPCS

## 2022-10-18 PROCEDURE — D4355 FULL MOUTH DEBRIDEMENT TO ENABLE A COMPREHENSIVE ORAL EVALUATION AND DIAGNOSIS ON A SUBSEQUENT VISIT: HCPCS

## 2022-10-18 PROCEDURE — D1310 NUTRITIONAL COUNSELING FOR CONTROL OF DENTAL DISEASE: HCPCS

## 2022-10-18 NOTE — PROGRESS NOTES
Dental procedures in this visit   •  - FULL MOUTH DEBRIDEMENT TO ENABLE A COMPREHENSIVE ORAL EVALUATION AND DIAGNOSIS ON A SUBSEQUENT VISIT (Completed)     Service provider: Bruno Hines [address] [facility]     Billing provider: Cele Ramirez RDH, PHDHP   •  - SCREENING OF A PATIENT (Completed)     Service provider: Bruno Hines [address] [facility]     Billing provider: Cele Ramirez RDH, PHDHP   •  - NUTRITIONAL COUNSELING FOR CONTROL OF DENTAL DISEASE (Completed)     Service provider: Bruno Hines [address] [facility]     Billing provider: Cele Ramirez RDH, PHDHP   •  - ORAL HYGIENE INSTRUCTIONS (Completed)     Service provider: Bruno Hines [address] [facility]     Billing provider: Cele Ramirez RDH, PHDHP     Subjective   Patient ID: Trey Orona is a 40 y o  female  Chief Complaint   Patient presents with   • Debridement     Prophy  Chief Complaint   Patient presents with   • Debridement     Debridement performed today as per Dr Doug Etienne order/exam information 5/5/22 - will then have Dr Cosme Quigley re-evaluate healing and tissue response at pt's next resin appointment and to decide if there is a need for SRP referral       Method Used:  · Prophy Method Used: Ultrasonic Scaling  · Hand Scaling  · Flossed      Radiographs Taken in Dexis: (Taken to assess periodontal health)  · None      Intra/Extra Oral Cancer Screening:  · Within normal limits    Oral Hygiene:  · Poor    Plaque:  · Generalized  · Moderate  · Heavy    Calculus:  · Generalized  · Moderate  · Heavy    Bleeding:  · Generalized  · Moderate  · Heavy    Stain:  · None    Periodontal Charting:   · Unable to probe due to heavy calculus    Periodontal Classification:  · Generalized  · Moderate  · Periodontal Disease    Nutritional Counseling:  · Discussed dietary habits and suggested better food choices  · Discussed pH and the role it plays in decay    Oral Hygiene Instruction:  Warm salt water rinsing and ibuprofen (prn) after today's visit    Went over daily routine - stressed importance of keeping teeth clean now after debridement today (and everyday) - discussed brushing, flossing, rinsing         Next Visit:  Dentist visit - resins with re-eval after today's debridement to see if SRP referral is necessary and/or set up pt's recall here; will also have Dr Melodie modi suspicious area 15 buccal - pt very sensitive here (14/15) during debridement, pt may need anesthesia for future scaling of that area

## 2022-11-14 ENCOUNTER — HOSPITAL ENCOUNTER (OUTPATIENT)
Dept: NUCLEAR MEDICINE | Facility: HOSPITAL | Age: 42
Discharge: HOME | End: 2022-11-14

## 2022-11-14 ENCOUNTER — HOSPITAL ENCOUNTER (OUTPATIENT)
Dept: CT IMAGING | Facility: HOSPITAL | Age: 42
Discharge: HOME | End: 2022-11-14

## 2022-11-14 DIAGNOSIS — C79.52 SECONDARY MALIGNANT NEOPLASM OF BONE AND BONE MARROW (HCC): ICD-10-CM

## 2022-11-14 DIAGNOSIS — C79.51 SECONDARY MALIGNANT NEOPLASM OF BONE AND BONE MARROW (HCC): ICD-10-CM

## 2022-11-14 RX ADMIN — IOHEXOL 100 ML: 350 INJECTION, SOLUTION INTRAVENOUS at 10:56

## 2022-11-15 ENCOUNTER — CLINICAL SUPPORT (OUTPATIENT)
Dept: INTERNAL MEDICINE CLINIC | Facility: CLINIC | Age: 42
End: 2022-11-15

## 2022-11-15 ENCOUNTER — APPOINTMENT (OUTPATIENT)
Dept: LAB | Facility: CLINIC | Age: 42
End: 2022-11-15

## 2022-11-15 DIAGNOSIS — Z17.0 ESTROGEN RECEPTOR POSITIVE: ICD-10-CM

## 2022-11-15 DIAGNOSIS — Z17.0 MALIGNANT NEOPLASM OF LEFT BREAST IN FEMALE, ESTROGEN RECEPTOR POSITIVE, UNSPECIFIED SITE OF BREAST (HCC): ICD-10-CM

## 2022-11-15 DIAGNOSIS — C50.912 MALIGNANT NEOPLASM OF LEFT BREAST IN FEMALE, ESTROGEN RECEPTOR POSITIVE, UNSPECIFIED SITE OF BREAST (HCC): ICD-10-CM

## 2022-11-15 DIAGNOSIS — Z11.1 ENCOUNTER FOR PPD TEST: Primary | ICD-10-CM

## 2022-11-15 LAB
ALBUMIN SERPL BCP-MCNC: 3.6 G/DL (ref 3.5–5)
ALP SERPL-CCNC: 37 U/L (ref 46–116)
ALT SERPL W P-5'-P-CCNC: 29 U/L (ref 12–78)
ANION GAP SERPL CALCULATED.3IONS-SCNC: 7 MMOL/L (ref 4–13)
AST SERPL W P-5'-P-CCNC: 18 U/L (ref 5–45)
BASOPHILS # BLD AUTO: 0.05 THOUSANDS/ÂΜL (ref 0–0.1)
BASOPHILS NFR BLD AUTO: 1 % (ref 0–1)
BILIRUB SERPL-MCNC: 0.85 MG/DL (ref 0.2–1)
BUN SERPL-MCNC: 14 MG/DL (ref 5–25)
CALCIUM SERPL-MCNC: 9.6 MG/DL (ref 8.3–10.1)
CHLORIDE SERPL-SCNC: 103 MMOL/L (ref 96–108)
CO2 SERPL-SCNC: 26 MMOL/L (ref 21–32)
CREAT SERPL-MCNC: 1.1 MG/DL (ref 0.6–1.3)
EOSINOPHIL # BLD AUTO: 0.14 THOUSAND/ÂΜL (ref 0–0.61)
EOSINOPHIL NFR BLD AUTO: 3 % (ref 0–6)
ERYTHROCYTE [DISTWIDTH] IN BLOOD BY AUTOMATED COUNT: 13.2 % (ref 11.6–15.1)
GFR SERPL CREATININE-BSD FRML MDRD: 62 ML/MIN/1.73SQ M
GLUCOSE SERPL-MCNC: 115 MG/DL (ref 65–140)
HCT VFR BLD AUTO: 36.7 % (ref 34.8–46.1)
HGB BLD-MCNC: 12.3 G/DL (ref 11.5–15.4)
IMM GRANULOCYTES # BLD AUTO: 0.02 THOUSAND/UL (ref 0–0.2)
IMM GRANULOCYTES NFR BLD AUTO: 0 % (ref 0–2)
LDH SERPL-CCNC: 327 U/L (ref 81–234)
LYMPHOCYTES # BLD AUTO: 1.78 THOUSANDS/ÂΜL (ref 0.6–4.47)
LYMPHOCYTES NFR BLD AUTO: 39 % (ref 14–44)
MCH RBC QN AUTO: 36.2 PG (ref 26.8–34.3)
MCHC RBC AUTO-ENTMCNC: 33.5 G/DL (ref 31.4–37.4)
MCV RBC AUTO: 108 FL (ref 82–98)
MONOCYTES # BLD AUTO: 0.31 THOUSAND/ÂΜL (ref 0.17–1.22)
MONOCYTES NFR BLD AUTO: 7 % (ref 4–12)
NEUTROPHILS # BLD AUTO: 2.25 THOUSANDS/ÂΜL (ref 1.85–7.62)
NEUTS SEG NFR BLD AUTO: 50 % (ref 43–75)
NRBC BLD AUTO-RTO: 0 /100 WBCS
PLATELET # BLD AUTO: 237 THOUSANDS/UL (ref 149–390)
PMV BLD AUTO: 9.2 FL (ref 8.9–12.7)
POTASSIUM SERPL-SCNC: 4.1 MMOL/L (ref 3.5–5.3)
PROT SERPL-MCNC: 7.9 G/DL (ref 6.4–8.4)
RBC # BLD AUTO: 3.4 MILLION/UL (ref 3.81–5.12)
SODIUM SERPL-SCNC: 136 MMOL/L (ref 135–147)
WBC # BLD AUTO: 4.55 THOUSAND/UL (ref 4.31–10.16)

## 2022-11-17 ENCOUNTER — CLINICAL SUPPORT (OUTPATIENT)
Dept: INTERNAL MEDICINE CLINIC | Facility: CLINIC | Age: 42
End: 2022-11-17

## 2022-11-17 DIAGNOSIS — Z11.1 ENCOUNTER FOR PPD TEST: Primary | ICD-10-CM

## 2022-11-17 LAB
INDURATION: NORMAL MM
TB SKIN TEST: NEGATIVE

## 2022-11-23 ENCOUNTER — CLINICAL SUPPORT (OUTPATIENT)
Dept: INTERNAL MEDICINE CLINIC | Facility: CLINIC | Age: 42
End: 2022-11-23

## 2022-11-23 DIAGNOSIS — Z11.1 ENCOUNTER FOR PPD TEST: Primary | ICD-10-CM

## 2022-11-25 ENCOUNTER — CLINICAL SUPPORT (OUTPATIENT)
Dept: INTERNAL MEDICINE CLINIC | Facility: CLINIC | Age: 42
End: 2022-11-25

## 2022-11-25 DIAGNOSIS — Z11.1 ENCOUNTER FOR PPD TEST: Primary | ICD-10-CM

## 2022-11-25 LAB
INDURATION: NORMAL MM
TB SKIN TEST: NEGATIVE

## 2022-12-05 ENCOUNTER — TELEPHONE (OUTPATIENT)
Dept: OTHER | Facility: OTHER | Age: 42
End: 2022-12-05

## 2023-01-03 ENCOUNTER — APPOINTMENT (OUTPATIENT)
Dept: LAB | Facility: CLINIC | Age: 43
End: 2023-01-03

## 2023-01-03 ENCOUNTER — OFFICE VISIT (OUTPATIENT)
Dept: INTERNAL MEDICINE CLINIC | Facility: CLINIC | Age: 43
End: 2023-01-03

## 2023-01-03 VITALS
TEMPERATURE: 98.7 F | WEIGHT: 214.4 LBS | SYSTOLIC BLOOD PRESSURE: 130 MMHG | DIASTOLIC BLOOD PRESSURE: 92 MMHG | HEART RATE: 82 BPM | BODY MASS INDEX: 39.21 KG/M2 | OXYGEN SATURATION: 98 %

## 2023-01-03 DIAGNOSIS — E78.5 HYPERLIPIDEMIA, UNSPECIFIED HYPERLIPIDEMIA TYPE: ICD-10-CM

## 2023-01-03 DIAGNOSIS — Z02.89 ENCOUNTER FOR PHYSICAL EXAMINATION RELATED TO EMPLOYMENT: ICD-10-CM

## 2023-01-03 DIAGNOSIS — E06.3 HYPOTHYROIDISM DUE TO HASHIMOTO'S THYROIDITIS: ICD-10-CM

## 2023-01-03 DIAGNOSIS — E55.9 VITAMIN D DEFICIENCY: ICD-10-CM

## 2023-01-03 DIAGNOSIS — C79.51 BONE METASTASES (HCC): ICD-10-CM

## 2023-01-03 DIAGNOSIS — E11.9 TYPE 2 DIABETES MELLITUS WITHOUT COMPLICATION, WITHOUT LONG-TERM CURRENT USE OF INSULIN (HCC): ICD-10-CM

## 2023-01-03 DIAGNOSIS — E03.8 HYPOTHYROIDISM DUE TO HASHIMOTO'S THYROIDITIS: ICD-10-CM

## 2023-01-03 DIAGNOSIS — Z02.89 ENCOUNTER FOR PHYSICAL EXAMINATION RELATED TO EMPLOYMENT: Primary | ICD-10-CM

## 2023-01-03 NOTE — PATIENT INSTRUCTIONS
Problem List Items Addressed This Visit          Endocrine    Diabetes mellitus ([facility]    Hypothyroidism       Musculoskeletal and Integument    Bone metastases ([facility]       Other    Hyperlipidemia    Vitamin D deficiency    Encounter for physical examination related to employment - Primary     The patient is here for an employement physical examination  Per the requirements of the employer, the patient is able to perform all duties as explained and discussed on the attached job description     Will order labs to check immunity for hepatitis, MMR, varicella         Relevant Orders    Measles/Mumps/Rubella Immunity    Hepatitis B Immunity Panel    Varicella zoster antibody, IgG     Other Visit Diagnoses       Body mass index (BMI) 40 0-44 9, adult ([facility]

## 2023-01-03 NOTE — PROGRESS NOTES
Assessment/Plan:     Problem List Items Addressed This Visit        Endocrine    Diabetes mellitus ([address] )    Hypothyroidism       Musculoskeletal and Integument    Bone metastases ([address] )       Other    Hyperlipidemia    Vitamin D deficiency    Encounter for physical examination related to employment - Primary     The patient is here for an employement physical examination  Per the requirements of the employer, the patient is able to perform all duties as explained and discussed on the attached job description  Will order labs to check immunity for hepatitis, MMR, varicella         Relevant Orders    Measles/Mumps/Rubella Immunity    Hepatitis B Immunity Panel    Varicella zoster antibody, IgG   Other Visit Diagnoses     Body mass index (BMI) 40 0-44 9, adult (HCC)              Subjective:      Patient ID: Britney Rao is a 40 y o  female  The patient is here today to discuss her work requirements and a physical  Please continue to the [facility] section of the note for details of today's visit  The patient is here for an employement physical examination  Per the requirements of the employer, the patient is able to perform all duties as explained and discussed on the attached job description  The following portions of the patient's history were reviewed and updated as appropriate:     Past Medical History:  She has a past medical history of Allergy, Anxiety, Arthritis, BRCA negative, Breast cancer ([address] ), Candidiasis, cutaneous, Cervical cancer ([address] ), Depression, Diabetes mellitus ([address] ), Disease of thyroid gland, Disorder of urinary tract, Fatty liver, Hemorrhoids, History of chemotherapy, History of radiation therapy, Influenza A, Obesity, PONV (postoperative nausea and vomiting), and Port-A-Cath in place  ,  _______________________________________________________________________  Medical Problems:  does not have any pertinent problems on file ,  _______________________________________________________________________  Past Surgical History:   has a past surgical history that includes Mastectomy (Left, 12/19/2013); Hysterectomy (04/07/2014); Bilateral salpingoophorectomy; GASTRECTOMY SLEEVE LAPAROSCOPIC; IR port removal (11/21/2019); and Breast biopsy  ,  _______________________________________________________________________  Family History:  family history includes Breast cancer in her family; Diabetes in her family, father, and mother; Hodgkin's lymphoma in her paternal grandmother; Hypertension in her family and maternal grandfather; Leukemia in her son; No Known Problems in her daughter, sister, sister, son, son, and son; Ovarian cancer (age of onset: 29) in her sister; Uterine cancer (age of onset: 64) in her maternal aunt  ,  _______________________________________________________________________  Social History:   reports that she quit smoking about 9 years ago  Her smoking use included cigarettes  She has never used smokeless tobacco  She reports that she does not drink alcohol and does not use drugs  ,  _______________________________________________________________________  Allergies:  is allergic to paclitaxel     _______________________________________________________________________  Current Outpatient Medications   Medication Sig Dispense Refill   • Abemaciclib (Verzenio) 150 MG TABS Take 150 mg by mouth 2 (two) times a day 60 tablet 0   • ALPRAZolam (XANAX) 0 25 mg tablet Take 0 25 mg by mouth daily at bedtime as needed      • benzonatate (TESSALON) 200 MG capsule Take 1 capsule (200 mg total) by mouth 3 (three) times a day as needed for cough 20 capsule 0   • calcium-vitamin D (OSCAL 500 + D) 500 mg-200 units per tablet Take 1 tablet by mouth daily       • ergocalciferol (VITAMIN D2) 50,000 units take 1 capsule by mouth weekly 12 capsule 0   • fulvestrant (FASLODEX) 250 mg/5 mL 500 mg by Intramuscular (multi inj) route once     • levothyroxine 88 mcg tablet take 1 tablet by mouth once daily 30 tablet 1   • loperamide (IMODIUM A-D) 2 MG tablet Take 1 tablet (2 mg total) by mouth 4 (four) times a day as needed for diarrhea 30 tablet 0   • nystatin (MYCOSTATIN) powder Apply 1 application topically 2 (two) times a day Abdominal fold 15 g 0   • simvastatin (ZOCOR) 20 mg tablet take 1 tablet by mouth at bedtime 90 tablet 0   • Sodium Fluoride 1 1 % PSTE Apply 1 Squirt to teeth daily at bedtime Brush daily at bedtime  Spit, do not rinse  Nothing to eat or drink afterwards   100 mL 0   • traMADol (ULTRAM) 50 mg tablet Take 50 mg by mouth     • AURELIO MICROLET LANCETS lancets by Does not apply route daily     • Blood Glucose Monitoring Suppl (WISETIVI CONTOUR MONITOR) w/Device KIT by Does not apply route     • cyclobenzaprine (FLEXERIL) 10 mg tablet Take 1 tablet (10 mg total) by mouth 3 (three) times a day as needed for muscle spasms for up to 14 days 42 tablet 0   • denosumab (PROLIA) 60 mg/mL Inject 60 mg under the skin once (Patient not taking: Reported on 5/5/2022)     • Denosumab (XGEVA SC) Inject under the skin (Patient not taking: Reported on 6/13/2022)     • diclofenac (VOLTAREN) 75 mg EC tablet Take 1 tablet (75 mg total) by mouth 2 (two) times a day as needed (Muscular skeletal pain) 60 tablet 0   • diclofenac sodium (VOLTAREN) 1 % Apply 1 application topically daily as needed      • famotidine (PEPCID) 40 MG tablet Take 1 tablet (40 mg total) by mouth daily at bedtime as needed for heartburn 30 tablet 5   • Glucose Blood (AURELIO CONTOUR TEST VI) by In Vitro route daily     • ibuprofen (MOTRIN) 600 mg tablet Take 1 tablet (600 mg total) by mouth every 6 (six) hours as needed for mild pain 30 tablet 0   • methylPREDNISolone 4 MG tablet therapy pack Use as directed on package (Patient not taking: Reported on 7/15/2022) 21 each 0   • naproxen (NAPROSYN) 500 mg tablet Take 1 tablet (500 mg total) by mouth 2 (two) times a day as needed (Muscular skeletal pain) for up to 14 days 28 tablet 0   • omeprazole (PriLOSEC) 40 MG capsule Take 1 capsule (40 mg total) by mouth daily 90 capsule 1   • pentoxifylline (TRENtal) 400 mg ER tablet Take 400 mg by mouth 2 (two) times a day     • vitamin E, tocopherol, 400 units capsule Take 400 Units by mouth 2 (two) times a day       No current facility-administered medications for this visit      _______________________________________________________________________      Review of Systems   Constitutional: Negative for activity change, chills, fatigue and fever  HENT: Negative for rhinorrhea and sore throat  Eyes: Negative for pain  Respiratory: Negative for cough and shortness of breath  Cardiovascular: Negative for chest pain, palpitations and leg swelling  Gastrointestinal: Negative for abdominal pain, constipation, diarrhea, nausea and vomiting  Genitourinary: Negative for difficulty urinating, flank pain, frequency and urgency  Musculoskeletal: Negative for gait problem, joint swelling and myalgias  Skin: Negative for color change  Neurological: Negative for dizziness, weakness, light-headedness and headaches  Psychiatric/Behavioral: Negative for sleep disturbance  The patient is not nervous/anxious  All other systems reviewed and are negative  Objective:  Vitals:    01/03/23 1323   BP: 130/92   BP Location: Right arm   Patient Position: Sitting   Cuff Size: Standard   Pulse: 82   Temp: 98 7 °F (37 1 °C)   SpO2: 98%   Weight: 97 3 kg (214 lb 6 4 oz)     Body mass index is 39 21 kg/m²  Physical Exam  Vitals reviewed  Constitutional:       General: She is awake  Appearance: Normal appearance  She is well-developed and normal weight  HENT:      Head: Normocephalic and atraumatic  Nose: Nose normal       Mouth/Throat:      Mouth: Mucous membranes are moist    Eyes:      Extraocular Movements: Extraocular movements intact     Cardiovascular:      Rate and Rhythm: Normal rate and regular rhythm  Pulses: Normal pulses  Heart sounds: Normal heart sounds  Pulmonary:      Effort: Pulmonary effort is normal       Breath sounds: Normal breath sounds  Abdominal:      General: Bowel sounds are normal       Palpations: Abdomen is soft  Musculoskeletal:         General: Normal range of motion  Cervical back: Normal range of motion  Right lower leg: No edema  Left lower leg: No edema  Skin:     General: Skin is warm and dry  Neurological:      Mental Status: She is alert and oriented to person, place, and time  Psychiatric:         Attention and Perception: Attention normal          Mood and Affect: Mood normal          Speech: Speech normal          Behavior: Behavior normal  Behavior is cooperative

## 2023-01-03 NOTE — ASSESSMENT & PLAN NOTE
The patient is here for an employement physical examination  Per the requirements of the employer, the patient is able to perform all duties as explained and discussed on the attached job description     Will order labs to check immunity for hepatitis, MMR, varicella

## 2023-01-04 LAB
MEV IGG SER QL IA: NORMAL
MUV IGG SER QL IA: NORMAL
VZV IGG SER QL IA: NORMAL

## 2023-01-05 LAB
HBV SURFACE AB SER-ACNC: >1000 MIU/ML
HBV SURFACE AG SER QL: NORMAL
RUBV IGG SERPL IA-ACNC: 28.4 IU/ML

## 2023-01-18 ENCOUNTER — RA CDI HCC (OUTPATIENT)
Dept: OTHER | Facility: HOSPITAL | Age: 43
End: 2023-01-18

## 2023-01-18 NOTE — PROGRESS NOTES
[facility]  coding opportunities     [id number]     Chart Reviewed number of suggestions sent to Provider: 1     Patients Insurance     Medicare Insurance: [address] [place] [address]

## 2023-01-20 ENCOUNTER — OFFICE VISIT (OUTPATIENT)
Dept: INTERNAL MEDICINE CLINIC | Facility: CLINIC | Age: 43
End: 2023-01-20

## 2023-01-20 VITALS
WEIGHT: 218 LBS | OXYGEN SATURATION: 98 % | BODY MASS INDEX: 40.12 KG/M2 | DIASTOLIC BLOOD PRESSURE: 84 MMHG | HEIGHT: 62 IN | HEART RATE: 98 BPM | TEMPERATURE: 98 F | SYSTOLIC BLOOD PRESSURE: 136 MMHG

## 2023-01-20 DIAGNOSIS — E11.9 TYPE 2 DIABETES MELLITUS WITHOUT COMPLICATION, WITHOUT LONG-TERM CURRENT USE OF INSULIN (HCC): ICD-10-CM

## 2023-01-20 DIAGNOSIS — J01.10 ACUTE NON-RECURRENT FRONTAL SINUSITIS: Primary | ICD-10-CM

## 2023-01-20 DIAGNOSIS — L72.3 SEBACEOUS CYST: ICD-10-CM

## 2023-01-20 DIAGNOSIS — E06.3 HYPOTHYROIDISM DUE TO HASHIMOTO'S THYROIDITIS: ICD-10-CM

## 2023-01-20 DIAGNOSIS — E03.8 HYPOTHYROIDISM DUE TO HASHIMOTO'S THYROIDITIS: ICD-10-CM

## 2023-01-20 DIAGNOSIS — J06.9 UPPER RESPIRATORY TRACT INFECTION, UNSPECIFIED TYPE: ICD-10-CM

## 2023-01-20 PROBLEM — I50.9 CHF (CONGESTIVE HEART FAILURE) (HCC): Status: ACTIVE | Noted: 2023-01-20

## 2023-01-20 LAB
SARS-COV-2 AG UPPER RESP QL IA: NEGATIVE
VALID CONTROL: NORMAL

## 2023-01-20 NOTE — ASSESSMENT & PLAN NOTE
Lab Results   Component Value Date    HGBA1C 5 4 08/25/2022   A1c is good and follow up on labs  Diet control

## 2023-01-20 NOTE — PROGRESS NOTES
Assessment/Plan:    Problem List Items Addressed This Visit        Endocrine    Diabetes mellitus ([facility] )       Lab Results   Component Value Date    HGBA1C 5 4 08/25/2022   A1c is good and follow up on labs  Diet control  Relevant Orders    Comprehensive metabolic panel    Lipid Panel with Direct LDL reflex    Microalbumin / creatinine urine ratio    Hemoglobin A1C    CBC and differential    Hypothyroidism     Follow up on TSH  Continue the Levothryoxine 88 mcg QDay         Relevant Orders    TSH, 3rd generation   Other Visit Diagnoses     Acute non-recurrent frontal sinusitis    -  Primary    Sinusitis noted   Amoxicillin 875/125 mg PO QDay    Upper respiratory tract infection, unspecified type        Relevant Orders    POCT Rapid Covid Ag (Completed)    Sebaceous cyst        Conservative care           Diagnoses and all orders for this visit:    Acute non-recurrent frontal sinusitis  Comments:  Sinusitis noted   Amoxicillin 875/125 mg PO QDay    Upper respiratory tract infection, unspecified type  -     POCT Rapid Covid Ag    Sebaceous cyst  Comments:  Conservative care    Hypothyroidism due to Hashimoto's thyroiditis  -     TSH, 3rd generation; Future    Type 2 diabetes mellitus without complication, without long-term current use of insulin ([facility])  -     Comprehensive metabolic panel; Future  -     Lipid Panel with Direct LDL reflex; Future  -     Microalbumin / creatinine urine ratio  -     Hemoglobin A1C; Future  -     CBC and differential; Future    Other orders  -     Cancel: Ambulatory Referral to Physical Therapy; Future      Hypothyroidism  Follow up on TSH  Continue the Levothryoxine 88 mcg QDay    Diabetes mellitus ([facility] [address] )    Lab Results   Component Value Date    HGBA1C 5 4 08/25/2022   A1c is good and follow up on labs  Diet control  Subjective:      Patient ID: Giovanni Hammond is a 40 y o  female  Sinusitis  This is a new problem  The current episode started in the past 7 days   The problem is unchanged  There has been no fever  She is experiencing no pain  Associated symptoms include congestion, coughing, headaches, a hoarse voice and sinus pressure  Pertinent negatives include no chills, diaphoresis, ear pain, neck pain, shortness of breath, sneezing, sore throat or swollen glands  Past treatments include nothing  The treatment provided no relief  The following portions of the patient's history were reviewed and updated as appropriate:   She has a past medical history of Allergy, Anxiety, Arthritis, BRCA negative, Breast cancer ([facility] ), Candidiasis, cutaneous, Cervical cancer ([facility] [address] ), Depression, Diabetes mellitus ([facility] ), Disease of thyroid gland, Disorder of urinary tract, Fatty liver, Hemorrhoids, History of chemotherapy, History of radiation therapy, Influenza A, Obesity, PONV (postoperative nausea and vomiting), and Port-A-Cath in place  ,  does not have any pertinent problems on file  ,   has a past surgical history that includes Mastectomy (Left, 12/19/2013); Hysterectomy (04/07/2014); Bilateral salpingoophorectomy; GASTRECTOMY SLEEVE LAPAROSCOPIC; IR port removal (11/21/2019); and Breast biopsy  ,  family history includes Breast cancer in her family; Diabetes in her family, father, and mother; Hodgkin's lymphoma in her paternal grandmother; Hypertension in her family and maternal grandfather; Leukemia in her son; No Known Problems in her daughter, sister, sister, son, son, and son; Ovarian cancer (age of onset: 29) in her sister; Uterine cancer (age of onset: 64) in her maternal aunt  ,   reports that she quit smoking about 9 years ago  Her smoking use included cigarettes  She has never used smokeless tobacco  She reports that she does not drink alcohol and does not use drugs  ,  is allergic to paclitaxel     Current Outpatient Medications   Medication Sig Dispense Refill   • Abemaciclib (Verzenio) 150 MG TABS Take 150 mg by mouth 2 (two) times a day 60 tablet 0   • ALPRAZolam (XANAX) 0 25 mg tablet Take 0 25 mg by mouth daily at bedtime as needed      • benzonatate (TESSALON) 200 MG capsule Take 1 capsule (200 mg total) by mouth 3 (three) times a day as needed for cough 20 capsule 0   • calcium-vitamin D (OSCAL 500 + D) 500 mg-200 units per tablet Take 1 tablet by mouth daily  • Denosumab (XGEVA SC) Inject under the skin     • ergocalciferol (VITAMIN D2) 50,000 units take 1 capsule by mouth weekly 12 capsule 0   • fulvestrant (FASLODEX) 250 mg/5 mL 500 mg by Intramuscular (multi inj) route once     • levothyroxine 88 mcg tablet take 1 tablet by mouth once daily 30 tablet 1   • loperamide (IMODIUM A-D) 2 MG tablet Take 1 tablet (2 mg total) by mouth 4 (four) times a day as needed for diarrhea 30 tablet 0   • nystatin (MYCOSTATIN) powder Apply 1 application topically 2 (two) times a day Abdominal fold 15 g 0   • simvastatin (ZOCOR) 20 mg tablet take 1 tablet by mouth at bedtime 90 tablet 0   • Sodium Fluoride 1 1 % PSTE Apply 1 Squirt to teeth daily at bedtime Brush daily at bedtime  Spit, do not rinse  Nothing to eat or drink afterwards   100 mL 0   • traMADol (ULTRAM) 50 mg tablet Take 50 mg by mouth     • cyclobenzaprine (FLEXERIL) 10 mg tablet Take 1 tablet (10 mg total) by mouth 3 (three) times a day as needed for muscle spasms for up to 14 days 42 tablet 0   • diclofenac sodium (VOLTAREN) 1 % Apply 1 application topically daily as needed      • famotidine (PEPCID) 40 MG tablet Take 1 tablet (40 mg total) by mouth daily at bedtime as needed for heartburn 30 tablet 5   • naproxen (NAPROSYN) 500 mg tablet Take 1 tablet (500 mg total) by mouth 2 (two) times a day as needed (Muscular skeletal pain) for up to 14 days 28 tablet 0   • omeprazole (PriLOSEC) 40 MG capsule Take 1 capsule (40 mg total) by mouth daily 90 capsule 1   • pentoxifylline (TRENtal) 400 mg ER tablet Take 400 mg by mouth 2 (two) times a day     • vitamin E, tocopherol, 400 units capsule Take 400 Units by mouth 2 (two) times a day No current facility-administered medications for this visit  Review of Systems   Constitutional: Negative for chills and diaphoresis  HENT: Positive for congestion, hoarse voice and sinus pressure  Negative for ear pain, sneezing and sore throat  Respiratory: Positive for cough  Negative for shortness of breath  Musculoskeletal: Negative for neck pain  Neurological: Positive for headaches  Objective:  Vitals:    01/20/23 1017   BP: 136/84   BP Location: Left arm   Patient Position: Sitting   Cuff Size: Standard   Pulse: 98   Temp: 98 °F (36 7 °C)   SpO2: 98%   Weight: 98 9 kg (218 lb)   Height: 5' 2" (1 575 m)     Body mass index is 39 87 kg/m²  Physical Exam  Vitals and nursing note reviewed  Constitutional:       Appearance: She is well-developed  HENT:      Head: Normocephalic and atraumatic  Nose: Congestion present  Mouth/Throat:      Pharynx: Posterior oropharyngeal erythema present  No oropharyngeal exudate  Eyes:      Pupils: Pupils are equal, round, and reactive to light  Cardiovascular:      Rate and Rhythm: Normal rate and regular rhythm  Heart sounds: Normal heart sounds  No murmur heard  Pulmonary:      Effort: Pulmonary effort is normal  No respiratory distress  Breath sounds: Normal breath sounds  No wheezing  Abdominal:      General: Bowel sounds are normal       Palpations: Abdomen is soft  Tenderness: There is no abdominal tenderness  Musculoskeletal:         General: Normal range of motion  Cervical back: Normal range of motion and neck supple  Skin:     General: Skin is warm and dry  Neurological:      Mental Status: She is alert and oriented to person, place, and time            PHQ-2/9 Depression Screening

## 2023-01-24 ENCOUNTER — LAB (OUTPATIENT)
Dept: LAB | Facility: CLINIC | Age: 43
End: 2023-01-24

## 2023-01-24 DIAGNOSIS — E11.9 TYPE 2 DIABETES MELLITUS WITHOUT COMPLICATION, WITHOUT LONG-TERM CURRENT USE OF INSULIN (HCC): ICD-10-CM

## 2023-01-24 DIAGNOSIS — E03.8 HYPOTHYROIDISM DUE TO HASHIMOTO'S THYROIDITIS: ICD-10-CM

## 2023-01-24 DIAGNOSIS — E06.3 HYPOTHYROIDISM DUE TO HASHIMOTO'S THYROIDITIS: ICD-10-CM

## 2023-01-24 LAB
ALBUMIN SERPL BCP-MCNC: 3.6 G/DL (ref 3.5–5)
ALP SERPL-CCNC: 42 U/L (ref 46–116)
ALT SERPL W P-5'-P-CCNC: 25 U/L (ref 12–78)
ANION GAP SERPL CALCULATED.3IONS-SCNC: 5 MMOL/L (ref 4–13)
AST SERPL W P-5'-P-CCNC: 16 U/L (ref 5–45)
BASOPHILS # BLD AUTO: 0.04 THOUSANDS/ÂΜL (ref 0–0.1)
BASOPHILS NFR BLD AUTO: 1 % (ref 0–1)
BILIRUB SERPL-MCNC: 0.64 MG/DL (ref 0.2–1)
BUN SERPL-MCNC: 16 MG/DL (ref 5–25)
CALCIUM SERPL-MCNC: 10.4 MG/DL (ref 8.3–10.1)
CHLORIDE SERPL-SCNC: 104 MMOL/L (ref 96–108)
CHOLEST SERPL-MCNC: 224 MG/DL
CO2 SERPL-SCNC: 29 MMOL/L (ref 21–32)
CREAT SERPL-MCNC: 0.94 MG/DL (ref 0.6–1.3)
CREAT UR-MCNC: 109 MG/DL
EOSINOPHIL # BLD AUTO: 0.11 THOUSAND/ÂΜL (ref 0–0.61)
EOSINOPHIL NFR BLD AUTO: 3 % (ref 0–6)
ERYTHROCYTE [DISTWIDTH] IN BLOOD BY AUTOMATED COUNT: 13.1 % (ref 11.6–15.1)
GFR SERPL CREATININE-BSD FRML MDRD: 75 ML/MIN/1.73SQ M
GLUCOSE P FAST SERPL-MCNC: 86 MG/DL (ref 65–99)
HCT VFR BLD AUTO: 32.5 % (ref 34.8–46.1)
HDLC SERPL-MCNC: 73 MG/DL
HGB BLD-MCNC: 11 G/DL (ref 11.5–15.4)
IMM GRANULOCYTES # BLD AUTO: 0.01 THOUSAND/UL (ref 0–0.2)
IMM GRANULOCYTES NFR BLD AUTO: 0 % (ref 0–2)
LDLC SERPL CALC-MCNC: 120 MG/DL (ref 0–100)
LYMPHOCYTES # BLD AUTO: 1.7 THOUSANDS/ÂΜL (ref 0.6–4.47)
LYMPHOCYTES NFR BLD AUTO: 43 % (ref 14–44)
MCH RBC QN AUTO: 35.7 PG (ref 26.8–34.3)
MCHC RBC AUTO-ENTMCNC: 33.8 G/DL (ref 31.4–37.4)
MCV RBC AUTO: 106 FL (ref 82–98)
MICROALBUMIN UR-MCNC: 26.7 MG/L (ref 0–20)
MICROALBUMIN/CREAT 24H UR: 24 MG/G CREATININE (ref 0–30)
MONOCYTES # BLD AUTO: 0.31 THOUSAND/ÂΜL (ref 0.17–1.22)
MONOCYTES NFR BLD AUTO: 8 % (ref 4–12)
NEUTROPHILS # BLD AUTO: 1.83 THOUSANDS/ÂΜL (ref 1.85–7.62)
NEUTS SEG NFR BLD AUTO: 45 % (ref 43–75)
NRBC BLD AUTO-RTO: 0 /100 WBCS
PLATELET # BLD AUTO: 231 THOUSANDS/UL (ref 149–390)
PMV BLD AUTO: 9.3 FL (ref 8.9–12.7)
POTASSIUM SERPL-SCNC: 4.2 MMOL/L (ref 3.5–5.3)
PROT SERPL-MCNC: 7.3 G/DL (ref 6.4–8.4)
RBC # BLD AUTO: 3.08 MILLION/UL (ref 3.81–5.12)
SODIUM SERPL-SCNC: 138 MMOL/L (ref 135–147)
TRIGL SERPL-MCNC: 154 MG/DL
TSH SERPL DL<=0.05 MIU/L-ACNC: 2.08 UIU/ML (ref 0.45–4.5)
WBC # BLD AUTO: 4 THOUSAND/UL (ref 4.31–10.16)

## 2023-01-25 LAB
EST. AVERAGE GLUCOSE BLD GHB EST-MCNC: 100 MG/DL
HBA1C MFR BLD: 5.1 %

## 2023-01-30 ENCOUNTER — OFFICE VISIT (OUTPATIENT)
Dept: INTERNAL MEDICINE CLINIC | Facility: CLINIC | Age: 43
End: 2023-01-30

## 2023-01-30 VITALS
SYSTOLIC BLOOD PRESSURE: 134 MMHG | WEIGHT: 217 LBS | OXYGEN SATURATION: 99 % | BODY MASS INDEX: 39.93 KG/M2 | HEART RATE: 73 BPM | HEIGHT: 62 IN | DIASTOLIC BLOOD PRESSURE: 82 MMHG | TEMPERATURE: 97.5 F

## 2023-01-30 DIAGNOSIS — E83.52 HYPERCALCEMIA: ICD-10-CM

## 2023-01-30 DIAGNOSIS — E03.9 HYPOTHYROIDISM, UNSPECIFIED TYPE: ICD-10-CM

## 2023-01-30 DIAGNOSIS — M85.80 OSTEOPENIA, UNSPECIFIED LOCATION: ICD-10-CM

## 2023-01-30 DIAGNOSIS — E11.9 TYPE 2 DIABETES MELLITUS WITHOUT COMPLICATION, WITHOUT LONG-TERM CURRENT USE OF INSULIN (HCC): ICD-10-CM

## 2023-01-30 DIAGNOSIS — E06.3 HYPOTHYROIDISM DUE TO HASHIMOTO'S THYROIDITIS: Primary | ICD-10-CM

## 2023-01-30 DIAGNOSIS — E66.9 OBESITY (BMI 35.0-39.9 WITHOUT COMORBIDITY): ICD-10-CM

## 2023-01-30 DIAGNOSIS — I50.9 CONGESTIVE HEART FAILURE, UNSPECIFIED HF CHRONICITY, UNSPECIFIED HEART FAILURE TYPE (HCC): ICD-10-CM

## 2023-01-30 DIAGNOSIS — E03.8 HYPOTHYROIDISM DUE TO HASHIMOTO'S THYROIDITIS: Primary | ICD-10-CM

## 2023-01-30 RX ORDER — LEVOTHYROXINE SODIUM 88 UG/1
88 TABLET ORAL DAILY
Qty: 90 TABLET | Refills: 1 | Status: SHIPPED | OUTPATIENT
Start: 2023-01-30 | End: 2023-07-29

## 2023-01-30 NOTE — ASSESSMENT & PLAN NOTE
Lab Results   Component Value Date    HGBA1C 5 1 01/24/2023   A1c is good   Discussed insulin resistance and need for weight loss

## 2023-01-30 NOTE — ASSESSMENT & PLAN NOTE
The patient notes inconsistent use of the levothyroxine  Recheck TSH and noted need for consistent dosing 6 weeks

## 2023-01-30 NOTE — PROGRESS NOTES
Assessment/Plan:    Problem List Items Addressed This Visit        Endocrine    Diabetes mellitus ([place] [address] )       Lab Results   Component Value Date    HGBA1C 5 1 01/24/2023   A1c is good   Discussed insulin resistance and need for weight loss         Hypothyroidism - Primary     The patient notes inconsistent use of the levothyroxine  Recheck TSH and noted need for consistent dosing 6 weeks  Relevant Medications    levothyroxine 88 mcg tablet    Other Relevant Orders    TSH, 3rd generation       Cardiovascular and Mediastinum    CHF (congestive heart failure) ([facility])       Musculoskeletal and Integument    Osteopenia     Noted mild elevated Ca+ and we will follow up on that  Check PTH  Recheck Ca+ in 6 weeks            Other    Obesity (BMI 35 0-39 9 without comorbidity)     Discussed exercise and diet  Noted that this would help with insulin resistance        Other Visit Diagnoses     Hypercalcemia        Relevant Orders    Comprehensive metabolic panel    PTH, intact    Calcium, ionized           Diagnoses and all orders for this visit:    Hypothyroidism due to Hashimoto's thyroiditis  -     TSH, 3rd generation; Future    Hypothyroidism, unspecified type  -     levothyroxine 88 mcg tablet; Take 1 tablet (88 mcg total) by mouth daily    Hypercalcemia  -     Comprehensive metabolic panel; Future  -     PTH, intact; Future  -     Calcium, ionized; Future    Congestive heart failure, unspecified HF chronicity, unspecified heart failure type ([place] [address] )    Type 2 diabetes mellitus without complication, without long-term current use of insulin ([facility])    Osteopenia, unspecified location    Obesity (BMI 35 0-39 9 without comorbidity)      Hypothyroidism  The patient notes inconsistent use of the levothyroxine  Recheck TSH and noted need for consistent dosing 6 weeks      Diabetes mellitus ([place] [address] )    Lab Results   Component Value Date    HGBA1C 5 1 01/24/2023   A1c is good   Discussed insulin resistance and need for weight loss    Osteopenia  Noted mild elevated Ca+ and we will follow up on that  Check PTH  Recheck Ca+ in 6 weeks    Bone metastases (HCC)  Check Ca+ in 6 weeks    Obesity (BMI 35 0-39 9 without comorbidity)  Discussed exercise and diet  Noted that this would help with insulin resistance        Subjective:      Patient ID: Froylan Grossman is a 44 y o  female  HPI    The following portions of the patient's history were reviewed and updated as appropriate:   She has a past medical history of Allergy, Anxiety, Arthritis, BRCA negative, Breast cancer ([address] ), Candidiasis, cutaneous, Cervical cancer ([facility] [address] ), Depression, Diabetes mellitus ([facility] [address] ), Disease of thyroid gland, Disorder of urinary tract, Fatty liver, Hemorrhoids, History of chemotherapy, History of radiation therapy, Influenza A, Obesity, PONV (postoperative nausea and vomiting), and Port-A-Cath in place  ,  does not have any pertinent problems on file  ,   has a past surgical history that includes Mastectomy (Left, 12/19/2013); Hysterectomy (04/07/2014); Bilateral salpingoophorectomy; GASTRECTOMY SLEEVE LAPAROSCOPIC; IR port removal (11/21/2019); and Breast biopsy  ,  family history includes Breast cancer in her family; Diabetes in her family, father, and mother; Hodgkin's lymphoma in her paternal grandmother; Hypertension in her family and maternal grandfather; Leukemia in her son; No Known Problems in her daughter, sister, sister, son, son, and son; Ovarian cancer (age of onset: 29) in her sister; Uterine cancer (age of onset: 64) in her maternal aunt  ,   reports that she quit smoking about 9 years ago  Her smoking use included cigarettes  She has never used smokeless tobacco  She reports that she does not drink alcohol and does not use drugs  ,  is allergic to paclitaxel     Current Outpatient Medications   Medication Sig Dispense Refill   • Abemaciclib (Verzenio) 150 MG TABS Take 150 mg by mouth 2 (two) times a day 60 tablet 0   • ALPRAZolam (XANAX) 0 25 mg tablet Take 0 25 mg by mouth daily at bedtime as needed      • benzonatate (TESSALON) 200 MG capsule Take 1 capsule (200 mg total) by mouth 3 (three) times a day as needed for cough 20 capsule 0   • calcium-vitamin D (OSCAL 500 + D) 500 mg-200 units per tablet Take 1 tablet by mouth daily  • Denosumab (XGEVA SC) Inject under the skin     • ergocalciferol (VITAMIN D2) 50,000 units take 1 capsule by mouth weekly 12 capsule 0   • fulvestrant (FASLODEX) 250 mg/5 mL 500 mg by Intramuscular (multi inj) route once     • levothyroxine 88 mcg tablet Take 1 tablet (88 mcg total) by mouth daily 90 tablet 1   • loperamide (IMODIUM A-D) 2 MG tablet Take 1 tablet (2 mg total) by mouth 4 (four) times a day as needed for diarrhea 30 tablet 0   • nystatin (MYCOSTATIN) powder Apply 1 application topically 2 (two) times a day Abdominal fold 15 g 0   • simvastatin (ZOCOR) 20 mg tablet take 1 tablet by mouth at bedtime 90 tablet 0   • Sodium Fluoride 1 1 % PSTE Apply 1 Squirt to teeth daily at bedtime Brush daily at bedtime  Spit, do not rinse  Nothing to eat or drink afterwards   100 mL 0   • traMADol (ULTRAM) 50 mg tablet Take 50 mg by mouth     • cyclobenzaprine (FLEXERIL) 10 mg tablet Take 1 tablet (10 mg total) by mouth 3 (three) times a day as needed for muscle spasms for up to 14 days 42 tablet 0   • diclofenac sodium (VOLTAREN) 1 % Apply 1 application topically daily as needed      • famotidine (PEPCID) 40 MG tablet Take 1 tablet (40 mg total) by mouth daily at bedtime as needed for heartburn 30 tablet 5   • naproxen (NAPROSYN) 500 mg tablet Take 1 tablet (500 mg total) by mouth 2 (two) times a day as needed (Muscular skeletal pain) for up to 14 days 28 tablet 0   • omeprazole (PriLOSEC) 40 MG capsule Take 1 capsule (40 mg total) by mouth daily 90 capsule 1   • pentoxifylline (TRENtal) 400 mg ER tablet Take 400 mg by mouth 2 (two) times a day     • vitamin E, tocopherol, 400 units capsule Take 400 Units by mouth 2 (two) times a day No current facility-administered medications for this visit  Review of Systems   Constitutional: Negative for chills, fatigue and fever  HENT: Negative  Respiratory: Negative for cough, chest tightness and shortness of breath  Cardiovascular: Negative for chest pain, palpitations and leg swelling  Gastrointestinal: Negative for abdominal pain, constipation, diarrhea, nausea and vomiting  Genitourinary: Negative  Musculoskeletal: Negative for arthralgias, back pain and myalgias  Skin: Negative  Neurological: Negative  Psychiatric/Behavioral: Negative  Objective:  Vitals:    01/30/23 0754   BP: 134/82   BP Location: Left arm   Patient Position: Sitting   Cuff Size: Standard   Pulse: 73   Temp: 97 5 °F (36 4 °C)   SpO2: 99%   Weight: 98 4 kg (217 lb)   Height: 5' 2" (1 575 m)     Body mass index is 39 69 kg/m²  Physical Exam  Vitals and nursing note reviewed  Constitutional:       Appearance: She is well-developed  HENT:      Head: Normocephalic and atraumatic  Eyes:      Pupils: Pupils are equal, round, and reactive to light  Cardiovascular:      Rate and Rhythm: Normal rate and regular rhythm  Heart sounds: Normal heart sounds  No murmur heard  Pulmonary:      Effort: Pulmonary effort is normal  No respiratory distress  Breath sounds: Normal breath sounds  No wheezing  Abdominal:      General: Bowel sounds are normal       Palpations: Abdomen is soft  Tenderness: There is no abdominal tenderness  Musculoskeletal:         General: Normal range of motion  Cervical back: Normal range of motion and neck supple  Skin:     General: Skin is warm and dry  Neurological:      Mental Status: She is alert and oriented to person, place, and time            PHQ-2/9 Depression Screening

## 2023-02-09 ENCOUNTER — APPOINTMENT (OUTPATIENT)
Dept: LAB | Facility: CLINIC | Age: 43
End: 2023-02-09

## 2023-02-09 DIAGNOSIS — Z17.0 MALIGNANT NEOPLASM OF LEFT BREAST IN FEMALE, ESTROGEN RECEPTOR POSITIVE, UNSPECIFIED SITE OF BREAST (HCC): ICD-10-CM

## 2023-02-09 DIAGNOSIS — C79.51 BONE METASTASES (HCC): ICD-10-CM

## 2023-02-09 DIAGNOSIS — C50.912 MALIGNANT NEOPLASM OF LEFT BREAST IN FEMALE, ESTROGEN RECEPTOR POSITIVE, UNSPECIFIED SITE OF BREAST (HCC): ICD-10-CM

## 2023-02-09 LAB
ALBUMIN SERPL BCP-MCNC: 3.6 G/DL (ref 3.5–5)
ALP SERPL-CCNC: 42 U/L (ref 46–116)
ALT SERPL W P-5'-P-CCNC: 27 U/L (ref 12–78)
ANION GAP SERPL CALCULATED.3IONS-SCNC: 3 MMOL/L (ref 4–13)
AST SERPL W P-5'-P-CCNC: 20 U/L (ref 5–45)
BASOPHILS # BLD AUTO: 0.06 THOUSANDS/ÂΜL (ref 0–0.1)
BASOPHILS NFR BLD AUTO: 1 % (ref 0–1)
BILIRUB SERPL-MCNC: 0.47 MG/DL (ref 0.2–1)
BUN SERPL-MCNC: 16 MG/DL (ref 5–25)
CALCIUM SERPL-MCNC: 9.9 MG/DL (ref 8.3–10.1)
CHLORIDE SERPL-SCNC: 103 MMOL/L (ref 96–108)
CO2 SERPL-SCNC: 30 MMOL/L (ref 21–32)
CREAT SERPL-MCNC: 1.01 MG/DL (ref 0.6–1.3)
EOSINOPHIL # BLD AUTO: 0.12 THOUSAND/ÂΜL (ref 0–0.61)
EOSINOPHIL NFR BLD AUTO: 3 % (ref 0–6)
ERYTHROCYTE [DISTWIDTH] IN BLOOD BY AUTOMATED COUNT: 13.2 % (ref 11.6–15.1)
GFR SERPL CREATININE-BSD FRML MDRD: 68 ML/MIN/1.73SQ M
GLUCOSE P FAST SERPL-MCNC: 86 MG/DL (ref 65–99)
HCT VFR BLD AUTO: 32.6 % (ref 34.8–46.1)
HGB BLD-MCNC: 11.2 G/DL (ref 11.5–15.4)
IMM GRANULOCYTES # BLD AUTO: 0.02 THOUSAND/UL (ref 0–0.2)
IMM GRANULOCYTES NFR BLD AUTO: 0 % (ref 0–2)
LDH SERPL-CCNC: 248 U/L (ref 81–234)
LYMPHOCYTES # BLD AUTO: 1.86 THOUSANDS/ÂΜL (ref 0.6–4.47)
LYMPHOCYTES NFR BLD AUTO: 38 % (ref 14–44)
MCH RBC QN AUTO: 36.1 PG (ref 26.8–34.3)
MCHC RBC AUTO-ENTMCNC: 34.4 G/DL (ref 31.4–37.4)
MCV RBC AUTO: 105 FL (ref 82–98)
MONOCYTES # BLD AUTO: 0.34 THOUSAND/ÂΜL (ref 0.17–1.22)
MONOCYTES NFR BLD AUTO: 7 % (ref 4–12)
NEUTROPHILS # BLD AUTO: 2.46 THOUSANDS/ÂΜL (ref 1.85–7.62)
NEUTS SEG NFR BLD AUTO: 51 % (ref 43–75)
NRBC BLD AUTO-RTO: 0 /100 WBCS
PLATELET # BLD AUTO: 240 THOUSANDS/UL (ref 149–390)
PMV BLD AUTO: 9.5 FL (ref 8.9–12.7)
POTASSIUM SERPL-SCNC: 4 MMOL/L (ref 3.5–5.3)
PROT SERPL-MCNC: 7.1 G/DL (ref 6.4–8.4)
RBC # BLD AUTO: 3.1 MILLION/UL (ref 3.81–5.12)
SODIUM SERPL-SCNC: 136 MMOL/L (ref 135–147)
WBC # BLD AUTO: 4.86 THOUSAND/UL (ref 4.31–10.16)

## 2023-02-23 ENCOUNTER — OFFICE VISIT (OUTPATIENT)
Dept: DENTISTRY | Facility: CLINIC | Age: 43
End: 2023-02-23

## 2023-02-23 VITALS — SYSTOLIC BLOOD PRESSURE: 120 MMHG | DIASTOLIC BLOOD PRESSURE: 74 MMHG | HEART RATE: 86 BPM

## 2023-02-23 DIAGNOSIS — K02.9 DENTAL CARIES: Primary | ICD-10-CM

## 2023-02-23 RX ORDER — LOPERAMIDE HYDROCHLORIDE 2 MG/1
CAPSULE ORAL
COMMUNITY
Start: 2022-12-18

## 2023-02-23 NOTE — PROGRESS NOTES
Composite Filling    Bertha Beckett presents for composite filling  PMH reviewed, no changes  Discussed with patient need for RCT if pulp exposure occurs or in future if pulp is inflamed  Pt understands and consents  Applied topical benzocaine, administered 1 carps 2% lido 1:100k epi via IANB and long buccal    Prepped tooth #19 and 21 with 556 carbide on high speed  Caries removed with round carbide on slow speed  Pulp horn exposed on #19  Placed palodent matrix  Isolation with cotton rolls and dri-angles    Etch with 37% H2PO4, rinse, dry  Applied Adhese with 20 second scrub once, gentle air dry and light cured for 10s  Restored with flowable resin and GI shade A3 and light cured  Refined with finishing burs, polished with enhance point  Verified occlusion and contacts  Pt left satisfied      NV: Resins

## 2023-03-06 ENCOUNTER — LAB (OUTPATIENT)
Dept: LAB | Facility: CLINIC | Age: 43
End: 2023-03-06

## 2023-03-06 DIAGNOSIS — E06.3 HYPOTHYROIDISM DUE TO HASHIMOTO'S THYROIDITIS: ICD-10-CM

## 2023-03-06 DIAGNOSIS — E03.8 HYPOTHYROIDISM DUE TO HASHIMOTO'S THYROIDITIS: ICD-10-CM

## 2023-03-06 DIAGNOSIS — E83.52 HYPERCALCEMIA: ICD-10-CM

## 2023-03-06 DIAGNOSIS — C50.912 MALIGNANT NEOPLASM OF LEFT BREAST IN FEMALE, ESTROGEN RECEPTOR POSITIVE, UNSPECIFIED SITE OF BREAST (HCC): ICD-10-CM

## 2023-03-06 DIAGNOSIS — Z17.0 MALIGNANT NEOPLASM OF LEFT BREAST IN FEMALE, ESTROGEN RECEPTOR POSITIVE, UNSPECIFIED SITE OF BREAST (HCC): ICD-10-CM

## 2023-03-06 DIAGNOSIS — E78.5 HYPERLIPIDEMIA, UNSPECIFIED HYPERLIPIDEMIA TYPE: ICD-10-CM

## 2023-03-06 LAB
ALBUMIN SERPL BCP-MCNC: 3.8 G/DL (ref 3.5–5)
ALP SERPL-CCNC: 46 U/L (ref 46–116)
ALT SERPL W P-5'-P-CCNC: 29 U/L (ref 12–78)
ANION GAP SERPL CALCULATED.3IONS-SCNC: 3 MMOL/L (ref 4–13)
AST SERPL W P-5'-P-CCNC: 21 U/L (ref 5–45)
BASOPHILS # BLD AUTO: 0.05 THOUSANDS/ÂΜL (ref 0–0.1)
BASOPHILS NFR BLD AUTO: 1 % (ref 0–1)
BILIRUB SERPL-MCNC: 0.59 MG/DL (ref 0.2–1)
BUN SERPL-MCNC: 14 MG/DL (ref 5–25)
CA-I BLD-SCNC: 1.22 MMOL/L (ref 1.12–1.32)
CALCIUM SERPL-MCNC: 9.8 MG/DL (ref 8.3–10.1)
CHLORIDE SERPL-SCNC: 106 MMOL/L (ref 96–108)
CO2 SERPL-SCNC: 27 MMOL/L (ref 21–32)
CREAT SERPL-MCNC: 0.88 MG/DL (ref 0.6–1.3)
EOSINOPHIL # BLD AUTO: 0.1 THOUSAND/ÂΜL (ref 0–0.61)
EOSINOPHIL NFR BLD AUTO: 2 % (ref 0–6)
ERYTHROCYTE [DISTWIDTH] IN BLOOD BY AUTOMATED COUNT: 13.1 % (ref 11.6–15.1)
GFR SERPL CREATININE-BSD FRML MDRD: 81 ML/MIN/1.73SQ M
GLUCOSE P FAST SERPL-MCNC: 90 MG/DL (ref 65–99)
HCT VFR BLD AUTO: 34.5 % (ref 34.8–46.1)
HGB BLD-MCNC: 11.8 G/DL (ref 11.5–15.4)
IMM GRANULOCYTES # BLD AUTO: 0.03 THOUSAND/UL (ref 0–0.2)
IMM GRANULOCYTES NFR BLD AUTO: 1 % (ref 0–2)
LDH SERPL-CCNC: 234 U/L (ref 81–234)
LYMPHOCYTES # BLD AUTO: 1.76 THOUSANDS/ÂΜL (ref 0.6–4.47)
LYMPHOCYTES NFR BLD AUTO: 40 % (ref 14–44)
MCH RBC QN AUTO: 35.9 PG (ref 26.8–34.3)
MCHC RBC AUTO-ENTMCNC: 34.2 G/DL (ref 31.4–37.4)
MCV RBC AUTO: 105 FL (ref 82–98)
MONOCYTES # BLD AUTO: 0.39 THOUSAND/ÂΜL (ref 0.17–1.22)
MONOCYTES NFR BLD AUTO: 9 % (ref 4–12)
NEUTROPHILS # BLD AUTO: 2.08 THOUSANDS/ÂΜL (ref 1.85–7.62)
NEUTS SEG NFR BLD AUTO: 47 % (ref 43–75)
NRBC BLD AUTO-RTO: 0 /100 WBCS
PLATELET # BLD AUTO: 256 THOUSANDS/UL (ref 149–390)
PMV BLD AUTO: 9 FL (ref 8.9–12.7)
POTASSIUM SERPL-SCNC: 4.3 MMOL/L (ref 3.5–5.3)
PROT SERPL-MCNC: 7.5 G/DL (ref 6.4–8.4)
PTH-INTACT SERPL-MCNC: 108.5 PG/ML (ref 18.4–80.1)
RBC # BLD AUTO: 3.29 MILLION/UL (ref 3.81–5.12)
SODIUM SERPL-SCNC: 136 MMOL/L (ref 135–147)
TSH SERPL DL<=0.05 MIU/L-ACNC: 2.03 UIU/ML (ref 0.45–4.5)
WBC # BLD AUTO: 4.41 THOUSAND/UL (ref 4.31–10.16)

## 2023-03-06 RX ORDER — SIMVASTATIN 20 MG
TABLET ORAL
Qty: 90 TABLET | Refills: 0 | Status: SHIPPED | OUTPATIENT
Start: 2023-03-06

## 2023-04-28 ENCOUNTER — APPOINTMENT (OUTPATIENT)
Dept: LAB | Facility: CLINIC | Age: 43
End: 2023-04-28

## 2023-04-28 DIAGNOSIS — C50.912 MALIGNANT NEOPLASM OF LEFT BREAST IN FEMALE, ESTROGEN RECEPTOR POSITIVE, UNSPECIFIED SITE OF BREAST (HCC): ICD-10-CM

## 2023-04-28 DIAGNOSIS — Z17.0 MALIGNANT NEOPLASM OF LEFT BREAST IN FEMALE, ESTROGEN RECEPTOR POSITIVE, UNSPECIFIED SITE OF BREAST (HCC): ICD-10-CM

## 2023-04-28 LAB
ALBUMIN SERPL BCP-MCNC: 3.6 G/DL (ref 3.5–5)
ALP SERPL-CCNC: 29 U/L (ref 46–116)
ALT SERPL W P-5'-P-CCNC: 24 U/L (ref 12–78)
ANION GAP SERPL CALCULATED.3IONS-SCNC: 4 MMOL/L (ref 4–13)
AST SERPL W P-5'-P-CCNC: 16 U/L (ref 5–45)
BASOPHILS # BLD AUTO: 0.03 THOUSANDS/ΜL (ref 0–0.1)
BASOPHILS NFR BLD AUTO: 1 % (ref 0–1)
BILIRUB SERPL-MCNC: 0.47 MG/DL (ref 0.2–1)
BUN SERPL-MCNC: 27 MG/DL (ref 5–25)
CALCIUM SERPL-MCNC: 9.8 MG/DL (ref 8.3–10.1)
CHLORIDE SERPL-SCNC: 105 MMOL/L (ref 96–108)
CO2 SERPL-SCNC: 28 MMOL/L (ref 21–32)
CREAT SERPL-MCNC: 1.41 MG/DL (ref 0.6–1.3)
EOSINOPHIL # BLD AUTO: 0.07 THOUSAND/ΜL (ref 0–0.61)
EOSINOPHIL NFR BLD AUTO: 1 % (ref 0–6)
ERYTHROCYTE [DISTWIDTH] IN BLOOD BY AUTOMATED COUNT: 12.4 % (ref 11.6–15.1)
GFR SERPL CREATININE-BSD FRML MDRD: 45 ML/MIN/1.73SQ M
GLUCOSE SERPL-MCNC: 86 MG/DL (ref 65–140)
HCT VFR BLD AUTO: 33.9 % (ref 34.8–46.1)
HGB BLD-MCNC: 11.9 G/DL (ref 11.5–15.4)
IMM GRANULOCYTES # BLD AUTO: 0.08 THOUSAND/UL (ref 0–0.2)
IMM GRANULOCYTES NFR BLD AUTO: 1 % (ref 0–2)
LDH SERPL-CCNC: 224 U/L (ref 81–234)
LYMPHOCYTES # BLD AUTO: 2.28 THOUSANDS/ΜL (ref 0.6–4.47)
LYMPHOCYTES NFR BLD AUTO: 35 % (ref 14–44)
MCH RBC QN AUTO: 36.6 PG (ref 26.8–34.3)
MCHC RBC AUTO-ENTMCNC: 35.1 G/DL (ref 31.4–37.4)
MCV RBC AUTO: 104 FL (ref 82–98)
MONOCYTES # BLD AUTO: 0.57 THOUSAND/ΜL (ref 0.17–1.22)
MONOCYTES NFR BLD AUTO: 9 % (ref 4–12)
NEUTROPHILS # BLD AUTO: 3.46 THOUSANDS/ΜL (ref 1.85–7.62)
NEUTS SEG NFR BLD AUTO: 53 % (ref 43–75)
NRBC BLD AUTO-RTO: 0 /100 WBCS
PLATELET # BLD AUTO: 254 THOUSANDS/UL (ref 149–390)
PMV BLD AUTO: 9 FL (ref 8.9–12.7)
POTASSIUM SERPL-SCNC: 4 MMOL/L (ref 3.5–5.3)
PROT SERPL-MCNC: 7.2 G/DL (ref 6.4–8.4)
RBC # BLD AUTO: 3.25 MILLION/UL (ref 3.81–5.12)
SODIUM SERPL-SCNC: 137 MMOL/L (ref 135–147)
WBC # BLD AUTO: 6.49 THOUSAND/UL (ref 4.31–10.16)

## 2023-05-18 ENCOUNTER — TELEPHONE (OUTPATIENT)
Dept: INTERNAL MEDICINE CLINIC | Facility: CLINIC | Age: 43
End: 2023-05-18

## 2023-05-19 DIAGNOSIS — R21 RASH: ICD-10-CM

## 2023-05-19 DIAGNOSIS — E78.5 HYPERLIPIDEMIA, UNSPECIFIED HYPERLIPIDEMIA TYPE: ICD-10-CM

## 2023-05-19 DIAGNOSIS — K90.9 DIARRHEA DUE TO MALABSORPTION: ICD-10-CM

## 2023-05-19 DIAGNOSIS — R19.7 DIARRHEA DUE TO MALABSORPTION: ICD-10-CM

## 2023-05-19 RX ORDER — LOPERAMIDE HYDROCHLORIDE 2 MG/1
2 TABLET ORAL 4 TIMES DAILY PRN
Qty: 30 TABLET | Refills: 0 | Status: SHIPPED | OUTPATIENT
Start: 2023-05-19

## 2023-05-19 RX ORDER — SIMVASTATIN 20 MG
TABLET ORAL
Qty: 100 TABLET | Refills: 1 | Status: SHIPPED | OUTPATIENT
Start: 2023-05-19

## 2023-05-22 ENCOUNTER — HOSPITAL ENCOUNTER (OUTPATIENT)
Dept: CT IMAGING | Facility: HOSPITAL | Age: 43
Discharge: HOME | End: 2023-05-22

## 2023-05-22 ENCOUNTER — HOSPITAL ENCOUNTER (OUTPATIENT)
Dept: NUCLEAR MEDICINE | Facility: HOSPITAL | Age: 43
Discharge: HOME | End: 2023-05-22

## 2023-05-22 ENCOUNTER — APPOINTMENT (OUTPATIENT)
Dept: LAB | Facility: HOSPITAL | Age: 43
End: 2023-05-22

## 2023-05-22 DIAGNOSIS — C50.912 MALIGNANT NEOPLASM OF UNSPECIFIED SITE OF LEFT FEMALE BREAST (HCC): ICD-10-CM

## 2023-05-22 DIAGNOSIS — Z17.0 MALIGNANT NEOPLASM OF LEFT BREAST IN FEMALE, ESTROGEN RECEPTOR POSITIVE, UNSPECIFIED SITE OF BREAST (HCC): ICD-10-CM

## 2023-05-22 DIAGNOSIS — C50.912 MALIGNANT NEOPLASM OF LEFT BREAST IN FEMALE, ESTROGEN RECEPTOR POSITIVE, UNSPECIFIED SITE OF BREAST (HCC): ICD-10-CM

## 2023-05-22 DIAGNOSIS — Z17.0 ESTROGEN RECEPTOR POSITIVE STATUS (ER+): ICD-10-CM

## 2023-05-22 DIAGNOSIS — C79.51 SECONDARY MALIGNANT NEOPLASM OF BONE (HCC): ICD-10-CM

## 2023-05-22 LAB
ALBUMIN SERPL BCP-MCNC: 4.2 G/DL (ref 3.5–5)
ALP SERPL-CCNC: 28 U/L (ref 34–104)
ALT SERPL W P-5'-P-CCNC: 17 U/L (ref 7–52)
ANION GAP SERPL CALCULATED.3IONS-SCNC: 7 MMOL/L (ref 4–13)
AST SERPL W P-5'-P-CCNC: 18 U/L (ref 13–39)
BASOPHILS # BLD AUTO: 0.05 THOUSANDS/ÂΜL (ref 0–0.1)
BASOPHILS NFR BLD AUTO: 1 % (ref 0–1)
BILIRUB SERPL-MCNC: 0.58 MG/DL (ref 0.2–1)
BUN SERPL-MCNC: 15 MG/DL (ref 5–25)
CALCIUM SERPL-MCNC: 9.5 MG/DL (ref 8.4–10.2)
CHLORIDE SERPL-SCNC: 103 MMOL/L (ref 96–108)
CO2 SERPL-SCNC: 28 MMOL/L (ref 21–32)
CREAT SERPL-MCNC: 0.94 MG/DL (ref 0.6–1.3)
EOSINOPHIL # BLD AUTO: 0.14 THOUSAND/ÂΜL (ref 0–0.61)
EOSINOPHIL NFR BLD AUTO: 4 % (ref 0–6)
ERYTHROCYTE [DISTWIDTH] IN BLOOD BY AUTOMATED COUNT: 12.5 % (ref 11.6–15.1)
GFR SERPL CREATININE-BSD FRML MDRD: 75 ML/MIN/1.73SQ M
GLUCOSE P FAST SERPL-MCNC: 88 MG/DL (ref 65–99)
HCT VFR BLD AUTO: 34.8 % (ref 34.8–46.1)
HGB BLD-MCNC: 11.8 G/DL (ref 11.5–15.4)
IMM GRANULOCYTES # BLD AUTO: 0.01 THOUSAND/UL (ref 0–0.2)
IMM GRANULOCYTES NFR BLD AUTO: 0 % (ref 0–2)
LDH SERPL-CCNC: 216 U/L (ref 140–271)
LYMPHOCYTES # BLD AUTO: 1.91 THOUSANDS/ÂΜL (ref 0.6–4.47)
LYMPHOCYTES NFR BLD AUTO: 48 % (ref 14–44)
MCH RBC QN AUTO: 36.1 PG (ref 26.8–34.3)
MCHC RBC AUTO-ENTMCNC: 33.9 G/DL (ref 31.4–37.4)
MCV RBC AUTO: 106 FL (ref 82–98)
MONOCYTES # BLD AUTO: 0.33 THOUSAND/ÂΜL (ref 0.17–1.22)
MONOCYTES NFR BLD AUTO: 8 % (ref 4–12)
NEUTROPHILS # BLD AUTO: 1.57 THOUSANDS/ÂΜL (ref 1.85–7.62)
NEUTS SEG NFR BLD AUTO: 39 % (ref 43–75)
NRBC BLD AUTO-RTO: 0 /100 WBCS
PLATELET # BLD AUTO: 231 THOUSANDS/UL (ref 149–390)
PMV BLD AUTO: 9.1 FL (ref 8.9–12.7)
POTASSIUM SERPL-SCNC: 4.2 MMOL/L (ref 3.5–5.3)
PROT SERPL-MCNC: 7 G/DL (ref 6.4–8.4)
RBC # BLD AUTO: 3.27 MILLION/UL (ref 3.81–5.12)
SODIUM SERPL-SCNC: 138 MMOL/L (ref 135–147)
WBC # BLD AUTO: 4.01 THOUSAND/UL (ref 4.31–10.16)

## 2023-05-22 RX ORDER — NYSTATIN 100000 [USP'U]/G
1 POWDER TOPICAL 2 TIMES DAILY
Qty: 15 G | Refills: 3 | Status: SHIPPED | OUTPATIENT
Start: 2023-05-22

## 2023-05-22 RX ADMIN — IOHEXOL 100 ML: 350 INJECTION, SOLUTION INTRAVENOUS at 10:26

## 2023-05-22 NOTE — PROGRESS NOTES
Assessment/Plan:    Problem List Items Addressed This Visit    None       There are no diagnoses linked to this encounter. No problem-specific Assessment & Plan notes found for this encounter. Subjective:      Patient ID: Franc Rayo is a 44 y.o. female. HPI    The following portions of the patient's history were reviewed and updated as appropriate:   She has a past medical history of Allergy, Anxiety, Arthritis, BRCA negative, Breast cancer ([address]), Candidiasis, cutaneous, Cervical cancer ([address]), Depression, Diabetes mellitus ([address]), Disease of thyroid gland, Disorder of urinary tract, Fatty liver, Hemorrhoids, History of chemotherapy, History of radiation therapy, Influenza A, Obesity, PONV (postoperative nausea and vomiting), and Port-A-Cath in place. ,  does not have any pertinent problems on file. ,   has a past surgical history that includes Mastectomy (Left, 12/19/2013); Hysterectomy (04/07/2014); Bilateral salpingoophorectomy; GASTRECTOMY SLEEVE LAPAROSCOPIC; IR port removal (11/21/2019); and Breast biopsy. ,  family history includes Breast cancer in her family; Diabetes in her family, father, and mother; Hodgkin's lymphoma in her paternal grandmother; Hypertension in her family and maternal grandfather; Leukemia in her son; No Known Problems in her daughter, sister, sister, son, son, and son; Ovarian cancer (age of onset: 29) in her sister; Uterine cancer (age of onset: 64) in her maternal aunt. ,   reports that she quit smoking about 10 years ago. Her smoking use included cigarettes. She has never used smokeless tobacco. She reports that she does not drink alcohol and does not use drugs. ,  is allergic to paclitaxel. .  Current Outpatient Medications   Medication Sig Dispense Refill   • Abemaciclib (Verzenio) 150 MG TABS Take 150 mg by mouth 2 (two) times a day 60 tablet 0   • ALPRAZolam (XANAX) 0.25 mg tablet Take 0.25 mg by mouth daily at bedtime as needed      • benzonatate (TESSALON) 200 MG capsule Take 1 capsule (200 mg total) by mouth 3 (three) times a day as needed for cough (Patient not taking: Reported on 2/23/2023) 20 capsule 0   • calcium-vitamin D (OSCAL 500 + D) 500 mg-200 units per tablet Take 1 tablet by mouth daily. • cyclobenzaprine (FLEXERIL) 10 mg tablet Take 1 tablet (10 mg total) by mouth 3 (three) times a day as needed for muscle spasms for up to 14 days 42 tablet 0   • Denosumab (XGEVA SC) Inject under the skin     • diclofenac sodium (VOLTAREN) 1 % Apply 1 application topically daily as needed      • ergocalciferol (VITAMIN D2) 50,000 units take 1 capsule by mouth weekly 12 capsule 0   • famotidine (PEPCID) 40 MG tablet Take 1 tablet (40 mg total) by mouth daily at bedtime as needed for heartburn 30 tablet 5   • fulvestrant (FASLODEX) 250 mg/5 mL 500 mg by Intramuscular (multi inj) route once     • levothyroxine 88 mcg tablet Take 1 tablet (88 mcg total) by mouth daily 90 tablet 1   • loperamide (IMODIUM A-D) 2 MG tablet Take 1 tablet (2 mg total) by mouth 4 (four) times a day as needed for diarrhea 30 tablet 0   • loperamide (IMODIUM) 2 mg capsule      • naproxen (NAPROSYN) 500 mg tablet Take 1 tablet (500 mg total) by mouth 2 (two) times a day as needed (Muscular skeletal pain) for up to 14 days 28 tablet 0   • nystatin (MYCOSTATIN) powder Apply 1 application. topically 2 (two) times a day Abdominal fold 15 g 3   • omeprazole (PriLOSEC) 40 MG capsule Take 1 capsule (40 mg total) by mouth daily 90 capsule 1   • pentoxifylline (TRENtal) 400 mg ER tablet Take 400 mg by mouth 2 (two) times a day     • simvastatin (ZOCOR) 20 mg tablet take 1 tablet by mouth at bedtime 100 tablet 1   • Sodium Fluoride 1.1 % PSTE Apply 1 Squirt to teeth daily at bedtime Brush daily at bedtime. Spit, do not rinse. Nothing to eat or drink afterwards.  100 mL 0   • traMADol (ULTRAM) 50 mg tablet Take 50 mg by mouth     • vitamin E, tocopherol, 400 units capsule Take 400 Units by mouth 2 (two) times a day No current facility-administered medications for this visit. Review of Systems      Objective: There were no vitals filed for this visit. There is no height or weight on file to calculate BMI.      Physical Exam     PHQ-2/9 Depression Screening

## 2023-06-16 ENCOUNTER — OFFICE VISIT (OUTPATIENT)
Dept: URGENT CARE | Facility: CLINIC | Age: 43
End: 2023-06-16
Payer: COMMERCIAL

## 2023-06-16 ENCOUNTER — APPOINTMENT (OUTPATIENT)
Dept: RADIOLOGY | Facility: CLINIC | Age: 43
End: 2023-06-16
Payer: COMMERCIAL

## 2023-06-16 ENCOUNTER — OFFICE VISIT (OUTPATIENT)
Dept: OBGYN CLINIC | Facility: CLINIC | Age: 43
End: 2023-06-16
Payer: COMMERCIAL

## 2023-06-16 VITALS
HEART RATE: 88 BPM | WEIGHT: 215 LBS | DIASTOLIC BLOOD PRESSURE: 86 MMHG | HEIGHT: 62 IN | OXYGEN SATURATION: 97 % | BODY MASS INDEX: 39.56 KG/M2 | SYSTOLIC BLOOD PRESSURE: 139 MMHG

## 2023-06-16 VITALS
DIASTOLIC BLOOD PRESSURE: 85 MMHG | SYSTOLIC BLOOD PRESSURE: 135 MMHG | OXYGEN SATURATION: 97 % | TEMPERATURE: 97.5 F | RESPIRATION RATE: 18 BRPM | HEART RATE: 71 BPM

## 2023-06-16 DIAGNOSIS — M25.562 CHRONIC PAIN OF LEFT KNEE: ICD-10-CM

## 2023-06-16 DIAGNOSIS — G89.29 CHRONIC PAIN OF RIGHT KNEE: ICD-10-CM

## 2023-06-16 DIAGNOSIS — M17.12 PRIMARY OSTEOARTHRITIS OF ONE KNEE, LEFT: ICD-10-CM

## 2023-06-16 DIAGNOSIS — G89.29 CHRONIC PAIN OF LEFT KNEE: ICD-10-CM

## 2023-06-16 DIAGNOSIS — M17.11 PRIMARY OSTEOARTHRITIS OF ONE KNEE, RIGHT: Primary | ICD-10-CM

## 2023-06-16 DIAGNOSIS — R51.9 SINUS HEADACHE: ICD-10-CM

## 2023-06-16 DIAGNOSIS — M25.561 CHRONIC PAIN OF RIGHT KNEE: ICD-10-CM

## 2023-06-16 DIAGNOSIS — R05.1 ACUTE COUGH: ICD-10-CM

## 2023-06-16 DIAGNOSIS — J01.10 ACUTE NON-RECURRENT FRONTAL SINUSITIS: Primary | ICD-10-CM

## 2023-06-16 LAB
SARS-COV-2 AG UPPER RESP QL IA: NEGATIVE
VALID CONTROL: NORMAL

## 2023-06-16 PROCEDURE — 99204 OFFICE O/P NEW MOD 45 MIN: CPT | Performed by: FAMILY MEDICINE

## 2023-06-16 PROCEDURE — S9083 URGENT CARE CENTER GLOBAL: HCPCS

## 2023-06-16 PROCEDURE — 87811 SARS-COV-2 COVID19 W/OPTIC: CPT

## 2023-06-16 PROCEDURE — 73562 X-RAY EXAM OF KNEE 3: CPT

## 2023-06-16 PROCEDURE — 99213 OFFICE O/P EST LOW 20 MIN: CPT

## 2023-06-16 PROCEDURE — 96372 THER/PROPH/DIAG INJ SC/IM: CPT

## 2023-06-16 RX ORDER — KETOROLAC TROMETHAMINE 30 MG/ML
30 INJECTION, SOLUTION INTRAMUSCULAR; INTRAVENOUS ONCE
Status: COMPLETED | OUTPATIENT
Start: 2023-06-16 | End: 2023-06-16

## 2023-06-16 RX ORDER — AMOXICILLIN AND CLAVULANATE POTASSIUM 875; 125 MG/1; MG/1
1 TABLET, FILM COATED ORAL EVERY 12 HOURS SCHEDULED
Qty: 14 TABLET | Refills: 0 | Status: SHIPPED | OUTPATIENT
Start: 2023-06-16 | End: 2023-06-23

## 2023-06-16 RX ADMIN — KETOROLAC TROMETHAMINE 30 MG: 30 INJECTION, SOLUTION INTRAMUSCULAR; INTRAVENOUS at 11:01

## 2023-06-16 NOTE — PATIENT INSTRUCTIONS
Majority of cases are viral and will not require antibiotic treatment  Given length of symptoms will treat  Take antibiotics as prescribed  Complete entire course of antibiotics even if feeling better  Eat yogurt with live and active cultures and/or take a probiotic at least 3 hours before or after antibiotic dose  Monitor stool for diarrhea and/or blood  If this occurs, contact primary care doctor ASAP  Warm compresses over sinuses  Steam treatment (practice proper safety precautions when handing hot liquids/steam)  Over the counter saline nasal spray  Follow up with PCP in 3-5 days  Proceed to  ER if symptoms worsen

## 2023-06-16 NOTE — PROGRESS NOTES
"[facility] [place] [address]  [facility] [address] [id number] [phone]      Chief Complaint:  Chief Complaint   Patient presents with   • Left Knee - Pain   • Right Knee - Pain       Vitals:  /86   Pulse 88   Ht 5' 2\" (1 575 m)   Wt 97 5 kg (215 lb)   SpO2 97%   BMI 39 32 kg/m²     The following portions of the patient's history were reviewed and updated as appropriate: allergies, current medications, past family history, past medical history, past social history, past surgical history, and problem list       Subjective:   Patient ID: Lisa Sheriff is a 44 y o  female  Here c/o B knee pain  Hx of OA  Seeing Dr Rachid Holloway- has had visco injections  Had L knee visco injection 4 wks ago  R knee swelling/pain- had steroid injection- about 2 wks go- no improvement  Pain level in R knee 10/10  L knee doing ok  Taking tylenol  Worse with walking/steps  She has done therapy years ago      Review of Systems   Constitutional: Negative for fatigue and fever  Respiratory: Negative for shortness of breath  Cardiovascular: Negative for chest pain  Gastrointestinal: Negative for abdominal pain and nausea  Genitourinary: Negative for dysuria  Musculoskeletal: Positive for arthralgias  Skin: Negative for rash and wound  Neurological: Negative for weakness and headaches  Objective:  Right Knee Exam     Tenderness   The patient is experiencing tenderness in the medial joint line  Range of Motion   The patient has normal right knee ROM  Other   Swelling: none  Effusion: no effusion present      Left Knee Exam     Tenderness   The patient is experiencing no tenderness  Range of Motion   The patient has normal left knee ROM  Other   Swelling: none  Effusion: no effusion present          Observations   Left Knee   Negative for effusion  Right Knee   Negative for effusion         Physical Exam  Constitutional:       Appearance: " Normal appearance  She is normal weight  Eyes:      Extraocular Movements: Extraocular movements intact  Pulmonary:      Effort: Pulmonary effort is normal    Musculoskeletal:         General: Tenderness present  Cervical back: Normal range of motion  Right knee: No effusion  Left knee: No effusion  Skin:     General: Skin is warm and dry  Neurological:      General: No focal deficit present  Mental Status: She is alert and oriented to person, place, and time  Mental status is at baseline  Psychiatric:         Mood and Affect: Mood normal          Behavior: Behavior normal          Thought Content: Thought content normal          Judgment: Judgment normal          I have personally reviewed pertinent films in PACS and my interpretation is XR-  B knee-  R knee medial joint space narrowing- severe OA  L knee- medial joint space narrowing  mod OA  Assessment/Plan:  Assessment/Plan   Diagnoses and all orders for this visit:    Primary osteoarthritis of one knee, right  -     Ambulatory Referral to Physical Therapy; Future  -     Injection Procedure Prior Authorization; Future    Primary osteoarthritis of one knee, left  -     Ambulatory Referral to Physical Therapy; Future    Chronic pain of left knee  -     XR knee 3 vw left non injury; Future  -     Ambulatory Referral to Physical Therapy; Future    Chronic pain of right knee  -     XR knee 3 vw right non injury; Future  -     Ambulatory Referral to Physical Therapy; Future        Return for f/u for R knee visco injection, Tea Ibrahim MD

## 2023-06-16 NOTE — PROGRESS NOTES
[facility]        NAME: Maurice Molina is a 44 y o  female  : 1980    MRN: [id number]  DATE: 2023  TIME: 12:07 PM    Assessment and Plan   Acute non-recurrent frontal sinusitis [J01 10]  1  Acute non-recurrent frontal sinusitis  amoxicillin-clavulanate (AUGMENTIN) 875-125 mg per tablet      2  Sinus headache  ketorolac (TORADOL) injection 30 mg      3  Acute cough  Poct Covid 19 Rapid Antigen Test        POCT COVID-19: -    Given Toradol 30 mg IM in clinic today  Provided work note  Patient Instructions   Majority of cases are viral and will not require antibiotic treatment  Given length of symptoms will treat  Take antibiotics as prescribed  Complete entire course of antibiotics even if feeling better  Eat yogurt with live and active cultures and/or take a probiotic at least 3 hours before or after antibiotic dose  Monitor stool for diarrhea and/or blood  If this occurs, contact primary care doctor ASAP  Warm compresses over sinuses  Steam treatment (practice proper safety precautions when handing hot liquids/steam)  Over the counter saline nasal spray    Follow up with PCP in 3-5 days  Proceed to ER if symptoms worsen  Chief Complaint     Chief Complaint   Patient presents with   • Headache     For several days   • Cough     With mucous, clogged nose      History of Present Illness   Pt is a 43 y/o F who presents to the clinic today with CC of HA, cough and congestion  Sinusitis  This is a new problem  The current episode started in the past 7 days  The problem is unchanged  There has been no fever  Her pain is at a severity of 6/10 (HA)  Associated symptoms include congestion, coughing (Green sputum), ear pain, headaches, sinus pressure and a sore throat  Pertinent negatives include no chills or diaphoresis  Past treatments include acetaminophen  The treatment provided no relief         Review of Systems   Review of Systems   Constitutional: Negative for chills, diaphoresis and fever  HENT: Positive for congestion, ear pain, sinus pressure, sinus pain and sore throat  Negative for postnasal drip and rhinorrhea  Respiratory: Positive for cough (Green sputum)  Cardiovascular: Negative  Gastrointestinal: Negative for abdominal pain, diarrhea, nausea and vomiting  Genitourinary: Negative for difficulty urinating, dyspareunia, flank pain, frequency and urgency  Neurological: Positive for headaches  Negative for dizziness and light-headedness  Current Medications       Current Outpatient Medications:   •  amoxicillin-clavulanate (AUGMENTIN) 875-125 mg per tablet, Take 1 tablet by mouth every 12 (twelve) hours for 7 days, Disp: 14 tablet, Rfl: 0  •  Abemaciclib (Verzenio) 150 MG TABS, Take 150 mg by mouth 2 (two) times a day, Disp: 60 tablet, Rfl: 0  •  calcium-vitamin D (OSCAL 500 + D) 500 mg-200 units per tablet, Take 1 tablet by mouth daily  , Disp: , Rfl:   •  Denosumab (Carmine Case SC), Inject under the skin, Disp: , Rfl:   •  diclofenac sodium (VOLTAREN) 1 %, Apply 1 application topically daily as needed , Disp: , Rfl:   •  ergocalciferol (VITAMIN D2) 50,000 units, take 1 capsule by mouth weekly, Disp: 12 capsule, Rfl: 0  •  famotidine (PEPCID) 40 MG tablet, Take 1 tablet (40 mg total) by mouth daily at bedtime as needed for heartburn, Disp: 30 tablet, Rfl: 5  •  fulvestrant (FASLODEX) 250 mg/5 mL, 500 mg by Intramuscular (multi inj) route once, Disp: , Rfl:   •  levothyroxine 88 mcg tablet, Take 1 tablet (88 mcg total) by mouth daily, Disp: 90 tablet, Rfl: 1  •  loperamide (IMODIUM A-D) 2 MG tablet, Take 1 tablet (2 mg total) by mouth 4 (four) times a day as needed for diarrhea, Disp: 30 tablet, Rfl: 0  •  loperamide (IMODIUM) 2 mg capsule, , Disp: , Rfl:   •  nystatin (MYCOSTATIN) powder, Apply 1 application   topically 2 (two) times a day Abdominal fold, Disp: 15 g, Rfl: 3  •  omeprazole (PriLOSEC) 40 MG capsule, Take 1 capsule (40 mg total) by mouth daily, Disp: 90 capsule, Rfl: 1  •  simvastatin (ZOCOR) 20 mg tablet, take 1 tablet by mouth at bedtime, Disp: 100 tablet, Rfl: 1  •  Sodium Fluoride 1 1 % PSTE, Apply 1 Squirt to teeth daily at bedtime Brush daily at bedtime  Spit, do not rinse  Nothing to eat or drink afterwards  , Disp: 100 mL, Rfl: 0  •  vitamin E, tocopherol, 400 units capsule, Take 400 Units by mouth 2 (two) times a day, Disp: , Rfl:   No current facility-administered medications for this visit      Current Allergies     Allergies as of 06/16/2023 - Reviewed 06/16/2023   Allergen Reaction Noted   • Paclitaxel Anaphylaxis 02/26/2016            The following portions of the patient's history were reviewed and updated as appropriate: allergies, current medications, past family history, past medical history, past social history, past surgical history and problem list      Past Medical History:   Diagnosis Date   • Allergy    • Anxiety    • Arthritis    • BRCA negative    • Breast cancer ([facility] [address] )     left mastectomy 2013   • Candidiasis, cutaneous    • Cervical cancer ([facility] [address] )    • Depression    • [facility]    • Disease of thyroid gland     hypothyroid   • Disorder of urinary tract    • Fatty liver    • Hemorrhoids    • History of chemotherapy    • History of radiation therapy    • Influenza A    • Obesity    • PONV (postoperative nausea and vomiting)    • Port-A-Cath in place        Past Surgical History:   Procedure Laterality Date   • BILATERAL SALPINGOOPHORECTOMY     • BREAST BIOPSY      needle core   • GASTRECTOMY SLEEVE LAPAROSCOPIC      Onset: 1/26/15  Dr Bhavik Garrido   • HYSTERECTOMY  04/07/2014   • IR PORT REMOVAL  11/21/2019   • MASTECTOMY Left 12/19/2013    Lymph nodes removed       Family History   Problem Relation Age of Onset   • Diabetes Mother    • Diabetes Father    • Ovarian cancer Sister 29   • No Known Problems Sister    • No Known Problems Sister    • No Known Problems Daughter    • Hypertension Maternal Grandfather • Hodgkin's lymphoma Paternal Grandmother    • Leukemia Son         chronic myeloid   • No Known Problems Son    • No Known Problems Son    • No Known Problems Son    • Uterine cancer Maternal Aunt 64   • Breast cancer Family    • Diabetes Family    • Hypertension Family          Medications have been verified  Objective   /85 (BP Location: Right arm, Patient Position: Sitting)   Pulse 71   Temp 97 5 °F (36 4 °C)   Resp 18   SpO2 97%        Physical Exam     Physical Exam  Constitutional:       General: She is not in acute distress  Appearance: Normal appearance  She is not ill-appearing  HENT:      Head: Normocephalic  Right Ear: Tympanic membrane normal       Left Ear: Tympanic membrane normal       Nose: Rhinorrhea present  Right Turbinates: Enlarged and swollen  Left Turbinates: Enlarged and swollen  Right Sinus: Maxillary sinus tenderness and frontal sinus tenderness present  Left Sinus: Maxillary sinus tenderness and frontal sinus tenderness present  Mouth/Throat:      Mouth: Mucous membranes are moist    Cardiovascular:      Rate and Rhythm: Normal rate and regular rhythm  Heart sounds: Normal heart sounds  No murmur heard  Pulmonary:      Effort: Pulmonary effort is normal  No respiratory distress  Breath sounds: Normal breath sounds  No stridor  No wheezing, rhonchi or rales  Skin:     General: Skin is warm and dry  Neurological:      Mental Status: She is alert

## 2023-06-16 NOTE — LETTER
June 16, 2023     Patient: Yehuda Shaikh   YOB: 1980   Date of Visit: 6/16/2023       To Whom it May Concern:    Bertha Beckett was seen in my clinic on 6/16/2023  She may return to work on 6/19/2023 or when fever free for 24 without any fever reducing agents  If you have any questions or concerns, please don't hesitate to call           Sincerely,          CONCEPCIÓN Zhang        CC: No Recipients

## 2023-06-21 ENCOUNTER — OFFICE VISIT (OUTPATIENT)
Dept: INTERNAL MEDICINE CLINIC | Facility: CLINIC | Age: 43
End: 2023-06-21
Payer: COMMERCIAL

## 2023-06-21 ENCOUNTER — APPOINTMENT (OUTPATIENT)
Dept: LAB | Facility: CLINIC | Age: 43
End: 2023-06-21
Payer: COMMERCIAL

## 2023-06-21 VITALS
SYSTOLIC BLOOD PRESSURE: 120 MMHG | WEIGHT: 216 LBS | RESPIRATION RATE: 12 BRPM | BODY MASS INDEX: 39.75 KG/M2 | TEMPERATURE: 99.1 F | HEART RATE: 82 BPM | OXYGEN SATURATION: 98 % | DIASTOLIC BLOOD PRESSURE: 84 MMHG | HEIGHT: 62 IN

## 2023-06-21 DIAGNOSIS — Z00.00 MEDICARE ANNUAL WELLNESS VISIT, SUBSEQUENT: Primary | ICD-10-CM

## 2023-06-21 DIAGNOSIS — C79.51 MALIGNANT NEOPLASM METASTATIC TO BONE (HCC): ICD-10-CM

## 2023-06-21 DIAGNOSIS — E11.9 TYPE 2 DIABETES MELLITUS WITHOUT COMPLICATION, WITHOUT LONG-TERM CURRENT USE OF INSULIN (HCC): ICD-10-CM

## 2023-06-21 DIAGNOSIS — R80.9 MICROALBUMINURIA: ICD-10-CM

## 2023-06-21 DIAGNOSIS — C50.912 MALIGNANT NEOPLASM OF LEFT FEMALE BREAST, UNSPECIFIED ESTROGEN RECEPTOR STATUS, UNSPECIFIED SITE OF BREAST (HCC): ICD-10-CM

## 2023-06-21 DIAGNOSIS — Z12.31 ENCOUNTER FOR SCREENING MAMMOGRAM FOR MALIGNANT NEOPLASM OF BREAST: ICD-10-CM

## 2023-06-21 DIAGNOSIS — B35.3 TINEA PEDIS OF LEFT FOOT: ICD-10-CM

## 2023-06-21 DIAGNOSIS — M17.11 ARTHRITIS OF KNEE, RIGHT: ICD-10-CM

## 2023-06-21 LAB
BASOPHILS # BLD AUTO: 0.05 THOUSANDS/ÂΜL (ref 0–0.1)
BASOPHILS NFR BLD AUTO: 1 % (ref 0–1)
EOSINOPHIL # BLD AUTO: 0.14 THOUSAND/ÂΜL (ref 0–0.61)
EOSINOPHIL NFR BLD AUTO: 3 % (ref 0–6)
ERYTHROCYTE [DISTWIDTH] IN BLOOD BY AUTOMATED COUNT: 12.7 % (ref 11.6–15.1)
HCT VFR BLD AUTO: 33.1 % (ref 34.8–46.1)
HGB BLD-MCNC: 11.6 G/DL (ref 11.5–15.4)
IMM GRANULOCYTES # BLD AUTO: 0.02 THOUSAND/UL (ref 0–0.2)
IMM GRANULOCYTES NFR BLD AUTO: 0 % (ref 0–2)
LYMPHOCYTES # BLD AUTO: 2.16 THOUSANDS/ÂΜL (ref 0.6–4.47)
LYMPHOCYTES NFR BLD AUTO: 47 % (ref 14–44)
MCH RBC QN AUTO: 35.8 PG (ref 26.8–34.3)
MCHC RBC AUTO-ENTMCNC: 35 G/DL (ref 31.4–37.4)
MCV RBC AUTO: 102 FL (ref 82–98)
MONOCYTES # BLD AUTO: 0.33 THOUSAND/ÂΜL (ref 0.17–1.22)
MONOCYTES NFR BLD AUTO: 7 % (ref 4–12)
NEUTROPHILS # BLD AUTO: 1.96 THOUSANDS/ÂΜL (ref 1.85–7.62)
NEUTS SEG NFR BLD AUTO: 42 % (ref 43–75)
NRBC BLD AUTO-RTO: 0 /100 WBCS
PLATELET # BLD AUTO: 242 THOUSANDS/UL (ref 149–390)
PMV BLD AUTO: 9.1 FL (ref 8.9–12.7)
RBC # BLD AUTO: 3.24 MILLION/UL (ref 3.81–5.12)
SL AMB POCT HEMOGLOBIN AIC: 5.4 (ref ?–6.5)
WBC # BLD AUTO: 4.66 THOUSAND/UL (ref 4.31–10.16)

## 2023-06-21 PROCEDURE — 36415 COLL VENOUS BLD VENIPUNCTURE: CPT

## 2023-06-21 PROCEDURE — 85025 COMPLETE CBC W/AUTO DIFF WBC: CPT

## 2023-06-21 PROCEDURE — 83036 HEMOGLOBIN GLYCOSYLATED A1C: CPT | Performed by: INTERNAL MEDICINE

## 2023-06-21 PROCEDURE — G0439 PPPS, SUBSEQ VISIT: HCPCS | Performed by: INTERNAL MEDICINE

## 2023-06-21 PROCEDURE — 99214 OFFICE O/P EST MOD 30 MIN: CPT | Performed by: INTERNAL MEDICINE

## 2023-06-21 RX ORDER — TRAMADOL HYDROCHLORIDE 50 MG/1
50 TABLET ORAL EVERY 6 HOURS PRN
Qty: 60 TABLET | Refills: 0 | Status: SHIPPED | OUTPATIENT
Start: 2023-06-21 | End: 2023-07-21

## 2023-06-21 RX ORDER — IBUPROFEN 600 MG/1
600 TABLET ORAL EVERY 6 HOURS PRN
COMMUNITY
Start: 2023-06-09 | End: 2024-06-08

## 2023-06-21 RX ORDER — CLOTRIMAZOLE AND BETAMETHASONE DIPROPIONATE 10; .5 MG/ML; MG/ML
LOTION TOPICAL 2 TIMES DAILY
Qty: 30 ML | Refills: 0 | Status: SHIPPED | OUTPATIENT
Start: 2023-06-21 | End: 2023-06-28

## 2023-06-21 RX ORDER — LISINOPRIL 2.5 MG/1
2.5 TABLET ORAL DAILY
Qty: 30 TABLET | Refills: 5 | Status: SHIPPED | OUTPATIENT
Start: 2023-06-21 | End: 2023-06-26 | Stop reason: SDUPTHER
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
## 2023-06-21 NOTE — ASSESSMENT & PLAN NOTE
Lab Results   Component Value Date    HGBA1C 5 4 06/21/2023   Diet controlled and doing well with no other issues at this time

## 2023-06-21 NOTE — PATIENT INSTRUCTIONS
Medicare Preventive Visit Patient Instructions  Thank you for completing your Welcome to Medicare Visit or Medicare Annual Wellness Visit today  Your next wellness visit will be due in one year (6/21/2024)  The screening/preventive services that you may require over the next 5-10 years are detailed below  Some tests may not apply to you based off risk factors and/or age  Screening tests ordered at today's visit but not completed yet may show as past due  Also, please note that scanned in results may not display below  Preventive Screenings:  Service Recommendations Previous Testing/Comments   Colorectal Cancer Screening  * Colonoscopy    * Fecal Occult Blood Test (FOBT)/Fecal Immunochemical Test (FIT)  * Fecal DNA/Cologuard Test  * Flexible Sigmoidoscopy Age: 39-70 years old   Colonoscopy: every 10 years (may be performed more frequently if at higher risk)  OR  FOBT/FIT: every 1 year  OR  Cologuard: every 3 years  OR  Sigmoidoscopy: every 5 years  Screening may be recommended earlier than age 39 if at higher risk for colorectal cancer  Also, an individualized decision between you and your healthcare provider will decide whether screening between the ages of 74-80 would be appropriate  Colonoscopy: Not on file  FOBT/FIT: Not on file  Cologuard: Not on file  Sigmoidoscopy: Not on file          Breast Cancer Screening Age: 36 years old  Frequency: every 1-2 years  Not required if history of left and right mastectomy Mammogram: 06/30/2022    History Breast Cancer   Cervical Cancer Screening Between the ages of 21-29, pap smear recommended once every 3 years  Between the ages of 33-67, can perform pap smear with HPV co-testing every 5 years     Recommendations may differ for women with a history of total hysterectomy, cervical cancer, or abnormal pap smears in past  Pap Smear: Not on file    History Cervical Cancer   Hepatitis C Screening Once for adults born [facility]  More frequently in patients at high risk for Hepatitis C Hep C Antibody: 08/25/2022    Screening Current   Diabetes Screening 1-2 times per year if you're at risk for diabetes or have pre-diabetes Fasting glucose: 88 mg/dL (5/22/2023)  A1C: 5 1 % (1/24/2023)  Screening Not Indicated  History Diabetes   Cholesterol Screening Once every 5 years if you don't have a lipid disorder  May order more often based on risk factors  Lipid panel: 01/24/2023    Screening Not Indicated  History Lipid Disorder     Other Preventive Screenings Covered by Medicare:  1  Abdominal Aortic Aneurysm (AAA) Screening: covered once if your at risk  You're considered to be at risk if you have a family history of AAA  2  Lung Cancer Screening: covers low dose CT scan once per year if you meet all of the following conditions: (1) Age 50-69; (2) No signs or symptoms of lung cancer; (3) Current smoker or have quit smoking within the last 15 years; (4) You have a tobacco smoking history of at least 20 pack years (packs per day multiplied by number of years you smoked); (5) You get a written order from a healthcare provider  3  Glaucoma Screening: covered annually if you're considered high risk: (1) You have diabetes OR (2) Family history of glaucoma OR (3)  aged 48 and older OR (3)  American aged 72 and older  3  Osteoporosis Screening: covered every 2 years if you meet one of the following conditions: (1) You're estrogen deficient and at risk for osteoporosis based off medical history and other findings; (2) Have a vertebral abnormality; (3) On glucocorticoid therapy for more than 3 months; (4) Have primary hyperparathyroidism; (5) On osteoporosis medications and need to assess response to drug therapy  · Last bone density test (DXA Scan): 09/13/2021   5  HIV Screening: covered annually if you're between the age of 15-65  Also covered annually if you are younger than 13 and older than 72 with risk factors for HIV infection   For pregnant patients, it is covered up to 3 times per pregnancy  Immunizations:  Immunization Recommendations   Influenza Vaccine Annual influenza vaccination during flu season is recommended for all persons aged >= 6 months who do not have contraindications   Pneumococcal Vaccine   * Pneumococcal conjugate vaccine = PCV13 (Prevnar 13), PCV15 (Vaxneuvance), PCV20 (Prevnar 20)  * Pneumococcal polysaccharide vaccine = PPSV23 (Pneumovax) Adults 25-60 years old: 1-3 doses may be recommended based on certain risk factors  Adults 72 years old: 1-2 doses may be recommended based off what pneumonia vaccine you previously received   Hepatitis B Vaccine 3 dose series if at intermediate or high risk (ex: diabetes, end stage renal disease, liver disease)   Tetanus (Td) Vaccine - COST NOT COVERED BY MEDICARE PART B Following completion of primary series, a booster dose should be given every 10 years to maintain immunity against tetanus  Td may also be given as tetanus wound prophylaxis  Tdap Vaccine - COST NOT COVERED BY MEDICARE PART B Recommended at least once for all adults  For pregnant patients, recommended with each pregnancy  Shingles Vaccine (Shingrix) - COST NOT COVERED BY MEDICARE PART B  2 shot series recommended in those aged 48 and above     Health Maintenance Due:      Topic Date Due   • HIV Screening  Never done   • Breast Cancer Screening: Mammogram  06/30/2023   • DXA SCAN  09/13/2023   • Hepatitis C Screening  Completed     Immunizations Due:      Topic Date Due   • COVID-19 Vaccine (6 - Booster for Moderna series) 11/22/2021   • Pneumococcal Vaccine: Pediatrics (0 to 5 Years) and At-Risk Patients (6 to 59 Years) (2 - PCV) 11/30/2022     Advance Directives   What are advance directives? Advance directives are legal documents that state your wishes and plans for medical care  These plans are made ahead of time in case you lose your ability to make decisions for yourself   Advance directives can apply to any medical decision, such as the treatments you want, and if you want to donate organs  What are the types of advance directives? There are many types of advance directives, and each state has rules about how to use them  You may choose a combination of any of the following:  · Living will: This is a written record of the treatment you want  You can also choose which treatments you do not want, which to limit, and which to stop at a certain time  This includes surgery, medicine, IV fluid, and tube feedings  · Durable power of  for healthcare [facility]): This is a written record that states who you want to make healthcare choices for you when you are unable to make them for yourself  This person, called a proxy, is usually a family member or a friend  You may choose more than 1 proxy  · Do not resuscitate (DNR) order:  A DNR order is used in case your heart stops beating or you stop breathing  It is a request not to have certain forms of treatment, such as CPR  A DNR order may be included in other types of advance directives  · Medical directive: This covers the care that you want if you are in a coma, near death, or unable to make decisions for yourself  You can list the treatments you want for each condition  Treatment may include pain medicine, surgery, blood transfusions, dialysis, IV or tube feedings, and a ventilator (breathing machine)  · Values history: This document has questions about your views, beliefs, and how you feel and think about life  This information can help others choose the care that you would choose  Why are advance directives important? An advance directive helps you control your care  Although spoken wishes may be used, it is better to have your wishes written down  Spoken wishes can be misunderstood, or not followed  Treatments may be given even if you do not want them  An advance directive may make it easier for your family to make difficult choices about your care     Weight Management   Why it is important to manage your weight:  Being overweight increases your risk of health conditions such as heart disease, high blood pressure, type 2 diabetes, and certain types of cancer  It can also increase your risk for osteoarthritis, sleep apnea, and other respiratory problems  Aim for a slow, steady weight loss  Even a small amount of weight loss can lower your risk of health problems  How to lose weight safely:  A safe and healthy way to lose weight is to eat fewer calories and get regular exercise  You can lose up about 1 pound a week by decreasing the number of calories you eat by 500 calories each day  Healthy meal plan for weight management:  A healthy meal plan includes a variety of foods, contains fewer calories, and helps you stay healthy  A healthy meal plan includes the following:  · Eat whole-grain foods more often  A healthy meal plan should contain fiber  Fiber is the part of grains, fruits, and vegetables that is not broken down by your body  Whole-grain foods are healthy and provide extra fiber in your diet  Some examples of whole-grain foods are whole-wheat breads and pastas, oatmeal, brown rice, and bulgur  · Eat a variety of vegetables every day  Include dark, leafy greens such as spinach, kale, radha greens, and mustard greens  Eat yellow and orange vegetables such as carrots, sweet potatoes, and winter squash  · Eat a variety of fruits every day  Choose fresh or canned fruit (canned in its own juice or light syrup) instead of juice  Fruit juice has very little or no fiber  · Eat low-fat dairy foods  Drink fat-free (skim) milk or 1% milk  Eat fat-free yogurt and low-fat cottage cheese  Try low-fat cheeses such as mozzarella and other reduced-fat cheeses  · Choose meat and other protein foods that are low in fat  Choose beans or other legumes such as split peas or lentils  Choose fish, skinless poultry (chicken or turkey), or lean cuts of red meat (beef or pork)   Before you cook meat or poultry, cut off any visible fat  · Use less fat and oil  Try baking foods instead of frying them  Add less fat, such as margarine, sour cream, regular salad dressing and mayonnaise to foods  Eat fewer high-fat foods  Some examples of high-fat foods include french fries, doughnuts, ice cream, and cakes  · Eat fewer sweets  Limit foods and drinks that are high in sugar  This includes candy, cookies, regular soda, and sweetened drinks  Exercise:  Exercise at least 30 minutes per day on most days of the week  Some examples of exercise include walking, biking, dancing, and swimming  You can also fit in more physical activity by taking the stairs instead of the elevator or parking farther away from stores  Ask your healthcare provider about the best exercise plan for you  © Copyright [employer] 2018 Information is for End User's use only and may not be sold, redistributed or otherwise used for commercial purposes   All illustrations and images included in CareNotes® are the copyrighted property of A D A M , Inc  or [address] [place] [address]

## 2023-06-24 ENCOUNTER — APPOINTMENT (OUTPATIENT)
Dept: LAB | Facility: CLINIC | Age: 43
End: 2023-06-24
Payer: COMMERCIAL

## 2023-06-24 DIAGNOSIS — Z17.0 MALIGNANT NEOPLASM OF LEFT BREAST IN FEMALE, ESTROGEN RECEPTOR POSITIVE, UNSPECIFIED SITE OF BREAST (HCC): ICD-10-CM

## 2023-06-24 DIAGNOSIS — C50.912 MALIGNANT NEOPLASM OF LEFT BREAST IN FEMALE, ESTROGEN RECEPTOR POSITIVE, UNSPECIFIED SITE OF BREAST (HCC): ICD-10-CM

## 2023-06-24 DIAGNOSIS — C79.51 SECONDARY MALIGNANT NEOPLASM OF BONE AND BONE MARROW (HCC): ICD-10-CM

## 2023-06-24 DIAGNOSIS — R80.9 MICROALBUMINURIA: ICD-10-CM

## 2023-06-24 DIAGNOSIS — C79.52 SECONDARY MALIGNANT NEOPLASM OF BONE AND BONE MARROW (HCC): ICD-10-CM

## 2023-06-24 LAB
ALBUMIN SERPL BCP-MCNC: 3.7 G/DL (ref 3.5–5)
ALP SERPL-CCNC: 30 U/L (ref 46–116)
ALT SERPL W P-5'-P-CCNC: 27 U/L (ref 12–78)
ANION GAP SERPL CALCULATED.3IONS-SCNC: 1 MMOL/L
AST SERPL W P-5'-P-CCNC: 17 U/L (ref 5–45)
BILIRUB SERPL-MCNC: 0.62 MG/DL (ref 0.2–1)
BUN SERPL-MCNC: 17 MG/DL (ref 5–25)
CALCIUM SERPL-MCNC: 9.3 MG/DL (ref 8.3–10.1)
CHLORIDE SERPL-SCNC: 109 MMOL/L (ref 96–108)
CO2 SERPL-SCNC: 28 MMOL/L (ref 21–32)
CREAT SERPL-MCNC: 1.09 MG/DL (ref 0.6–1.3)
GFR SERPL CREATININE-BSD FRML MDRD: 62 ML/MIN/1.73SQ M
GLUCOSE P FAST SERPL-MCNC: 101 MG/DL (ref 65–99)
POTASSIUM SERPL-SCNC: 4.1 MMOL/L (ref 3.5–5.3)
PROT SERPL-MCNC: 7.2 G/DL (ref 6.4–8.4)
SODIUM SERPL-SCNC: 138 MMOL/L (ref 135–147)

## 2023-06-24 PROCEDURE — 80053 COMPREHEN METABOLIC PANEL: CPT

## 2023-06-24 PROCEDURE — 36415 COLL VENOUS BLD VENIPUNCTURE: CPT

## 2023-06-28 ENCOUNTER — EVALUATION (OUTPATIENT)
Dept: PHYSICAL THERAPY | Facility: CLINIC | Age: 43
End: 2023-06-28
Payer: COMMERCIAL

## 2023-06-28 DIAGNOSIS — M17.12 PRIMARY OSTEOARTHRITIS OF ONE KNEE, LEFT: ICD-10-CM

## 2023-06-28 DIAGNOSIS — M25.562 CHRONIC PAIN OF LEFT KNEE: ICD-10-CM

## 2023-06-28 DIAGNOSIS — M25.561 CHRONIC PAIN OF RIGHT KNEE: ICD-10-CM

## 2023-06-28 DIAGNOSIS — M17.11 PRIMARY OSTEOARTHRITIS OF ONE KNEE, RIGHT: ICD-10-CM

## 2023-06-28 DIAGNOSIS — G89.29 CHRONIC PAIN OF LEFT KNEE: ICD-10-CM

## 2023-06-28 DIAGNOSIS — G89.29 CHRONIC PAIN OF RIGHT KNEE: ICD-10-CM

## 2023-06-28 DIAGNOSIS — M17.12 PRIMARY OSTEOARTHRITIS OF LEFT KNEE: ICD-10-CM

## 2023-06-28 DIAGNOSIS — M17.11 PRIMARY OSTEOARTHRITIS OF RIGHT KNEE: Primary | ICD-10-CM

## 2023-06-28 PROCEDURE — 97162 PT EVAL MOD COMPLEX 30 MIN: CPT

## 2023-06-28 NOTE — PROGRESS NOTES
"PT Evaluation     Today's date: 2023  Patient name: Michelle Russell  : 1980  MRN: [id number]  Referring provider: Dain Gilford, MD  Dx:   Encounter Diagnosis     ICD-10-CM    1  Primary osteoarthritis of right knee  M17 11       2  Primary osteoarthritis of left knee  M17 12       3  Chronic pain of left knee  M25 562     G89 29       4  Chronic pain of right knee  M25 561     G89 29                      Assessment  Assessment details: Michelle Russell is a 44 y o  female presenting to outpatient physical therapy with noted impairments including pain, impaired soft tissue mobility, reduced range of motion, reduced strength, reduced postural awareness, and reduced activity tolerance  Signs and symptoms at present are consistent with referring diagnosis of primary OA B knees with chronic pain  Due to noted impairments, the patient's present functional limitations include difficulty with ADLs with increased need for assistance, reliance on medication and/or modalities for pain relief, reduced tolerance for functional mobility and activity, and difficulty completing HH and self care responsibilities  Patient to benefit from skilled outpatient physical therapy 2x/week for 8 weeks in order to reduce pain, maximize pain free range of motion, increase strength and stability, and improve functional mobility/functional activity in order to maximize return to prior level of function with reduced limitations  Home exercise program was provided and all questions answered to patient's level of satisfaction  Thank you for your referral         Impairments: abnormal or restricted ROM, abnormal movement, activity intolerance, impaired physical strength, lacks appropriate home exercise program and pain with function  Understanding of Dx/Px/POC: good   Prognosis: good    Goals  STGs to be achieved in 4 weeks:  1   Pt to demonstrate reduced subjective pain rating \"at worst\" by at least 2-3 points from Initial Eval in order " to allow for reduced pain with ADLs and improved functional activity tolerance  2  Pt to demonstrate increased AROM of R knee by at least 5-10 degrees in order to allow for greater ease and independence with ADLs and functional mobility  3  Pt to demonstrate increased MMT of B knee ext and flex by at least 1/2-1 grade in order to improve safety and stability with ADLs and functional mobility  LTGs to be achieved in 6-8 weeks:  1  Pt will be I with HEP in order to continue to improve quality of life and independence and reduce risk for re-injury  2  Pt to demonstrate return to usual M[facility] with [facility] chores without limitations or restrictions  3  Pt to demonstrate improved function as noted by achieving or exceeding predicted score on FOTO outcomes assessment tool  Plan  Patient would benefit from: skilled physical therapy  Other planned modality interventions: Modalities prn for symptom management  Planned therapy interventions: manual therapy, neuromuscular re-education, therapeutic activities, therapeutic exercise, strengthening, stretching and home exercise program  Frequency: 2x week  Duration in weeks: 8  Plan of Care beginning date: 6/28/2023  Plan of Care expiration date: 8/23/2023  Treatment plan discussed with: PTA and patient        Subjective Evaluation    History of Present Illness  Mechanism of injury: Pt notes B knee pain for years, has had cortisone injections in B knees, has had gel injections in L knee and is sched for gel injections R knee July 20th  Pt notes knee pain varies, but has diff on stairs, standing for prolonged periods of greater than an hour, and diff walking  States she walks limited amounts  Diff bending and completing [facility] with [facility] chores  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   Notes she gets very stiff while driving, dimas from pushing gas pedal   Of note, pt states she hurt her back last night while lifting a client and is having a difficult time with transfers and amb  Is to have an xray today and she did report the incident to her agency            Recurrent probem    Quality of life: poor    Pain  Current pain ratin  At best pain ratin  At worst pain rating: 10  Quality: sharp  Relieving factors: ice and medications (lidocaine patch, tramadol)  Aggravating factors: standing, walking and stair climbing (bending)  Progression: worsening    Social Support  Steps to enter house: yes (1-2)  Stairs in house: yes   Lives in: multiple-level home (pt sleeps on 3rd floor)  Lives with: spouse and adult children    Employment status: working (LPN for Agency)  Hand dominance: right      Diagnostic Tests  X-ray: abnormal (R knee bone on bone, L knee min cartilage)  Treatments  Previous treatment: injection treatment  Current treatment: physical therapy  Patient Goals  Patient goals for therapy: decreased pain, increased strength, increased motion and independence with ADLs/IADLs          Objective     Observations     Additional Observation Details  Pt having max difficulty with amb due to back injury which occurred last night  Pt is obese, genu varus B  Min crepitus R knee, less on L    Tenderness     Additional Tenderness Details  B knees are TTP, anterior aspect    Active Range of Motion   Left Knee   Flexion: 120 degrees   Extension: -5 degrees     Right Knee   Flexion: 100 degrees   Extension: -10 degrees     Strength/Myotome Testing     Left Knee   Flexion: 4  Extension: 4  Quadriceps contraction: good    Right Knee   Flexion: 4  Extension: 4  Quadriceps contraction: good             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, back injury last night,     Re-eval Date: 23    Date        Visit Count 1       FOTO Comp        Pain In See eval       Pain Out                Manuals                                        Neuro Re-Ed        [employer]        Sidestep on aeromat        Tandem aeromat                                        Ther Ex        [employer] Heel slides        SAQs        SLRs        bridges        LAQs        Leg press        Ham stretch        Incline calf stretch        Standing 3 way SLR                                Ther Activity        Step ups        Mini squats        Gait Training                        Modalities

## 2023-07-05 ENCOUNTER — TELEPHONE (OUTPATIENT)
Dept: INTERNAL MEDICINE CLINIC | Facility: CLINIC | Age: 43
End: 2023-07-05

## 2023-07-20 ENCOUNTER — PROCEDURE VISIT (OUTPATIENT)
Dept: OBGYN CLINIC | Facility: CLINIC | Age: 43
End: 2023-07-20
Payer: COMMERCIAL

## 2023-07-20 VITALS
TEMPERATURE: 98.7 F | BODY MASS INDEX: 39.9 KG/M2 | HEART RATE: 91 BPM | SYSTOLIC BLOOD PRESSURE: 125 MMHG | HEIGHT: 62 IN | WEIGHT: 216.8 LBS | DIASTOLIC BLOOD PRESSURE: 85 MMHG

## 2023-07-20 DIAGNOSIS — M17.11 PRIMARY OSTEOARTHRITIS OF ONE KNEE, RIGHT: Primary | ICD-10-CM

## 2023-07-20 PROCEDURE — 20610 DRAIN/INJ JOINT/BURSA W/O US: CPT | Performed by: FAMILY MEDICINE

## 2023-07-20 RX ORDER — ROPIVACAINE HYDROCHLORIDE 5 MG/ML
10 INJECTION, SOLUTION EPIDURAL; INFILTRATION; PERINEURAL
Status: COMPLETED | OUTPATIENT
Start: 2023-07-20 | End: 2023-07-20

## 2023-07-20 RX ADMIN — ROPIVACAINE HYDROCHLORIDE 10 ML: 5 INJECTION, SOLUTION EPIDURAL; INFILTRATION; PERINEURAL at 11:30

## 2023-07-20 NOTE — PROGRESS NOTES
[facility] [address] [id number] [phone]      Assessment:  1. Primary osteoarthritis of one knee, right        Plan:  Patient Instructions   F/u 1 wk  R knee visco #1 given     Return in about 1 week (around 7/27/2023) for Recheck. Chief Complaint:  Chief Complaint   Patient presents with   • Right Knee - Follow-up       Subjective:   HPI    Patient ID: Oc Lazo is a 44 y.o. female   Here for f/u R knee visco #1     Review of Systems   Constitutional: Negative for fatigue and fever. Respiratory: Negative for shortness of breath. Cardiovascular: Negative for chest pain. Gastrointestinal: Negative for abdominal pain and nausea. Genitourinary: Negative for dysuria. Musculoskeletal: Positive for arthralgias. Skin: Negative for rash and wound. Neurological: Negative for weakness and headaches. Objective:  Vitals:  /85 (BP Location: Right arm, Patient Position: Sitting, Cuff Size: Standard)   Pulse 91   Temp 98.7 °F (37.1 °C) (Tympanic)   Ht 5' 2" (1.575 m)   Wt 98.3 kg (216 lb 12.8 oz)   BMI 39.65 kg/m²     The following portions of the patient's history were reviewed and updated as appropriate: allergies, current medications, past family history, past medical history, past social history, past surgical history, and problem list.    Physical exam:  Physical Exam  Constitutional:       Appearance: Normal appearance. She is normal weight. HENT:      Head: Normocephalic. Eyes:      Extraocular Movements: Extraocular movements intact. Pulmonary:      Effort: Pulmonary effort is normal.   Musculoskeletal:      Cervical back: Normal range of motion. Skin:     General: Skin is warm and dry. Neurological:      General: No focal deficit present. Mental Status: She is alert and oriented to person, place, and time. Mental status is at baseline.    Psychiatric:         Mood and Affect: Mood normal.         Behavior: Behavior normal.         Thought Content: Thought content normal.         Judgment: Judgment normal.       Ortho Exam      Large joint arthrocentesis: R knee  Universal Protocol:  Consent: Verbal consent obtained. Risks and benefits: risks, benefits and alternatives were discussed  Consent given by: patient  Time out: Immediately prior to procedure a "time out" was called to verify the correct patient, procedure, equipment, support staff and site/side marked as required.   Timeout called at: 7/20/2023 11:16 AM.  Site marked: the operative site was marked  Supporting Documentation  Indications: pain   Procedure Details  Location: knee - R knee  Preparation: Patient was prepped and draped in the usual sterile fashion  Needle gauge: 21.  Ultrasound guidance: no  Approach: anterolateral  Medications administered: 10 mL ropivacaine 0.5 %; 30 mg sodium hyaluronate 30 mg/2 mL    Patient tolerance: patient tolerated the procedure well with no immediate complications  Dressing:  Sterile dressing applied          Carmine Vásquez MD

## 2023-07-24 DIAGNOSIS — E03.9 HYPOTHYROIDISM, UNSPECIFIED TYPE: ICD-10-CM

## 2023-07-24 RX ORDER — LEVOTHYROXINE SODIUM 88 UG/1
88 TABLET ORAL DAILY
Qty: 90 TABLET | Refills: 1 | Status: SHIPPED | OUTPATIENT
Start: 2023-07-24

## 2023-07-27 ENCOUNTER — PROCEDURE VISIT (OUTPATIENT)
Dept: OBGYN CLINIC | Facility: CLINIC | Age: 43
End: 2023-07-27
Payer: COMMERCIAL

## 2023-07-27 VITALS
SYSTOLIC BLOOD PRESSURE: 131 MMHG | WEIGHT: 218 LBS | HEIGHT: 62 IN | DIASTOLIC BLOOD PRESSURE: 96 MMHG | TEMPERATURE: 98.7 F | BODY MASS INDEX: 40.12 KG/M2 | HEART RATE: 89 BPM

## 2023-07-27 DIAGNOSIS — M17.11 PRIMARY OSTEOARTHRITIS OF ONE KNEE, RIGHT: Primary | ICD-10-CM

## 2023-07-27 PROCEDURE — 20610 DRAIN/INJ JOINT/BURSA W/O US: CPT | Performed by: FAMILY MEDICINE

## 2023-07-27 RX ORDER — ROPIVACAINE HYDROCHLORIDE 5 MG/ML
10 INJECTION, SOLUTION EPIDURAL; INFILTRATION; PERINEURAL
Status: COMPLETED | OUTPATIENT
Start: 2023-07-27 | End: 2023-07-27

## 2023-07-27 RX ADMIN — ROPIVACAINE HYDROCHLORIDE 10 ML: 5 INJECTION, SOLUTION EPIDURAL; INFILTRATION; PERINEURAL at 12:15

## 2023-07-27 NOTE — PROGRESS NOTES
[facility] [address] [id number] [phone]      Assessment:  1. Primary osteoarthritis of one knee, right        Plan:  Patient Instructions   F/u 1 wk  R knee visco #2 given  Limited activity for 2 days. Icing/heat/OTC pain meds as needed. Return in about 1 week (around 8/3/2023) for Recheck. Chief Complaint:  Chief Complaint   Patient presents with   • Right Knee - Follow-up       Subjective:   HPI    Patient ID: Twyla Severin is a 44 y.o. female     Here for visco # 2 R knee    Review of Systems   Constitutional: Negative for fatigue and fever. Respiratory: Negative for shortness of breath. Cardiovascular: Negative for chest pain. Gastrointestinal: Negative for abdominal pain and nausea. Genitourinary: Negative for dysuria. Musculoskeletal: Positive for arthralgias. Skin: Negative for rash and wound. Neurological: Negative for weakness and headaches. Objective:  Vitals:  /96 (BP Location: Left arm, Patient Position: Sitting, Cuff Size: Standard)   Pulse 89   Temp 98.7 °F (37.1 °C) (Tympanic)   Ht 5' 2" (1.575 m)   Wt 98.9 kg (218 lb)   BMI 39.87 kg/m²     The following portions of the patient's history were reviewed and updated as appropriate: allergies, current medications, past family history, past medical history, past social history, past surgical history, and problem list.    Physical exam:  Physical Exam  Constitutional:       Appearance: Normal appearance. She is normal weight. Eyes:      Extraocular Movements: Extraocular movements intact. Pulmonary:      Effort: Pulmonary effort is normal.   Musculoskeletal:      Cervical back: Normal range of motion. Skin:     General: Skin is warm and dry. Neurological:      General: No focal deficit present. Mental Status: She is alert and oriented to person, place, and time. Mental status is at baseline.    Psychiatric: Mood and Affect: Mood normal.         Behavior: Behavior normal.         Thought Content: Thought content normal.         Judgment: Judgment normal.       Ortho Exam      Large joint arthrocentesis: R knee  Universal Protocol:  Consent: Verbal consent obtained. Risks and benefits: risks, benefits and alternatives were discussed  Consent given by: patient  Time out: Immediately prior to procedure a "time out" was called to verify the correct patient, procedure, equipment, support staff and site/side marked as required.   Timeout called at: 7/27/2023 12:18 PM.  Site marked: the operative site was marked  Supporting Documentation  Indications: pain   Procedure Details  Location: knee - R knee  Preparation: Patient was prepped and draped in the usual sterile fashion  Needle gauge: 21.  Ultrasound guidance: no  Approach: anterolateral  Medications administered: 10 mL ropivacaine 0.5 %; 30 mg sodium hyaluronate 30 mg/2 mL    Patient tolerance: patient tolerated the procedure well with no immediate complications  Dressing:  Sterile dressing applied            Sylvain Frey MD

## 2023-07-28 ENCOUNTER — TELEPHONE (OUTPATIENT)
Dept: DENTISTRY | Facility: CLINIC | Age: 43
End: 2023-07-28

## 2023-07-28 DIAGNOSIS — M85.80 OSTEOPENIA, UNSPECIFIED LOCATION: Primary | ICD-10-CM

## 2023-08-01 ENCOUNTER — TELEPHONE (OUTPATIENT)
Dept: FAMILY MEDICINE CLINIC | Facility: CLINIC | Age: 43
End: 2023-08-01

## 2023-08-01 NOTE — TELEPHONE ENCOUNTER
Pt called stating she has an appt 8/3/23 with dental but she has another conflicting appt at this time. She states she had another appt with the office prior but someone forgot to check her in. I don't see any openings anywhere, but if you can keep her in mind if there are any cancellations.

## 2023-08-03 ENCOUNTER — PROCEDURE VISIT (OUTPATIENT)
Dept: OBGYN CLINIC | Facility: CLINIC | Age: 43
End: 2023-08-03
Payer: COMMERCIAL

## 2023-08-03 VITALS
TEMPERATURE: 99.1 F | HEIGHT: 62 IN | BODY MASS INDEX: 39.49 KG/M2 | HEART RATE: 99 BPM | DIASTOLIC BLOOD PRESSURE: 89 MMHG | WEIGHT: 214.6 LBS | SYSTOLIC BLOOD PRESSURE: 123 MMHG

## 2023-08-03 DIAGNOSIS — M17.11 PRIMARY OSTEOARTHRITIS OF ONE KNEE, RIGHT: Primary | ICD-10-CM

## 2023-08-03 PROCEDURE — 20610 DRAIN/INJ JOINT/BURSA W/O US: CPT | Performed by: FAMILY MEDICINE

## 2023-08-03 RX ORDER — ROPIVACAINE HYDROCHLORIDE 5 MG/ML
10 INJECTION, SOLUTION EPIDURAL; INFILTRATION; PERINEURAL
Status: COMPLETED | OUTPATIENT
Start: 2023-08-03 | End: 2023-08-03

## 2023-08-03 RX ADMIN — ROPIVACAINE HYDROCHLORIDE 10 ML: 5 INJECTION, SOLUTION EPIDURAL; INFILTRATION; PERINEURAL at 11:30

## 2023-08-03 NOTE — PROGRESS NOTES
[facility] [address] [address]  [phone]      Assessment:  1. Primary osteoarthritis of one knee, right        Plan:  Patient Instructions   F/u as needed  R knee visco #3 given     Return if symptoms worsen or fail to improve. Chief Complaint:  Chief Complaint   Patient presents with   • Right Knee - Follow-up       Subjective:   HPI    Patient ID: Elisabeth Phillip is a 44 y.o. female   Here for R knee visco #3    Review of Systems   Constitutional: Negative for fatigue and fever. Respiratory: Negative for shortness of breath. Cardiovascular: Negative for chest pain. Gastrointestinal: Negative for abdominal pain and nausea. Genitourinary: Negative for dysuria. Musculoskeletal: Positive for arthralgias. Skin: Negative for rash and wound. Neurological: Negative for weakness and headaches. Objective:  Vitals:  /89 (BP Location: Right arm, Patient Position: Sitting, Cuff Size: Standard)   Pulse 99   Temp 99.1 °F (37.3 °C) (Tympanic)   Ht 5' 2" (1.575 m)   Wt 97.3 kg (214 lb 9.6 oz)   BMI 39.25 kg/m²     The following portions of the patient's history were reviewed and updated as appropriate: allergies, current medications, past family history, past medical history, past social history, past surgical history, and problem list.    Physical exam:  Physical Exam  Constitutional:       Appearance: Normal appearance. She is normal weight. Eyes:      Extraocular Movements: Extraocular movements intact. Pulmonary:      Effort: Pulmonary effort is normal.   Musculoskeletal:      Cervical back: Normal range of motion. Skin:     General: Skin is warm and dry. Neurological:      General: No focal deficit present. Mental Status: She is alert and oriented to person, place, and time. Mental status is at baseline.    Psychiatric:         Mood and Affect: Mood normal.         Behavior: Behavior normal. Thought Content: Thought content normal.         Judgment: Judgment normal.       Ortho Exam      Large joint arthrocentesis: R knee  Universal Protocol:  Consent: Verbal consent obtained. Risks and benefits: risks, benefits and alternatives were discussed  Consent given by: patient  Time out: Immediately prior to procedure a "time out" was called to verify the correct patient, procedure, equipment, support staff and site/side marked as required.   Timeout called at: 8/3/2023 11:35 AM.  Site marked: the operative site was marked  Supporting Documentation  Indications: pain   Procedure Details  Location: knee - R knee  Preparation: Patient was prepped and draped in the usual sterile fashion  Needle gauge: 21.  Ultrasound guidance: no  Approach: anterolateral  Medications administered: 10 mL ropivacaine 0.5 %; 30 mg sodium hyaluronate 30 mg/2 mL    Patient tolerance: patient tolerated the procedure well with no immediate complications  Dressing:  Sterile dressing applied            Chandler Lane MD

## 2023-08-06 DIAGNOSIS — R21 RASH: ICD-10-CM

## 2023-08-06 NOTE — PROGRESS NOTES
PT Evaluation     Today's date: 2023  Patient name: Hema Forrest  : 1980  MRN: [id number]  Referring provider: Stacie Lopez MD  Dx:   Encounter Diagnosis     ICD-10-CM    1. Chronic pain of left knee  M25.562     G89.29       2. Chronic pain of right knee  M25.561     G89.29                      Assessment  Assessment details: Hema Forrest is a 44 y.o. female presenting to outpatient physical therapy with noted impairments including pain, impaired soft tissue mobility, reduced range of motion, reduced strength, reduced postural awareness, and reduced activity tolerance. Signs and symptoms at present are consistent with referring diagnosis of primary OA B knees with chronic pain. Due to noted impairments, the patient's present functional limitations include difficulty with ADLs with increased need for assistance, reliance on medication and/or modalities for pain relief, reduced tolerance for functional mobility and activity, and difficulty completing HH and self care responsibilities. Patient to benefit from skilled outpatient physical therapy 2x/week for 8 weeks in order to reduce pain, maximize pain free range of motion, increase strength and stability, and improve functional mobility/functional activity in order to maximize return to prior level of function with reduced limitations. Home exercise program was provided and all questions answered to patient's level of satisfaction. Thank you for your referral.        Impairments: abnormal or restricted ROM, abnormal movement, activity intolerance, impaired physical strength, lacks appropriate home exercise program and pain with function  Understanding of Dx/Px/POC: good   Prognosis: good    Goals  STGs to be achieved in 4 weeks:  1. Pt to demonstrate reduced subjective pain rating "at worst" by at least 2-3 points from Initial Eval in order to allow for reduced pain with ADLs and improved functional activity tolerance.    2. Pt to demonstrate increased AROM of R knee by at least 5-10 degrees in order to allow for greater ease and independence with ADLs and functional mobility. 3. Pt to demonstrate increased MMT of B knee ext and flex by at least 1/2-1 grade in order to improve safety and stability with ADLs and functional mobility. LTGs to be achieved in 6-8 weeks:  1. Pt will be I with HEP in order to continue to improve quality of life and independence and reduce risk for re-injury. 2. Pt to demonstrate return to usual [facility] chores and grocery shopping  without limitations or restrictions. 3. Pt to demonstrate improved function as noted by achieving or exceeding predicted score on FOTO outcomes assessment tool. Plan  Patient would benefit from: skilled physical therapy  Other planned modality interventions: Modalities prn for symptom management  Planned therapy interventions: manual therapy, neuromuscular re-education, therapeutic activities, therapeutic exercise, strengthening, stretching and home exercise program  Frequency: 2x week  Duration in weeks: 8  Plan of Care beginning date: 8/8/2023  Plan of Care expiration date: 10/3/2023  Treatment plan discussed with: PTA and patient        Subjective Evaluation    History of Present Illness  Mechanism of injury: Pt notes B knee pain for years, has had cortisone injections in B knees, has had gel injections in L knee and is sched for gel injections R knee July 20th. Pt notes knee pain varies, but has diff on stairs, standing for prolonged periods of greater than an hour, and diff walking. States she walks limited amounts. Diff bending and completing [facility] chores. 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 Notes she gets very stiff while driving, dimas from pushing gas pedal.  Of note, pt states she hurt her back last night while lifting a client and is having a difficult time with transfers and amb.  Is to have an xray today and she did report the incident to her agency. 23 UPDATE: Pt returning to PT for first visit since injuring her back. States she has an appointment with a  physician on 23. Staes she had a series of 3 injections R knee which has decreased her pain and improved mobility. Pt notes the L knee also improved with injections approx 2 months ago. Pt notes climbing stairs is worst activity she does, also walking for prolonged periods and driving for prolonged periods cause stiffness in knees and diff with mobility. Recurrent probem    Quality of life: poor    Patient Goals  Patient goals for therapy: decreased pain, increased strength, increased motion and independence with ADLs/IADLs    Pain  Current pain ratin  At best pain ratin  At worst pain rating: 10  Quality: sharp and dull ache  Relieving factors: ice and medications (lidocaine patch, tramadol)  Aggravating factors: standing, walking and stair climbing (bending, driving)  Progression: improved    Social Support  Steps to enter house: yes (1-2)  Stairs in house: yes   Lives in: multiple-level home (pt sleeps on 3rd floor)  Lives with: spouse and adult children    Employment status: working (LPN for Agency)  Hand dominance: right      Diagnostic Tests  X-ray: abnormal (R knee bone on bone, L knee min cartilage)  Treatments  Previous treatment: injection treatment  Current treatment: injection treatment, medication and physical therapy        Objective     Observations     Additional Observation Details    Pt is obese, genu varus B  Min crepitus R knee, less on L    Tenderness   Left Knee   Tenderness in the inferior patella. Right Knee   Tenderness in the inferior patella.      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, back injury last night,     Re-eval Date:     Date 8/8       Visit Count 1       FOTO Comp 8/8       Pain In See eval       Pain Out                Manuals 8/8                                       Neuro Re-Ed        [employer]        Sidestep on aeromat        Tandem aeromat                                        Ther Ex        Nustep L1  10'       Heel slides 20x  R       SAQs 30 x 3"B       SLRs  2 x 10  B       bridges  10 x 3"       LAQs        Leg press        Ham stretch        Incline calf stretch        Standing 3 way SLR        TR's/HRs 20x                       Ther Activity        Step ups 10x L/R       Mini squats        Gait Training                        Modalities

## 2023-08-07 ENCOUNTER — OFFICE VISIT (OUTPATIENT)
Dept: DENTISTRY | Facility: CLINIC | Age: 43
End: 2023-08-07
Payer: COMMERCIAL

## 2023-08-07 VITALS — SYSTOLIC BLOOD PRESSURE: 122 MMHG | DIASTOLIC BLOOD PRESSURE: 83 MMHG | HEART RATE: 83 BPM

## 2023-08-07 DIAGNOSIS — K02.9 DENTAL CARIES: ICD-10-CM

## 2023-08-07 DIAGNOSIS — K05.6 PERIODONTAL DISEASE: Primary | ICD-10-CM

## 2023-08-07 PROCEDURE — D2393 RESIN-BASED COMPOSITE - 3 SURFACES, POSTERIOR: HCPCS | Performed by: STUDENT IN AN ORGANIZED HEALTH CARE EDUCATION/TRAINING PROGRAM

## 2023-08-07 PROCEDURE — D2392 RESIN-BASED COMPOSITE - 2 SURFACES, POSTERIOR: HCPCS | Performed by: STUDENT IN AN ORGANIZED HEALTH CARE EDUCATION/TRAINING PROGRAM

## 2023-08-07 RX ORDER — METHOCARBAMOL 500 MG/1
500 TABLET, FILM COATED ORAL 4 TIMES DAILY
COMMUNITY

## 2023-08-07 RX ORDER — NYSTATIN 100000 [USP'U]/G
1 POWDER TOPICAL 2 TIMES DAILY
Qty: 15 G | Refills: 0 | Status: SHIPPED | OUTPATIENT
Start: 2023-08-07

## 2023-08-07 NOTE — PROGRESS NOTES
Composite Filling    Bertha Balljes presents for composite filling. PMH reviewed, no changes. Discussed with patient need for RCT if pulp exposure occurs or in future if pulp is inflamed. Pt understands and consents. Applied topical benzocaine, administered carps 4% articaine 1:100k epi via infiltration    Prepped tooth #13, 14 with 556 carbide on high speed. Caries removed with round carbide on slow speed. Placed palodent matrix. Isolation with cotton rolls and dri-angles    #15 nonrestorable due to furcation involvement and root caries. Recommend ext. Etch with 37% H2PO4, rinse, dry. Applied Adhese with 20 second scrub once, gentle air dry and light cured for 10s. Restored with Tetric bulk marita shade A2 and light cured. Refined with finishing burs, polished with enhance point. Verified occlusion and contacts. Pt left satisfied. NV: Recall.

## 2023-08-08 ENCOUNTER — EVALUATION (OUTPATIENT)
Dept: PHYSICAL THERAPY | Facility: CLINIC | Age: 43
End: 2023-08-08
Payer: COMMERCIAL

## 2023-08-08 DIAGNOSIS — G89.29 CHRONIC PAIN OF LEFT KNEE: Primary | ICD-10-CM

## 2023-08-08 DIAGNOSIS — M25.561 CHRONIC PAIN OF RIGHT KNEE: ICD-10-CM

## 2023-08-08 DIAGNOSIS — M25.562 CHRONIC PAIN OF LEFT KNEE: Primary | ICD-10-CM

## 2023-08-08 DIAGNOSIS — G89.29 CHRONIC PAIN OF RIGHT KNEE: ICD-10-CM

## 2023-08-08 PROCEDURE — 97110 THERAPEUTIC EXERCISES: CPT

## 2023-08-08 PROCEDURE — 97162 PT EVAL MOD COMPLEX 30 MIN: CPT

## 2023-08-09 ENCOUNTER — OFFICE VISIT (OUTPATIENT)
Dept: DENTISTRY | Facility: CLINIC | Age: 43
End: 2023-08-09

## 2023-08-09 ENCOUNTER — OFFICE VISIT (OUTPATIENT)
Dept: PHYSICAL THERAPY | Facility: CLINIC | Age: 43
End: 2023-08-09
Payer: COMMERCIAL

## 2023-08-09 VITALS — DIASTOLIC BLOOD PRESSURE: 73 MMHG | TEMPERATURE: 97.7 F | SYSTOLIC BLOOD PRESSURE: 103 MMHG | HEART RATE: 81 BPM

## 2023-08-09 DIAGNOSIS — G89.29 CHRONIC PAIN OF RIGHT KNEE: ICD-10-CM

## 2023-08-09 DIAGNOSIS — Z01.20 ENCOUNTER FOR DENTAL EXAMINATION: Primary | ICD-10-CM

## 2023-08-09 DIAGNOSIS — M25.561 CHRONIC PAIN OF RIGHT KNEE: ICD-10-CM

## 2023-08-09 DIAGNOSIS — M25.562 CHRONIC PAIN OF LEFT KNEE: Primary | ICD-10-CM

## 2023-08-09 DIAGNOSIS — G89.29 CHRONIC PAIN OF LEFT KNEE: Primary | ICD-10-CM

## 2023-08-09 PROCEDURE — D0274 BITEWINGS - 4 RADIOGRAPHIC IMAGES: HCPCS | Performed by: DENTAL HYGIENIST

## 2023-08-09 PROCEDURE — D0120 PERIODIC ORAL EVALUATION - ESTABLISHED PATIENT: HCPCS

## 2023-08-09 PROCEDURE — 97110 THERAPEUTIC EXERCISES: CPT

## 2023-08-09 NOTE — PROGRESS NOTES
Daily Note     Today's date: 2023  Patient name: Reynaldo Bonds  : 1980  MRN: [id number]  Referring provider: Veronica Hong MD  Dx:   Encounter Diagnosis     ICD-10-CM    1. Chronic pain of left knee  M25.562     G89.29       2. Chronic pain of right knee  M25.561     G89.29                      Subjective:  "Just a little bit", is pt's response to pain/sx @ B Knees. R knee > L knee re: level of pain. Objective: See treatment diary below      Assessment: Tolerated treatment Fair+ with performance of ther exer. Received CP applic Well with good benefits. Plan: Con't services 2x/week as per POC/Goals set forth.             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, back injury last night,     Re-eval Date:     Date 23      Visit Count 1 2      FOTO Comp        Pain In See eval "a little bit"    > with exer      Pain Out  < after CP applic              Manuals 23                                      Neuro Re-Ed  23      Wobble board        Sidestep on aeromat        Tandem aeromat                                        Ther Ex  23      Nustep L1  10' L1  10'        Heel slides 20x  R 20x  R        SAQs 30 x 3"B 30 x 3" B        SLRs  2 x 10  B  2 x 10  B        bridges  10 x 3" 10 x 3"        LAQs  **B 20x/3"         Leg press          Ham stretch  *NV        Incline calf stretch  *NV      Standing 3 way SLR  *Upcoming      TR's/HRs 20x 30x          **Seated March  B Alt 3x10              Ther Activity  23      Step ups 10x L/R 10x L/R        Mini squats        Gait Training                        Modalities  23        **CP to R knee 10 min

## 2023-08-09 NOTE — DENTAL PROCEDURE DETAILS
Carlie Hall presents for a Periodic exam. Verbal consent for treatment given in addition to the forms. Reviewed health history - Patient is ASA II - Malignant Neoplasm metastatic of left breast - undergoing infusion therapy - [facility]  Consents signed: Yes     Perio: Generalized, Heavy bleeding, and Gingivitis  ---FMP - 1-5 mm w/ heavy bleeding  ---Generalized mild bone loss  ---Stage 1, Grade B  Pain Scale: 0  Caries Assessment: Medium  Radiographs: Bitewings x4  EO/IO/OCS:  WNL     Oral Hygiene instruction reviewed and given. OHI:  Poor  ---Mod calc, plaque, and food debris  Recommended Hygiene recall visits with  Bertha. Treatment Plan:  1. Periodontal therapy:   4 quads of SRP's  3. Caries control: No decay  3. Occlusal evaluation:   Missing #29, #15 is super-erupting;  #17 - has been ext'd  4. Case Difficulty Type 1    Prognosis is Good.   Referrals needed: No  Exam:  Dr. Loya Asa    NV1:  SRP's UR/ UL - 60 min  NV2:  SRP's UL/LL - 60 min  Need to preauth for SRP's    Please give SFS erika

## 2023-08-14 ENCOUNTER — OFFICE VISIT (OUTPATIENT)
Dept: PHYSICAL THERAPY | Facility: CLINIC | Age: 43
End: 2023-08-14
Payer: COMMERCIAL

## 2023-08-14 DIAGNOSIS — M25.561 CHRONIC PAIN OF RIGHT KNEE: ICD-10-CM

## 2023-08-14 DIAGNOSIS — G89.29 CHRONIC PAIN OF LEFT KNEE: Primary | ICD-10-CM

## 2023-08-14 DIAGNOSIS — M25.562 CHRONIC PAIN OF LEFT KNEE: Primary | ICD-10-CM

## 2023-08-14 DIAGNOSIS — G89.29 CHRONIC PAIN OF RIGHT KNEE: ICD-10-CM

## 2023-08-14 PROCEDURE — 97110 THERAPEUTIC EXERCISES: CPT

## 2023-08-14 NOTE — PROGRESS NOTES
Daily Note     Today's date: 2023  Patient name: Oc Lazo  : 1980  MRN: [id number]  Referring provider: Joce Burnett MD  Dx:   Encounter Diagnosis     ICD-10-CM    1. Chronic pain of left knee  M25.562     G89.29       2. Chronic pain of right knee  M25.561     G89.29                      Subjective: Pt notes her R knee hurts a little . Objective: See treatment diary below      Assessment: Tolerated treatment fair. Patient demonstrated fatigue post treatment, exhibited good technique with therapeutic exercises and would benefit from continued PT      Plan: Continue per plan of care.       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, back injury last night,     Re-eval Date:     Date 23     Visit Count 1 2 3     FOTO Comp        Pain In See eval "a little bit"    > with exer 3-4/10 R knee     Pain Out  < after CP applic              Manuals 23                                     Neuro Re-Ed  23      Wobble board        Sidestep on aeromat        Tandem aeromat                                        Ther Ex  23      Nustep L1  10' L1  10'   L2  10'     Heel slides 20x  R 20x  R   30x R     SAQs 30 x 3"B 30 x 3" B   30 x 3"     SLRs  2 x 10  B  2 x 10  B   2 x 10 B     bridges  10 x 3" 10 x 3"   15 x 3"     LAQs  **B 20x/3"    B 30x     Leg press          Ham stretch  *NV   4x 30"     Incline calf stretch  *NV      Standing 3 way SLR  *Upcoming 15 x ea B     TR's/HRs 20x 30x     30x     march  **Seated March  B Alt 3x10   Standing march  30x alt             Ther Activity  23      Step ups 10x L/R 10x L/R        Mini squats        Gait Training                        Modalities  23       **CP to R knee 10 min declined

## 2023-08-17 ENCOUNTER — OFFICE VISIT (OUTPATIENT)
Dept: PHYSICAL THERAPY | Facility: CLINIC | Age: 43
End: 2023-08-17
Payer: COMMERCIAL

## 2023-08-17 ENCOUNTER — APPOINTMENT (OUTPATIENT)
Dept: LAB | Facility: CLINIC | Age: 43
End: 2023-08-17
Payer: COMMERCIAL

## 2023-08-17 DIAGNOSIS — M25.562 CHRONIC PAIN OF LEFT KNEE: Primary | ICD-10-CM

## 2023-08-17 DIAGNOSIS — G89.29 CHRONIC PAIN OF LEFT KNEE: Primary | ICD-10-CM

## 2023-08-17 DIAGNOSIS — M25.561 CHRONIC PAIN OF RIGHT KNEE: ICD-10-CM

## 2023-08-17 DIAGNOSIS — C79.51 SECONDARY MALIGNANT NEOPLASM OF BONE AND BONE MARROW (HCC): ICD-10-CM

## 2023-08-17 DIAGNOSIS — G89.29 CHRONIC PAIN OF RIGHT KNEE: ICD-10-CM

## 2023-08-17 DIAGNOSIS — C79.52 SECONDARY MALIGNANT NEOPLASM OF BONE AND BONE MARROW (HCC): ICD-10-CM

## 2023-08-17 LAB
BASOPHILS # BLD AUTO: 0.07 THOUSANDS/ÂΜL (ref 0–0.1)
BASOPHILS NFR BLD AUTO: 1 % (ref 0–1)
EOSINOPHIL # BLD AUTO: 0.15 THOUSAND/ÂΜL (ref 0–0.61)
EOSINOPHIL NFR BLD AUTO: 3 % (ref 0–6)
ERYTHROCYTE [DISTWIDTH] IN BLOOD BY AUTOMATED COUNT: 13 % (ref 11.6–15.1)
HCT VFR BLD AUTO: 34 % (ref 34.8–46.1)
HGB BLD-MCNC: 11.1 G/DL (ref 11.5–15.4)
IMM GRANULOCYTES # BLD AUTO: 0.04 THOUSAND/UL (ref 0–0.2)
IMM GRANULOCYTES NFR BLD AUTO: 1 % (ref 0–2)
LYMPHOCYTES # BLD AUTO: 2.21 THOUSANDS/ÂΜL (ref 0.6–4.47)
LYMPHOCYTES NFR BLD AUTO: 42 % (ref 14–44)
MCH RBC QN AUTO: 34.7 PG (ref 26.8–34.3)
MCHC RBC AUTO-ENTMCNC: 32.6 G/DL (ref 31.4–37.4)
MCV RBC AUTO: 106 FL (ref 82–98)
MONOCYTES # BLD AUTO: 0.46 THOUSAND/ÂΜL (ref 0.17–1.22)
MONOCYTES NFR BLD AUTO: 9 % (ref 4–12)
NEUTROPHILS # BLD AUTO: 2.39 THOUSANDS/ÂΜL (ref 1.85–7.62)
NEUTS SEG NFR BLD AUTO: 44 % (ref 43–75)
NRBC BLD AUTO-RTO: 0 /100 WBCS
PLATELET # BLD AUTO: 265 THOUSANDS/UL (ref 149–390)
PMV BLD AUTO: 9.2 FL (ref 8.9–12.7)
RBC # BLD AUTO: 3.2 MILLION/UL (ref 3.81–5.12)
WBC # BLD AUTO: 5.32 THOUSAND/UL (ref 4.31–10.16)

## 2023-08-17 PROCEDURE — 80053 COMPREHEN METABOLIC PANEL: CPT

## 2023-08-17 PROCEDURE — 97110 THERAPEUTIC EXERCISES: CPT

## 2023-08-17 PROCEDURE — 36415 COLL VENOUS BLD VENIPUNCTURE: CPT

## 2023-08-17 PROCEDURE — 85025 COMPLETE CBC W/AUTO DIFF WBC: CPT

## 2023-08-17 PROCEDURE — 97530 THERAPEUTIC ACTIVITIES: CPT

## 2023-08-17 NOTE — PROGRESS NOTES
Daily Note     Today's date: 2023  Patient name: Oc Lazo  : 1980  MRN: [id number]  Referring provider: Joce Burnett MD  Dx:   Encounter Diagnosis     ICD-10-CM    1. Chronic pain of left knee  M25.562     G89.29       2. Chronic pain of right knee  M25.561     G89.29                      Subjective: Pt states her knees are feeling better but her back is bothering her. States she had muscle soreness after last visit. Objective: See treatment diary below      Assessment: Tolerated treatment well. Patient demonstrated fatigue post treatment, exhibited good technique with therapeutic exercises and would benefit from continued PT  Pt had an easier time with the ex program today due to less knee pain. Plan: Continue per plan of care.       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, back injury last night,     Re-eval Date:     Date 23    Visit Count 1 2 3 4    FOTO Comp        Pain In See eval "a little bit"    > with exer 3-4/10 R knee   0-1/10    Pain Out  < after CP applic              Manuals 23                                    Neuro Re-Ed  23      Wobble board        Sidestep on aeromat        Tandem aeromat                                        Ther Ex  23      Nustep L1  10' L1  10'   L2  10' L2  10'    Heel slides 20x  R 20x  R   30x R 30x B    SAQs 30 x 3"B 30 x 3" B   30 x 3" 30 x 3"    SLRs  2 x 10  B  2 x 10  B   2 x 10 B 2 x 10 B    bridges  10 x 3" 10 x 3"   15 x 3" 15 x 5"    LAQs  **B 20x/3"    B 30x B 30x    Leg press          Ham stretch  *NV   4x 30" 4 x 30"    Incline calf stretch  *NV      Standing 3 way SLR  *Upcoming 15 x ea B   20x ea    TR's/HRs 20x 30x     30x 30x    march  **Seated March  B Alt 3x10   Standing march  30x alt  Standing march  30x alt            Ther Activity  23      Step ups 10x L/R 10x L/R    10x L/R   F/L    Mini squats    10x    Gait Training                        Modalities  23 8/14/23       **CP to R knee 10 min declined

## 2023-08-18 LAB
ALBUMIN SERPL BCP-MCNC: 3.5 G/DL (ref 3.5–5)
ALP SERPL-CCNC: 30 U/L (ref 46–116)
ALT SERPL W P-5'-P-CCNC: 28 U/L (ref 12–78)
ANION GAP SERPL CALCULATED.3IONS-SCNC: 2 MMOL/L
AST SERPL W P-5'-P-CCNC: 19 U/L (ref 5–45)
BILIRUB SERPL-MCNC: 0.49 MG/DL (ref 0.2–1)
BUN SERPL-MCNC: 17 MG/DL (ref 5–25)
CALCIUM SERPL-MCNC: 9.3 MG/DL (ref 8.3–10.1)
CHLORIDE SERPL-SCNC: 107 MMOL/L (ref 96–108)
CO2 SERPL-SCNC: 29 MMOL/L (ref 21–32)
CREAT SERPL-MCNC: 1.12 MG/DL (ref 0.6–1.3)
GFR SERPL CREATININE-BSD FRML MDRD: 60 ML/MIN/1.73SQ M
GLUCOSE SERPL-MCNC: 85 MG/DL (ref 65–140)
POTASSIUM SERPL-SCNC: 4.2 MMOL/L (ref 3.5–5.3)
PROT SERPL-MCNC: 7.2 G/DL (ref 6.4–8.4)
SODIUM SERPL-SCNC: 138 MMOL/L (ref 135–147)

## 2023-08-21 ENCOUNTER — APPOINTMENT (OUTPATIENT)
Dept: PHYSICAL THERAPY | Facility: CLINIC | Age: 43
End: 2023-08-21
Payer: COMMERCIAL

## 2023-08-24 ENCOUNTER — OFFICE VISIT (OUTPATIENT)
Dept: PHYSICAL THERAPY | Facility: CLINIC | Age: 43
End: 2023-08-24
Payer: COMMERCIAL

## 2023-08-24 DIAGNOSIS — G89.29 CHRONIC PAIN OF RIGHT KNEE: ICD-10-CM

## 2023-08-24 DIAGNOSIS — G89.29 CHRONIC PAIN OF LEFT KNEE: Primary | ICD-10-CM

## 2023-08-24 DIAGNOSIS — M25.562 CHRONIC PAIN OF LEFT KNEE: Primary | ICD-10-CM

## 2023-08-24 DIAGNOSIS — M25.561 CHRONIC PAIN OF RIGHT KNEE: ICD-10-CM

## 2023-08-24 PROCEDURE — 97110 THERAPEUTIC EXERCISES: CPT

## 2023-08-24 NOTE — PROGRESS NOTES
Daily Note     Today's date: 2023  Patient name: Yolette Costello  : 1980  MRN: [id number]  Referring provider: Lucy Torres MD  Dx:   Encounter Diagnosis     ICD-10-CM    1. Chronic pain of left knee  M25.562     G89.29       2. Chronic pain of right knee  M25.561     G89.29           Start Time: 0855  Stop Time: 0945  Total time in clinic (min): 50 minutes    Subjective: She reports her knees hurt a little more today and she thinks it is from the weather. She reports that her knees bother her more in the morning. Objective: See treatment diary below      Assessment: Pt tolerated treatment well. She is tolerating progression of strengthening exercises well and demonstrated proper technique with mild increase in knee pain. She required frequent rest breaks due to fatigue this session. She demonstrated fatigue post treatment and would benefit from continued PT for continued improvement in overall LE strengthening/stability      Plan: Continue per plan of care. Progress treatment as tolerated.        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                                   Neuro Re-Ed  23      Wobble board        Sidestep on aeromat        Tandem aeromat        Clock balance     3x B                           Ther Ex  23      Nustep L1  10' L1  10'   L2  10' L2  10' L2 10'   Heel slides 20x  R 20x  R   30x R 30x B 30 x B   SAQs 30 x 3"B 30 x 3" B   30 x 3" 30 x 3"    SLRs  2 x 10  B  2 x 10  B   2 x 10 B 2 x 10 B 20x B   bridges  10 x 3" 10 x 3"   15 x 3" 15 x 5" 20 x 5"   LAQs  **B 20x/3"    B 30x B 30x B 30x  2# cuff   Leg press          Ham stretch  *NV   4x 30" 4 x 30"    Incline calf stretch  *NV Standing 3 way SLR  *Upcoming 15 x ea B   20x ea 20x ea   TR's/HRs 20x 30x     30x 30x 30x   march  **Seated March B Alt 3x10   Standing march  30x alt  Standing march  30x alt Standing march 30x alt            Ther Activity  8.9.23      Step ups 10x L/R 10x L/R    10x L/R   F/L 15x L/R  Fwd and Lat   Mini squats    10x 10x painful   Gait Training                        Modalities  8.9.23 8/14/23       **CP to R knee 10 min declined

## 2023-08-28 ENCOUNTER — APPOINTMENT (OUTPATIENT)
Dept: PHYSICAL THERAPY | Facility: CLINIC | Age: 43
End: 2023-08-28
Payer: COMMERCIAL

## 2023-08-31 ENCOUNTER — APPOINTMENT (OUTPATIENT)
Dept: PHYSICAL THERAPY | Facility: CLINIC | Age: 43
End: 2023-08-31
Payer: COMMERCIAL

## 2023-09-14 ENCOUNTER — APPOINTMENT (OUTPATIENT)
Dept: LAB | Facility: CLINIC | Age: 43
End: 2023-09-14
Payer: COMMERCIAL

## 2023-09-14 ENCOUNTER — HOSPITAL ENCOUNTER (OUTPATIENT)
Dept: BONE DENSITY | Facility: HOSPITAL | Age: 43
End: 2023-09-14
Payer: COMMERCIAL

## 2023-09-14 VITALS — WEIGHT: 210.54 LBS | BODY MASS INDEX: 38.74 KG/M2 | HEIGHT: 62 IN

## 2023-09-14 DIAGNOSIS — C79.51 MALIGNANT NEOPLASM METASTATIC TO BONE (HCC): ICD-10-CM

## 2023-09-14 DIAGNOSIS — M85.80 OSTEOPENIA, UNSPECIFIED LOCATION: ICD-10-CM

## 2023-09-14 LAB
ALBUMIN SERPL BCP-MCNC: 4.1 G/DL (ref 3.5–5)
ALP SERPL-CCNC: 30 U/L (ref 34–104)
ALT SERPL W P-5'-P-CCNC: 18 U/L (ref 7–52)
ANION GAP SERPL CALCULATED.3IONS-SCNC: 4 MMOL/L
AST SERPL W P-5'-P-CCNC: 18 U/L (ref 13–39)
BASOPHILS # BLD AUTO: 0.06 THOUSANDS/ÂΜL (ref 0–0.1)
BASOPHILS NFR BLD AUTO: 1 % (ref 0–1)
BILIRUB SERPL-MCNC: 0.47 MG/DL (ref 0.2–1)
BUN SERPL-MCNC: 15 MG/DL (ref 5–25)
CALCIUM SERPL-MCNC: 9.2 MG/DL (ref 8.4–10.2)
CHLORIDE SERPL-SCNC: 106 MMOL/L (ref 96–108)
CO2 SERPL-SCNC: 30 MMOL/L (ref 21–32)
CREAT SERPL-MCNC: 1.07 MG/DL (ref 0.6–1.3)
EOSINOPHIL # BLD AUTO: 0.18 THOUSAND/ÂΜL (ref 0–0.61)
EOSINOPHIL NFR BLD AUTO: 4 % (ref 0–6)
ERYTHROCYTE [DISTWIDTH] IN BLOOD BY AUTOMATED COUNT: 12.8 % (ref 11.6–15.1)
GFR SERPL CREATININE-BSD FRML MDRD: 64 ML/MIN/1.73SQ M
GLUCOSE P FAST SERPL-MCNC: 78 MG/DL (ref 65–99)
HCT VFR BLD AUTO: 33.4 % (ref 34.8–46.1)
HGB BLD-MCNC: 11.3 G/DL (ref 11.5–15.4)
IMM GRANULOCYTES # BLD AUTO: 0.02 THOUSAND/UL (ref 0–0.2)
IMM GRANULOCYTES NFR BLD AUTO: 1 % (ref 0–2)
LYMPHOCYTES # BLD AUTO: 1.47 THOUSANDS/ÂΜL (ref 0.6–4.47)
LYMPHOCYTES NFR BLD AUTO: 34 % (ref 14–44)
MCH RBC QN AUTO: 35.3 PG (ref 26.8–34.3)
MCHC RBC AUTO-ENTMCNC: 33.8 G/DL (ref 31.4–37.4)
MCV RBC AUTO: 104 FL (ref 82–98)
MONOCYTES # BLD AUTO: 0.34 THOUSAND/ÂΜL (ref 0.17–1.22)
MONOCYTES NFR BLD AUTO: 8 % (ref 4–12)
NEUTROPHILS # BLD AUTO: 2.26 THOUSANDS/ÂΜL (ref 1.85–7.62)
NEUTS SEG NFR BLD AUTO: 52 % (ref 43–75)
NRBC BLD AUTO-RTO: 0 /100 WBCS
PLATELET # BLD AUTO: 225 THOUSANDS/UL (ref 149–390)
PMV BLD AUTO: 9.3 FL (ref 8.9–12.7)
POTASSIUM SERPL-SCNC: 4.1 MMOL/L (ref 3.5–5.3)
PROT SERPL-MCNC: 6.7 G/DL (ref 6.4–8.4)
RBC # BLD AUTO: 3.2 MILLION/UL (ref 3.81–5.12)
SODIUM SERPL-SCNC: 140 MMOL/L (ref 135–147)
WBC # BLD AUTO: 4.33 THOUSAND/UL (ref 4.31–10.16)

## 2023-09-14 PROCEDURE — 77080 DXA BONE DENSITY AXIAL: CPT

## 2023-09-14 PROCEDURE — 85025 COMPLETE CBC W/AUTO DIFF WBC: CPT

## 2023-09-14 PROCEDURE — 36415 COLL VENOUS BLD VENIPUNCTURE: CPT

## 2023-09-14 PROCEDURE — 80053 COMPREHEN METABOLIC PANEL: CPT

## 2023-09-15 NOTE — RESULT ENCOUNTER NOTE
The patients DXA notes mild osteopenia and the patient should engage in regular weight bearing exercise and take Calcium 600 mg twice a day

## 2023-09-18 ENCOUNTER — APPOINTMENT (OUTPATIENT)
Dept: RADIOLOGY | Facility: CLINIC | Age: 43
End: 2023-09-18
Payer: COMMERCIAL

## 2023-09-18 ENCOUNTER — OFFICE VISIT (OUTPATIENT)
Dept: URGENT CARE | Facility: CLINIC | Age: 43
End: 2023-09-18
Payer: COMMERCIAL

## 2023-09-18 VITALS
HEART RATE: 85 BPM | TEMPERATURE: 98.4 F | OXYGEN SATURATION: 96 % | SYSTOLIC BLOOD PRESSURE: 118 MMHG | RESPIRATION RATE: 18 BRPM | DIASTOLIC BLOOD PRESSURE: 75 MMHG

## 2023-09-18 DIAGNOSIS — S29.012A STRAIN OF THORACIC BACK REGION: Primary | ICD-10-CM

## 2023-09-18 DIAGNOSIS — M54.6 ACUTE RIGHT-SIDED THORACIC BACK PAIN: ICD-10-CM

## 2023-09-18 PROBLEM — M54.50 ACUTE BILATERAL LOW BACK PAIN: Status: ACTIVE | Noted: 2023-09-18

## 2023-09-18 PROBLEM — R93.7 ABNORMAL X-RAY OF LUMBAR SPINE: Status: ACTIVE | Noted: 2023-09-18

## 2023-09-18 PROBLEM — R93.7 ABNORMAL X-RAY OF THORACIC SPINE: Status: ACTIVE | Noted: 2023-09-18

## 2023-09-18 PROCEDURE — 71101 X-RAY EXAM UNILAT RIBS/CHEST: CPT

## 2023-09-18 PROCEDURE — S9083 URGENT CARE CENTER GLOBAL: HCPCS | Performed by: PHYSICIAN ASSISTANT

## 2023-09-18 PROCEDURE — 99213 OFFICE O/P EST LOW 20 MIN: CPT | Performed by: PHYSICIAN ASSISTANT

## 2023-09-18 NOTE — PROGRESS NOTES
[facility]        NAME: Nilsa Parker is a 44 y.o. female  : 1980    MRN: [id number]  DATE: 2023  TIME: 2:37 PM    Assessment and Plan   Strain of thoracic back region [S29.012A]  1. Strain of thoracic back region  XR ribs right w pa chest min 3 views        No obvious rib fractures. Possible differential includes musculoskeletal secondary to PT or other day to day activities. However, due to history of breast CA metastasis, recommend calling oncology. Will review radiology results and call patient if radiology report differs from initial read. XR provider read: no acute fracture or dislocation    Patient Instructions     Continue tylenol  Gentle deep breathing  Heat or Ice 20 minutes 3-4 times per day for 3 days  Follow up with oncologist  Follow up with PCP in 3-5 days. Proceed to  ER if symptoms worsen. Chief Complaint     Chief Complaint   Patient presents with   • Back Pain     Patient injured her back on 2023 and is being seen by [facility] through [facility] comp.-patient has been receiving injections for the back pain, upper back-right side         History of Present Illness       43 y/o F with osteoporosis, current hx of breast CA x  and current worker's compensation treatment for low back injury presents with R thoracic pain x 2 weeks. Reports intermittent pain worse with coughing, deep breathing, standing up and sneezing. States the pain started a few days after she started PT and received a toradol ropivicane injection in the area. Additionally reports similar symptoms with a prior rib fracture and on the L side a few years ago that revealed metastasis in the L rib. Denies SOB, calf redness/swelling, or history of DVT/PE or clotting disorders. She has a CT and bone scan scheduled for next month ordered by her oncologist.       Review of Systems   Review of Systems   Constitutional: Negative for fever.    Respiratory: Negative for shortness of breath. Cardiovascular: Negative for chest pain. Gastrointestinal: Negative for abdominal pain. Skin: Negative for rash. Neurological: Negative for weakness and numbness. Current Medications       Current Outpatient Medications:   •  Abemaciclib (Verzenio) 150 MG TABS, Take 150 mg by mouth 2 (two) times a day, Disp: 60 tablet, Rfl: 0  •  calcium-vitamin D (OSCAL 500 + D) 500 mg-200 units per tablet, Take 1 tablet by mouth daily. , Disp: , Rfl:   •  Denosumab ([place] Sonya SC), Inject under the skin, Disp: , Rfl:   •  ergocalciferol (VITAMIN D2) 50,000 units, take 1 capsule by mouth weekly, Disp: 12 capsule, Rfl: 0  •  fulvestrant (FASLODEX) 250 mg/5 mL, 500 mg by Intramuscular (multi inj) route once, Disp: , Rfl:   •  ibuprofen (MOTRIN) 600 mg tablet, Take 600 mg by mouth every 6 (six) hours as needed, Disp: , Rfl:   •  levothyroxine 88 mcg tablet, take 1 tablet by mouth once daily, Disp: 90 tablet, Rfl: 1  •  lisinopril (ZESTRIL) 2.5 mg tablet, Take 1 tablet (2.5 mg total) by mouth daily, Disp: 90 tablet, Rfl: 1  •  loperamide (IMODIUM A-D) 2 MG tablet, Take 1 tablet (2 mg total) by mouth 4 (four) times a day as needed for diarrhea, Disp: 30 tablet, Rfl: 0  •  methocarbamol (ROBAXIN) 500 mg tablet, Take 500 mg by mouth 4 (four) times a day, Disp: , Rfl:   •  nystatin (MYCOSTATIN) powder, Apply 1 Application topically 2 (two) times a day Abdominal fold, Disp: 15 g, Rfl: 0  •  Omeprazole POWD, Take 20 mg by mouth, Disp: , Rfl:   •  simvastatin (ZOCOR) 20 mg tablet, take 1 tablet by mouth at bedtime, Disp: 100 tablet, Rfl: 1  •  Sodium Fluoride 1.1 % PSTE, Apply 1 Squirt to teeth daily at bedtime Brush daily at bedtime. Spit, do not rinse. Nothing to eat or drink afterwards. , Disp: 100 mL, Rfl: 0  •  clotrimazole-betamethasone (LOTRISONE) 1-0.05 % lotion, Apply topically 2 (two) times a day for 7 days (Patient not taking: Reported on 7/20/2023), Disp: 30 mL, Rfl: 0  •  diclofenac sodium (VOLTAREN) 1 %, Apply 1 application topically daily as needed , Disp: , Rfl:   •  famotidine (PEPCID) 40 MG tablet, Take 1 tablet (40 mg total) by mouth daily at bedtime as needed for heartburn, Disp: 30 tablet, Rfl: 5  •  omeprazole (PriLOSEC) 40 MG capsule, Take 1 capsule (40 mg total) by mouth daily, Disp: 90 capsule, Rfl: 1  •  vitamin E, tocopherol, 400 units capsule, Take 400 Units by mouth 2 (two) times a day, Disp: , Rfl:     Current Allergies     Allergies as of 09/18/2023 - Reviewed 09/18/2023   Allergen Reaction Noted   • Paclitaxel Anaphylaxis 02/26/2016            The following portions of the patient's history were reviewed and updated as appropriate: allergies, current medications, past family history, past medical history, past social history, past surgical history and problem list.     Past Medical History:   Diagnosis Date   • Allergy    • Anxiety    • Arthritis    • BRCA negative    • Breast cancer ([address])     left mastectomy 2013   • Candidiasis, cutaneous    • Cervical cancer ([address])    • Depression    • Diabetes mellitus ([address])    • Disease of thyroid gland     hypothyroid   • Disorder of urinary tract    • Fatty liver    • Hemorrhoids    • History of chemotherapy    • History of radiation therapy    • Influenza A    • Obesity    • PONV (postoperative nausea and vomiting)    • Port-A-Cath in place        Past Surgical History:   Procedure Laterality Date   • BILATERAL SALPINGOOPHORECTOMY     • BREAST BIOPSY      needle core   • GASTRECTOMY SLEEVE LAPAROSCOPIC      Onset: 1/26/15  Dr. Kathleen Mckeon   • HYSTERECTOMY  04/07/2014   • IR PORT REMOVAL  11/21/2019   • MASTECTOMY Left 12/19/2013    Lymph nodes removed       Family History   Problem Relation Age of Onset   • Diabetes Mother    • Diabetes Father    • Ovarian cancer Sister 29   • No Known Problems Sister    • No Known Problems Sister    • No Known Problems Daughter    • Hypertension Maternal Grandfather    • Hodgkin's lymphoma Paternal Grandmother    • Leukemia Son         chronic myeloid   • No Known Problems Son    • No Known Problems Son    • No Known Problems Son    • Uterine cancer Maternal Aunt 64   • Breast cancer Family    • Diabetes Family    • Hypertension Family          Medications have been verified. Objective   /75   Pulse 85   Temp 98.4 °F (36.9 °C)   Resp 18   SpO2 96%   No LMP recorded. Patient has had a hysterectomy. Physical Exam     Physical Exam  Vitals reviewed. Constitutional:       General: She is not in acute distress. Appearance: She is well-developed. Cardiovascular:      Rate and Rhythm: Normal rate and regular rhythm. Heart sounds: Normal heart sounds. No murmur heard. No friction rub. No gallop. Pulmonary:      Effort: Pulmonary effort is normal. No respiratory distress. Breath sounds: Normal breath sounds. No wheezing, rhonchi or rales. Musculoskeletal:         General: No tenderness or deformity. Normal range of motion. Comments: Pain with lateral rotation. Skin:     General: Skin is warm. Findings: No erythema or rash. Neurological:      Mental Status: She is alert and oriented to person, place, and time. Coordination: Coordination normal.      Deep Tendon Reflexes: Reflexes are normal and symmetric. Reflexes normal.   Psychiatric:         Behavior: Behavior normal.         Thought Content:  Thought content normal.         Judgment: Judgment normal.

## 2023-09-18 NOTE — PATIENT INSTRUCTIONS
Continue tylenol  Gentle deep breathing  Heat or Ice 20 minutes 3-4 times per day for 3 days  Follow up with oncologist  Follow up with PCP in 3-5 days. Proceed to  ER if symptoms worsen.

## 2023-10-02 ENCOUNTER — RA CDI HCC (OUTPATIENT)
Dept: OTHER | Facility: HOSPITAL | Age: 43
End: 2023-10-02

## 2023-10-02 NOTE — PROGRESS NOTES
[facility] coding opportunities     E66.01     Chart Reviewed number of suggestions sent to Provider: 1     Patients Insurance     Medicare Insurance: [facility]

## 2023-10-04 ENCOUNTER — TELEPHONE (OUTPATIENT)
Dept: ADMINISTRATIVE | Facility: OTHER | Age: 43
End: 2023-10-04

## 2023-10-04 ENCOUNTER — IMMUNIZATIONS (OUTPATIENT)
Dept: INTERNAL MEDICINE CLINIC | Facility: CLINIC | Age: 43
End: 2023-10-04

## 2023-10-04 NOTE — TELEPHONE ENCOUNTER
10/04/23 10:43 AM    Patient contacted (spoke with patient) to bring Advance Directive, POLST, or Living Will document to next scheduled pcp visit. Thank you.   Cristino Hampton [facility]

## 2023-10-06 ENCOUNTER — TELEPHONE (OUTPATIENT)
Age: 43
End: 2023-10-06

## 2023-10-06 ENCOUNTER — OFFICE VISIT (OUTPATIENT)
Dept: OBGYN CLINIC | Facility: CLINIC | Age: 43
End: 2023-10-06
Payer: OTHER MISCELLANEOUS

## 2023-10-06 VITALS
DIASTOLIC BLOOD PRESSURE: 91 MMHG | SYSTOLIC BLOOD PRESSURE: 129 MMHG | BODY MASS INDEX: 39.93 KG/M2 | WEIGHT: 217 LBS | TEMPERATURE: 99.6 F | HEART RATE: 84 BPM | HEIGHT: 62 IN

## 2023-10-06 DIAGNOSIS — S39.92XA LOWER BACK INJURY, INITIAL ENCOUNTER: Primary | ICD-10-CM

## 2023-10-06 DIAGNOSIS — M54.16 LUMBAR RADICULOPATHY: ICD-10-CM

## 2023-10-06 PROCEDURE — 99214 OFFICE O/P EST MOD 30 MIN: CPT | Performed by: FAMILY MEDICINE

## 2023-10-06 NOTE — LETTER
October 6, 2023     Patient: Faith Levine  YOB: 1980  Date of Visit: 10/6/2023      To Whom it May Concern:    Bertha Beckett is under my professional care. Bertha was seen in my office on 10/6/2023. Bertha may return to work with limitations -  no bending/twisting. No lifting from the ground. No lifting/pushing/pulling >10 lbs . If you have any questions or concerns, please don't hesitate to call.          Sincerely,          Eugene Fermin MD        CC: No Recipients

## 2023-10-06 NOTE — PATIENT INSTRUCTIONS
F/u after MRI  MRI- L spine-  Lower back injury/pain/radiculopathy/XR neg/6 wks of PT  Icing/heat/OTC pain meds as needed. Continue therapy. Meds as needed.   Begin therapy at [facility]

## 2023-10-06 NOTE — TELEPHONE ENCOUNTER
Caller:Patient    Doctor: Gadiel Franklin    Reason for call: Appointment today @ 2 W/Comp Case [phone] Claim Number [id number] (they also gave her a [employer] number [id number])    Call back#: [phone]

## 2023-10-06 NOTE — PROGRESS NOTES
[facility] SPECIALISTS [address] [address]  [phone]      Assessment:  1. Lower back injury, initial encounter  -     Ambulatory Referral to Physical Therapy; Future  -     MRI lumbar spine wo contrast; Future; Expected date: 10/06/2023    2. Lumbar radiculopathy  -     Ambulatory Referral to Physical Therapy; Future  -     MRI lumbar spine wo contrast; Future; Expected date: 10/06/2023        Plan:  Patient Instructions   F/u after MRI  MRI- L spine-  Lower back injury/pain/radiculopathy/XR neg/6 wks of PT  Icing/heat/OTC pain meds as needed. Continue therapy. Meds as needed. Begin therapy at [facility]     Return for f/u after MRI L spine, Recheck. Chief Complaint:  Chief Complaint   Patient presents with   • Spine - Pain       Subjective:   HPI    Patient ID: Poncho Otto is a 44 y.o. 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 [facility]. Given prednisone/muscle relaxant  She is still having pain/spasms/n/t, worse bending/twisting/prolonged sitting/laying on R side. She was seeing Dr. Aure Cerda- PMR 9/1/23, given trigger point injections- helped temporarily. Note reviewed. Started therapy at [facility]- going for 6 wks      Minimal lumbar rotolevoscoliosis. No lumbar subluxation. Vertebral body heights   normal. Mild degenerative disc changes at some lumbar level including T11-12,   Most prominent at L4-5 with minimal disc space narrowing and tiny osteophytes   and anterior endplate sclerosis, also minimal changes L4-5 and L5-S1. Some   minimal lower lumbar spine facet arthropathy. Bony pelvis intact. Mild degenerative change pubic symphysis. No right hip   dislocation or fracture.  Both hip joint spaces appear normal.            Review of Systems   Constitutional: Negative for fatigue and fever. Respiratory: Negative for shortness of breath. Cardiovascular: Negative for chest pain. Gastrointestinal: Negative for abdominal pain and nausea. Genitourinary: Negative for dysuria. Musculoskeletal: Positive for back pain. Skin: Negative for rash and wound. Neurological: Negative for weakness and headaches. Objective:  Vitals:  /91 (BP Location: Right arm, Patient Position: Sitting, Cuff Size: Large)   Pulse 84   Temp 99.6 °F (37.6 °C) (Tympanic)   Ht 5' 2" (1.575 m)   Wt 98.4 kg (217 lb)   BMI 39.69 kg/m²     The following portions of the patient's history were reviewed and updated as appropriate: allergies, current medications, past family history, past medical history, past social history, past surgical history, and problem list.    Physical exam:  Physical Exam  Constitutional:       Appearance: Normal appearance. She is normal weight. HENT:      Head: Normocephalic. Eyes:      Extraocular Movements: Extraocular movements intact. Pulmonary:      Effort: Pulmonary effort is normal.   Musculoskeletal:      Cervical back: Normal range of motion. Lumbar back: Positive right straight leg raise test. Negative left straight leg raise test.   Skin:     General: Skin is warm and dry. Neurological:      General: No focal deficit present. Mental Status: She is alert and oriented to person, place, and time. Mental status is at baseline. Psychiatric:         Mood and Affect: Mood normal.         Behavior: Behavior normal.         Thought Content: Thought content normal.         Judgment: Judgment normal.       Back Exam     Tenderness   The patient is experiencing tenderness in the sacroiliac and lumbar. Range of Motion   The patient has normal back ROM. Muscle Strength   The patient has normal back strength.     Tests   Straight leg raise right: positive  Straight leg raise left: negative    Reflexes   Patellar: normal    Other   Sensation: normal  Gait: antalgic     Comments:  Pain with flex/ext/rot B/lat flex Nadeen Walker MD

## 2023-10-10 ENCOUNTER — TELEPHONE (OUTPATIENT)
Dept: DENTISTRY | Facility: CLINIC | Age: 43
End: 2023-10-10

## 2023-10-11 ENCOUNTER — APPOINTMENT (OUTPATIENT)
Dept: LAB | Facility: CLINIC | Age: 43
End: 2023-10-11
Payer: COMMERCIAL

## 2023-10-11 DIAGNOSIS — C79.51 MALIGNANT NEOPLASM METASTATIC TO BONE (HCC): ICD-10-CM

## 2023-10-11 LAB
ALBUMIN SERPL BCP-MCNC: 4.1 G/DL (ref 3.5–5)
ALP SERPL-CCNC: 31 U/L (ref 34–104)
ALT SERPL W P-5'-P-CCNC: 16 U/L (ref 7–52)
ANION GAP SERPL CALCULATED.3IONS-SCNC: 5 MMOL/L
AST SERPL W P-5'-P-CCNC: 17 U/L (ref 13–39)
BASOPHILS # BLD AUTO: 0.06 THOUSANDS/ÂΜL (ref 0–0.1)
BASOPHILS NFR BLD AUTO: 1 % (ref 0–1)
BILIRUB SERPL-MCNC: 0.6 MG/DL (ref 0.2–1)
BUN SERPL-MCNC: 15 MG/DL (ref 5–25)
CALCIUM SERPL-MCNC: 9.8 MG/DL (ref 8.4–10.2)
CHLORIDE SERPL-SCNC: 103 MMOL/L (ref 96–108)
CO2 SERPL-SCNC: 30 MMOL/L (ref 21–32)
CREAT SERPL-MCNC: 0.92 MG/DL (ref 0.6–1.3)
EOSINOPHIL # BLD AUTO: 0.18 THOUSAND/ÂΜL (ref 0–0.61)
EOSINOPHIL NFR BLD AUTO: 4 % (ref 0–6)
ERYTHROCYTE [DISTWIDTH] IN BLOOD BY AUTOMATED COUNT: 12.9 % (ref 11.6–15.1)
GFR SERPL CREATININE-BSD FRML MDRD: 77 ML/MIN/1.73SQ M
GLUCOSE P FAST SERPL-MCNC: 85 MG/DL (ref 65–99)
HCT VFR BLD AUTO: 34.6 % (ref 34.8–46.1)
HGB BLD-MCNC: 11.7 G/DL (ref 11.5–15.4)
IMM GRANULOCYTES # BLD AUTO: 0.02 THOUSAND/UL (ref 0–0.2)
IMM GRANULOCYTES NFR BLD AUTO: 0 % (ref 0–2)
LYMPHOCYTES # BLD AUTO: 2.07 THOUSANDS/ÂΜL (ref 0.6–4.47)
LYMPHOCYTES NFR BLD AUTO: 45 % (ref 14–44)
MCH RBC QN AUTO: 35.7 PG (ref 26.8–34.3)
MCHC RBC AUTO-ENTMCNC: 33.8 G/DL (ref 31.4–37.4)
MCV RBC AUTO: 106 FL (ref 82–98)
MONOCYTES # BLD AUTO: 0.41 THOUSAND/ÂΜL (ref 0.17–1.22)
MONOCYTES NFR BLD AUTO: 9 % (ref 4–12)
NEUTROPHILS # BLD AUTO: 1.92 THOUSANDS/ÂΜL (ref 1.85–7.62)
NEUTS SEG NFR BLD AUTO: 41 % (ref 43–75)
NRBC BLD AUTO-RTO: 0 /100 WBCS
PLATELET # BLD AUTO: 224 THOUSANDS/UL (ref 149–390)
PMV BLD AUTO: 9 FL (ref 8.9–12.7)
POTASSIUM SERPL-SCNC: 4.2 MMOL/L (ref 3.5–5.3)
PROT SERPL-MCNC: 7.1 G/DL (ref 6.4–8.4)
RBC # BLD AUTO: 3.28 MILLION/UL (ref 3.81–5.12)
SODIUM SERPL-SCNC: 138 MMOL/L (ref 135–147)
WBC # BLD AUTO: 4.66 THOUSAND/UL (ref 4.31–10.16)

## 2023-10-11 PROCEDURE — 80053 COMPREHEN METABOLIC PANEL: CPT

## 2023-10-11 PROCEDURE — 85025 COMPLETE CBC W/AUTO DIFF WBC: CPT

## 2023-10-11 PROCEDURE — 36415 COLL VENOUS BLD VENIPUNCTURE: CPT

## 2023-10-13 ENCOUNTER — HOSPITAL ENCOUNTER (OUTPATIENT)
Dept: MRI IMAGING | Facility: HOSPITAL | Age: 43
End: 2023-10-13
Attending: FAMILY MEDICINE
Payer: OTHER MISCELLANEOUS

## 2023-10-13 DIAGNOSIS — M54.16 LUMBAR RADICULOPATHY: ICD-10-CM

## 2023-10-13 DIAGNOSIS — S39.92XA LOWER BACK INJURY, INITIAL ENCOUNTER: ICD-10-CM

## 2023-10-13 PROCEDURE — 72148 MRI LUMBAR SPINE W/O DYE: CPT

## 2023-10-13 PROCEDURE — G1004 CDSM NDSC: HCPCS

## 2023-10-16 ENCOUNTER — HOSPITAL ENCOUNTER (OUTPATIENT)
Dept: CT IMAGING | Facility: HOSPITAL | Age: 43
Discharge: HOME | End: 2023-10-16
Payer: COMMERCIAL

## 2023-10-16 ENCOUNTER — HOSPITAL ENCOUNTER (OUTPATIENT)
Dept: NUCLEAR MEDICINE | Facility: HOSPITAL | Age: 43
Discharge: HOME | End: 2023-10-16
Payer: COMMERCIAL

## 2023-10-16 DIAGNOSIS — C79.52 SECONDARY MALIGNANT NEOPLASM OF BONE AND BONE MARROW (HCC): ICD-10-CM

## 2023-10-16 DIAGNOSIS — C79.51 SECONDARY MALIGNANT NEOPLASM OF BONE AND BONE MARROW (HCC): ICD-10-CM

## 2023-10-16 DIAGNOSIS — Z17.0 ESTROGEN RECEPTOR POSITIVE: ICD-10-CM

## 2023-10-16 DIAGNOSIS — C50.912 MALIGNANT NEOPLASM OF LEFT FEMALE BREAST, UNSPECIFIED ESTROGEN RECEPTOR STATUS, UNSPECIFIED SITE OF BREAST (HCC): ICD-10-CM

## 2023-10-16 PROCEDURE — 71260 CT THORAX DX C+: CPT

## 2023-10-16 PROCEDURE — G1004 CDSM NDSC: HCPCS

## 2023-10-16 PROCEDURE — A9503 TC99M MEDRONATE: HCPCS

## 2023-10-16 PROCEDURE — 74177 CT ABD & PELVIS W/CONTRAST: CPT

## 2023-10-16 PROCEDURE — 78306 BONE IMAGING WHOLE BODY: CPT

## 2023-10-16 RX ADMIN — IOHEXOL 100 ML: 350 INJECTION, SOLUTION INTRAVENOUS at 08:01

## 2023-10-18 ENCOUNTER — TELEPHONE (OUTPATIENT)
Dept: DENTISTRY | Facility: CLINIC | Age: 43
End: 2023-10-18

## 2023-10-23 ENCOUNTER — OFFICE VISIT (OUTPATIENT)
Dept: OBGYN CLINIC | Facility: CLINIC | Age: 43
End: 2023-10-23
Payer: OTHER MISCELLANEOUS

## 2023-10-23 VITALS
TEMPERATURE: 98.7 F | BODY MASS INDEX: 39.79 KG/M2 | HEIGHT: 62 IN | SYSTOLIC BLOOD PRESSURE: 126 MMHG | DIASTOLIC BLOOD PRESSURE: 84 MMHG | HEART RATE: 87 BPM | WEIGHT: 216.2 LBS

## 2023-10-23 DIAGNOSIS — M51.36 BULGING LUMBAR DISC: ICD-10-CM

## 2023-10-23 DIAGNOSIS — M54.16 LUMBAR RADICULOPATHY: ICD-10-CM

## 2023-10-23 DIAGNOSIS — S39.92XD LOWER BACK INJURY, SUBSEQUENT ENCOUNTER: Primary | ICD-10-CM

## 2023-10-23 PROBLEM — S39.92XA LOWER BACK INJURY, INITIAL ENCOUNTER: Status: ACTIVE | Noted: 2023-10-23

## 2023-10-23 PROCEDURE — 99214 OFFICE O/P EST MOD 30 MIN: CPT | Performed by: FAMILY MEDICINE

## 2023-10-23 RX ORDER — GABAPENTIN 100 MG/1
CAPSULE ORAL
Qty: 90 CAPSULE | Refills: 1 | Status: SHIPPED | OUTPATIENT
Start: 2023-10-23

## 2023-10-23 NOTE — PROGRESS NOTES
[facility] SPECIALISTS [address] [address] [phone]      Assessment:  1. Lower back injury, subsequent encounter  -     gabapentin (NEURONTIN) 100 mg capsule; Take 1 T PO tonight, then 1T PO BID tomorrow, then 1T PO TID thereafter. 2. Lumbar radiculopathy  -     gabapentin (NEURONTIN) 100 mg capsule; Take 1 T PO tonight, then 1T PO BID tomorrow, then 1T PO TID thereafter. 3. Bulging lumbar disc  -     gabapentin (NEURONTIN) 100 mg capsule; Take 1 T PO tonight, then 1T PO BID tomorrow, then 1T PO TID thereafter. Plan:  Patient Instructions   F/u here as needed  Referral to Pain mgmt  Icing/heat/OTC pain meds as needed. Restart PT  Home exercises. Return for f/u wtih pain mgmt, Recheck. Chief Complaint:  Chief Complaint   Patient presents with    Lower Back - Follow-up     Follow-up to MRI       Subjective:   HPI    Patient ID: Josh Miguel is a 44 y.o. 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 MRI LUMBAR SPINE WITHOUT CONTRAST     INDICATION: S39. 92XA: Unspecified injury of lower back, initial encounter  M54.16: Radiculopathy, lumbar region. COMPARISON: X-ray lumbar spine 2/15/2015     TECHNIQUE:  Multiplanar, multisequence imaging of the lumbar spine was performed. .        IMAGE QUALITY:  Diagnostic     FINDINGS:     VERTEBRAL BODIES:  There are 5 lumbar type vertebral bodies. Normal alignment of the lumbar spine. No spondylolysis or spondylolisthesis. No scoliosis. No compression fracture. There are T12 superior endplate and Q17 inferior endplate Schmorl's   nodes.  No bone marrow signal abnormality or suspicious discrete lesion. SACRUM:  Normal signal within the sacrum. No evidence of insufficiency or stress fracture. DISTAL CORD AND CONUS:  Normal size and signal within the distal cord and conus. PARASPINAL SOFT TISSUES:  Paraspinal soft tissues are unremarkable. LOWER THORACIC DISC SPACES:  Normal disc height and signal.  No disc herniation, canal stenosis or foraminal narrowing. LUMBAR DISC SPACES:     L1-L2: No disc bulge. Mild facet arthropathy. No canal or foraminal stenosis. L2-L3: No disc bulge. Mild facet arthropathy. No canal or foraminal stenosis. L3-L4: No significant disc bulge. Mild facet arthropathy. No canal or foraminal stenosis. L4-L5: No significant disc bulge. Mild facet arthropathy. No canal or foraminal stenosis. L5-S1: There is mild disc height loss and degenerative disc dehydration. There is minimal disc bulge and superimposed right foraminal disc protrusion which exerts mass effect upon undersurface of the exiting right L5 nerve root with moderate foraminal   narrowing. No significant canal stenosis. OTHER FINDINGS: Small T2 hyperintense splenic lesion (series 2 image 5), likely a cyst which is also seen on prior CT 5/22/2023. IMPRESSION:     Right foraminal disc protrusion at level L5-S1 exerting mass effect upon undersurface of the right L5 nerve root with moderate foraminal stenosis. Correlate for right L5 radiculopathy. Workstation performed: [id number]    Review of Systems   Constitutional:  Negative for fatigue and fever. Respiratory:  Negative for shortness of breath. Cardiovascular:  Negative for chest pain. Gastrointestinal:  Negative for abdominal pain and nausea. Genitourinary:  Negative for dysuria. Musculoskeletal:  Positive for back pain. Skin:  Negative for rash and wound. Neurological:  Negative for weakness and headaches.        Objective:  Vitals:  /84 (BP Location: Right arm, Patient Position: Sitting, Cuff Size: Large)   Pulse 87   Temp 98.7 °F (37.1 °C) (Tympanic)   Ht 5' 2" (1.575 m)   Wt 98.1 kg (216 lb 3.2 oz)   BMI 39.54 kg/m²     The following portions of the patient's history were reviewed and updated as appropriate: allergies, current medications, past family history, past medical history, past social history, past surgical history, and problem list.    Physical exam:  Physical Exam  Constitutional:       Appearance: Normal appearance. She is normal weight. Eyes:      Extraocular Movements: Extraocular movements intact. Pulmonary:      Effort: Pulmonary effort is normal.   Musculoskeletal:      Cervical back: Normal range of motion. Lumbar back: Positive right straight leg raise test and positive left straight leg raise test.   Skin:     General: Skin is warm and dry. Neurological:      General: No focal deficit present. Mental Status: She is alert and oriented to person, place, and time. Mental status is at baseline. Psychiatric:         Mood and Affect: Mood normal.         Behavior: Behavior normal.         Thought Content: Thought content normal.         Judgment: Judgment normal.       Back Exam     Tenderness   The patient is experiencing tenderness in the lumbar and sacroiliac. Range of Motion   The patient has normal back ROM. Muscle Strength   The patient has normal back strength. Tests   Straight leg raise right: positive  Straight leg raise left: positive    Reflexes   Patellar:  normal    Comments:  Pain with flex/ext/rot B/lat flex B            I have personally reviewed pertinent films in PACS and my interpretation is MRI-  L spine L5/S1 bulging disc. Moses Martell MD

## 2023-10-23 NOTE — PROGRESS NOTES
PT Evaluation     Today's date: 10/25/23  Patient name: Franc Rayo  : 1980  MRN: [id number]  Referring provider: Swati Simms MD  Dx:   Encounter Diagnosis     ICD-10-CM    1. Lower back injury, initial encounter  S39. 92XA Ambulatory Referral to Physical Therapy      2. Lumbar radiculopathy  M54.16 Ambulatory Referral to Physical Therapy                     Assessment  Assessment details: Franc Rayo is a 44 y.o. female presenting to outpatient physical therapy with noted impairments including pain, impaired soft tissue mobility, reduced range of motion, reduced strength, reduced postural awareness, and reduced activity tolerance. Signs and symptoms at present are consistent with referring diagnosis of R LBP with radiculopathy RLE. . Due to noted impairments, the patient's present functional limitations include difficulty with ADLs with increased need for assistance, reliance on medication and/or modalities for pain relief, reduced tolerance for functional mobility and activity, and difficulty completing self care, HH and work responsibilities. Patient to benefit from skilled outpatient physical therapy 2x/week for 8 weeks in order to reduce pain, maximize pain free range of motion, increase strength and stability, and improve functional mobility/functional activity in order to maximize return to prior level of function with reduced limitations. Home exercise program was provided and all questions answered to patient's level of satisfaction. Thank you for your referral.        Impairments: abnormal or restricted ROM, abnormal movement, activity intolerance, impaired physical strength, lacks appropriate home exercise program and pain with function  Understanding of Dx/Px/POC: good   Prognosis: fair    Goals  STGs to be achieved in 4 weeks:  1.  Pt to demonstrate reduced subjective pain rating "at worst" by at least 2-3 points from Initial Eval in order to allow for reduced pain with ADLs and improved functional activity tolerance. 2. Pt to demonstrate increased AROM of trunk to next least restrictive level  in order to allow for greater ease and independence with ADLs and functional mobility. 3. Pt to demonstrate increased MMT of RLE  by at least 1/2-1 grade  and abdominal/lumbar strength in order to improve safety and stability with ADLs and functional mobility. LTGs to be achieved in 6-8 weeks:  1. Pt will be I with HEP in order to continue to improve quality of life and independence and reduce risk for re-injury. 2. Pt to demonstrate return to work and [employer] activities without limitations or restrictions. 3. Pt to demonstrate improved function as noted by achieving or exceeding predicted score on FOTO outcomes assessment tool. Plan  Patient would benefit from: skilled physical therapy  Other planned modality interventions: Modalities prn for symptom management  Planned therapy interventions: manual therapy, neuromuscular re-education, therapeutic activities, therapeutic exercise, strengthening, stretching and home exercise program  Frequency: 2x week  Duration in weeks: 8  Plan of Care beginning date: 10/25/2023  Plan of Care expiration date: 12/20/2023  Treatment plan discussed with: PTA and patient    Subjective Evaluation    History of Present Illness  Mechanism of injury: Pt notes on June 27 she attempted to lift someone at work and injured her back. Could not walk for 2 days. Had an MRI 10/13 with results as follows:right foraminal disc protrusion at level L5-S1 exerting mass effect upon undersurface of the right L5 nerve root with moderate foraminal stenosis. Correlate for right L5 radiculopathy. Pt is attempting to make a pain mgmt appointment. Pt went for 6 weeks of PT at [facility] which was minimally beneficial. Pt had 2 mini injections of Tordol which helped for a little while. Had massage and E stim which did not help significantly.  Current sx include R LBP with radiculopathy and weakness RLE. Has diff with stair negotiation and feels she needs HR's. Pain in her back and leg is constant. Pt states she got a RX for gabapentin which she has not started yet. Pt is on 10# weight restriction and no bending , lifting or twisting. Pt has not been working due to restrictions. Diff sleeping and laying on R side. Recurrent probem    Quality of life: poor    Patient Goals  Patient goals for therapy: decreased pain, increased motion, increased strength, return to work and independence with ADLs/IADLs    Pain  Current pain ratin  At best pain ratin  At worst pain rating: 10  Quality: sharp  Relieving factors: ice, heat and medications  Aggravating factors: sitting, standing, stair climbing, walking and lifting (bending)  Progression: improved (improved from initial sx)    Social Support  Steps to enter house: yes (2)  Stairs in house: yes (to bathroom on second floor and bedroom on 3rd floor, mostly stays on first floor)   Lives in: multiple-level home  Lives with: spouse and adult children    Employment status: not working    Diagnostic Tests  MRI studies: abnormal (see imaging)  Treatments  Previous treatment: physical therapy, medication, massage and injection treatment  Current treatment: physical therapy      Objective     Concurrent Complaints  Positive for disturbed sleep. Postural Observations    Additional Postural Observation Details  Excessive lumbar lordosis    Tenderness     Right Hip   Tenderness in the PSIS.      Active Range of Motion     Lumbar   Flexion:  Restriction level: moderate  Left lateral flexion:  Restriction level: minimal  Right lateral flexion:  with pain  Left rotation:  Restriction level: minimal  Right rotation:  Restriction level: minimal    Strength/Myotome Testing     Left Hip   Planes of Motion   Flexion: 4+  Extension: 4+  Abduction: 4+    Right Hip   Planes of Motion   Flexion: 4  Extension: 4-  Abduction: 4+    Left Knee   Flexion: 5  Extension: 5    Right Knee   Flexion: 4  Extension: 4+    Left Ankle/Foot   Dorsiflexion: 5    Right Ankle/Foot   Dorsiflexion: 5    Tests     Lumbar     Left   Negative passive SLR. Right   Positive passive SLR. Left Pelvic Girdle/Sacrum   Negative: active SLR test.     Right Pelvic Girdle/Sacrum   Positive: active SLR test.     Left Hip   Negative EZEQUIEL. Right Hip   Positive EZEQUIEL.      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      Re-eval Date: 12/6/23    Date 10/25       Visit Count 1       FOTO Comp 10/25       Pain In See IE       Pain Out See IE           Manuals 10/25                                       Neuro Re-Ed        MTPs/LTPs        Pallof press                                                Ther Ex        Nustep        SKTC 5 x 10"       DKTC 5 x 10"       LTRs 10x 5"       bridges        AH        AH w/heel slide        AH w/BKFO        AH w/SLR        Prone hip ext        PPU        Stand 3 way SLR        PB stretch  L-C-R        Pirif stretch        Ham stretch R        Leg press        Ther Activity                        Gait Training                        Modalities

## 2023-10-23 NOTE — LETTER
October 23, 2023     Patient: Paige Pena  YOB: 1980  Date of Visit: 10/23/2023      To Whom it May Concern:    Bertha Beckett is under my professional care. Bertha was seen in my office on 10/23/2023. Bertha may return to work with limitations -  no bending/twisting. No lifting from the ground. No lifting/pushing/pulling >10 lbs . If you have any questions or concerns, please don't hesitate to call.          Sincerely,          Jackelyn Lin MD        CC: No Recipients

## 2023-10-23 NOTE — PATIENT INSTRUCTIONS
F/u here as needed  Referral to Pain mgmt  Icing/heat/OTC pain meds as needed. Restart PT  Home exercises.

## 2023-10-25 ENCOUNTER — EVALUATION (OUTPATIENT)
Dept: PHYSICAL THERAPY | Facility: CLINIC | Age: 43
End: 2023-10-25
Payer: OTHER MISCELLANEOUS

## 2023-10-25 DIAGNOSIS — M54.16 LUMBAR RADICULOPATHY: ICD-10-CM

## 2023-10-25 DIAGNOSIS — S39.92XA LOWER BACK INJURY, INITIAL ENCOUNTER: ICD-10-CM

## 2023-10-25 PROCEDURE — 97110 THERAPEUTIC EXERCISES: CPT

## 2023-10-25 PROCEDURE — 97162 PT EVAL MOD COMPLEX 30 MIN: CPT

## 2023-10-30 ENCOUNTER — OFFICE VISIT (OUTPATIENT)
Dept: PHYSICAL THERAPY | Facility: CLINIC | Age: 43
End: 2023-10-30
Payer: OTHER MISCELLANEOUS

## 2023-10-30 DIAGNOSIS — M54.16 LUMBAR RADICULOPATHY: Primary | ICD-10-CM

## 2023-10-30 PROCEDURE — 97110 THERAPEUTIC EXERCISES: CPT

## 2023-10-30 NOTE — PROGRESS NOTES
Daily Note     Today's date: 10/30/2023  Patient name: Yvette Wilson  : 1980  MRN: [id number]  Referring provider: Angelo Valdez MD  Dx:   Encounter Diagnosis     ICD-10-CM    1. Lumbar radiculopathy  M54.16                      Subjective:   Pt. reports Pain level @ R SI/LB region currently  = "8"/10. Objective: See treatment diary below      Assessment: Tolerated treatment fair. Patient would benefit from continued PT  Pan level increased with exer today, as per pt. Feedback. *Declined offer for CP applic. Plan: Continue per plan of care. Progress treatment as tolerated. 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      Re-eval Date: 23    Date 10/25 10.30.23      Visit Count 1 2      FOTO Comp 10/25       Pain In See IE R SI/LB  8/10      Pain Out See IE > pain/sx  w/exer          Manuals 10/25 10.30.23                                      Neuro Re-Ed        MTPs/LTPs        Pallof press                                                Ther Ex  10.30.23      Nustep  **L` 8 min w/MHP to LB        SKTC 5 x 10" 5 x 10"        DKTC 5 x 10" 5 x 10"        LTRs 10x 5" 10x 5"        bridges        AH  **10x10"        AH w/heel slide  **B 1x10      AH w/BKFO  **1x20/5"        AH w/SLR  **B 1x10/pause        Prone hip ext        PPU        Stand 3 way SLR        PB stretch  L-C-R        Pirif stretch        Ham stretch R  **B 3x/20"      Leg press        Ther Activity                        Gait Training                        Modalities  10.30.23        **MHP to LB while on NS  No adverse reaction.

## 2023-11-02 ENCOUNTER — OFFICE VISIT (OUTPATIENT)
Dept: PHYSICAL THERAPY | Facility: CLINIC | Age: 43
End: 2023-11-02
Payer: OTHER MISCELLANEOUS

## 2023-11-02 ENCOUNTER — TELEPHONE (OUTPATIENT)
Dept: ADMINISTRATIVE | Facility: OTHER | Age: 43
End: 2023-11-02

## 2023-11-02 ENCOUNTER — TELEPHONE (OUTPATIENT)
Age: 43
End: 2023-11-02

## 2023-11-02 DIAGNOSIS — M54.16 LUMBAR RADICULOPATHY: Primary | ICD-10-CM

## 2023-11-02 DIAGNOSIS — S39.92XA LOWER BACK INJURY, INITIAL ENCOUNTER: ICD-10-CM

## 2023-11-02 PROCEDURE — 97110 THERAPEUTIC EXERCISES: CPT

## 2023-11-02 NOTE — TELEPHONE ENCOUNTER
Caller: Evan    Doctor: James Poor    Reason for call: Questioned fax#.  provided [phone]    Call back#: Jimmie Campbell

## 2023-11-02 NOTE — PROGRESS NOTES
Daily Note     Today's date: 2023  Patient name: Bri Willingham  : 1980  MRN: [id number]  Referring provider: Chrissie Murillo MD  Dx:   Encounter Diagnosis     ICD-10-CM    1. Lumbar radiculopathy  M54.16       2. Lower back injury, initial encounter  S39. 92XA                      Subjective:  Pain level @ LB reported to = "6-7"/10 @ this present time. Objective: See treatment diary below      Assessment: Tolerated treatment Fair+/Fairly Well overall. Patient exhibited good technique with therapeutic exercises and would benefit from continued PT.  "Not as bad" as last time, is pt's remark at end of treatment session. Plan:  Continue per plan of care. Progress treatment as tolerated. 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      Re-eval Date: 23    Date 10/25 10.30.23 11     Visit Count 1 2 3     FOTO Comp 10/25       Pain In See IE R SI/LB  8/10 6-710     Pain Out See IE > pain/sx  w/exer Not as bad         Manuals 10/25 10.30.23 11.2.23                                     Neuro Re-Ed        MTPs/LTPs   *Upcoming     Pallof press                                                Ther Ex  10.30.23 11.2.23     Nustep  **L`1   8 min w/MHP to LB   L1` 10 min w/MHP to LB     SKTC 5 x 10" 5 x 10"   5 x 10"     DKTC 5 x 10" 5 x 10"   5 x 10"     LTRs 10x 5" 10x 5"   10x 5"     bridges        AH  **10x10"   10x10"     AH w/heel slide  **B 1x10 B 1x15     AH w/BKFO  **1x20/5"   1x20/5"     AH w/SLR  **B 1x10/pause   B 1x10/pause     Prone hip ext        PPU        Stand 3 way SLR   *NV     PB stretch  L-C-R   *NV     Pirif stretch        Ham stretch R  **B 3x/20" B 3x/20"     Leg press        Ther Activity                        Gait Training                        Modalities  10.30.23 11.2.23       **MHP to LB while on NS  No adverse reaction. MHP to LB while on NS  No adverse reaction.

## 2023-11-02 NOTE — TELEPHONE ENCOUNTER
11/02/23 3:16 PM    Patient contacted (spoke with patient) to bring Advance Directive, POLST, or Living Will document to next scheduled pcp visit. Thank you.   Rodney Olmos MA  [facility]

## 2023-11-06 ENCOUNTER — APPOINTMENT (OUTPATIENT)
Dept: PHYSICAL THERAPY | Facility: CLINIC | Age: 43
End: 2023-11-06
Payer: OTHER MISCELLANEOUS

## 2023-11-06 ENCOUNTER — OFFICE VISIT (OUTPATIENT)
Dept: INTERNAL MEDICINE CLINIC | Facility: CLINIC | Age: 43
End: 2023-11-06
Payer: COMMERCIAL

## 2023-11-06 VITALS
SYSTOLIC BLOOD PRESSURE: 132 MMHG | BODY MASS INDEX: 40.15 KG/M2 | TEMPERATURE: 99.3 F | OXYGEN SATURATION: 99 % | DIASTOLIC BLOOD PRESSURE: 88 MMHG | HEART RATE: 80 BPM | HEIGHT: 62 IN | WEIGHT: 218.2 LBS

## 2023-11-06 DIAGNOSIS — E55.9 VITAMIN D DEFICIENCY: ICD-10-CM

## 2023-11-06 DIAGNOSIS — K90.9 DIARRHEA DUE TO MALABSORPTION: ICD-10-CM

## 2023-11-06 DIAGNOSIS — E11.9 TYPE 2 DIABETES MELLITUS WITHOUT COMPLICATION, WITHOUT LONG-TERM CURRENT USE OF INSULIN (HCC): Primary | ICD-10-CM

## 2023-11-06 DIAGNOSIS — R19.7 DIARRHEA DUE TO MALABSORPTION: ICD-10-CM

## 2023-11-06 DIAGNOSIS — C50.912 MALIGNANT NEOPLASM OF LEFT FEMALE BREAST, UNSPECIFIED ESTROGEN RECEPTOR STATUS, UNSPECIFIED SITE OF BREAST (HCC): ICD-10-CM

## 2023-11-06 DIAGNOSIS — E03.9 HYPOTHYROIDISM, UNSPECIFIED TYPE: ICD-10-CM

## 2023-11-06 DIAGNOSIS — N39.41 URGE INCONTINENCE: ICD-10-CM

## 2023-11-06 PROBLEM — M51.27 LUMBAGO-SCIATICA DUE TO DISPLACEMENT OF LUMBAR INTERVERTEBRAL DISC: Status: ACTIVE | Noted: 2023-11-02

## 2023-11-06 PROCEDURE — 99214 OFFICE O/P EST MOD 30 MIN: CPT | Performed by: INTERNAL MEDICINE

## 2023-11-06 RX ORDER — LOPERAMIDE HYDROCHLORIDE 2 MG/1
2 TABLET ORAL 4 TIMES DAILY PRN
Qty: 30 TABLET | Refills: 2 | Status: SHIPPED | OUTPATIENT
Start: 2023-11-06

## 2023-11-06 RX ORDER — ERGOCALCIFEROL 1.25 MG/1
50000 CAPSULE ORAL WEEKLY
Qty: 12 CAPSULE | Refills: 1 | Status: SHIPPED | OUTPATIENT
Start: 2023-11-06

## 2023-11-06 RX ORDER — OXYBUTYNIN CHLORIDE 5 MG/1
5 TABLET, EXTENDED RELEASE ORAL DAILY
Qty: 30 TABLET | Refills: 1 | Status: SHIPPED | OUTPATIENT
Start: 2023-11-06 | End: 2024-01-05

## 2023-11-06 NOTE — ASSESSMENT & PLAN NOTE
Lab Results   Component Value Date    HGBA1C 5.4 06/21/2023   DM well controlled with diet and the patient has no concerns  HgbA1c, CMP

## 2023-11-06 NOTE — PROGRESS NOTES
Assessment/Plan:    Problem List Items Addressed This Visit     Diabetes mellitus ([address]) - Primary       Lab Results   Component Value Date    HGBA1C 5.4 06/21/2023   DM well controlled with diet and the patient has no concerns  HgbA1c, CMP         Relevant Orders    Comprehensive metabolic panel    Lipid Panel with Direct LDL reflex    Albumin / creatinine urine ratio    CBC and differential    Hemoglobin A1C    Hypothyroidism     Continue the current dose of levothyroxine  Check TSH         Relevant Orders    TSH, 3rd generation    Vitamin D deficiency     Check the vitamin D levels         Relevant Medications    ergocalciferol (VITAMIN D2) 50,000 units    Other Relevant Orders    Vitamin D 25 hydroxy   Other Visit Diagnoses     Urge incontinence        Trial of Oxybutynin XL 5 mg and titrate up PRN    Relevant Medications    oxybutynin (DITROPAN-XL) 5 mg 24 hr tablet    Diarrhea due to malabsorption        Anticholinergic effects of Oxybutynin may  Continue the Immodium. Relevant Medications    loperamide (IMODIUM A-D) 2 MG tablet    Malignant neoplasm of left female breast, unspecified estrogen receptor status, unspecified site of breast ([address])        Relevant Orders    Mammo diagnostic bilateral w cad           Diagnoses and all orders for this visit:    Type 2 diabetes mellitus without complication, without long-term current use of insulin ([facility])  -     Comprehensive metabolic panel; Future  -     Lipid Panel with Direct LDL reflex; Future  -     Albumin / creatinine urine ratio  -     CBC and differential; Future  -     Hemoglobin A1C; Future    Urge incontinence  Comments:  Trial of Oxybutynin XL 5 mg and titrate up PRN  Orders:  -     oxybutynin (DITROPAN-XL) 5 mg 24 hr tablet; Take 1 tablet (5 mg total) by mouth daily    Hypothyroidism, unspecified type  -     TSH, 3rd generation;  Future    Diarrhea due to malabsorption  Comments:  Anticholinergic effects of Oxybutynin may  Continue the Immodium. Orders:  -     loperamide (IMODIUM A-D) 2 MG tablet; Take 1 tablet (2 mg total) by mouth 4 (four) times a day as needed for diarrhea    Vitamin D deficiency  -     ergocalciferol (VITAMIN D2) 50,000 units; Take 1 capsule (50,000 Units total) by mouth once a week  -     Vitamin D 25 hydroxy; Future    Malignant neoplasm of left female breast, unspecified estrogen receptor status, unspecified site of breast ([address])  -     Mammo diagnostic bilateral w cad; Future        Diabetes mellitus ([address])    Lab Results   Component Value Date    HGBA1C 5.4 06/21/2023   DM well controlled with diet and the patient has no concerns  HgbA1c, CMP    Hypothyroidism  Continue the current dose of levothyroxine  Check TSH    Vitamin D deficiency  Check the vitamin D levels        Subjective:      Patient ID: Teo Dobbs is a 44 y.o. female. The patient was seen and examined and note to have incontinence. Reviewed the MRI of the Lumbar spine done by Dr Brissa Knutson and noted that the patient had one small disk herniation at the right L5 neuro foramina. The patient feels that this might be the source of her symptoms. The following portions of the patient's history were reviewed and updated as appropriate:   She has a past medical history of Allergy, Anxiety, Arthritis, BRCA negative, Breast cancer ([address]), Candidiasis, cutaneous, Cervical cancer ([address]), Depression, Diabetes mellitus ([address]), Disease of thyroid gland, Disorder of urinary tract, Fatty liver, Hemorrhoids, History of chemotherapy, History of radiation therapy, Influenza A, Obesity, PONV (postoperative nausea and vomiting), and Port-A-Cath in place. ,  does not have any pertinent problems on file. ,   has a past surgical history that includes Mastectomy (Left, 12/19/2013); Hysterectomy (04/07/2014); Bilateral salpingoophorectomy; GASTRECTOMY SLEEVE LAPAROSCOPIC; IR port removal (11/21/2019); and Breast biopsy. ,  family history includes Breast cancer in her family; Diabetes in her family, father, and mother; Hodgkin's lymphoma in her paternal grandmother; Hypertension in her family and maternal grandfather; Leukemia in her son; No Known Problems in her daughter, sister, sister, son, son, and son; Ovarian cancer (age of onset: 29) in her sister; Uterine cancer (age of onset: 64) in her maternal aunt. ,   reports that she quit smoking about 10 years ago. Her smoking use included cigarettes. She has never used smokeless tobacco. She reports that she does not drink alcohol and does not use drugs. ,  is allergic to paclitaxel. .  Current Outpatient Medications   Medication Sig Dispense Refill   • Abemaciclib (Verzenio) 150 MG TABS Take 150 mg by mouth 2 (two) times a day 60 tablet 0   • calcium-vitamin D (OSCAL 500 + D) 500 mg-200 units per tablet Take 1 tablet by mouth daily. • Denosumab (XGEVA SC) Inject under the skin     • diclofenac sodium (VOLTAREN) 1 % Apply 1 application topically daily as needed      • ergocalciferol (VITAMIN D2) 50,000 units Take 1 capsule (50,000 Units total) by mouth once a week 12 capsule 1   • famotidine (PEPCID) 40 MG tablet Take 1 tablet (40 mg total) by mouth daily at bedtime as needed for heartburn 30 tablet 0   • fulvestrant (FASLODEX) 250 mg/5 mL 500 mg by Intramuscular (multi inj) route once     • gabapentin (NEURONTIN) 100 mg capsule Take 1 T PO tonight, then 1T PO BID tomorrow, then 1T PO TID thereafter.  90 capsule 1   • ibuprofen (MOTRIN) 600 mg tablet Take 600 mg by mouth every 6 (six) hours as needed     • levothyroxine 88 mcg tablet Take 1 tablet (88 mcg total) by mouth daily 90 tablet 0   • lisinopril (ZESTRIL) 2.5 mg tablet Take 1 tablet (2.5 mg total) by mouth daily 90 tablet 0   • loperamide (IMODIUM A-D) 2 MG tablet Take 1 tablet (2 mg total) by mouth 4 (four) times a day as needed for diarrhea 30 tablet 2   • methocarbamol (ROBAXIN) 500 mg tablet Take 1 tablet (500 mg total) by mouth 4 (four) times a day for 21 days 84 tablet 0   • nystatin (MYCOSTATIN) powder Apply 1 Application topically 2 (two) times a day Abdominal fold 15 g 0   • omeprazole (PriLOSEC) 40 MG capsule Take 1 capsule (40 mg total) by mouth daily 90 capsule 0   • oxybutynin (DITROPAN-XL) 5 mg 24 hr tablet Take 1 tablet (5 mg total) by mouth daily 30 tablet 1   • simvastatin (ZOCOR) 20 mg tablet Take 1 tablet (20 mg total) by mouth daily at bedtime 100 tablet 0   • Sodium Fluoride 1.1 % PSTE Apply 1 Squirt to teeth daily at bedtime Brush daily at bedtime. Spit, do not rinse. Nothing to eat or drink afterwards. 100 mL 0   • vitamin E, tocopherol, 400 units capsule Take 400 Units by mouth 2 (two) times a day       No current facility-administered medications for this visit. Review of Systems   Constitutional:  Negative for chills, fatigue and fever. HENT: Negative. Respiratory:  Negative for cough, chest tightness and shortness of breath. Cardiovascular:  Negative for chest pain, palpitations and leg swelling. Gastrointestinal:  Negative for abdominal pain, constipation, diarrhea, nausea and vomiting. Genitourinary: Negative. Musculoskeletal:  Negative for arthralgias, back pain and myalgias. Skin: Negative. Neurological: Negative. Psychiatric/Behavioral: Negative. Objective:  Vitals:    11/06/23 1329   BP: 132/88   Pulse: 80   Temp: 99.3 °F (37.4 °C)   SpO2: 99%   Weight: 99 kg (218 lb 3.2 oz)   Height: 5' 2" (1.575 m)     Body mass index is 39.91 kg/m². Physical Exam  Vitals and nursing note reviewed. Constitutional:       Appearance: She is well-developed. HENT:      Head: Normocephalic and atraumatic. Eyes:      Pupils: Pupils are equal, round, and reactive to light. Cardiovascular:      Rate and Rhythm: Normal rate and regular rhythm. Heart sounds: Normal heart sounds. No murmur heard. Pulmonary:      Effort: Pulmonary effort is normal. No respiratory distress. Breath sounds: Normal breath sounds. No wheezing. Abdominal:      General: Bowel sounds are normal.      Palpations: Abdomen is soft. Tenderness: There is no abdominal tenderness. Musculoskeletal:         General: Normal range of motion. Cervical back: Normal range of motion and neck supple. Skin:     General: Skin is warm and dry. Neurological:      Mental Status: She is alert and oriented to person, place, and time.           PHQ-2/9 Depression Screening

## 2023-11-09 ENCOUNTER — LAB (OUTPATIENT)
Dept: LAB | Facility: CLINIC | Age: 43
End: 2023-11-09
Payer: COMMERCIAL

## 2023-11-09 DIAGNOSIS — M54.16 LUMBAR RADICULOPATHY: ICD-10-CM

## 2023-11-09 DIAGNOSIS — E11.9 TYPE 2 DIABETES MELLITUS WITHOUT COMPLICATION, WITHOUT LONG-TERM CURRENT USE OF INSULIN (HCC): ICD-10-CM

## 2023-11-09 DIAGNOSIS — E03.9 HYPOTHYROIDISM, UNSPECIFIED TYPE: ICD-10-CM

## 2023-11-09 DIAGNOSIS — M54.50 ACUTE BILATERAL LOW BACK PAIN, UNSPECIFIED WHETHER SCIATICA PRESENT: ICD-10-CM

## 2023-11-09 DIAGNOSIS — S39.92XD LOWER BACK INJURY, SUBSEQUENT ENCOUNTER: ICD-10-CM

## 2023-11-09 DIAGNOSIS — M51.36 BULGING LUMBAR DISC: ICD-10-CM

## 2023-11-09 DIAGNOSIS — E55.9 VITAMIN D DEFICIENCY: ICD-10-CM

## 2023-11-09 LAB
25(OH)D3 SERPL-MCNC: 13.7 NG/ML (ref 30–100)
ALBUMIN SERPL BCP-MCNC: 4.3 G/DL (ref 3.5–5)
ALP SERPL-CCNC: 33 U/L (ref 34–104)
ALT SERPL W P-5'-P-CCNC: 22 U/L (ref 7–52)
ANION GAP SERPL CALCULATED.3IONS-SCNC: 9 MMOL/L
AST SERPL W P-5'-P-CCNC: 23 U/L (ref 13–39)
BASOPHILS # BLD AUTO: 0.06 THOUSANDS/ÂΜL (ref 0–0.1)
BASOPHILS NFR BLD AUTO: 2 % (ref 0–1)
BILIRUB SERPL-MCNC: 0.52 MG/DL (ref 0.2–1)
BUN SERPL-MCNC: 13 MG/DL (ref 5–25)
CALCIUM SERPL-MCNC: 9.3 MG/DL (ref 8.4–10.2)
CHLORIDE SERPL-SCNC: 102 MMOL/L (ref 96–108)
CHOLEST SERPL-MCNC: 205 MG/DL
CO2 SERPL-SCNC: 27 MMOL/L (ref 21–32)
CREAT SERPL-MCNC: 0.91 MG/DL (ref 0.6–1.3)
CREAT UR-MCNC: 204.1 MG/DL
EOSINOPHIL # BLD AUTO: 0.11 THOUSAND/ÂΜL (ref 0–0.61)
EOSINOPHIL NFR BLD AUTO: 3 % (ref 0–6)
ERYTHROCYTE [DISTWIDTH] IN BLOOD BY AUTOMATED COUNT: 13 % (ref 11.6–15.1)
EST. AVERAGE GLUCOSE BLD GHB EST-MCNC: 114 MG/DL
GFR SERPL CREATININE-BSD FRML MDRD: 78 ML/MIN/1.73SQ M
GLUCOSE P FAST SERPL-MCNC: 86 MG/DL (ref 65–99)
HBA1C MFR BLD: 5.6 %
HCT VFR BLD AUTO: 35.3 % (ref 34.8–46.1)
HDLC SERPL-MCNC: 72 MG/DL
HGB BLD-MCNC: 12.1 G/DL (ref 11.5–15.4)
IMM GRANULOCYTES # BLD AUTO: 0.01 THOUSAND/UL (ref 0–0.2)
IMM GRANULOCYTES NFR BLD AUTO: 0 % (ref 0–2)
LDLC SERPL CALC-MCNC: 113 MG/DL (ref 0–100)
LYMPHOCYTES # BLD AUTO: 1.74 THOUSANDS/ÂΜL (ref 0.6–4.47)
LYMPHOCYTES NFR BLD AUTO: 45 % (ref 14–44)
MCH RBC QN AUTO: 35.7 PG (ref 26.8–34.3)
MCHC RBC AUTO-ENTMCNC: 34.3 G/DL (ref 31.4–37.4)
MCV RBC AUTO: 104 FL (ref 82–98)
MICROALBUMIN UR-MCNC: 13 MG/L
MICROALBUMIN/CREAT 24H UR: 6 MG/G CREATININE (ref 0–30)
MONOCYTES # BLD AUTO: 0.42 THOUSAND/ÂΜL (ref 0.17–1.22)
MONOCYTES NFR BLD AUTO: 11 % (ref 4–12)
NEUTROPHILS # BLD AUTO: 1.48 THOUSANDS/ÂΜL (ref 1.85–7.62)
NEUTS SEG NFR BLD AUTO: 39 % (ref 43–75)
NRBC BLD AUTO-RTO: 0 /100 WBCS
PLATELET # BLD AUTO: 216 THOUSANDS/UL (ref 149–390)
PMV BLD AUTO: 9.2 FL (ref 8.9–12.7)
POTASSIUM SERPL-SCNC: 4.1 MMOL/L (ref 3.5–5.3)
PROT SERPL-MCNC: 7.3 G/DL (ref 6.4–8.4)
RBC # BLD AUTO: 3.39 MILLION/UL (ref 3.81–5.12)
SODIUM SERPL-SCNC: 138 MMOL/L (ref 135–147)
TRIGL SERPL-MCNC: 99 MG/DL
TSH SERPL DL<=0.05 MIU/L-ACNC: 1.94 UIU/ML (ref 0.45–4.5)
WBC # BLD AUTO: 3.82 THOUSAND/UL (ref 4.31–10.16)

## 2023-11-09 PROCEDURE — 83036 HEMOGLOBIN GLYCOSYLATED A1C: CPT

## 2023-11-09 PROCEDURE — 85025 COMPLETE CBC W/AUTO DIFF WBC: CPT

## 2023-11-09 PROCEDURE — 84443 ASSAY THYROID STIM HORMONE: CPT

## 2023-11-09 PROCEDURE — 80061 LIPID PANEL: CPT

## 2023-11-09 PROCEDURE — 36415 COLL VENOUS BLD VENIPUNCTURE: CPT

## 2023-11-09 PROCEDURE — 82570 ASSAY OF URINE CREATININE: CPT | Performed by: INTERNAL MEDICINE

## 2023-11-09 PROCEDURE — 82306 VITAMIN D 25 HYDROXY: CPT

## 2023-11-09 PROCEDURE — 82043 UR ALBUMIN QUANTITATIVE: CPT | Performed by: INTERNAL MEDICINE

## 2023-11-09 PROCEDURE — 80053 COMPREHEN METABOLIC PANEL: CPT

## 2023-11-09 RX ORDER — GABAPENTIN 100 MG/1
CAPSULE ORAL
Qty: 90 CAPSULE | Refills: 1 | Status: SHIPPED | OUTPATIENT
Start: 2023-11-09

## 2023-11-09 RX ORDER — METHOCARBAMOL 500 MG/1
500 TABLET, FILM COATED ORAL 4 TIMES DAILY
Qty: 84 TABLET | Refills: 0 | Status: CANCELLED | OUTPATIENT
Start: 2023-11-09 | End: 2023-11-30

## 2023-11-09 NOTE — TELEPHONE ENCOUNTER
Reason for call:   [x] Refill   [] Prior Auth  [] Other:     Office:   [] PCP/Provider -   [x] Specialty/Provider - Ortho    Medication: Gabapentin    Dose/Frequency: 100 mg     Quantity: #90    Pharmacy: [facility]     Does the patient have enough for 3 days?    [] Yes   [x] No - Send as HP to POD

## 2023-11-09 NOTE — TELEPHONE ENCOUNTER
Reason for call:   [x] Refill   [] Prior Auth  [] Other:     Office:   [x] PCP/Provider -   [] Specialty/Provider -     Medication: Robaxin    Dose/Frequency: 500 mg     Quantity: #84    Pharmacy: [facility]    Does the patient have enough for 3 days?    [] Yes   [x] No - Send as HP to POD

## 2023-11-10 RX ORDER — METHOCARBAMOL 500 MG/1
500 TABLET, FILM COATED ORAL 4 TIMES DAILY
Qty: 84 TABLET | Refills: 0 | Status: SHIPPED | OUTPATIENT
Start: 2023-11-10 | End: 2023-12-01

## 2023-11-13 ENCOUNTER — OFFICE VISIT (OUTPATIENT)
Dept: PHYSICAL THERAPY | Facility: CLINIC | Age: 43
End: 2023-11-13
Payer: OTHER MISCELLANEOUS

## 2023-11-13 DIAGNOSIS — S39.92XA LOWER BACK INJURY, INITIAL ENCOUNTER: ICD-10-CM

## 2023-11-13 DIAGNOSIS — M54.16 LUMBAR RADICULOPATHY: Primary | ICD-10-CM

## 2023-11-13 PROCEDURE — 97110 THERAPEUTIC EXERCISES: CPT

## 2023-11-13 NOTE — PROGRESS NOTES
Daily Note     Today's date: 2023  Patient name: Arlyn Haque  : 1980  MRN: [id number]  Referring provider: Eloise Arechiga MD  Dx:   Encounter Diagnosis     ICD-10-CM    1. Lumbar radiculopathy  M54.16       2. Lower back injury, initial encounter  S39. 92XA                      Subjective: Pt notes her back pain is rated 6/10 this morning  Pt states she ell down the steps 23 and hit her back. Went to the chiropractor the next day and only had TENS and a massage. Was seen in the ER  and had xrays which were negative for fxs. Pt states she usually has increased pain the day after therapy. Objective: See treatment diary below      Assessment: Tolerated treatment fair. Patient demonstrated fatigue post treatment and would benefit from continued PT  Pt completed ther ex with fair technique. Added standing 3 way SLRs and was able to increase reps of ex w/o adverse effects. Plan: Continue per plan of care.       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      Re-eval Date: 23    Date 10/25 10.30.23 11.2.23 11/13    Visit Count 1 2 3 4    FOTO Comp 10/25       Pain In See IE R SI/LB  8/10 6-7/10  6/10    Pain Out See IE > pain/sx  w/exer Not as bad         Manuals 10/25 10.30.23 11.2.23 11/13                                    Neuro Re-Ed        MTPs/LTPs   *Upcoming     Pallof press                                                Ther Ex  10.30.23 11    Nustep  **L`1   8 min w/MHP to LB   L1` 10 min w/MHP to LB L3  10' w/MHP     SKTC 5 x 10" 5 x 10"   5 x 10" 5 x 10"    DKTC 5 x 10" 5 x 10"   5 x 10" 5 x 10"    LTRs 10x 5" 10x 5"   10x 5" 10 x 5"    bridges        AH  **10x10"   10x10" 15 x 10"    AH w/heel slide  **B 1x10 B 1x15  20x    AH w/BKFO  **1x20/5"   1x20/5" 1 x 20    AH w/SLR  **B 1x10/pause   B 1x10/pause B 2 x 10    Prone hip ext        PPU        Stand 3 way SLR   *NV 20x B    PB stretch  L-C-R        Pirif stretch        Ham stretch R  **B 3x/20" B 3x/20" B 3 x 20"    Leg press        Ther Activity                        Gait Training                        Modalities  10.30.23 11.2.23       **MHP to LB while on NS  No adverse reaction. MHP to LB while on NS  No adverse reaction. MHP to LB while on NS  No adverse reaction.

## 2023-11-16 ENCOUNTER — APPOINTMENT (OUTPATIENT)
Dept: PHYSICAL THERAPY | Facility: CLINIC | Age: 43
End: 2023-11-16
Payer: OTHER MISCELLANEOUS

## 2023-11-20 ENCOUNTER — OFFICE VISIT (OUTPATIENT)
Dept: PHYSICAL THERAPY | Facility: CLINIC | Age: 43
End: 2023-11-20
Payer: OTHER MISCELLANEOUS

## 2023-11-20 DIAGNOSIS — S39.92XA LOWER BACK INJURY, INITIAL ENCOUNTER: ICD-10-CM

## 2023-11-20 DIAGNOSIS — M54.16 LUMBAR RADICULOPATHY: Primary | ICD-10-CM

## 2023-11-20 PROCEDURE — 97110 THERAPEUTIC EXERCISES: CPT

## 2023-11-20 NOTE — PROGRESS NOTES
Daily Note     Today's date: 2023  Patient name: Fallon Elizabeth  : 1980  MRN: [id number]  Referring provider: Rasheed Sabillon MD  Dx:   Encounter Diagnosis     ICD-10-CM    1. Lumbar radiculopathy  M54.16       2. Lower back injury, initial encounter  S39. 92XA                      Subjective: Pt notes she is in a lot of pain this morning and does not want to go on the Nustep. States she is unable to go for walks due to weakness in her legs caused by her back pain      Objective: See treatment diary below      Assessment: Tolerated treatment fair. Patient demonstrated fatigue post treatment and would benefit from continued PT Completed ex with fair technique due to pain. Plan: Continue per plan of care.       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                                   Neuro Re-Ed        MTPs/LTPs   *Upcoming     Pallof press                                                Ther Ex  10.30.23 11.2.23 11/13 11/20   Nustep  **L`1   8 min w/MHP to LB   L1` 10 min w/MHP to LB L3  10' w/MHP  declined   SKTC 5 x 10" 5 x 10"   5 x 10" 5 x 10" 5 x 10"   DKTC 5 x 10" 5 x 10"   5 x 10" 5 x 10" 5 x 10"   LTRs 10x 5" 10x 5"   10x 5" 10 x 5" 10 x 5"   bridges        AH  **10x10"   10x10" 15 x 10" 15 x 5"   AH w/heel slide  **B 1x10 B 1x15  20x 30x   AH w/BKFO  **1x20/5"   1x20/5" 1 x 20 25x   AH w/SLR  **B 1x10/pause   B 1x10/pause B 2 x 10 B 2 x 10   Prone hip ext        PPU        Stand 3 way SLR   *NV 20x B 20x B   PB stretch  L-C-R        Pirif stretch        Ham stretch R  **B 3x/20" B 3x/20" B 3 x 20" B 3 x 30"   Leg press        Ther Activity                        Gait Training                        Modalities  10.30.23 11.2.23 **MHP to LB while on NS  No adverse reaction. MHP to LB while on NS  No adverse reaction. MHP to LB while on NS  No adverse reaction.   HP x 10 min to low back in chair to end tx

## 2023-11-21 ENCOUNTER — OFFICE VISIT (OUTPATIENT)
Age: 43
End: 2023-11-21
Payer: COMMERCIAL

## 2023-11-21 VITALS
SYSTOLIC BLOOD PRESSURE: 100 MMHG | OXYGEN SATURATION: 97 % | HEIGHT: 62 IN | RESPIRATION RATE: 18 BRPM | HEART RATE: 64 BPM | DIASTOLIC BLOOD PRESSURE: 64 MMHG | BODY MASS INDEX: 39.56 KG/M2 | WEIGHT: 215 LBS

## 2023-11-21 DIAGNOSIS — Z12.11 COLON CANCER SCREENING: ICD-10-CM

## 2023-11-21 DIAGNOSIS — K44.9 HIATAL HERNIA: ICD-10-CM

## 2023-11-21 DIAGNOSIS — K76.0 FATTY LIVER: ICD-10-CM

## 2023-11-21 DIAGNOSIS — K22.10 EROSIVE ESOPHAGITIS: Primary | ICD-10-CM

## 2023-11-21 PROCEDURE — 99214 OFFICE O/P EST MOD 30 MIN: CPT | Performed by: INTERNAL MEDICINE

## 2023-11-21 NOTE — PROGRESS NOTES
[facility] - Outpatient Follow-up Note  Nohemi Schultz 44 y.o. female MRN: [id number]  Encounter: [id number]          ASSESSMENT AND PLAN:      1. Erosive esophagitis  2. Hiatal hernia  She has reflux esophagitis likely due to the combination of her hiatal hernia and gastric sleeve surgery. I encouraged her to continue taking omeprazole daily. I said even when she feels better she should continue taking the omeprazole as it will prevent future flares of the reflux. She should have a repeat endoscopy in 3 years for [facility]'s screening because of her history of gastric sleeve surgery. 3. Fatty liver  She has a history of fatty liver disease and denies significant alcohol intake so this is probably metabolic liver disease. I encouraged her to continue to try to lose weight through a combination of diet and exercise. I gave her a goal of 5% weight loss over the next 6 months. I will schedule her for a follow-up visit in 6 months to reassess. 4. Colon cancer screening  She is due for screening colonoscopy when she turns age 40 so this can be performed at the same time as her next upper endoscopy.    ______________________________________________________________________    SUBJECTIVE: She presents for follow-up of her reflux esophagitis, hiatal hernia, and gastric sleeve surgery. Her PET scan showed an abnormality in the esophagus but her and me showed esophagitis and a hiatal hernia. She has been feeling well when she takes her omeprazole daily but when she is feeling better she often stops taking the omeprazole and her symptoms worsen. She denies nausea, vomiting, change in bowel habits, bleeding, or weight loss. She had a colonoscopy many years ago because of bleeding and it was unremarkable. She has a history of fatty liver but denies significant alcohol intake. REVIEW OF SYSTEMS IS OTHERWISE NEGATIVE.       Historical Information   Past Medical History:   Diagnosis Date    Allergy     Anxiety     Arthritis     BRCA negative     Breast cancer ([address])     left mastectomy 2013    Candidiasis, cutaneous     Cervical cancer (HCC)     Depression     Diabetes mellitus ([address])     Disease of thyroid gland     hypothyroid    Disorder of urinary tract     Fatty liver     Hemorrhoids     History of chemotherapy     History of radiation therapy     Influenza A     Obesity     PONV (postoperative nausea and vomiting)     Port-A-Cath in place      Past Surgical History:   Procedure Laterality Date    BILATERAL SALPINGOOPHORECTOMY      BREAST BIOPSY      needle core    GASTRECTOMY SLEEVE LAPAROSCOPIC      Onset: 1/26/15  Dr. Sarah Murray  04/07/2014    IR PORT REMOVAL  11/21/2019    MASTECTOMY Left 12/19/2013    Lymph nodes removed     Social History   Social History     Substance and Sexual Activity   Alcohol Use No     Social History     Substance and Sexual Activity   Drug Use Never     Social History     Tobacco Use   Smoking Status Former    Types: Cigarettes    Quit date: 4/1/2013    Years since quitting: 10.6   Smokeless Tobacco Never     Family History   Problem Relation Age of Onset    Diabetes Mother     Diabetes Father     Ovarian cancer Sister 29    No Known Problems Sister     No Known Problems Sister     No Known Problems Daughter     Hypertension Maternal Grandfather     Hodgkin's lymphoma Paternal Grandmother     Leukemia Son         chronic myeloid    No Known Problems Son     No Known Problems Son     No Known Problems Son     Uterine cancer Maternal Aunt 64    Breast cancer Family     Diabetes Family     Hypertension Family        Meds/Allergies       Current Outpatient Medications:     Abemaciclib (Verzenio) 150 MG TABS    calcium-vitamin D (OSCAL 500 + D) 500 mg-200 units per tablet    Denosumab (XGEVA SC)    ergocalciferol (VITAMIN D2) 50,000 units    fulvestrant (FASLODEX) 250 mg/5 mL    gabapentin (NEURONTIN) 100 mg capsule    ibuprofen (MOTRIN) 600 mg tablet    levothyroxine 88 mcg tablet    lisinopril (ZESTRIL) 2.5 mg tablet    loperamide (IMODIUM A-D) 2 MG tablet    methocarbamol (ROBAXIN) 500 mg tablet    nystatin (MYCOSTATIN) powder    omeprazole (PriLOSEC) 40 MG capsule    oxybutynin (DITROPAN-XL) 5 mg 24 hr tablet    simvastatin (ZOCOR) 20 mg tablet    Sodium Fluoride 1.1 % PSTE    diclofenac sodium (VOLTAREN) 1 %    famotidine (PEPCID) 40 MG tablet    vitamin E, tocopherol, 400 units capsule    Allergies   Allergen Reactions    Paclitaxel Anaphylaxis     Pt states she needs a steroid before she can take this medication. Objective     Blood pressure 100/64, pulse 64, resp. rate 18, height 5' 2" (1.575 m), weight 97.5 kg (215 lb), SpO2 97 %. Body mass index is 39.32 kg/m². PHYSICAL EXAM:      General Appearance:   Alert, cooperative, no distress   HEENT:   Normocephalic, atraumatic, anicteric. Neck:  Supple, symmetrical, trachea midline   Lungs:   Clear to auscultation bilaterally; no rales, rhonchi or wheezing; respirations unlabored    Heart[de-identified]   Regular rate and rhythm; no murmur, rub, or gallop. Abdomen:   Soft, non-tender, non-distended; normal bowel sounds; no masses, no organomegaly    Genitalia:   Deferred    Rectal:   Deferred    Extremities:  No cyanosis, clubbing or edema    Pulses:  2+ and symmetric    Skin:  No jaundice, rashes, or lesions    Lymph nodes:  No palpable cervical lymphadenopathy        Lab Results:   No visits with results within 1 Day(s) from this visit.    Latest known visit with results is:   Lab on 11/09/2023   Component Date Value    Sodium 11/09/2023 138     Potassium 11/09/2023 4.1     Chloride 11/09/2023 102     CO2 11/09/2023 27     ANION GAP 11/09/2023 9     BUN 11/09/2023 13     Creatinine 11/09/2023 0.91     Glucose, Fasting 11/09/2023 86     Calcium 11/09/2023 9.3     AST 11/09/2023 23     ALT 11/09/2023 22     Alkaline Phosphatase 11/09/2023 33 (L)     Total Protein 11/09/2023 7.3     Albumin 11/09/2023 4.3     Total Bilirubin 11/09/2023 0.52     eGFR 11/09/2023 78     Cholesterol 11/09/2023 205 (H)     Triglycerides 11/09/2023 99     HDL, Direct 11/09/2023 72     LDL Calculated 11/09/2023 113 (H)     WBC 11/09/2023 3.82 (L)     RBC 11/09/2023 3.39 (L)     Hemoglobin 11/09/2023 12.1     Hematocrit 11/09/2023 35.3     MCV 11/09/2023 104 (H)     MCH 11/09/2023 35.7 (H)     MCHC 11/09/2023 34.3     RDW 11/09/2023 13.0     MPV 11/09/2023 9.2     Platelets 17/86/4435 216     nRBC 11/09/2023 0     Neutrophils Relative 11/09/2023 39 (L)     Immat GRANS % 11/09/2023 0     Lymphocytes Relative 11/09/2023 45 (H)     Monocytes Relative 11/09/2023 11     Eosinophils Relative 11/09/2023 3     Basophils Relative 11/09/2023 2 (H)     Neutrophils Absolute 11/09/2023 1.48 (L)     Immature Grans Absolute 11/09/2023 0.01     Lymphocytes Absolute 11/09/2023 1.74     Monocytes Absolute 11/09/2023 0.42     Eosinophils Absolute 11/09/2023 0.11     Basophils Absolute 11/09/2023 0.06     Hemoglobin A1C 11/09/2023 5.6     EAG 11/09/2023 114     TSH 3RD GENERATON 11/09/2023 1. 942     Vit D, 25-Hydroxy 11/09/2023 13.7 (L)          Radiology Results:   XR spine thoracic 3 vw    Result Date: 11/7/2023  Narrative: History: Fall. 3 projections of the thoracic spine were obtained. There is no evidence of a thoracic spine fracture. There are no degenerative changes. Impression: IMPRESSION: Normal thoracic spine. [id number]    CT-OUTSIDE    Result Date: 10/23/2023  Narrative: This result has an attachment that is not available.

## 2023-11-22 ENCOUNTER — OFFICE VISIT (OUTPATIENT)
Dept: PHYSICAL THERAPY | Facility: CLINIC | Age: 43
End: 2023-11-22
Payer: OTHER MISCELLANEOUS

## 2023-11-22 DIAGNOSIS — S39.92XA LOWER BACK INJURY, INITIAL ENCOUNTER: ICD-10-CM

## 2023-11-22 DIAGNOSIS — M54.16 LUMBAR RADICULOPATHY: Primary | ICD-10-CM

## 2023-11-22 PROCEDURE — 97110 THERAPEUTIC EXERCISES: CPT

## 2023-11-22 NOTE — PROGRESS NOTES
Daily Note     Today's date: 2023  Patient name: Steffany Champion  : 1980  MRN: [id number]  Referring provider: Nubia Trujillo MD  Dx:   Encounter Diagnosis     ICD-10-CM    1. Lumbar radiculopathy  M54.16       2. Lower back injury, initial encounter  S39. 92XA                      Subjective: Pt states her back is killing her today. Saw the chiropractor yesterday who stretched her and "put pressure on her back". Objective: See treatment diary below      Assessment: Tolerated treatment fair. Patient demonstrated fatigue post treatment and would benefit from continued PTPt moving with guarded posture and expressions of pain. Submax movements during ex. Pt will benefit from cont'd PT intervention. Added MTPs and LTPs to TE program without issue. Plan: Continue per plan of care.       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      Re-eval Date: 23    Date 11/22 10.30.23 11.2.23 11/13 11/20   Visit Count 6 2 3 4 5   FOTO        Pain In  7-8/10 R SI/LB  8/10 6-7/10  6/10 8/10   Pain Out  > pain/sx  w/exer Not as bad         Manuals 11/22 10.30.23 11.2.23 11/13 11/20                                   Neuro Re-Ed        MTPs/LTPs Red 20x ea  *Upcoming     Pallof press                                                Ther Ex  10.30.23 11.2.23 11/13 11/20   Nustep L1  10' w/MHP **L`1   8 min w/MHP to LB   L1` 10 min w/MHP to LB L3  10' w/MHP  declined   SKTC 5 x 10" 5 x 10"   5 x 10" 5 x 10" 5 x 10"   DKTC 5 x 10" 5 x 10"   5 x 10" 5 x 10" 5 x 10"   LTRs 15x 5" 10x 5"   10x 5" 10 x 5" 10 x 5"   bridges        AH 15 x 5" **10x10"   10x10" 15 x 10" 15 x 5"   AH w/heel slide  30x **B 1x10 B 1x15  20x 30x   AH w/BKFO 25x **1x20/5"   1x20/5" 1 x 20 25x   AH w/SLR B 2 x 10 **B 1x10/pause   B 1x10/pause B 2 x 10 B 2 x 10   Prone hip ext        PPU        Stand 3 way SLR 20x B  *NV 20x B 20x B   PB stretch  L-C-R        Pirif stretch        Ham stretch R B 3 x 30" **B 3x/20" B 3x/20" B 3 x 20" B 3 x 30" Leg press        Ther Activity                        Gait Training                        Modalities  10.30.23 11.2.23      MHP to back on nustep x 10' **MHP to LB while on NS  No adverse reaction. MHP to LB while on NS  No adverse reaction. MHP to LB while on NS  No adverse reaction.   HP x 10 min to low back in chair to end tx

## 2023-11-27 ENCOUNTER — OFFICE VISIT (OUTPATIENT)
Dept: PHYSICAL THERAPY | Facility: CLINIC | Age: 43
End: 2023-11-27
Payer: OTHER MISCELLANEOUS

## 2023-11-27 DIAGNOSIS — M54.16 LUMBAR RADICULOPATHY: Primary | ICD-10-CM

## 2023-11-27 DIAGNOSIS — M54.50 ACUTE BILATERAL LOW BACK PAIN, UNSPECIFIED WHETHER SCIATICA PRESENT: ICD-10-CM

## 2023-11-27 PROCEDURE — 97140 MANUAL THERAPY 1/> REGIONS: CPT

## 2023-11-27 PROCEDURE — 97110 THERAPEUTIC EXERCISES: CPT

## 2023-11-27 NOTE — PROGRESS NOTES
Daily Note     Today's date: 2023  Patient name: Mariangel Sanchez  : 1980  MRN: [id number]  Referring provider: Deloris Sunshine MD  Dx:   Encounter Diagnosis     ICD-10-CM    1. Lumbar radiculopathy  M54.16                      Subjective: Pt. Reports pain level @ R SI/LB region = "6"/10 at this present time. States she can't wait to get her injection on 23.       Objective: See treatment diary below      Assessment: Tolerated treatment fair. Patient would benefit from continued PT. Less pain during/after MHP and CP apps. Plan: Continue per plan of care. Progress treatment as tolerated.                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      Re-eval Date: 23    Date 23   Visit Count 6 7 3 4 5   FOTO        Pain In  7-8/10 R SI/LB  6/10 6-7/10  6/10 8/10   Pain Out  Less pain during/after MHP and CP apps Not as bad         Manuals 23     **Pro-Tech roller to R SI/LB region  8 min  *Light applic                              Neuro Re-Ed  23      MTPs/LTPs Red 20x ea resume *Upcoming     Pallof press                                                Ther Ex  23   Nustep L1  10' w/MHP **L`1   8 min w/MHP to LB   L1` 10 min w/MHP to LB L3  10' w/MHP  declined   SKTC 5 x 10" 6 x 10"   5 x 10" 5 x 10" 5 x 10"   DKTC 5 x 10" 6 x 10"   5 x 10" 5 x 10" 5 x 10"   LTRs 15x 5" 10x 10"   10x 5" 10 x 5" 10 x 5"   bridges        AH 15 x 5" 20x10"   10x10" 15 x 10" 15 x 5"   AH w/heel slide  30x 2x20 B 1x15  20x 30x   AH w/BKFO 25x 1x30/5"   1x20/5" 1 x 20 25x   AH w/SLR B 2 x 10 B 1x10/pause   B 1x10/pause B 2 x 10 B 2 x 10   Prone hip ext        PPU        Stand 3 way SLR 20x B resume *NV 20x B 20x B   PB stretch  L-C-R        Pirif stretch        Ham stretch R B 3 x 30" *B 4x/20" B 3x/20" B 3 x 20" B 3 x 30"   Leg press        Ther Activity                        Gait Training Modalities  11.27.23 11.2.23      MHP to back on nustep x 10' MHP to LB while seated in chair to begin session  No adverse reaction. MHP to LB while on NS  No adverse reaction. MHP to LB while on NS  No adverse reaction.   HP x 10 min to low back in chair to end tx

## 2023-11-28 RX ORDER — METHOCARBAMOL 500 MG/1
TABLET, FILM COATED ORAL
Qty: 84 TABLET | Refills: 0 | Status: SHIPPED | OUTPATIENT
Start: 2023-11-28

## 2023-11-30 ENCOUNTER — OFFICE VISIT (OUTPATIENT)
Dept: PHYSICAL THERAPY | Facility: CLINIC | Age: 43
End: 2023-11-30
Payer: OTHER MISCELLANEOUS

## 2023-11-30 DIAGNOSIS — S39.92XA LOWER BACK INJURY, INITIAL ENCOUNTER: ICD-10-CM

## 2023-11-30 DIAGNOSIS — M54.16 LUMBAR RADICULOPATHY: Primary | ICD-10-CM

## 2023-11-30 PROCEDURE — 97110 THERAPEUTIC EXERCISES: CPT

## 2023-11-30 PROCEDURE — 97140 MANUAL THERAPY 1/> REGIONS: CPT

## 2023-11-30 NOTE — PROGRESS NOTES
Daily Note     Today's date: 2023  Patient name: Franc Rayo  : 1980  MRN: [id number]  Referring provider: Swati Simms MD  Dx:   Encounter Diagnosis     ICD-10-CM    1. Lumbar radiculopathy  M54.16       2. Lower back injury, initial encounter  S39. 92XA                      Subjective: Pt states her R leg feels good today and all of her pain is located in her R hip and buttock. Objective: See treatment diary below      Assessment: Tolerated treatment fair. Patient demonstrated fatigue post treatment, exhibited good technique with therapeutic exercises, and would benefit from continued PT Pt notes pain during pro-janell roller stating she has pain with pressure over the painful region. Plan: Continue per plan of care.       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      Re-eval Date: 23    Date 23     Visit Count 6 7 8     FOTO        Pain In  7-8/10 R SI/LB  /10   5-6/10  R hip and buttock     Pain Out  Less pain during/after MHP and CP apps Not as bad         Manuals 23       **Pro-Tech roller to R SI/LB region  8 min  *Light applic Pro-Tech roller to R SI/LB region  8 min  Light  to medium applic                             Neuro Re-Ed  23      MTPs/LTPs Red 20x ea resume      Pallof press                                                Ther Ex  23      Nustep L1  10' w/MHP **L`1   8 min w/MHP to LB   L1` 10 min w/MHP to LB     SKTC 5 x 10" 6 x 10"   6 x 10"     DKTC 5 x 10" 6 x 10"   6 x 10"     LTRs 15x 5" 10x 10"   15x 5"     bridges        AH 15 x 5" 20x10"   DC     AH w/heel slide  30x 2x20 B 2 x 20     AH w/BKFO 25x 1x30/5"   1x30/5"     AH w/SLR B 2 x 10 B 1x10/pause   B 1x10/pause     Prone hip ext        PPU        Stand 3 way SLR 20x B resume      PB stretch  L-C-R        Pirif stretch        Ham stretch R B 3 x 30" *B 4x/20" B 3x/20" 4    Leg press        Ther Activity                        Gait Training Modalities  11.27.23 11.2.23      MHP to back on nustep x 10' MHP to LB while seated in chair to begin session  No adverse reaction. MHP to LB while on NS  No adverse reaction.

## 2023-12-08 ENCOUNTER — APPOINTMENT (OUTPATIENT)
Dept: PHYSICAL THERAPY | Facility: CLINIC | Age: 43
End: 2023-12-08
Payer: OTHER MISCELLANEOUS

## 2023-12-11 ENCOUNTER — OFFICE VISIT (OUTPATIENT)
Dept: DENTISTRY | Facility: CLINIC | Age: 43
End: 2023-12-11

## 2023-12-11 VITALS — HEART RATE: 91 BPM | DIASTOLIC BLOOD PRESSURE: 75 MMHG | TEMPERATURE: 97.8 F | SYSTOLIC BLOOD PRESSURE: 107 MMHG

## 2023-12-11 DIAGNOSIS — K04.7 CHRONIC DENTAL INFECTION: Primary | ICD-10-CM

## 2023-12-11 DIAGNOSIS — K04.4 ACUTE APICAL PERIODONTITIS OF PULPAL ORIGIN: ICD-10-CM

## 2023-12-11 DIAGNOSIS — Z87.19 HISTORY OF PERIODONTAL DISEASE: ICD-10-CM

## 2023-12-11 PROCEDURE — D0220 INTRAORAL - PERIAPICAL FIRST RADIOGRAPHIC IMAGE: HCPCS | Performed by: DENTIST

## 2023-12-11 PROCEDURE — D0140 LIMITED ORAL EVALUATION - PROBLEM FOCUSED: HCPCS | Performed by: DENTIST

## 2023-12-11 RX ORDER — AMOXICILLIN 500 MG/1
500 CAPSULE ORAL EVERY 8 HOURS SCHEDULED
Qty: 21 CAPSULE | Refills: 0 | Status: SHIPPED | OUTPATIENT
Start: 2023-12-11 | End: 2023-12-18

## 2023-12-11 NOTE — DENTAL PROCEDURE DETAILS
Limited Oral Evaluation Tooth #19. Patient (Rohith Beckett) presents for dental emergency visit. Patient consent treatment. [facility], no changes. Patient reported history of breast cancer and currently taking Xgeva medication. ASA: III  Pain score: 5  Chief complain: "My bottom left back tooth is loose and causing dental pain". HPI: patient reported she was in severe pain of lower left back tooth (#19) for last few days but today pain level went down because she is currently Tylenol pain medication. Patient reported she is unable to chew on her tooth. Patient reported she has an appointment for extraction of tooth #15 with [facility] on 1/12/2024 they postponed her extraction because of her medical history. Radiograph: PA x-ray tooth #19. EOE: MOUSTAPHA. IOE/Assessment/Plan: tooth #19 with existing DO composite filling, degree III mobility and furcation involvement, deep pockets 5-6 mm, Widening of PDL with radiolucency involving the furcation area. primary and secondary occlusal trauma, palpation is (-) and percussion is (++). Prognosis is poor. Non restorable tooth. 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 Periodontally involved non restorable tooth #19. Informed and demonstrated to patient patient approved. Referral: STAT to [facility] for extraction of tooth #19. RX: Amoxicillin 500 mg, 21 caps, TID, for 7 days, no refills. Patient denies allergy. POI is given. Patient left satisfied and ambulatory. NV: SC/RP with Hyg. (Medical clearance will be required).

## 2023-12-12 ENCOUNTER — OFFICE VISIT (OUTPATIENT)
Dept: PHYSICAL THERAPY | Facility: CLINIC | Age: 43
End: 2023-12-12
Payer: OTHER MISCELLANEOUS

## 2023-12-12 DIAGNOSIS — S39.92XA LOWER BACK INJURY, INITIAL ENCOUNTER: Primary | ICD-10-CM

## 2023-12-12 DIAGNOSIS — M54.16 LUMBAR RADICULOPATHY: ICD-10-CM

## 2023-12-12 PROCEDURE — 97110 THERAPEUTIC EXERCISES: CPT

## 2023-12-12 PROCEDURE — 97140 MANUAL THERAPY 1/> REGIONS: CPT

## 2023-12-12 NOTE — PROGRESS NOTES
Daily Note     Today's date: 2023  Patient name: Paige Pena  : 1980  MRN: [id number]  Referring provider: Jackelyn Lin MD  Dx:   Encounter Diagnosis     ICD-10-CM    1. Lower back injury, initial encounter  S39. 92XA       2. Lumbar radiculopathy  M54.16                      Subjective:   Same c/o pain/sx @ R hip/glut/SI regions. Rates today's pain level to = "4"/10. Objective: See treatment diary below      Assessment: Tolerated treatment  Fair+/Fairly Well . Patient demonstrated fatigue post treatment and would benefit from continued PT.   Pt. Able to advance today with select exercises. Plan: Continue per plan of care. Progress treatment as tolerated.                  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    Re-eval Date: 23    Date 23    Visit Count 6 7 8 9    FOTO        Pain In  7-810 R SI/LB  610   5-6/10  R hip and buttock 4/10  R hip and buttock    Pain Out  Less pain during/after MHP and CP apps Not as bad "A little better"        Manuals 23      **Pro-Tech roller to R SI/LB region  8 min  *Light applic Pro-Tech roller to R SI/LB region  8 min  Light  to medium applic Pro-Tech roller to R SI/LB region  8 min  Light  to medium applic                            Neuro Re-Ed  23    MTPs/LTPs Red 20x ea resume      Pallof press                                                Ther Ex  23    Nustep L1  10' w/MHP **L`1   8 min w/MHP to LB   L1` 10 min w/MHP to LB L2` 10 min     SKTC 5 x 10" 6 x 10"   6 x 10" 7 x 10"    DKTC 5 x 10" 6 x 10"   6 x 10" 7 x 10"    LTRs 15x 5" 10x 10"   15x 5" 15x 5"    bridges        AH 15 x 5" 20x10"   DC     AH w/heel slide  30x 2x20 B 2 x 20 B 3x15    AH w/BKFO 25x 1x30/5"   1x30/5" 2x20/5    AH w/SLR B 2 x 10 B 1x10/pause   B 1x10/pause B 1x15/pause    Prone hip ext        PPU        Stand 3 way SLR 20x B resume      PB stretch  L-C-R        Pirif stretch        Ham stretch R B 3 x 30" *B 4x/20" B 3x/20" B 3x/30"  W/rope assist    Leg press        Ther Activity                        Gait Training                        Modalities  11.27.23 11.2.23 12.12.23     MHP to back on nustep x 10' MHP to LB while seated in chair to begin session  No adverse reaction. MHP to LB while on NS  No adverse reaction.   MHP to R SI/Glut/Hip sidelying on L   10 min

## 2023-12-14 ENCOUNTER — OFFICE VISIT (OUTPATIENT)
Dept: PHYSICAL THERAPY | Facility: CLINIC | Age: 43
End: 2023-12-14
Payer: OTHER MISCELLANEOUS

## 2023-12-14 DIAGNOSIS — M54.16 LUMBAR RADICULOPATHY: ICD-10-CM

## 2023-12-14 DIAGNOSIS — S39.92XA LOWER BACK INJURY, INITIAL ENCOUNTER: Primary | ICD-10-CM

## 2023-12-14 PROCEDURE — 97112 NEUROMUSCULAR REEDUCATION: CPT | Performed by: PHYSICAL MEDICINE & REHABILITATION

## 2023-12-14 PROCEDURE — 97110 THERAPEUTIC EXERCISES: CPT | Performed by: PHYSICAL MEDICINE & REHABILITATION

## 2023-12-14 NOTE — PROGRESS NOTES
Daily Note     Today's date: 2023  Patient name: Leny Adair  : 1980  MRN: [id number]  Referring provider: Rosie Lamb MD  Dx:   Encounter Diagnosis     ICD-10-CM    1. Lower back injury, initial encounter  S39. 92XA       2. Lumbar radiculopathy  M54.16                      Subjective: Pt notes her legs feels better since her B12 injections however notes the injections did not seem to help her back pain. No new sx/complaints. Notes c/c of pain R glut/hip. Notes exercises have still been difficult for her, especially on R side. Notes the "bike" has been not as bad since the injections. Objective: See treatment diary below      Assessment: Tolerated treatment well. Challenged with exercise with R LE with c/o discomfort R hip/glut. Requires verbal and tactile cues and feedback for correct activation of TA. Struggles with maintaining TA contraction with dynamic LE movement with reduced motor control and limited endurance of deep abdominal stabilizers. Min soreness noted R hip after session; noted it would "loosen up" after some time. No complaints after tx. Patient demonstrated fatigue post treatment and would benefit from continued PT      Plan: Continue per plan of care. Progress treatment as tolerated.        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    Re-eval Date: 23    Date 23   Visit Count 6 7 8 9 10   FOTO        Pain In  7-8/10 R SI/LB  6/10   5-6/10  R hip and buttock 4/10  R hip and buttock 4-5/10   Pain Out  Less pain during/after MHP and CP apps Not as bad "A little better" 5-6/10       Manuals 23     **Pro-Tech roller to R SI/LB region  8 min  *Light applic Pro-Tech roller to R SI/LB region  8 min  Light  to medium applic Pro-Tech roller to R SI/LB region  8 min  Light  to medium applic NP - resume                            Neuro Re-Ed  23    MTPs/LTPs Red 20x ea resume      Pallof press Ther Ex  11.27.23 12.12.23    Nustep L1  10' w/MHP **L`1   8 min w/MHP to LB   L1` 10 min w/MHP to LB L2` 10 min  L2` 10 min    SKTC 5 x 10" 6 x 10"   6 x 10" 7 x 10" 7 x 10"ea   DKTC 5 x 10" 6 x 10"   6 x 10" 7 x 10"    LTRs 15x 5" 10x 10"   15x 5" 15x 5" 15x 5"ea   bridges        AH 15 x 5" 20x10"   DC     AH w/heel slide  30x 2x20 B 2 x 20 B 3x15 B 3x15ea cues/feedback for TA activation   AH w/BKFO 25x 1x30/5"   1x30/5" 2x20/5 2x20/5ea cues/feedback for TA activation   AH w/SLR B 2 x 10 B 1x10/pause   B 1x10/pause B 1x15/pause B 1x15ea/pause  cues/feedback for TA activation   Prone hip ext        PPU        Stand 3 way SLR 20x B resume      PB stretch  L-C-R        Pirif stretch        Ham stretch R B 3 x 30" *B 4x/20" B 3x/20" B 3x/30"  W/rope assist B 3-4x/30"ea  W/rope assist   Leg press        Ther Activity                        Gait Training                        Modalities  11.27.23 11.2.23 12.12.23     MHP to back on nustep x 10' MHP to LB while seated in chair to begin session  No adverse reaction. MHP to LB while on NS  No adverse reaction.   MHP to R SI/Glut/Hip sidelying on L   10 min Np-pt deferred

## 2023-12-15 ENCOUNTER — APPOINTMENT (OUTPATIENT)
Dept: PHYSICAL THERAPY | Facility: CLINIC | Age: 43
End: 2023-12-15
Payer: OTHER MISCELLANEOUS

## 2023-12-19 ENCOUNTER — OFFICE VISIT (OUTPATIENT)
Dept: PHYSICAL THERAPY | Facility: CLINIC | Age: 43
End: 2023-12-19
Payer: OTHER MISCELLANEOUS

## 2023-12-19 DIAGNOSIS — M54.16 LUMBAR RADICULOPATHY: Primary | ICD-10-CM

## 2023-12-19 PROCEDURE — 97110 THERAPEUTIC EXERCISES: CPT

## 2023-12-19 PROCEDURE — 97140 MANUAL THERAPY 1/> REGIONS: CPT

## 2023-12-19 NOTE — PROGRESS NOTES
"Daily Note     Today's date: 2023  Patient name: Bertha Beckett  : 1980  MRN: [id number]  Referring provider: Donn Jeffries MD  Dx:   Encounter Diagnosis     ICD-10-CM    1. Lumbar radiculopathy  M54.16                      Subjective:   Pt. reports pain level currently = \"4-5\"/10.  States she just came from Chiropractor visit.      Objective: See treatment diary below      Assessment: Tolerated treatment Fair+/Fairly Well overall.  Patient would benefit from continued PT.      Plan: Continue per plan of care.  Progress treatment as tolerated.                 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    Re-eval Date: 23    Date 23   Visit Count 11 7 8 9 10   FOTO        Pain In 4-5/10 R SI/LB  6/10   5-6/10  R hip and buttock 4/10  R hip and buttock -5/10   Pain Out No worse Less pain during/after MHP and CP apps Not as bad \"A little better\" -6/10       Manuals 23    Pro-Tech roller to R SI/LB region  8 min  Light  to medium applic **Pro-Tech roller to R SI/LB region  8 min  *Light applic Pro-Tech roller to R SI/LB region  8 min  Light  to medium applic Pro-Tech roller to R SI/LB region  8 min  Light  to medium applic NP - resume                            Neuro Re-Ed 23    MTPs/LTPs    resume      Pallof press                                                Ther Ex 23    Nustep L3  10' w/MHP **L`1   8 min w/MHP to LB   L1` 10 min w/MHP to LB L2` 10 min  L2` 10 min    SKTC 7 x 10\" 6 x 10\"   6 x 10\" 7 x 10\" 7 x 10\"ea   DKTC 7 x 10\" 6 x 10\"   6 x 10\" 7 x 10\"    LTRs 20x 5\" 10x 10\"   15x 5\" 15x 5\" 15x 5\"ea   bridges        AH  20x10\"   DC     AH w/heel slide B 3x15ea  2x20 B 2 x 20 B 3x15 B 3x15ea cues/feedback for TA activation   AH w/BKFO 3x15/5\"ea  1x30/5\"   1x30/5\" 2x20/5 2x20/5ea cues/feedback for TA activation   AH w/SLR B 2 x 10 B 1x10/pause   B 1x10/pause B 1x15/pause " "B 1x15ea/pause  cues/feedback for TA activation   Prone hip ext        PPU        Stand 3 way SLR 20x B resume      PB stretch  L-C-R        Pirif stretch        Ham stretch R B 5 x 30\"  W/rope assist *B 4x/20\" B 3x/20\" B 3x/30\"  W/rope assist B 3-4x/30\"ea  W/rope assist   Leg press **30# 30x         Ther Activity                        Gait Training                        Modalities 12.19.23 11.27.23 11.2.23 12.12.23     MHP to back on nustep x 10' MHP to LB while seated in chair to begin session  No adverse reaction.  MHP to LB while on NS  No adverse reaction.  MHP to R SI/Glut/Hip sidelying on L   10 min Np-pt deferred                                  "

## 2023-12-22 ENCOUNTER — APPOINTMENT (OUTPATIENT)
Dept: PHYSICAL THERAPY | Facility: CLINIC | Age: 43
End: 2023-12-22
Payer: OTHER MISCELLANEOUS

## 2023-12-26 ENCOUNTER — OFFICE VISIT (OUTPATIENT)
Dept: PHYSICAL THERAPY | Facility: CLINIC | Age: 43
End: 2023-12-26
Payer: OTHER MISCELLANEOUS

## 2023-12-26 DIAGNOSIS — M54.16 LUMBAR RADICULOPATHY: ICD-10-CM

## 2023-12-26 DIAGNOSIS — S39.92XA LOWER BACK INJURY, INITIAL ENCOUNTER: Primary | ICD-10-CM

## 2023-12-26 PROCEDURE — 97110 THERAPEUTIC EXERCISES: CPT

## 2023-12-26 NOTE — PROGRESS NOTES
"Daily Note     Today's date: 2023  Patient name: Bertha Beckett  : 1980  MRN: [id number]  Referring provider: Donn Jeffries MD  Dx:   Encounter Diagnosis     ICD-10-CM    1. Lower back injury, initial encounter  S39.92XA       2. Lumbar radiculopathy  M54.16                      Subjective: Pt states she is doing okay      Objective: See treatment diary below      Assessment: Tolerated treatment well. Patient demonstrated fatigue post treatment, exhibited good technique with therapeutic exercises, and would benefit from continued PT  Pt having diff laying on R hip and has discomfort in R hip buttock area.      Plan: Continue per plan of care.      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    Re-eval Date: 23    Date 23   Visit Count 11 7 8 9 10   FOTO        Pain In 4-5/10 R SI/LB  3-4/10   5-6/10  R hip and buttock 4/10  R hip and buttock 4-5/10   Pain Out No worse  Not as bad \"A little better\" -6/10       Manuals 23    Pro-Tech roller to R SI/LB region  8 min  Light  to medium applic declined Pro-Tech roller to R SI/LB region  8 min  Light  to medium applic Pro-Tech roller to R SI/LB region  8 min  Light  to medium applic NP - resume                            Neuro Re-Ed 23    MTPs/LTPs    resume      Pallof press                                                Ther Ex 23    Nustep L3  10' w/MHP L`3   10 min    L1` 10 min w/MHP to LB L2` 10 min  L2` 10 min    SKTC 7 x 10\" 7 x 10\"   6 x 10\" 7 x 10\" 7 x 10\"ea   DKTC 7 x 10\" 7 x 10\"   6 x 10\" 7 x 10\" 7 x 10\"   LTRs 20x 5\" 20x 10\"   15x 5\" 15x 5\" 15x 5\"ea   bridges        AH  20x10\"   DC     AH w/heel slide B 3x15ea  3 x 15 B 2 x 20 B 3x15 B 3x15ea cues/feedback for TA activation   AH w/BKFO 3x15/5\"ea  3 x 15/5\"   1x30/5\" 2x20/5 2x20/5ea cues/feedback for TA activation   AH w/SLR B 2 x 10 B 2x10/pause   B 1x10/pause B 1x15/pause B " "1x15ea/pause  cues/feedback for TA activation   Prone hip ext        PPU        Stand 3 way SLR 20x B 20x B      PB stretch  L-C-R        Pirif stretch        Ham stretch R B 5 x 30\"  W/rope assist *B 4x/30\" B 3x/20\" B 3x/30\"  W/rope assist B 3-4x/30\"ea  W/rope assist   Leg press **30# 30x   40#  30x      Ther Activity                        Gait Training                        Modalities 12.19.23  11.2.23 12.12.23     MHP to back on nustep x 10'  MHP to LB while on NS  No adverse reaction.  MHP to R SI/Glut/Hip sidelying on L   10 min Np-pt deferred                                    "

## 2023-12-29 ENCOUNTER — APPOINTMENT (OUTPATIENT)
Dept: PHYSICAL THERAPY | Facility: CLINIC | Age: 43
End: 2023-12-29
Payer: OTHER MISCELLANEOUS

## 2024-01-02 ENCOUNTER — VBI (OUTPATIENT)
Dept: ADMINISTRATIVE | Facility: OTHER | Age: 44
End: 2024-01-02

## 2024-01-03 ENCOUNTER — APPOINTMENT (OUTPATIENT)
Dept: PHYSICAL THERAPY | Facility: CLINIC | Age: 44
End: 2024-01-03
Payer: OTHER MISCELLANEOUS

## 2024-01-03 DIAGNOSIS — M54.50 ACUTE BILATERAL LOW BACK PAIN, UNSPECIFIED WHETHER SCIATICA PRESENT: ICD-10-CM

## 2024-01-03 DIAGNOSIS — M51.36 BULGING LUMBAR DISC: ICD-10-CM

## 2024-01-03 DIAGNOSIS — S39.92XD LOWER BACK INJURY, SUBSEQUENT ENCOUNTER: ICD-10-CM

## 2024-01-03 DIAGNOSIS — M54.16 LUMBAR RADICULOPATHY: ICD-10-CM

## 2024-01-03 RX ORDER — GABAPENTIN 100 MG/1
CAPSULE ORAL
Qty: 90 CAPSULE | Refills: 0 | Status: SHIPPED | OUTPATIENT
Start: 2024-01-03

## 2024-01-05 ENCOUNTER — TELEPHONE (OUTPATIENT)
Dept: FAMILY MEDICINE CLINIC | Facility: CLINIC | Age: 44
End: 2024-01-05

## 2024-01-05 ENCOUNTER — APPOINTMENT (OUTPATIENT)
Dept: LAB | Facility: CLINIC | Age: 44
End: 2024-01-05
Payer: COMMERCIAL

## 2024-01-05 ENCOUNTER — OFFICE VISIT (OUTPATIENT)
Dept: PHYSICAL THERAPY | Facility: CLINIC | Age: 44
End: 2024-01-05
Payer: OTHER MISCELLANEOUS

## 2024-01-05 ENCOUNTER — OFFICE VISIT (OUTPATIENT)
Dept: OBGYN CLINIC | Facility: CLINIC | Age: 44
End: 2024-01-05
Payer: COMMERCIAL

## 2024-01-05 VITALS
BODY MASS INDEX: 39.56 KG/M2 | SYSTOLIC BLOOD PRESSURE: 123 MMHG | HEIGHT: 62 IN | WEIGHT: 215 LBS | HEART RATE: 77 BPM | DIASTOLIC BLOOD PRESSURE: 87 MMHG

## 2024-01-05 DIAGNOSIS — C50.912 MALIGNANT NEOPLASM OF LEFT BREAST IN FEMALE, ESTROGEN RECEPTOR POSITIVE, UNSPECIFIED SITE OF BREAST: ICD-10-CM

## 2024-01-05 DIAGNOSIS — M17.12 PRIMARY OSTEOARTHRITIS OF ONE KNEE, LEFT: ICD-10-CM

## 2024-01-05 DIAGNOSIS — M54.16 LUMBAR RADICULOPATHY: Primary | ICD-10-CM

## 2024-01-05 DIAGNOSIS — M17.11 PRIMARY OSTEOARTHRITIS OF ONE KNEE, RIGHT: Primary | ICD-10-CM

## 2024-01-05 DIAGNOSIS — Z17.0 MALIGNANT NEOPLASM OF LEFT BREAST IN FEMALE, ESTROGEN RECEPTOR POSITIVE, UNSPECIFIED SITE OF BREAST: ICD-10-CM

## 2024-01-05 DIAGNOSIS — C79.51 MALIGNANT NEOPLASM METASTATIC TO BONE (HCC): ICD-10-CM

## 2024-01-05 LAB
ALBUMIN SERPL BCP-MCNC: 4 G/DL (ref 3.5–5)
ALP SERPL-CCNC: 33 U/L (ref 34–104)
ALT SERPL W P-5'-P-CCNC: 16 U/L (ref 7–52)
ANION GAP SERPL CALCULATED.3IONS-SCNC: 9 MMOL/L
AST SERPL W P-5'-P-CCNC: 20 U/L (ref 13–39)
BASOPHILS # BLD AUTO: 0.07 THOUSANDS/ÂΜL (ref 0–0.1)
BASOPHILS NFR BLD AUTO: 1 % (ref 0–1)
BILIRUB SERPL-MCNC: 0.56 MG/DL (ref 0.2–1)
BUN SERPL-MCNC: 14 MG/DL (ref 5–25)
CALCIUM SERPL-MCNC: 9.2 MG/DL (ref 8.4–10.2)
CHLORIDE SERPL-SCNC: 103 MMOL/L (ref 96–108)
CO2 SERPL-SCNC: 27 MMOL/L (ref 21–32)
CREAT SERPL-MCNC: 0.89 MG/DL (ref 0.6–1.3)
EOSINOPHIL # BLD AUTO: 0.14 THOUSAND/ÂΜL (ref 0–0.61)
EOSINOPHIL NFR BLD AUTO: 3 % (ref 0–6)
ERYTHROCYTE [DISTWIDTH] IN BLOOD BY AUTOMATED COUNT: 12.9 % (ref 11.6–15.1)
GFR SERPL CREATININE-BSD FRML MDRD: 80 ML/MIN/1.73SQ M
GLUCOSE P FAST SERPL-MCNC: 89 MG/DL (ref 65–99)
HCT VFR BLD AUTO: 34.7 % (ref 34.8–46.1)
HGB BLD-MCNC: 11.8 G/DL (ref 11.5–15.4)
IMM GRANULOCYTES # BLD AUTO: 0.02 THOUSAND/UL (ref 0–0.2)
IMM GRANULOCYTES NFR BLD AUTO: 0 % (ref 0–2)
LDH SERPL-CCNC: 205 U/L (ref 140–271)
LYMPHOCYTES # BLD AUTO: 2.22 THOUSANDS/ÂΜL (ref 0.6–4.47)
LYMPHOCYTES NFR BLD AUTO: 46 % (ref 14–44)
MCH RBC QN AUTO: 35.3 PG (ref 26.8–34.3)
MCHC RBC AUTO-ENTMCNC: 34 G/DL (ref 31.4–37.4)
MCV RBC AUTO: 104 FL (ref 82–98)
MONOCYTES # BLD AUTO: 0.47 THOUSAND/ÂΜL (ref 0.17–1.22)
MONOCYTES NFR BLD AUTO: 10 % (ref 4–12)
NEUTROPHILS # BLD AUTO: 1.93 THOUSANDS/ÂΜL (ref 1.85–7.62)
NEUTS SEG NFR BLD AUTO: 40 % (ref 43–75)
NRBC BLD AUTO-RTO: 0 /100 WBCS
PLATELET # BLD AUTO: 247 THOUSANDS/UL (ref 149–390)
PMV BLD AUTO: 9 FL (ref 8.9–12.7)
POTASSIUM SERPL-SCNC: 4.1 MMOL/L (ref 3.5–5.3)
PROT SERPL-MCNC: 6.9 G/DL (ref 6.4–8.4)
RBC # BLD AUTO: 3.34 MILLION/UL (ref 3.81–5.12)
SODIUM SERPL-SCNC: 139 MMOL/L (ref 135–147)
WBC # BLD AUTO: 4.85 THOUSAND/UL (ref 4.31–10.16)

## 2024-01-05 PROCEDURE — 97110 THERAPEUTIC EXERCISES: CPT

## 2024-01-05 PROCEDURE — 36415 COLL VENOUS BLD VENIPUNCTURE: CPT

## 2024-01-05 PROCEDURE — 85025 COMPLETE CBC W/AUTO DIFF WBC: CPT

## 2024-01-05 PROCEDURE — 80053 COMPREHEN METABOLIC PANEL: CPT

## 2024-01-05 PROCEDURE — 20610 DRAIN/INJ JOINT/BURSA W/O US: CPT | Performed by: FAMILY MEDICINE

## 2024-01-05 PROCEDURE — 83615 LACTATE (LD) (LDH) ENZYME: CPT

## 2024-01-05 PROCEDURE — 99214 OFFICE O/P EST MOD 30 MIN: CPT | Performed by: FAMILY MEDICINE

## 2024-01-05 RX ORDER — TRIAMCINOLONE ACETONIDE 40 MG/ML
40 INJECTION, SUSPENSION INTRA-ARTICULAR; INTRAMUSCULAR
Status: COMPLETED | OUTPATIENT
Start: 2024-01-05 | End: 2024-01-05

## 2024-01-05 RX ORDER — METHOCARBAMOL 500 MG/1
TABLET, FILM COATED ORAL
Qty: 84 TABLET | Refills: 0 | Status: SHIPPED | OUTPATIENT
Start: 2024-01-05

## 2024-01-05 RX ORDER — ROPIVACAINE HYDROCHLORIDE 5 MG/ML
10 INJECTION, SOLUTION EPIDURAL; INFILTRATION; PERINEURAL
Status: COMPLETED | OUTPATIENT
Start: 2024-01-05 | End: 2024-01-05

## 2024-01-05 RX ADMIN — TRIAMCINOLONE ACETONIDE 40 MG: 40 INJECTION, SUSPENSION INTRA-ARTICULAR; INTRAMUSCULAR at 09:30

## 2024-01-05 RX ADMIN — ROPIVACAINE HYDROCHLORIDE 10 ML: 5 INJECTION, SOLUTION EPIDURAL; INFILTRATION; PERINEURAL at 09:30

## 2024-01-05 NOTE — PROGRESS NOTES
"[facility] [address] [address] [phone]      Assessment:  1. Primary osteoarthritis of one knee, right  -     Injection Procedure Prior Authorization; Future    2. Body mass index (BMI) 40.0-44.9, adult ([facility])    3. Primary osteoarthritis of one knee, left        Plan:  Patient Instructions   F/u 1 month for visco R knee  L knee steroid injection  Icing/heat/OTC pain meds as needed.     Return in about 4 weeks (around 2/2/2024) for Recheck.    Chief Complaint:  Chief Complaint   Patient presents with    Left Knee - Pain    Right Knee - Pain       Subjective:   HPI    Patient ID: Bertha Beckett is a 43 y.o. female     Here c/o B knee pain  Visco R knee helped but wearing off  Pain going up steps  Worse with cold/rainy weather  No swelling/locking/giving out  Tylenol PRN.  R knee pain 8/10    Review of Systems   Constitutional:  Negative for fatigue and fever.   Respiratory:  Negative for shortness of breath.    Cardiovascular:  Negative for chest pain.   Gastrointestinal:  Negative for abdominal pain and nausea.   Genitourinary:  Negative for dysuria.   Musculoskeletal:  Positive for arthralgias.   Skin:  Negative for rash and wound.   Neurological:  Negative for weakness and headaches.       Objective:  Vitals:  /87 (BP Location: Right arm, Patient Position: Sitting, Cuff Size: Large)   Pulse 77   Ht 5' 2\" (1.575 m)   Wt 97.5 kg (215 lb)   BMI 39.32 kg/m²     The following portions of the patient's history were reviewed and updated as appropriate: allergies, current medications, past family history, past medical history, past social history, past surgical history, and problem list.    Physical exam:  Physical Exam  Constitutional:       Appearance: Normal appearance. She is normal weight.   HENT:      Head: Normocephalic.   Eyes:      Extraocular Movements: Extraocular movements intact.   Pulmonary:      Effort: " "Pulmonary effort is normal.   Musculoskeletal:      Cervical back: Normal range of motion.      Left knee: No effusion.      Instability Tests: Medial Naveen test positive. Lateral Naveen test negative.   Skin:     General: Skin is warm and dry.   Neurological:      General: No focal deficit present.      Mental Status: She is alert and oriented to person, place, and time. Mental status is at baseline.   Psychiatric:         Mood and Affect: Mood normal.         Behavior: Behavior normal.         Thought Content: Thought content normal.         Judgment: Judgment normal.       Left Knee Exam     Tenderness   The patient is experiencing tenderness in the medial joint line.    Range of Motion   The patient has normal left knee ROM.    Tests   Naveen:  Medial - positive Lateral - negative  Varus: negative Valgus: negative    Other   Swelling: none  Effusion: no effusion present          Observations   Left Knee   Negative for effusion.       I have personally reviewed pertinent films in PACS and my interpretation is XR-  L knee- medial joint space narrowing. Reviewed XR again..    Large joint arthrocentesis: L knee  Universal Protocol:  Consent: Verbal consent obtained.  Risks and benefits: risks, benefits and alternatives were discussed  Consent given by: patient  Time out: Immediately prior to procedure a \"time out\" was called to verify the correct patient, procedure, equipment, support staff and site/side marked as required.  Timeout called at: 1/5/2024 9:36 AM.  Site marked: the operative site was marked  Supporting Documentation  Indications: pain   Procedure Details  Location: knee - L knee  Preparation: Patient was prepped and draped in the usual sterile fashion  Needle size: 25 G  Ultrasound guidance: no  Approach: anterolateral  Medications administered: 40 mg triamcinolone acetonide 40 mg/mL; 10 mL ropivacaine 0.5 %    Patient tolerance: patient tolerated the procedure well with no immediate " complications  Dressing:  Sterile dressing applied          Donn Jeffries MD

## 2024-01-05 NOTE — PROGRESS NOTES
"Daily Note     Today's date: 2024  Patient name: Bertha Beckett  : 1980  MRN: [id number]  Referring provider: Donn Jeffries MD  Dx:   Encounter Diagnosis     ICD-10-CM    1. Lumbar radiculopathy  M54.16                      Subjective:  Pt. reports pain level at this present time @  R SI/LB = \"4-5\"/10.      Objective: See treatment diary below      Assessment: Tolerated treatment fair.   Patient demonstrated fatigue post treatment and would benefit from continued PT.  Pain level/sx  \"worse\" by end of treatment session. *Select exer held today due to pt. having another medical appt.       Plan: Continue per plan of care.  Progress treatment as tolerated.                 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    Re-eval Date: 23    Date 23   Visit Count 11  13 9 10   FOTO        Pain In 4-5/10 R SI/LB  3-10   R SI/LB  4-5/10 410  R hip and buttock 4-510   Pain Out No worse  \"Worse\"   6-7 \"A little better\" -6/10       Manuals 23    Pro-Tech roller to R SI/LB region  8 min  Light  to medium applic declined *HELD Pro-Tech roller to R SI/LB region  8 min  Light  to medium applic NP - resume                            Neuro Re-Ed 23    MTPs/LTPs    resume *HELD     Pallof press                                                Ther Ex 23    Nustep L3  10' w/MHP L`3   10 min    L2` 10 min w/MHP to LB L2` 10 min  L2` 10 min    SKTC 7 x 10\" 7 x 10\"   5 x 10\" 7 x 10\" 7 x 10\"ea   DKTC 7 x 10\" 7 x 10\"   5 x 10\" 7 x 10\" 7 x 10\"   LTRs 20x 5\" 20x 10\"   *HELD 15x 5\" 15x 5\"ea   bridges        AH  20x10\"   DC     AH w/heel slide B 3x15ea  3 x 15 *HELD B 3x15 B 3x15ea cues/feedback for TA activation   AH w/BKFO 3x15/5\"ea  3 x 15/5\"   3x15/5\" 2x20/5 2x20/5ea cues/feedback for TA activation   AH w/SLR B 2 x 10 B 2x10/pause   B 1x10/pause B 1x15/pause B 1x15ea/pause  cues/feedback for TA activation " "  Prone hip ext        PPU        Stand 3 way SLR 20x B 20x B *HELD     PB stretch  L-C-R   **3 reps ea x 5\"     Pirif stretch        Ham stretch R B 5 x 30\"  W/rope assist *B 4x/30\" B 4x/30\"  W/rope assist B 3x/30\"  W/rope assist B 3-4x/30\"ea  W/rope assist   Leg press **30# 30x   40#  30x *HELD     Ther Activity                        Gait Training                        Modalities 12.19.23  1.5.24 12.12.23     MHP to back on nustep x 10'  MHP to LB while on NS  No adverse reaction.  MHP to R SI/Glut/Hip sidelying on L   10 min Np-pt deferred                                      "

## 2024-01-05 NOTE — TELEPHONE ENCOUNTER
Hi, this is Bertha Beckett. My YOB: 1980. I'm trying to see if you can send over my referral for Tooth #15 to [facility] in [place]. They are going to be doing my other 219 so I need my script. You know my referral that my tooth 15 has to come out. They referred me to get that tooth out. It needs to be sent to [facility]. I have an appointment there. My phone number's [phone] if you have any questions. Again, this is for Bertha Beckett. Date of birth [birth year]. If you could send that as soon as possible over because I have a consultation coming up. It's for tooth #15. If you have any questions, call me back and please let me know that you sent the referral to the [facility] in [place]. Thank you.      Can someone please fax patient oral surgery referral I printed to [facility] AT FAX # [phone] thanks!

## 2024-01-10 ENCOUNTER — OFFICE VISIT (OUTPATIENT)
Dept: PHYSICAL THERAPY | Facility: CLINIC | Age: 44
End: 2024-01-10
Payer: OTHER MISCELLANEOUS

## 2024-01-10 DIAGNOSIS — M54.16 LUMBAR RADICULOPATHY: Primary | ICD-10-CM

## 2024-01-10 PROCEDURE — 97110 THERAPEUTIC EXERCISES: CPT

## 2024-01-10 PROCEDURE — 97140 MANUAL THERAPY 1/> REGIONS: CPT

## 2024-01-10 NOTE — PROGRESS NOTES
"Daily Note     Today's date: 1/10/2024  Patient name: Bertha Beckett  : 1980  MRN: [id number]  Referring provider: Donn Jeffries MD  Dx:   Encounter Diagnosis     ICD-10-CM    1. Lumbar radiculopathy  M54.16                      Subjective:  Pt. reports current pain level = \"5\"/10 @  R LB/SI and R buttock regions.      Objective: See treatment diary below      Assessment: Tolerated treatment  Only Fair+ overall today.   Pt. Felt the exer were harder to perform today.  Patient demonstrated fatigue post treatment and would benefit from continued PT.      Plan: Continue per plan of care.  Progress treatment as tolerated.               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    Re-eval Date: 23    Date 23..24 1.10.24    Visit Count 11  13 14 10   FOTO        Pain In 4-5/10 R SI/LB  3-4/10   R SI/LB  4-5/10 5/10  R hip and buttock 4-5/10   Pain Out No worse  \"Worse\"   6-7 No better;no worse 5-610       Manuals 23  1.5.24 1.10.24     Pro-Tech roller to R SI/LB region  8 min  Light  to medium applic declined *HELD Pro-Tech roller to R SI/LB region  8 min  Light  to medium applic NP - resume                            Neuro Re-Ed 23  1.5.24 1.10.24    MTPs/LTPs    resume *HELD     Pallof press                                                Ther Ex 23 1.5.24 1.10.24    Nustep L3  10' w/MHP L`3   10 min    L2` 10 min w/MHP to LB L3` 10 min  L2` 10 min    SKTC 7 x 10\" 7 x 10\"   5 x 10\" **HEP 7 x 10\"ea   DKTC 7 x 10\" 7 x 10\"   5 x 10\" **HEP 7 x 10\"   LTRs 20x 5\" 20x 10\"   *HELD 15x 5\" 15x 5\"ea   bridges        AH  20x10\"   DC     AH w/heel slide B 3x15ea  3 x 15 *HELD B 3x15 B 3x15ea cues/feedback for TA activation   AH w/BKFO 3x15/5\"ea  3 x 15/5\"   3x15/5\" 2x20/5\" 2x20/5ea cues/feedback for TA activation   AH w/SLR B 2 x 10 B 2x10/pause   B 1x10/pause B 1x15/pause B 1x15ea/pause  cues/feedback for TA activation   Prone hip ext        PPU        Stand 3 way " "SLR 20x B 20x B *HELD     PB stretch  L-C-R   **3 reps ea x 5\" 3 reps ea x 5\"    Pirif stretch        Ham stretch R B 5 x 30\"  W/rope assist *B 4x/30\" B 4x/30\"  W/rope assist B 4x/30\"  W/rope assist B 3-4x/30\"ea  W/rope assist   Leg press **30# 30x   40#  30x *HELD 40#  30x    Ther Activity                        Gait Training                        Modalities 12.19.23  1.5.24 1.10.24     MHP to back on nustep x 10'  MHP to LB while on NS  No adverse reaction.  MHP to LB while on NS  No adverse reaction. Np-pt deferred                                        "

## 2024-01-12 ENCOUNTER — APPOINTMENT (OUTPATIENT)
Dept: PHYSICAL THERAPY | Facility: CLINIC | Age: 44
End: 2024-01-12
Payer: OTHER MISCELLANEOUS

## 2024-01-17 ENCOUNTER — OFFICE VISIT (OUTPATIENT)
Dept: PHYSICAL THERAPY | Facility: CLINIC | Age: 44
End: 2024-01-17
Payer: OTHER MISCELLANEOUS

## 2024-01-17 DIAGNOSIS — M54.16 LUMBAR RADICULOPATHY: Primary | ICD-10-CM

## 2024-01-17 PROCEDURE — 97032 APPL MODALITY 1+ESTIM EA 15: CPT

## 2024-01-17 PROCEDURE — 97110 THERAPEUTIC EXERCISES: CPT

## 2024-01-17 NOTE — PROGRESS NOTES
"Daily Note     Today's date: 2024  Patient name: Bertha Beckett  : 1980  MRN: [id number]  Referring provider: Donn Jeffries MD  Dx:   Encounter Diagnosis     ICD-10-CM    1. Lumbar radiculopathy  M54.16                      Subjective:  Pt. states she is deferring the Bike today, due to high level of pain @ R LB region.   Says she's not sure what she'll be able to do today.     Objective: See treatment diary below  *Trial of E-Stim/TENS applic today. Pt. Received her own Unit from [employer] rep. Will bring NV for 1* therapist to set up/educ for home use.    Assessment: Tolerated treatment  Fair/Fair+ with performance of exer .  Received MHP and trial of E-Stim applic Well.  Patient would benefit from continued PT.      Plan: Continue per plan of care.  Progress treatment as tolerated.  Plan for re-eval NV.           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    Re-eval Date: 23    Date 23.5.24 1.10.24 1.17.24   Visit Count 11  13 14 15   FOTO        Pain In 4-5/10 R SI/LB  3-4/10   R SI/LB  4-5/10 5/10  R hip and buttock \"A lot of Pain\"   Pain Out No worse  \"Worse\"   6-7 No better;no worse No better;no worse       Manuals 23  1.5.24 1.10.24 1.17.24    Pro-Tech roller to R SI/LB region  8 min  Light  to medium applic declined *HELD Pro-Tech roller to R SI/LB region  8 min  Light  to medium applic **Trial of E-Stim  15 min                           Neuro Re-Ed 23  1.5.24 1.10.24 1.17.24   MTPs/LTPs    resume *HELD     Pallof press                                                Ther Ex 23 1.5.24 1.10.24 1.17.24   Nustep L3  10' w/MHP L`3   10 min    L2` 10 min w/MHP to LB L3` 10 min  *deferred   SKTC 7 x 10\" 7 x 10\"   5 x 10\" **HEP 5 x 10\"ea   DKTC 7 x 10\" 7 x 10\"   5 x 10\" **HEP    LTRs 20x 5\" 20x 10\"   *HELD 15x 5\" 10x 5\"shannon   bridges        AH  20x10\"   DC     AH w/heel slide B 3x15ea  3 x 15 *HELD B 3x15 B 3x10   AH w/BKFO 3x15/5\"shannon  3 x 15/5\"   3x15/5\" 2x20/5\" " "2x20/5\"   AH w/SLR B 2 x 10 B 2x10/pause   B 1x10/pause B 1x15/pause held   Prone hip ext        PPU        Stand 3 way SLR 20x B 20x B *HELD     PB stretch  L-C-R   **3 reps ea x 5\" 3 reps ea x 5\" resume   Pirif stretch        Ham stretch R B 5 x 30\"  W/rope assist *B 4x/30\" B 4x/30\"  W/rope assist B 4x/30\"  W/rope assist B 3x/30\"ea  W/rope assist   Leg press **30# 30x   40#  30x *HELD 40#  30x resume   Ther Activity                        Gait Training                        Modalities 12.19.23  1.5.24 1.10.24 1.17.24    MHP to back on nustep x 10'  MHP to LB while on NS  No adverse reaction.  MHP to LB while on NS  No adverse reaction. MHP to LB seated   10 min  No adverse reaction.                                          "

## 2024-01-18 ENCOUNTER — VBI (OUTPATIENT)
Dept: ADMINISTRATIVE | Facility: OTHER | Age: 44
End: 2024-01-18

## 2024-01-19 ENCOUNTER — APPOINTMENT (OUTPATIENT)
Dept: PHYSICAL THERAPY | Facility: CLINIC | Age: 44
End: 2024-01-19
Payer: OTHER MISCELLANEOUS

## 2024-01-22 ENCOUNTER — TELEPHONE (OUTPATIENT)
Dept: DENTISTRY | Facility: CLINIC | Age: 44
End: 2024-01-22

## 2024-01-22 NOTE — TELEPHONE ENCOUNTER
Spoke to the patient regarding her visit to [facility]. They told her due to her risk of necrosis they recommend she has RCT not EXT.      Patient is scheduled at [facility] tomorrow for SRP. Pt will ask the dentist for an updated referral for Endo on #15 and 19. Patient understands I can not make the referral and it is beyond my scope of practice.     The patient will call if she has any follow up concerns or questions.

## 2024-01-23 ENCOUNTER — OFFICE VISIT (OUTPATIENT)
Dept: DENTISTRY | Facility: CLINIC | Age: 44
End: 2024-01-23

## 2024-01-23 VITALS — SYSTOLIC BLOOD PRESSURE: 124 MMHG | HEART RATE: 90 BPM | TEMPERATURE: 98.2 F | DIASTOLIC BLOOD PRESSURE: 80 MMHG

## 2024-01-23 DIAGNOSIS — Z01.20 ENCOUNTER FOR DENTAL EXAMINATION: Primary | ICD-10-CM

## 2024-01-23 PROCEDURE — D4341 PERIODONTAL SCALING AND ROOT PLANING - 4 OR MORE TEETH PER QUADRANT: HCPCS | Performed by: DENTAL HYGIENIST

## 2024-01-23 NOTE — PROGRESS NOTES
Medical Clearance  Received medical clearance from [facility] Dr. Sarabia and Dougie Dee, MSN,CRNP on 1/22/2024 (see scanned copy of medical clearance letter in media).

## 2024-01-23 NOTE — DENTAL PROCEDURE DETAILS
SCALING AND ROOT PLANING     ASA: III  Pain:  0  Reviewed M/H    SC/RP:   UR   Quad scaling and root planning  Applied Gingicaine Topical Anesthetic - pt did well     Type of Treatment:  Used Ultrasonic and Hand Scaling, Flossed, Polished, Subgingival Irrigation - post tx - Chlorhexidine.  Reviewed OHI  - Brush 2X/day and Floss 1X/day    Oral Hygiene:  Poor   Plaque:  Moderate   Calculus:  Moderate /  Heavy  Bleeding:  Moderate /  Heavy    Stain:   Light  /  Moderate     Treatment Plan:  no changes to tx plan        Exam:  none                 Referral:  Gave pt another OS referral and xrays to take to [facility] or [facility] - both [facility] and [facility] offices refused to treat her due to her cancer treatments.  Gave follow-up directions.    NV1:  SRP - LR - 60 min  NV2:  SRP - UL - 60 min  NV3:  SRP - LL - 60 min  NV4:  3 month perio maintenance - 60 min

## 2024-01-24 ENCOUNTER — OFFICE VISIT (OUTPATIENT)
Dept: PHYSICAL THERAPY | Facility: CLINIC | Age: 44
End: 2024-01-24
Payer: OTHER MISCELLANEOUS

## 2024-01-24 DIAGNOSIS — M54.16 LUMBAR RADICULOPATHY: Primary | ICD-10-CM

## 2024-01-24 DIAGNOSIS — M54.50 ACUTE BILATERAL LOW BACK PAIN, UNSPECIFIED WHETHER SCIATICA PRESENT: ICD-10-CM

## 2024-01-24 PROCEDURE — 97110 THERAPEUTIC EXERCISES: CPT

## 2024-01-24 PROCEDURE — 97032 APPL MODALITY 1+ESTIM EA 15: CPT

## 2024-01-24 PROCEDURE — 97535 SELF CARE MNGMENT TRAINING: CPT

## 2024-01-24 NOTE — PROGRESS NOTES
"Daily Note     Today's date: 2024  Patient name: Bertha Beckett  : 1980  MRN: [id number]  Referring provider: Donn Jeffries MD  Dx:   Encounter Diagnosis     ICD-10-CM    1. Lumbar radiculopathy  M54.16                      Subjective:  Pt. states she had f/u yesterday with Naftulin (PM MD). Was given instruction/direction for con't  PT, incl exer., meds,  and modalities. 3x/week.      Objective: See treatment diary below      Assessment:  Tolerated treatment  Fair + overall with exer .  R SI sore and tender to palpation. Good results with TENS/CP applic. Patient would benefit from continued PT.  Pain level decreased to \"2\"/10 after TENS/CP applic.       Plan: Continue per plan of care.  Progress treatment as tolerated.             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 1.10.24 1.24   Visit Count 16  13 14 15   FOTO        Pain In 410 R SI/LB  3-410   R SI/LB  4-5/10 5/10  R hip and buttock \"A lot of Pain\"   Pain Out 2/10  \"Worse\"   6-7 No better;no worse No better;no worse       Manuals 24  1.5.24 1.10.24 1.17.24    Hands-on light MF to R SI, prone position declined *HELD Pro-Tech roller to R SI/LB region  8 min  Light  to medium applic **Trial of E-Stim  15 min                           Neuro Re-Ed 24  1.5.24 1.10.24 1.17.24   MTPs/LTPs    resume *HELD     Pallof press                                 **Self care/pt Educ:    *Use/applic of TENS home applic    *Educ on benefits and physiological effects of modality               Ther Ex 24 1..24 1.10.24 1.17.24   Nustep L3  10' w/MHP L`3   10 min    L2` 10 min w/MHP to LB L3` 10 min  *deferred   SKTC 5 x 10\" 7 x 10\"   5 x 10\" **HEP 5 x 10\"ea   DKTC 5 x 10\" 7 x 10\"   5 x 10\" **HEP    LTRs 10x 5\" 20x 10\"   *HELD 15x 5\" 10x 5\"shannon   bridges        AH  20x10\"   DC     AH w/heel slide resume 3 x 15 *HELD B 3x15 B 3x10   AH w/BKFO 3x15/5\"shannon  3 x 15/5\"   3x15/5\" 2x20/5\" " "2x20/5\"   AH w/SLR resume B 2x10/pause   B 1x10/pause B 1x15/pause held   Prone hip ext *NV       PPU *NV         Stand 3 way SLR *HELD 20x B *HELD     PB stretch  L-C-R resume  **3 reps ea x 5\" 3 reps ea x 5\" resume   Pirif stretch        Ham stretch R B 5 x 30\"  Long-seated   *B 4x/30\" B 4x/30\"  W/rope assist B 4x/30\"  W/rope assist B 3x/30\"ea  W/rope assist   Leg press resume   40#  30x *HELD 40#  30x resume   Ther Activity                        Gait Training                        Modalities 1.24.24  1.5.24 1.10.24 1.17.24    MHP to back on nustep x 10'    No adverse reaction  MHP to LB while on NS  No adverse reaction.  MHP to LB while on NS  No adverse reaction. MHP to LB seated   10 min  No adverse reaction.    **TENS  15 min  W/CP applic                                        "

## 2024-01-25 RX ORDER — METHOCARBAMOL 500 MG/1
TABLET, FILM COATED ORAL
Qty: 84 TABLET | Refills: 0 | Status: SHIPPED | OUTPATIENT
Start: 2024-01-25

## 2024-01-26 ENCOUNTER — TELEPHONE (OUTPATIENT)
Age: 44
End: 2024-01-26

## 2024-01-26 ENCOUNTER — EVALUATION (OUTPATIENT)
Dept: PHYSICAL THERAPY | Facility: CLINIC | Age: 44
End: 2024-01-26
Payer: OTHER MISCELLANEOUS

## 2024-01-26 DIAGNOSIS — M54.16 LUMBAR RADICULOPATHY: Primary | ICD-10-CM

## 2024-01-26 DIAGNOSIS — S39.92XD LOWER BACK INJURY, SUBSEQUENT ENCOUNTER: ICD-10-CM

## 2024-01-26 DIAGNOSIS — M17.11 PRIMARY OSTEOARTHRITIS OF ONE KNEE, RIGHT: Primary | ICD-10-CM

## 2024-01-26 PROCEDURE — 97110 THERAPEUTIC EXERCISES: CPT

## 2024-01-26 NOTE — TELEPHONE ENCOUNTER
Caller: Patient     Doctor: Nesha    Reason for call: Patient is asking for a PT script for aqua therapy for her back and knees. Stated nothing is working and her PT therapist told her this would be beneficial.     Call back#: [phone]

## 2024-01-26 NOTE — PROGRESS NOTES
"PT Re-Evaluation     Today's date: 10/25/23  Patient name: Bertha Beckett  : 1980  MRN: [id number]  Referring provider: Donn Jeffries MD  Dx:   Encounter Diagnosis     ICD-10-CM    1. Lumbar radiculopathy  M54.16                        Assessment  Assessment details: Bertha Beckett is a 43 y.o. female presenting to outpatient physical therapy with noted impairments including pain, impaired soft tissue mobility, reduced range of motion, reduced strength, reduced postural awareness, and reduced activity tolerance. Signs and symptoms at present are consistent with referring diagnosis of R LBP with radiculopathy RLE.. Due to noted impairments, the patient's present functional limitations include difficulty with ADLs with increased need for assistance, reliance on medication and/or modalities for pain relief, reduced tolerance for functional mobility and activity, and difficulty completing self care, HH and work responsibilities. Patient to benefit from skilled outpatient physical therapy 2x/week for 8 weeks in order to reduce pain, maximize pain free range of motion, increase strength and stability, and improve functional mobility/functional activity in order to maximize return to prior level of function with reduced limitations. Home exercise program was provided and all questions answered to patient's level of satisfaction. Thank you for your referral.        Impairments: abnormal or restricted ROM, abnormal movement, activity intolerance, impaired physical strength, lacks appropriate home exercise program and pain with function  Understanding of Dx/Px/POC: good   Prognosis: fair    Goals  STGs to be achieved in 4 weeks:  1. Pt to demonstrate reduced subjective pain rating \"at worst\" by at least 2-3 points from Initial Eval in order to allow for reduced pain with ADLs and improved functional activity tolerance.   2. Pt to demonstrate increased AROM of trunk to next least restrictive level  in order to " allow for greater ease and independence with ADLs and functional mobility.   3. Pt to demonstrate increased MMT of RLE  by at least 1/2-1 grade  and abdominal/lumbar strength in order to improve safety and stability with ADLs and functional mobility.     LTGs to be achieved in 6-8 weeks:  1. Pt will be I with HEP in order to continue to improve quality of life and independence and reduce risk for re-injury.   2. Pt to demonstrate return to work and HH activities without limitations or restrictions.   3. Pt to demonstrate improved function as noted by achieving or exceeding predicted score on FOTO outcomes assessment tool.       Plan  Patient would benefit from: skilled physical therapy  Other planned modality interventions: Modalities prn for symptom management  Planned therapy interventions: manual therapy, neuromuscular re-education, therapeutic activities, therapeutic exercise, strengthening, stretching and home exercise program  Frequency: 2x week  Duration in weeks: 8  Plan of Care beginning date: 10/25/2023  Plan of Care expiration date: 12/20/2023  Treatment plan discussed with: PTA and patient      Subjective Evaluation    History of Present Illness  Mechanism of injury: Pt notes on June 27 she attempted to lift someone at work and injured her back. Could not walk for 2 days. Had an MRI 10/13 with results as follows:right foraminal disc protrusion at level L5-S1 exerting mass effect upon undersurface of the right L5 nerve root with moderate foraminal stenosis. Correlate for right L5 radiculopathy.  Pt is attempting to make a pain mgmt appointment. Pt went for 6 weeks of PT at [facility] which was minimally beneficial. Pt had 2 mini injections of Tordol which helped for a little while. Had massage and E stim which did not help significantly. Current sx include R LBP with radiculopathy and weakness RLE. Has diff with stair negotiation and feels she needs HR's. Pain in her back and leg is constant.Pt states she  "got a RX for gabapentin which she has not started yet. Pt is on 10# weight restriction and no bending , lifting or twisting. Pt has not been working due to restrictions. Diff sleeping and laying on R side.    24 UPDATE: Pt notes her pain has only improved 30-40% since SOC. Pt notes she saw her chiropractor yesterday and is more sore today but also notes \"the weather is killing me\"(rainy).          Recurrent probem    Quality of life: poor    Patient Goals  Patient goals for therapy: decreased pain, increased motion, increased strength, return to work and independence with ADLs/IADLs    Pain  Current pain ratin  At best pain ratin  At worst pain rating: 10  Quality: sharp  Relieving factors: ice, heat and medications (TENS unit)  Aggravating factors: sitting, standing, stair climbing, walking and lifting (bending)  Progression: improved (improved from initial sx)    Social Support  Steps to enter house: yes (2)  Stairs in house: yes (to bathroom on second floor and bedroom on 3rd floor, mostly stays on first floor)   Lives in: multiple-level home  Lives with: spouse and adult children    Employment status: not working    Diagnostic Tests  MRI studies: abnormal (see imaging)  Treatments  Previous treatment: physical therapy, medication, massage and injection treatment  Current treatment: physical therapy        Objective     Concurrent Complaints  Positive for disturbed sleep.     Postural Observations    Additional Postural Observation Details  Excessive lumbar lordosis    Tenderness     Right Hip   Tenderness in the PSIS.     Active Range of Motion     Lumbar   Flexion:  Restriction level: moderate  Left lateral flexion:  Restriction level: minimal  Right lateral flexion:  with pain  Left rotation:  Restriction level: minimal  Right rotation:  Restriction level: minimal    Strength/Myotome Testing     Left Hip   Planes of Motion   Flexion: 4+  Extension: 4+  Abduction: 4+    Right Hip   Planes of Motion " "  Flexion: 4  Extension: 4-  Abduction: 4+    Left Knee   Flexion: 5  Extension: 5    Right Knee   Flexion: 4  Extension: 4+    Left Ankle/Foot   Dorsiflexion: 5    Right Ankle/Foot   Dorsiflexion: 5    Tests     Lumbar     Left   Negative passive SLR.     Right   Positive passive SLR.     Left Pelvic Girdle/Sacrum   Negative: active SLR test.     Right Pelvic Girdle/Sacrum   Positive: active SLR test.     Left Hip   Negative EZEQUIEL.     Right Hip   Positive EZEQUIEL.     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      Re-eval Date: 12/6/23    Date 1.24.24 1/26 1.5.24 1.10.24 1.17.24   Visit Count 16 17 13 14 15   FOTO        Pain In 4/10  8/10   R SI/LB  4-5/10 5/10  R hip and buttock \"A lot of Pain\"   Pain Out 2/10  \"Worse\"   6-7 No better;no worse No better;no worse       Manuals 1.24.24 1/26 1.5.24 1.10.24 1.17.24    Hands-on light MF to R SI, prone position declined *HELD [employer] roller to R SI/LB region  8 min  Light  to medium applic **Trial of E-Stim  15 min                           Neuro Re-Ed 1.24.24  1.5.24 1.10.24 1.17.24   MTPs/LTPs    resume *HELD     Pallof press                                 **Self care/pt Educ:    *Use/applic of TENS home applic    *Educ on benefits and physiological effects of modality               Ther Ex 1.24.24 1/26 1.5.24 1.10.24 1.17.24   Nustep L3  10' w/MHP L`3   10 min    L2` 10 min w/MHP to LB L3` 10 min  *deferred   SKTC 5 x 10\" 5 x 10\"   5 x 10\" **HEP 5 x 10\"ea   DKTC 5 x 10\" 5x 10\"   5 x 10\" **HEP    LTRs 10x 5\" 20x 10\"   *HELD 15x 5\" 10x 5\"ea   bridges        AH     DC     AH w/heel slide resume 3 x 10 *HELD B 3x15 B 3x10   AH w/BKFO 3x15/5\"shannon  3 x 10   3x15/5\" 2x20/5\" 2x20/5\"   AH w/SLR resume B 2x10   B 1x10/pause B 1x15/pause held " "  Prone hip ext *NV       PPU *NV         Stand 3 way SLR *HELD 20x B *HELD     PB stretch  L-C-R resume  **3 reps ea x 5\" 3 reps ea x 5\" resume   Pirif stretch        Ham stretch R B 5 x 30\"  Long-seated   B 4x/30\"  longseated B 4x/30\"  W/rope assist B 4x/30\"  W/rope assist B 3x/30\"ea  W/rope assist   Leg press resume   50#  30x *HELD 40#  30x resume   Ther Activity                        Gait Training                        Modalities 1.24.24 1/26 1.5.24 1.10.24 1.17.24    MHP to back on nustep x 10'    No adverse reaction MHP to back on nustep x 10 MHP to LB while on NS  No adverse reaction.  MHP to LB while on NS  No adverse reaction. MHP to LB seated   10 min  No adverse reaction.    **TENS  15 min  W/CP applic                                        "

## 2024-01-29 ENCOUNTER — OFFICE VISIT (OUTPATIENT)
Dept: DENTISTRY | Facility: CLINIC | Age: 44
End: 2024-01-29

## 2024-01-29 VITALS — HEART RATE: 68 BPM | SYSTOLIC BLOOD PRESSURE: 124 MMHG | TEMPERATURE: 98.2 F | DIASTOLIC BLOOD PRESSURE: 86 MMHG

## 2024-01-29 DIAGNOSIS — K05.6 PERIODONTAL DISEASE: Primary | ICD-10-CM

## 2024-01-29 PROCEDURE — D4341 PERIODONTAL SCALING AND ROOT PLANING - 4 OR MORE TEETH PER QUADRANT: HCPCS | Performed by: DENTAL HYGIENIST

## 2024-01-29 NOTE — DENTAL PROCEDURE DETAILS
SCALING AND ROOT PLANING     ASA:   III  Pain:  0  Reviewed M/H;  Cancer - Med consult in [facility]    SC/RP:   LR    Quad scaling and root planning  Applied  Gingicaine Topical Anesthetic - Pt did well    Type of Treatment:  Used Ultrasonic and Hand Scaling, Flossed, Polished, Subgingival Irrigation - post tx - Chlorhexidine.  Reviewed OHI  - Brush 2X/day and Floss 1X/day    Oral Hygiene:  Poor   Plaque:  Moderate  / Heavy  Calculus:  Moderate /  Heavy  Bleeding:   Heavy    Stain:   Light      Treatment Plan:  no changes to tx plan       ---Pt states she has an appt at [place] 2/7/24 ? for an OS consult for 15, 19  DrNathan Exam:  none                 Referral:  No referral given  Gave follow-up directions.    NV1:  SRP - UL - 60 min  NV2:  SRP - LL - 60 min  NV3:  3 month perio maintenance - 60 min

## 2024-01-29 NOTE — LETTER
January 22, 2024     Nik Stewart DO  [address] [place] [id number]    Patient: Bertha Beckett   YOB: 1980   Date of Visit: 1/29/2024       Dear Dr. Stewart:    Patient mentioned above has a dental appointment on 1/23/2024. Medical clearance is needed for any precautions or prophylactic antibiotic prior dental procedures. Please.          Sincerely,        Wilfredo Wright, DMD

## 2024-01-31 ENCOUNTER — APPOINTMENT (OUTPATIENT)
Dept: PHYSICAL THERAPY | Facility: CLINIC | Age: 44
End: 2024-01-31
Payer: OTHER MISCELLANEOUS

## 2024-02-12 ENCOUNTER — APPOINTMENT (OUTPATIENT)
Dept: RADIOLOGY | Facility: CLINIC | Age: 44
End: 2024-02-12
Payer: COMMERCIAL

## 2024-02-12 ENCOUNTER — OFFICE VISIT (OUTPATIENT)
Dept: URGENT CARE | Facility: CLINIC | Age: 44
End: 2024-02-12
Payer: COMMERCIAL

## 2024-02-12 VITALS
TEMPERATURE: 98 F | HEART RATE: 91 BPM | HEIGHT: 62 IN | OXYGEN SATURATION: 97 % | DIASTOLIC BLOOD PRESSURE: 72 MMHG | SYSTOLIC BLOOD PRESSURE: 132 MMHG | BODY MASS INDEX: 41.41 KG/M2 | WEIGHT: 225 LBS

## 2024-02-12 DIAGNOSIS — S56.912A FOREARM STRAIN, LEFT, INITIAL ENCOUNTER: Primary | ICD-10-CM

## 2024-02-12 DIAGNOSIS — S56.912A FOREARM STRAIN, LEFT, INITIAL ENCOUNTER: ICD-10-CM

## 2024-02-12 PROCEDURE — 73080 X-RAY EXAM OF ELBOW: CPT

## 2024-02-12 PROCEDURE — 99213 OFFICE O/P EST LOW 20 MIN: CPT | Performed by: PHYSICIAN ASSISTANT

## 2024-02-12 NOTE — PROGRESS NOTES
[facility]        NAME: Bertha Beckett is a 44 y.o. female  : 1980    MRN: [id number]  DATE: 2024  TIME: 12:53 PM    Assessment and Plan   Forearm strain, left, initial encounter [S56.912A]  1. Forearm strain, left, initial encounter  XR elbow 3+ vw left        Left elbow XRAY: Pending Radiologist Interpretation.    Patient Instructions     Take Tylenol as directed on package to reduce pain.  Follow up with PCP in 3-5 days.  Proceed to  ER if symptoms worsen.    Chief Complaint     Chief Complaint   Patient presents with    Arm Pain     Left forearm pain that started yesterday.          History of Present Illness       Patient is a 44 year old female presenting to [facility] with pain to left forearm.  Pain began yesterday.  Patient noticed pain more so when lifting a crock pot.  Patient denies any known injury to extremity.  No reported swelling.  Patient takes Tylenol for pain, has not applied heat or ice to site.  Pt has a hx of breast cancer and had 19 lymph nodes removed to left axilla and left chest in 2016, hx of lymphedema of LUE at times.  Has not experienced a lymphedema flare recently.        Review of Systems   Review of Systems   Constitutional:  Negative for chills and fever.   HENT:  Negative for ear pain and sore throat.    Eyes:  Negative for pain and visual disturbance.   Respiratory:  Negative for cough and shortness of breath.    Cardiovascular:  Negative for chest pain and palpitations.   Gastrointestinal:  Negative for abdominal pain and vomiting.   Genitourinary:  Negative for dysuria and hematuria.   Musculoskeletal:  Positive for arthralgias (Left forearm pain). Negative for back pain.   Skin:  Negative for color change and rash.   Neurological:  Negative for seizures and syncope.   All other systems reviewed and are negative.        Current Medications       Current Outpatient Medications:     Abemaciclib (Verzenio) 150 MG TABS, Take 150 mg by mouth 2 (two)  times a day, Disp: 60 tablet, Rfl: 0    calcium-vitamin D (OSCAL 500 + D) 500 mg-200 units per tablet, Take 1 tablet by mouth daily., Disp: , Rfl:     Denosumab (XGEVA SC), Inject under the skin, Disp: , Rfl:     diclofenac sodium (VOLTAREN) 1 %, Apply 1 application topically daily as needed , Disp: , Rfl:     ergocalciferol (VITAMIN D2) 50,000 units, Take 1 capsule (50,000 Units total) by mouth once a week, Disp: 12 capsule, Rfl: 1    famotidine (PEPCID) 40 MG tablet, Take 1 tablet (40 mg total) by mouth daily at bedtime as needed for heartburn, Disp: 30 tablet, Rfl: 0    fulvestrant (FASLODEX) 250 mg/5 mL, 500 mg by Intramuscular (multi inj) route once, Disp: , Rfl:     gabapentin (NEURONTIN) 100 mg capsule, Take 1 capsule by mouth tonight then 1 capsule twice a day tomorrow, then 1 capsule 3 times a day thereafter, Disp: 90 capsule, Rfl: 0    ibuprofen (MOTRIN) 600 mg tablet, Take 600 mg by mouth every 6 (six) hours as needed, Disp: , Rfl:     levothyroxine 88 mcg tablet, Take 1 tablet (88 mcg total) by mouth daily, Disp: 90 tablet, Rfl: 0    lisinopril (ZESTRIL) 2.5 mg tablet, Take 1 tablet (2.5 mg total) by mouth daily, Disp: 90 tablet, Rfl: 0    loperamide (IMODIUM A-D) 2 MG tablet, Take 1 tablet (2 mg total) by mouth 4 (four) times a day as needed for diarrhea, Disp: 30 tablet, Rfl: 2    methocarbamol (ROBAXIN) 500 mg tablet, Take 1 tablet by mouth 4 times a day for 21 days, Disp: 84 tablet, Rfl: 0    nystatin (MYCOSTATIN) powder, Apply 1 Application topically 2 (two) times a day Abdominal fold, Disp: 15 g, Rfl: 0    omeprazole (PriLOSEC) 40 MG capsule, Take 1 capsule (40 mg total) by mouth daily, Disp: 90 capsule, Rfl: 0    oxybutynin (DITROPAN-XL) 5 mg 24 hr tablet, Take 1 tablet (5 mg total) by mouth daily, Disp: 30 tablet, Rfl: 1    simvastatin (ZOCOR) 20 mg tablet, Take 1 tablet (20 mg total) by mouth daily at bedtime, Disp: 100 tablet, Rfl: 0    Sodium Fluoride 1.1 % PSTE, Apply 1 Squirt to teeth daily  at bedtime Brush daily at bedtime. Spit, do not rinse. Nothing to eat or drink afterwards., Disp: 100 mL, Rfl: 0    vitamin E, tocopherol, 400 units capsule, Take 400 Units by mouth 2 (two) times a day, Disp: , Rfl:     Current Allergies     Allergies as of 02/12/2024 - Reviewed 02/12/2024   Allergen Reaction Noted    Paclitaxel Anaphylaxis 02/26/2016            The following portions of the patient's history were reviewed and updated as appropriate: allergies, current medications, past family history, past medical history, past social history, past surgical history and problem list.     Past Medical History:   Diagnosis Date    Allergy     Anxiety     Arthritis     BRCA negative     Breast cancer (HCC)     left mastectomy 2013    Candidiasis, cutaneous     Cervical cancer (HCC)     Depression     Diabetes mellitus (HCC)     Disease of thyroid gland     hypothyroid    Disorder of urinary tract     Fatty liver     Hemorrhoids     History of chemotherapy     History of radiation therapy     Influenza A     Obesity     PONV (postoperative nausea and vomiting)     Port-A-Cath in place        Past Surgical History:   Procedure Laterality Date    BILATERAL SALPINGOOPHORECTOMY      BREAST BIOPSY      needle core    GASTRECTOMY SLEEVE LAPAROSCOPIC      Onset: 1/26/15  Dr. Mal Almeida    HYSTERECTOMY  04/07/2014    IR PORT REMOVAL  11/21/2019    MASTECTOMY Left 12/19/2013    Lymph nodes removed       Family History   Problem Relation Age of Onset    Diabetes Mother     Diabetes Father     Ovarian cancer Sister 34    No Known Problems Sister     No Known Problems Sister     No Known Problems Daughter     Hypertension Maternal Grandfather     Hodgkin's lymphoma Paternal Grandmother     Leukemia Son         chronic myeloid    No Known Problems Son     No Known Problems Son     No Known Problems Son     Uterine cancer Maternal Aunt 61    Breast cancer Family     Diabetes Family     Hypertension Family          Medications  "have been verified.        Objective   /72   Pulse 91   Temp 98 °F (36.7 °C)   Ht 5' 2\" (1.575 m)   Wt 102 kg (225 lb)   SpO2 97%   BMI 41.15 kg/m²   No LMP recorded. Patient has had a hysterectomy.       Physical Exam     Physical Exam  Constitutional:       Appearance: Normal appearance.   HENT:      Head: Normocephalic and atraumatic.      Nose: Nose normal.      Mouth/Throat:      Mouth: Mucous membranes are moist.   Eyes:      Extraocular Movements: Extraocular movements intact.      Conjunctiva/sclera: Conjunctivae normal.      Pupils: Pupils are equal, round, and reactive to light.   Cardiovascular:      Rate and Rhythm: Normal rate and regular rhythm.      Heart sounds: Normal heart sounds.   Pulmonary:      Effort: Pulmonary effort is normal.      Breath sounds: Normal breath sounds.   Musculoskeletal:         General: Normal range of motion.      Right elbow: Normal.      Left elbow: No swelling. Normal range of motion. Tenderness present in lateral epicondyle.      Right forearm: Normal.      Left forearm: No swelling, edema, deformity or tenderness.        Arms:       Cervical back: Normal range of motion and neck supple.   Skin:     General: Skin is warm and dry.      Capillary Refill: Capillary refill takes less than 2 seconds.   Neurological:      General: No focal deficit present.      Mental Status: She is alert and oriented to person, place, and time.   Psychiatric:         Mood and Affect: Mood normal.         Behavior: Behavior normal.                   "

## 2024-02-15 ENCOUNTER — EVALUATION (OUTPATIENT)
Dept: PHYSICAL THERAPY | Age: 44
End: 2024-02-15
Payer: OTHER MISCELLANEOUS

## 2024-02-15 DIAGNOSIS — M54.16 LUMBAR RADICULOPATHY: Primary | ICD-10-CM

## 2024-02-15 PROCEDURE — 97164 PT RE-EVAL EST PLAN CARE: CPT | Performed by: PHYSICAL THERAPIST

## 2024-02-15 PROCEDURE — 97113 AQUATIC THERAPY/EXERCISES: CPT | Performed by: PHYSICAL THERAPIST

## 2024-02-15 NOTE — PROGRESS NOTES
PT Re-Evaluation     Today's date: 2/15/2024  Patient name: Bertha Beckett  : 1980  MRN: [id number]  Referring provider: Donn Jeffries MD  Dx:   Encounter Diagnosis     ICD-10-CM    1. Lumbar radiculopathy  M54.16           Start Time: 930  Stop Time: 1030  Total time in clinic (min): 60 minutes    Assessment  Assessment details: Bertha Beckett is a 44 y.o. female who presents with pain, decreased strength, and decreased ROM. Due to these impairments, Patient has difficulty performing a/iadls and recreational activities. Patient's clinical presentation is consistent with their referring diagnosis of LBP. Patient seen today transferred from [facility] PT at another facility after months of PT. She is also getting injections with mild but short term relief. She does feel better overall than initial injury in . Patient would benefit from skilled physical therapy to address their aforementioned impairments, improve their level of function and to improve their overall quality of life.  Impairments: abnormal or restricted ROM, activity intolerance, impaired physical strength, lacks appropriate home exercise program, pain with function, poor posture  and poor body mechanics  Understanding of Dx/Px/POC: good   Prognosis: good    Goals  ST-4 WEEKS  1.  Decrease LB pain < 7/10 on VAS at its worst.  2.  Increase ROM by > 5 deg in all deficients planes.  3.  Increase core strength by 1/2 MMT grade.  4. Increase tolerance to activity and pool therapy > 45 min.     LT-6 WEEKS  1. Patient to be independent with a/iadls.  2. Increase functional activities for leisure and home activities to previous LOF.  3. Independent with HEP and/or fitness program.    Plan  Patient would benefit from: skilled physical therapy  Planned modality interventions: cryotherapy, thermotherapy: hydrocollator packs and unattended electrical stimulation  Planned therapy interventions: activity modification, behavior modification, body  mechanics training, aquatic therapy, flexibility, functional ROM exercises, home exercise program, IADL retraining, joint mobilization, manual therapy, neuromuscular re-education, patient education, postural training, strengthening, stretching, therapeutic activities, therapeutic exercise and abdominal trunk stabilization  Frequency: 2-3x week.  Duration in weeks: 12  Plan of Care beginning date: 2/15/2024  Plan of Care expiration date: 5/15/2024  Treatment plan discussed with: patient        Subjective Evaluation    History of Present Illness  Mechanism of injury: Pt notes on  she attempted to lift someone at work and injured her back. Had an MRI 10/13 with results as follows:right foraminal disc protrusion at level L5-S1 exerting mass effect upon undersurface of the right L5 nerve root with moderate foraminal stenosis. Correlate for right L5 radiculopathy.  Patient had land PT for several months. She is now referred for aquatic therapy. She has been getting injections with mild relief for short term. 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  Pain  Current pain ratin  At best pain ratin  At worst pain rating: 10  Location: right LB and into buttocks  Quality: sharp and radiating  Relieving factors: ice, heat and medications  Aggravating factors: sitting, standing, stair climbing, walking and lifting (bending)  Progression: improved (improved from initial sx)    Social Support  Steps to enter house: yes  2  Stairs in house: yes (to bathroom on second floor and bedroom on 3rd floor, mostly stays on first floor)   Lives in: multiple-level home  Lives with: spouse and adult children    Employment status: not working    Diagnostic Tests  MRI studies: abnormal (see  imaging)  Treatments  Previous treatment: physical therapy, medication, massage and injection treatment  Current treatment: physical therapy        Objective     Concurrent Complaints  Positive for history of cancer.     Static Posture     Lumbar Spine   Increased lordosis.     Comments  BP RUE: 126/89 HR 76    Postural Observations  Seated posture: fair  Standing posture: fair    Additional Postural Observation Details  lumbar lordosis    Palpation   Left   Hypertonic in the erector spinae and lumbar paraspinals.     Right   Hypertonic in the erector spinae.     Tenderness     Right Hip   Tenderness in the PSIS.     Active Range of Motion     Lumbar   Flexion: Active lumbar flexion: tips to prox shins.  with pain Restriction level: moderate  Extension: 8 degrees  with pain Restriction level: minimal  Left lateral flexion:  Restriction level: minimal  Right lateral flexion:  with pain    Strength/Myotome Testing     Left Hip   Planes of Motion   Flexion: 4+  Extension: 4+  Abduction: 4+  Adduction: 4+    Right Hip   Planes of Motion   Flexion: 4  Extension: 4-  Abduction: 4  Adduction: 4+    Left Knee   Flexion: 5  Extension: 4+    Right Knee   Flexion: 4+  Extension: 4+    Left Ankle/Foot   Dorsiflexion: 5  Plantar flexion: 5    Right Ankle/Foot   Dorsiflexion: 5  Plantar flexion: 5    Tests     Lumbar     Left   Negative passive SLR and slump test.     Right   Positive passive SLR.   Negative slump test.     Left Pelvic Girdle/Sacrum   Negative: active SLR test.     Right Pelvic Girdle/Sacrum   Positive: active SLR test.     Left Hip   Negative EZEQUIEL and FADIR.     Right Hip   Positive EZEQUIEL.     Additional Tests Details  Ham length R 65*, L ham 85*    Ambulation   Weight-Bearing Status   Assistive device used: none    Ambulation: Level Surfaces   Ambulation without assistive device: independent    Ambulation: Stairs   Pattern: non-reciprocal  Pattern: non-reciprocal    Observational Gait   Gait: antalgic    Decreased walking speed.     Quality of Movement During Gait   Trunk    Trunk (Left): Positive left lateral lean over stance limb.   Trunk (Right): Positive lateral lean over stance limb.     General Comments:    Lower quarter screen   Foot/ankle: unremarkable    Knee Comments  OA B knees, had injections which relieved pain      Flowsheet Rows      Flowsheet Row Most Recent Value   PT/OT G-Codes    Current Score 37   Projected Score 47               POC expires Unit limit Auth Expiration date PT/OT + Visit Limit?   5/15/24 na na 19                           Visit/Unit Tracking  AUTH Status:  Date 2/15              2ndary auth after visit 19 Used 1               Remaining  18               FOTO                     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, OA both knees, OA knees, osteoporosis  W/C  Daily Treatment Diary     Date 2/15          Water Walk (FW, BW, side) x10’  5          LE work at wall               Hip FF/ext swing              Toe/heel              Hip ABD/ADD              March             Knee flexion & extension             Squats           UE noodle x3             Push/pull              Rotate              Row forward & back              OH side bend            UE/Core (AROM, paddles, MB)              ABD/ADD              Horizontal Pec Fly              Alt. arm swing (shld flex/ext)              Push pull              Single paddle rotation           SLS (EO, EC, ball toss)            Aqua Step (FW, lat, eccentric)            Core work:              MF press (red, blue, blk)             Kickboard press (blue, red)              Row/ext w/ tubing (red, blue, blk)           Seated on bench             ankle DF/PF              March              ABD/ADD              Knee flexion/extension            DW TX - hang in the deep water  10            DW ABD/ADD              DW Bicycle  5            DW Scissor hip flexion/extension            Stretches              HS/calf              Wall stretches              Other:            Hot Tub - 10 minutes only  5

## 2024-02-16 ENCOUNTER — OFFICE VISIT (OUTPATIENT)
Dept: DENTISTRY | Facility: CLINIC | Age: 44
End: 2024-02-16

## 2024-02-16 VITALS — SYSTOLIC BLOOD PRESSURE: 129 MMHG | HEART RATE: 86 BPM | DIASTOLIC BLOOD PRESSURE: 92 MMHG | TEMPERATURE: 98.7 F

## 2024-02-16 DIAGNOSIS — Z01.20 ENCOUNTER FOR DENTAL EXAMINATION: Primary | ICD-10-CM

## 2024-02-16 PROBLEM — E53.8 B12 DEFICIENCY: Status: ACTIVE | Noted: 2023-12-01

## 2024-02-16 PROCEDURE — D4341 PERIODONTAL SCALING AND ROOT PLANING - 4 OR MORE TEETH PER QUADRANT: HCPCS | Performed by: DENTAL HYGIENIST

## 2024-02-16 RX ORDER — ONDANSETRON 4 MG/1
TABLET, ORALLY DISINTEGRATING ORAL
COMMUNITY

## 2024-02-16 RX ORDER — PENTOXIFYLLINE 400 MG/1
400 TABLET, EXTENDED RELEASE ORAL
COMMUNITY
Start: 2023-10-10

## 2024-02-16 RX ORDER — AMOXICILLIN AND CLAVULANATE POTASSIUM 875; 125 MG/1; MG/1
TABLET, FILM COATED ORAL
COMMUNITY

## 2024-02-16 RX ORDER — CYANOCOBALAMIN, ISOPROPYL ALCOHOL 1000MCG/ML
1000 KIT INJECTION
COMMUNITY

## 2024-02-16 RX ORDER — TRAMADOL HYDROCHLORIDE 50 MG/1
TABLET ORAL
COMMUNITY
Start: 2023-12-01

## 2024-02-16 RX ORDER — ALPRAZOLAM 0.25 MG/1
TABLET ORAL
COMMUNITY
Start: 2024-02-15

## 2024-02-16 NOTE — DENTAL PROCEDURE DETAILS
SCALING AND ROOT PLANING     ASA:  II  Pain:0  Reviewed M/H    SC/RP:     UL   Quad scaling and root planning  Applied Gingicaine Topical Anesthetic - pt did well      Type of Treatment:  Used Ultrasonic and Hand Scaling, Flossed, Polished, Subgingival Irrigation - post tx - Chlorhexidine.  Reviewed OHI  - Brush 2X/day and Floss 1X/day    Oral Hygiene:  Poor   Plaque:  Moderate  / Heavy  Calculus:  Moderate /  Heavy  Bleeding:    Heavy    Stain:  Light      Treatment Plan:  no changes to tx plan        Exam:  none               Referral:  No referral given  Gave follow-up directions.    NV1:  SRP - LL - 60 min  NV2:  3 month perio maintenance - 60 min

## 2024-02-21 ENCOUNTER — OFFICE VISIT (OUTPATIENT)
Dept: PHYSICAL THERAPY | Age: 44
End: 2024-02-21
Payer: OTHER MISCELLANEOUS

## 2024-02-21 DIAGNOSIS — M54.16 LUMBAR RADICULOPATHY: Primary | ICD-10-CM

## 2024-02-21 PROCEDURE — 97113 AQUATIC THERAPY/EXERCISES: CPT

## 2024-02-21 NOTE — PROGRESS NOTES
Daily Note     Today's date: 2024  Patient name: Bertha Beckett  : 1980  MRN: [id number]  Referring provider: Donn Jeffries MD  Dx:   Encounter Diagnosis     ICD-10-CM    1. Lumbar radiculopathy  M54.16                      Subjective: pt notes no new complaints.       Objective: See treatment diary below      Assessment: Tolerated treatment fair.  Added seated and standing ex' s for LE's today. Also added noodle ex's for LB and core. No c/o w/ ex's, will monitor pt next visit. Patient would benefit from continued PT      Plan: Continue per plan of care.        POC expires Unit limit Auth Expiration date PT/OT + Visit Limit?   5/15/24 na na 19                           Visit/Unit Tracking  AUTH Status:  Date 2/15 2/21             2ndary auth after visit 19 Used 1 2              Remaining  18 17              FOTO                     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, OA both knees, OA knees, osteoporosis  W/C  Daily Treatment Diary     Date 2/15 2/21         Water Walk (FW, BW, side) x10’  5 10         LE work at wall               Hip FF/ext swing   2           Toe/heel   1           Hip ABD/ADD              Knee flexion & extension  1           Squats  1         UE noodle x3             Push/pull   1           Rotate   1           Row forward & back   2           OH side bend            UE/Core (AROM, paddles, MB)              ABD/ADD              Horizontal Pec Fly              Alt. arm swing (shld flex/ext)              Push pull              Single paddle rotation           SLS (EO, EC, ball toss)            Aqua Step (FW, lat, eccentric)            Core work:              MF press (red, blue, blk)             Kickboard press (blue, red)              Row/ext w/ tubing (red, blue, blk)           Seated on bench             ankle DF/PF              ABD/ADD   1           Knee flexion/extension   1         DW TX - hang in the deep water  10 10            DW ABD/ADD              DW Bicycle  5 5           DW Scissor hip flexion/extension            Stretches              HS/calf   2           Wall stretches             Other:            Hot Tub - 10 minutes only  5 10

## 2024-02-23 ENCOUNTER — OFFICE VISIT (OUTPATIENT)
Dept: DENTISTRY | Facility: CLINIC | Age: 44
End: 2024-02-23

## 2024-02-23 ENCOUNTER — OFFICE VISIT (OUTPATIENT)
Dept: PHYSICAL THERAPY | Age: 44
End: 2024-02-23
Payer: OTHER MISCELLANEOUS

## 2024-02-23 VITALS — HEART RATE: 93 BPM | SYSTOLIC BLOOD PRESSURE: 131 MMHG | TEMPERATURE: 97.7 F | DIASTOLIC BLOOD PRESSURE: 92 MMHG

## 2024-02-23 DIAGNOSIS — K03.6 ACCRETIONS ON TEETH: ICD-10-CM

## 2024-02-23 DIAGNOSIS — K05.6 PERIODONTAL DISEASE: Primary | ICD-10-CM

## 2024-02-23 DIAGNOSIS — M54.16 LUMBAR RADICULOPATHY: Primary | ICD-10-CM

## 2024-02-23 DIAGNOSIS — K03.6 DENTAL CALCULUS: ICD-10-CM

## 2024-02-23 PROCEDURE — D4341 PERIODONTAL SCALING AND ROOT PLANING - 4 OR MORE TEETH PER QUADRANT: HCPCS

## 2024-02-23 PROCEDURE — 97113 AQUATIC THERAPY/EXERCISES: CPT

## 2024-02-23 NOTE — PROGRESS NOTES
Daily Note     Today's date: 2024  Patient name: Bertha Beckett  : 1980  MRN: [id number]  Referring provider: Donn Jeffries MD  Dx:   Encounter Diagnosis     ICD-10-CM    1. Lumbar radiculopathy  M54.16           Start Time: 1455  Stop Time: 1550  Total time in clinic (min): 55 minutes    Subjective: pt continues w/ sig LBP.       Objective: See treatment diary below      Assessment: Tolerated treatment fair. Minimal relief in the water. Some pain w/ certain ex's.  Patient would benefit from continued PT      Plan: Continue per plan of care.        POC expires Unit limit Auth Expiration date PT/OT + Visit Limit?   5/15/24 na na 19                           Visit/Unit Tracking  AUTH Status:  Date 2/15 2/21 2/23            2ndary auth after visit 19 Used 1 2 3             Remaining  18 17 16             FOTO                     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, OA both knees, OA knees, osteoporosis  W/C  Daily Treatment Diary     Date 2/15 2/21 2/23        Water Walk (FW, BW, side) x10’  5 10 10        LE work at wall               Hip FF/ext swing   2 2          Toe/heel   1 1          Hip ABD/ADD   2  1          Knee flexion & extension  1 1          Squats  1 1        UE noodle x3             Push/pull   1 1          Rotate   1 1          Row forward & back   2 2          OH side bend            UE/Core (AROM, paddles, MB)              ABD/ADD              Horizontal Pec Fly              Alt. arm swing (shld flex/ext)              Push pull              Single paddle rotation           SLS (EO, EC, ball toss)            Aqua Step (FW, lat, eccentric)            Core work:              MF press (red, blue, blk)             Kickboard press (blue, red)              Row/ext w/ tubing (red, blue, blk)           Seated on bench             ankle DF/PF   1 1           1          ABD/ADD   1 1          Knee flexion/extension   1 1        DW TX - hang in the deep water   10 10 10          DW ABD/ADD    1          DW Bicycle  5 5 6          DW Scissor hip flexion/extension            Stretches              HS/calf   2 2          Wall stretches             Other:            Hot Tub - 10 minutes only  5 10 10

## 2024-02-23 NOTE — PROGRESS NOTES
SCALING AND ROOT PLANING     ASA:  2  Pain:  0   Reviewed M/H    SC/RP:   LL   Quad scaling and root planing  Applied  oraqix Topical Anesthetic,      Type of Treatment:  Used Ultrasonic and Hand Scaling, Flossed, Polished, Subgingival Irrigation - post tx - Chlorhexidine.   Gave her the syringe to use at home for salt water or Listerine   Reviewed OHI  - Brush 2X/day and Floss 1X/day    Oral Hygiene:  Poor   Plaque:  Heavy  Calculus:  Moderate - Heavy  Bleeding:  Heavy    Stain:   Light      Treatment Plan:  no changes to tx plan                     Referral:  No referral given  Gave follow-up directions.       Stressed cervical brushing    she had a lot of new debris  heavy bleeding   stressed relaxing lip and reaching anterior facial cervical areas.  NV3:  3 month perio maintenance - 50 min

## 2024-02-26 ENCOUNTER — OFFICE VISIT (OUTPATIENT)
Dept: PHYSICAL THERAPY | Age: 44
End: 2024-02-26
Payer: OTHER MISCELLANEOUS

## 2024-02-26 DIAGNOSIS — M54.16 LUMBAR RADICULOPATHY: Primary | ICD-10-CM

## 2024-02-26 PROCEDURE — 97113 AQUATIC THERAPY/EXERCISES: CPT

## 2024-02-26 NOTE — PROGRESS NOTES
Daily Note     Today's date: 2024  Patient name: Bertha Beckett  : 1980  MRN: [id number]  Referring provider: Donn Jeffries MD  Dx:   Encounter Diagnosis     ICD-10-CM    1. Lumbar radiculopathy  M54.16                      Subjective: pt continues w/ LBP 5/10. She also c/o L forearm/elbow pain which she saw MD for today and is referred to OT/PT.   Pt to have an injection  in LB tomorrow.     Objective: See treatment diary below      Assessment: Tolerated treatment fair. Added 3# ankle weights on for DWTX today to relieve pain and symptoms in DW.   Some relief noted with DWTX using ankle weights. No added UE ex's due to L UE pain. Gentle LE stretches to tolerance.  Patient would benefit from continued PT      Plan: Continue per plan of care.        POC expires Unit limit Auth Expiration date PT/OT + Visit Limit?   5/15/24 na na 19                           Visit/Unit Tracking  AUTH Status:  Date 2/15 2/21 2/23 2/26           2ndary auth after visit 19 Used 1 2 3 4            Remaining  18 17 16 15            FOTO                     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, OA both knees, OA knees, osteoporosis  W/C  Daily Treatment Diary     Date 2/15 2/21 2/23 2/26       Water Walk (FW, BW, side) x10’  5 10 10 10       LE work at wall               Hip FF/ext swing   2 2 2         Toe/heel   1 1 1         Hip ABD/ADD   2 2 2         March  1 1 1         Knee flexion & extension  1 1 1         Squats  1 1 1       UE noodle x3             Push/pull   1 1          Rotate   1 1 1         Row forward & back   2 2 2         OH side bend            UE/Core (AROM, paddles, MB)              ABD/ADD              Horizontal Pec Fly              Alt. arm swing (shld flex/ext)              Push pull              Single paddle rotation           SLS (EO, EC, ball toss)            Aqua Step (FW, lat, eccentric)            Core work:              MF press (red, blue, blk)             Kickboard press (blue, red)               Row/ext w/ tubing (red, blue, blk)           Seated on bench             ankle DF/PF   1 1 5''x5ea         March   1 1 1         ABD/ADD   1 1 1         Knee flexion/extension   1 1 1       DW TX - hang in the deep water  10 10 10 10 3#ea         DW ABD/ADD    1 1         DW Bicycle  5 5 6 6         DW Scissor hip flexion/extension            Stretches              HS/calf   2 2 2         Wall stretches             Other:            Hot Tub - 10 minutes only  5 10 10 10

## 2024-02-27 ENCOUNTER — APPOINTMENT (OUTPATIENT)
Dept: LAB | Facility: CLINIC | Age: 44
End: 2024-02-27
Payer: COMMERCIAL

## 2024-02-27 DIAGNOSIS — Z17.0 MALIGNANT NEOPLASM OF LEFT BREAST IN FEMALE, ESTROGEN RECEPTOR POSITIVE, UNSPECIFIED SITE OF BREAST: ICD-10-CM

## 2024-02-27 DIAGNOSIS — C79.51 MALIGNANT NEOPLASM METASTATIC TO BONE (HCC): ICD-10-CM

## 2024-02-27 DIAGNOSIS — C50.912 MALIGNANT NEOPLASM OF LEFT BREAST IN FEMALE, ESTROGEN RECEPTOR POSITIVE, UNSPECIFIED SITE OF BREAST: ICD-10-CM

## 2024-02-27 LAB
ALBUMIN SERPL BCP-MCNC: 4.2 G/DL (ref 3.5–5)
ALP SERPL-CCNC: 33 U/L (ref 34–104)
ALT SERPL W P-5'-P-CCNC: 17 U/L (ref 7–52)
ANION GAP SERPL CALCULATED.3IONS-SCNC: 10 MMOL/L
AST SERPL W P-5'-P-CCNC: 20 U/L (ref 13–39)
BASOPHILS # BLD AUTO: 0.06 THOUSANDS/ÂΜL (ref 0–0.1)
BASOPHILS NFR BLD AUTO: 2 % (ref 0–1)
BILIRUB SERPL-MCNC: 0.71 MG/DL (ref 0.2–1)
BUN SERPL-MCNC: 14 MG/DL (ref 5–25)
CALCIUM SERPL-MCNC: 9.3 MG/DL (ref 8.4–10.2)
CHLORIDE SERPL-SCNC: 101 MMOL/L (ref 96–108)
CO2 SERPL-SCNC: 27 MMOL/L (ref 21–32)
CREAT SERPL-MCNC: 1.07 MG/DL (ref 0.6–1.3)
EOSINOPHIL # BLD AUTO: 0.12 THOUSAND/ÂΜL (ref 0–0.61)
EOSINOPHIL NFR BLD AUTO: 3 % (ref 0–6)
ERYTHROCYTE [DISTWIDTH] IN BLOOD BY AUTOMATED COUNT: 12.6 % (ref 11.6–15.1)
GFR SERPL CREATININE-BSD FRML MDRD: 63 ML/MIN/1.73SQ M
GLUCOSE SERPL-MCNC: 120 MG/DL (ref 65–140)
HCT VFR BLD AUTO: 35.9 % (ref 34.8–46.1)
HGB BLD-MCNC: 12 G/DL (ref 11.5–15.4)
IMM GRANULOCYTES # BLD AUTO: 0.02 THOUSAND/UL (ref 0–0.2)
IMM GRANULOCYTES NFR BLD AUTO: 1 % (ref 0–2)
LYMPHOCYTES # BLD AUTO: 1.5 THOUSANDS/ÂΜL (ref 0.6–4.47)
LYMPHOCYTES NFR BLD AUTO: 36 % (ref 14–44)
MCH RBC QN AUTO: 35.7 PG (ref 26.8–34.3)
MCHC RBC AUTO-ENTMCNC: 33.4 G/DL (ref 31.4–37.4)
MCV RBC AUTO: 107 FL (ref 82–98)
MONOCYTES # BLD AUTO: 0.3 THOUSAND/ÂΜL (ref 0.17–1.22)
MONOCYTES NFR BLD AUTO: 7 % (ref 4–12)
NEUTROPHILS # BLD AUTO: 2.13 THOUSANDS/ÂΜL (ref 1.85–7.62)
NEUTS SEG NFR BLD AUTO: 51 % (ref 43–75)
NRBC BLD AUTO-RTO: 0 /100 WBCS
PLATELET # BLD AUTO: 226 THOUSANDS/UL (ref 149–390)
PMV BLD AUTO: 9.5 FL (ref 8.9–12.7)
POTASSIUM SERPL-SCNC: 3.8 MMOL/L (ref 3.5–5.3)
PROT SERPL-MCNC: 6.5 G/DL (ref 6.4–8.4)
RBC # BLD AUTO: 3.36 MILLION/UL (ref 3.81–5.12)
SODIUM SERPL-SCNC: 138 MMOL/L (ref 135–147)
WBC # BLD AUTO: 4.13 THOUSAND/UL (ref 4.31–10.16)

## 2024-02-27 PROCEDURE — 80053 COMPREHEN METABOLIC PANEL: CPT

## 2024-02-27 PROCEDURE — 36415 COLL VENOUS BLD VENIPUNCTURE: CPT

## 2024-02-27 PROCEDURE — 85025 COMPLETE CBC W/AUTO DIFF WBC: CPT

## 2024-02-29 ENCOUNTER — OFFICE VISIT (OUTPATIENT)
Dept: PHYSICAL THERAPY | Age: 44
End: 2024-02-29
Payer: OTHER MISCELLANEOUS

## 2024-02-29 DIAGNOSIS — M54.16 LUMBAR RADICULOPATHY: Primary | ICD-10-CM

## 2024-02-29 PROCEDURE — 97113 AQUATIC THERAPY/EXERCISES: CPT | Performed by: PHYSICAL THERAPIST

## 2024-02-29 NOTE — PROGRESS NOTES
Daily Note     Today's date: 2024  Patient name: Bertha Beckett  : 1980  MRN: [id number]  Referring provider: Donn Jeffries MD  Dx:   Encounter Diagnosis     ICD-10-CM    1. Lumbar radiculopathy  M54.16           Start Time: 1510  Stop Time: 1610  Total time in clinic (min): 60 minutes    Subjective: Patient reports the pool is helping more than on land. She feels less stress on her joints.       Objective: See treatment diary below      Assessment: Tolerated treatment well. Patient would benefit from continued PT  Patient does well with  PT in the water. Some movements are bothering to her.       Plan: Continue per plan of care.      POC expires Unit limit Auth Expiration date PT/OT + Visit Limit?   5/15/24 na na 19                           Visit/Unit Tracking  AUTH Status:  Date 2/15 2/21 2/23 2/26 2/29          2ndary auth after visit 19 Used 1 2 3 4 5           Remaining  18 17 16 15 14           FOTO                     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, OA both knees, OA knees, osteoporosis  W/C  Daily Treatment Diary     Date 2/15 2/21 2/23 2/26 2/29      Water Walk (FW, BW, side) x10’  5 10 10 10 10      LE work at wall               Hip FF/ext swing   2 2 2 2        Toe/heel   1 1 1 1        Hip ABD/ADD   2 2 2 2        March  1 1 1 1        Knee flexion & extension  1 1 1 1        Squats  1 1 1 1      UE noodle x3             Push/pull   1 1          Rotate   1 1 1 1        Row forward & back   2 2 2 2        OH side bend            UE/Core (AROM, paddles, MB)              ABD/ADD              Horizontal Pec Fly              Alt. arm swing (shld flex/ext)              Push pull              Single paddle rotation           SLS (EO, EC, ball toss)            Aqua Step (FW, lat, eccentric)            Core work:              MF press (red, blue, blk)             Kickboard press (blue, red)              Row/ext w/ tubing (red, blue, blk)           Seated on bench             ankle  "DF/PF   1 1 5''x5ea 5\" 5x        March   1 1 1 1        ABD/ADD   1 1 1 1        Knee flexion/extension   1 1 1 1      DW TX - hang in the deep water  10 10 10 10 3#ea 10\" 3#        DW ABD/ADD    1 1 1        DW Bicycle  5 5 6 6 6'        DW Scissor hip flexion/extension            Stretches              HS/calf   2 2 2 2        Wall stretches             Other:            Hot Tub - 10 minutes only  5 10 10 10 10                "

## 2024-03-06 ENCOUNTER — APPOINTMENT (OUTPATIENT)
Dept: PHYSICAL THERAPY | Age: 44
End: 2024-03-06
Payer: OTHER MISCELLANEOUS

## 2024-03-07 ENCOUNTER — OFFICE VISIT (OUTPATIENT)
Dept: PHYSICAL THERAPY | Age: 44
End: 2024-03-07
Payer: OTHER MISCELLANEOUS

## 2024-03-07 DIAGNOSIS — M54.16 LUMBAR RADICULOPATHY: Primary | ICD-10-CM

## 2024-03-07 PROCEDURE — 97113 AQUATIC THERAPY/EXERCISES: CPT

## 2024-03-07 NOTE — PROGRESS NOTES
Daily Note     Today's date: 3/7/2024  Patient name: Bertha Beckett  : 1980  MRN: [id number]  Referring provider: Donn Jeffries MD  Dx:   Encounter Diagnosis     ICD-10-CM    1. Lumbar radiculopathy  M54.16           Start Time: 1555  Stop Time: 1650  Total time in clinic (min): 55 minutes    Subjective: pt continues w/ sig LBP /10 and R hip/LE pain. She did have an injection on Tuesday which hasn't helped yet. She c/o soreness to LB from injection.       Objective: See treatment diary below      Assessment: Tolerated treatment fair. Minimal relief in the water. No added ex's today due to increased soreness from injection. Patient would benefit from continued PT      Plan: Continue per plan of care.      POC expires Unit limit Auth Expiration date PT/OT + Visit Limit?   5/15/24 na na 19                           Visit/Unit Tracking  AUTH Status:  Date 2/15 2/21 2/23 2/26 2/29 3/7         2ndary auth after visit 19 Used 1 2 3 4 5 6          Remaining  18 17 16 15 14 13          FOTO                     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, OA both knees, OA knees, osteoporosis  W/C  Daily Treatment Diary     Date 2/15 2/21 2/23 2/26 2/29 3/7     Water Walk (FW, BW, side) x10’  5 10 10 10 10 10     LE work at wall               Hip FF/ext swing   2 2 2 2 2       Toe/heel   1 1 1 1 1       Hip ABD/ADD   2 2 2 2 2        1 1 1 1       Knee flexion & extension  1 1 1 1 1       Squats  1 1 1 1 1     UE noodle x3             Push/pull   1 1   1       Rotate   1 1 1 1 1       Row forward & back   2 2 2 2 2       OH side bend            UE/Core (AROM, paddles, MB)              ABD/ADD              Horizontal Pec Fly              Alt. arm swing (shld flex/ext)              Push pull              Single paddle rotation           SLS (EO, EC, ball toss)            Aqua Step (FW, lat, eccentric)            Core work:              MF press (red, blue, blk)             Kickboard press (blue, red)           "    Row/ext w/ tubing (red, blue, blk)           Seated on bench             ankle DF/PF   1 1 5''x5ea 5\" 5x x5ea       March   1 1 1 1 1       ABD/ADD   1 1 1 1 1       Knee flexion/extension   1 1 1 1 1     DW TX - hang in the deep water  10 10 10 10 3#ea 10\" 3# 10       DW ABD/ADD    1 1 1 1       DW Bicycle  5 5 6 6 6' 6       DW Scissor hip flexion/extension            Stretches              HS/calf   2 2 2 2 2       Wall stretches             Other:            Hot Tub - 10 minutes only  5 10 10 10 10 10                 "

## 2024-03-08 ENCOUNTER — APPOINTMENT (OUTPATIENT)
Dept: PHYSICAL THERAPY | Age: 44
End: 2024-03-08
Payer: OTHER MISCELLANEOUS

## 2024-03-12 ENCOUNTER — OFFICE VISIT (OUTPATIENT)
Dept: PHYSICAL THERAPY | Age: 44
End: 2024-03-12
Payer: OTHER MISCELLANEOUS

## 2024-03-12 DIAGNOSIS — M54.16 LUMBAR RADICULOPATHY: Primary | ICD-10-CM

## 2024-03-12 PROCEDURE — 97113 AQUATIC THERAPY/EXERCISES: CPT | Performed by: PHYSICAL THERAPIST

## 2024-03-12 NOTE — PROGRESS NOTES
Daily Note     Today's date: 3/12/2024  Patient name: Bertha Beckett  : 1980  MRN: [id number]  Referring provider: Donn Jeffries MD  Dx:   Encounter Diagnosis     ICD-10-CM    1. Lumbar radiculopathy  M54.16                      Subjective: patient complains of back pain at 5/10 today      Objective: See treatment diary below      Assessment: Tolerated treatment fair. Patient demonstrated fatigue post treatment, exhibited good technique with therapeutic exercises, and would benefit from continued PT,slow steady Gait in/out of pool today, but still complains of pain with prolonged standing      Plan: Progress treatment as tolerated.       POC expires Unit limit Auth Expiration date PT/OT + Visit Limit?   5/15/24 na na 19                           Visit/Unit Tracking  AUTH Status:  Date 2/15 2/21 2/23 2/26 2/29 3/7 3/12        2ndary auth after visit 19 Used 1 2 3 4 5 6 7         Remaining  18 17 16 15 14 13 12         FOTO                     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, OA both knees, OA knees, osteoporosis  W/C  Daily Treatment Diary     Date 2/15 2/21 2/23 2/26 2/29 3/7 3/12    Water Walk (FW, BW, side) x10’  5 10 10 10 10 10 10    LE work at wall               Hip FF/ext swing   2 2 2 2 2 2      Toe/heel   1 1 1 1 1 1      Hip ABD/ADD   2 2 2 2 2 2      March  1 1 1 1 1 1      Knee flexion & extension  1 1 1 1 1 1      Squats  1 1 1 1 1 1    UE noodle x3             Push/pull   1 1   1 1      Rotate   1 1 1 1 1 1      Row forward & back   2 2 2 2 2 2      OH side bend            UE/Core (AROM, paddles, MB)              ABD/ADD              Horizontal Pec Fly              Alt. arm swing (shld flex/ext)              Push pull              Single paddle rotation           SLS (EO, EC, ball toss)            Aqua Step (FW, lat, eccentric)            Core work:              MF press (red, blue, blk)             Kickboard press (blue, red)              Row/ext w/ tubing (red, blue, blk)          "  Seated on bench             ankle DF/PF   1 1 5''x5ea 5\" 5x x5ea x5      March   1 1 1 1 1 1      ABD/ADD   1 1 1 1 1 1      Knee flexion/extension   1 1 1 1 1 1    DW TX - hang in the deep water  10 10 10 10 3#ea 10\" 3# 10 10      DW ABD/ADD    1 1 1 1 1      DW Bicycle  5 5 6 6 6' 6 6      DW Scissor hip flexion/extension            Stretches              HS/calf   2 2 2 2 2 2      Wall stretches             Other:            Hot Tub - 10 minutes only  5 10 10 10 10 10 10                  "

## 2024-03-14 ENCOUNTER — OFFICE VISIT (OUTPATIENT)
Dept: PHYSICAL THERAPY | Age: 44
End: 2024-03-14
Payer: OTHER MISCELLANEOUS

## 2024-03-14 DIAGNOSIS — M54.16 LUMBAR RADICULOPATHY: Primary | ICD-10-CM

## 2024-03-14 PROCEDURE — 97113 AQUATIC THERAPY/EXERCISES: CPT

## 2024-03-14 NOTE — PROGRESS NOTES
Daily Note     Today's date: 3/14/2024  Patient name: Bertha Beckett  : 1980  MRN: [id number]  Referring provider: Donn Jeffries MD  Dx:   Encounter Diagnosis     ICD-10-CM    1. Lumbar radiculopathy  M54.16           Start Time: 1600  Stop Time: 1700  Total time in clinic (min): 60 minutes    Subjective: Patient reports her LB Si injection did not help yet. Still having increased pain. C.o 6/10 LBP today.       Objective: See treatment diary below      Assessment: Tolerated treatment fairly well with program outlined below, still having increased pain and will progress as tolerated.  Patient exhibited good technique with therapeutic exercises and would benefit from continued PT      Plan: Continue per plan of care.      POC expires Unit limit Auth Expiration date PT/OT + Visit Limit?   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, OA both knees, OA knees, osteoporosis  W/C  Daily Treatment Diary     Date 2/15 2/21 2/23 2/26 2/29 3/7 3/12 3/14   Water Walk (FW, BW, side) x10’  5 10 10 10 10 10 10 10   LE work at wall               Hip FF/ext swing   2 2 2 2 2 2 2     Toe/heel   1 1 1 1 1 1 1     Hip ABD/ADD   2 2 2 2 2 2 2     March  1 1 1 1 1 1 1     Knee flexion & extension  1 1 1 1 1 1 1     Squats  1 1 1 1 1 1 1   UE noodle x3        1     Push/pull   1 1   1 1 1     Rotate   1 1 1 1 1 1 1     Row forward & back   2 2 2 2 2 2 2     OH side bend            UE/Core (AROM, paddles, MB)              ABD/ADD              Horizontal Pec Fly              Alt. arm swing (shld flex/ext)              Push pull              Single paddle rotation           SLS (EO, EC, ball toss)            Aqua Step (FW, lat, eccentric)            Core work:              MF press  "(red, blue, blk)             Kickboard press (blue, red)              Row/ext w/ tubing (red, blue, blk)           Seated on bench             ankle DF/PF   1 1 5''x5ea 5\" 5x x5ea x5 5x     March   1 1 1 1 1 1 1     ABD/ADD   1 1 1 1 1 1 1     Knee flexion/extension   1 1 1 1 1 1 1   DW TX - hang in the deep water  10 10 10 10 3#ea 10\" 3# 10 10 10 2.5#     DW ABD/ADD    1 1 1 1 1 1     DW Bicycle  5 5 6 6 6' 6 6 6     DW Scissor hip flexion/extension            Stretches              HS/calf   2 2 2 2 2 2 2     Wall stretches             Other:            Hot Tub - 10 minutes only  5 10 10 10 10 10 10 10                   "

## 2024-03-19 ENCOUNTER — APPOINTMENT (OUTPATIENT)
Dept: PHYSICAL THERAPY | Age: 44
End: 2024-03-19
Payer: OTHER MISCELLANEOUS

## 2024-03-21 ENCOUNTER — EVALUATION (OUTPATIENT)
Dept: PHYSICAL THERAPY | Age: 44
End: 2024-03-21
Payer: OTHER MISCELLANEOUS

## 2024-03-21 DIAGNOSIS — M54.16 LUMBAR RADICULOPATHY: Primary | ICD-10-CM

## 2024-03-21 PROCEDURE — 97113 AQUATIC THERAPY/EXERCISES: CPT

## 2024-03-21 NOTE — PROGRESS NOTES
PT Re-Evaluation     Today's date: 3/21/2024  Patient name: Bertha Beckett  : 1980  MRN: [id number]  Referring provider: Donn Jeffries MD  Dx:   Encounter Diagnosis     ICD-10-CM    1. Lumbar radiculopathy  M54.16 PT plan of care cert/re-cert          Start Time: 1345  Stop Time: 1448  Total time in clinic (min): 63 minutes    Assessment  Assessment details: Bertha Beckett is a 44 y.o. female who presents with pain, decreased strength, and decreased ROM. Due to these impairments, Patient has difficulty performing a/iadls and recreational activities. Patient's clinical presentation is consistent with their referring diagnosis of LBP. Patient has been having aquatic therapy and feeling better. She is also getting injections. Patient would benefit from skilled physical therapy to address their aforementioned impairments, improve their level of function and to improve their overall quality of life.  Impairments: abnormal or restricted ROM, activity intolerance, impaired physical strength, lacks appropriate home exercise program, pain with function, poor posture  and poor body mechanics  Understanding of Dx/Px/POC: good   Prognosis: good    Goals  ST-4 WEEKS  1.  Decrease LB pain < 7/10 on VAS at its worst. Progress made. Ongoing goal to decrease pain to max.   2.  Increase ROM by > 5 deg in all deficients planes. Progress made. Ongoing goal   3.  Increase core strength by 1/2 MMT grade. Progress made. Ongoing goal   4. Increase tolerance to activity and pool therapy > 45 min. Goal met. D/c goal.     LT-6 WEEKS  1. Patient to be independent with a/iadls. Ongoing goal   2. Increase functional activities for leisure and home activities to previous LOF. Ongoing goal   3. Independent with HEP and/or fitness program. Ongoing goal     Plan  Patient would benefit from: skilled physical therapy  Planned modality interventions: cryotherapy, thermotherapy: hydrocollator packs and unattended electrical  stimulation  Planned therapy interventions: activity modification, behavior modification, body mechanics training, aquatic therapy, flexibility, functional ROM exercises, home exercise program, IADL retraining, joint mobilization, manual therapy, neuromuscular re-education, patient education, postural training, strengthening, stretching, therapeutic activities, therapeutic exercise and abdominal trunk stabilization  Frequency: 2-3x week.  Duration in weeks: 8  Plan of Care beginning date: 2/15/2024  Plan of Care expiration date: 5/15/2024  Treatment plan discussed with: patient      Subjective Evaluation    History of Present Illness  Mechanism of injury: Pt notes on  she attempted to lift someone at work and injured her back. Had an MRI 10/13 with results as follows:right foraminal disc protrusion at level L5-S1 exerting mass effect upon undersurface of the right L5 nerve root with moderate foraminal stenosis. Correlate for right L5 radiculopathy.  Patient had land PT for several months. She is now referred for aquatic therapy. She has been getting injections with mild relief for short term. 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 She feels the pool therapy is helping.           Recurrent probem    Patient Goals  Patient goals for therapy: decreased pain, increased motion, increased strength and return to work  Patient goal: return to prior level of function.  Pain  Current pain ratin  At best pain rating: 3  At worst pain ratin  Location: right LB and into buttocks  Quality: sharp and radiating  Relieving factors: ice, heat and medications  Aggravating factors: sitting, standing, stair climbing, walking and lifting (bending)  Progression: improved (improved from initial sx)    Social Support  Steps to enter house: yes  2  Stairs in house: yes (to bathroom on second floor and bedroom on 3rd floor, mostly stays on first floor)    Lives in: multiple-level home  Lives with: spouse and adult children    Employment status: not working    Diagnostic Tests  MRI studies: abnormal (see imaging)  Treatments  Previous treatment: physical therapy, medication, massage and injection treatment  Current treatment: physical therapy      Objective     Concurrent Complaints  Positive for history of cancer.     Static Posture     Lumbar Spine   Increased lordosis.     Postural Observations  Seated posture: fair  Standing posture: fair    Additional Postural Observation Details  lumbar lordosis    Palpation   Left   Hypertonic in the erector spinae and lumbar paraspinals.     Right   Hypertonic in the erector spinae.     Tenderness     Right Hip   Tenderness in the PSIS.     Active Range of Motion     Lumbar   Flexion: Active lumbar flexion: tips to prox shins.  with pain Restriction level: moderate  Extension: 8 degrees  with pain Restriction level: minimal  Left lateral flexion:  Restriction level: minimal  Right lateral flexion:  with pain    Strength/Myotome Testing     Left Hip   Planes of Motion   Flexion: 4+  Extension: 4+  Abduction: 4+  Adduction: 4+    Right Hip   Planes of Motion   Flexion: 4+  Extension: 4  Abduction: 4+  Adduction: 4+    Left Knee   Flexion: 5  Extension: 4+    Right Knee   Flexion: 4+  Extension: 4+    Left Ankle/Foot   Dorsiflexion: 5  Plantar flexion: 5    Right Ankle/Foot   Dorsiflexion: 5  Plantar flexion: 5    Tests     Lumbar     Left   Negative passive SLR and slump test.     Right   Positive passive SLR.   Negative slump test.     Left Pelvic Girdle/Sacrum   Negative: active SLR test.     Right Pelvic Girdle/Sacrum   Positive: active SLR test.     Left Hip   Negative EZEQUIEL and FADIR.     Right Hip   Positive EZEQUIEL.     Additional Tests Details  Ham length R 65*, L ham 85*    Ambulation   Weight-Bearing Status   Assistive device used: none    Ambulation: Level Surfaces   Ambulation without assistive device:  "independent    Ambulation: Stairs   Pattern: non-reciprocal  Pattern: non-reciprocal    Observational Gait   Gait: antalgic   Decreased walking speed.     General Comments:    Lower quarter screen   Foot/ankle: unremarkable    Knee Comments  OA B knees, had injections which relieved pain      Flowsheet Rows      Flowsheet Row Most Recent Value   PT/OT G-Codes    Current Score 37   Projected Score 47               POC expires Unit limit Auth Expiration date PT/OT + Visit Limit?   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, OA both knees, OA knees, osteoporosis  W/C  Daily Treatment Diary     Date 3/21  2/23 2/26 2/29 3/7 3/12 3/14   Water Walk (FW, BW, side) x10’  10'  10 10 10 10 10 10   LE work at wall               Hip FF/ext swing  2 2 2 2 2 2 2 2     Toe/heel  1 1 1 1 1 1 1 1     Hip ABD/ADD  2 2 2 2 2 2 2 2     March 1 1 1 1 1 1 1 1     Knee flexion & extension 1 1 1 1 1 1 1 1     Squats 1 1 1 1 1 1 1 1   UE noodle x3        1     Push/pull   1 1   1 1 1     Rotate  1 1 1 1 1 1 1 1     Row forward & back   2 2 2 2 2 2 2     OH side bend            UE/Core (AROM, paddles, MB)  NT            ABD/ADD              Horizontal Pec Fly              Alt. arm swing (shld flex/ext)              Push pull              Single paddle rotation           SLS (EO, EC, ball toss)            Aqua Step (FW, lat, eccentric)            Core work:              MF press (red, blue, blk)             Kickboard press (blue, red)              Row/ext w/ tubing (red, blue, blk)           Seated on bench             ankle DF/PF  1 1 1 5''x5ea 5\" 5x x5ea x5 5x     March 1 1 1 1 1 1 1 1     ABD/ADD  1 1 1 1 1 1 1 1     Knee flexion/extension  1 1 1 1 " "1 1 1 1   DW TX - hang in the deep water  10'  2.5 10 10 10 3#ea 10\" 3# 10 10 10 2.5#     DW ABD/ADD  1  1 1 1 1 1 1     DW Bicycle  5 5 6 6 6' 6 6 6     DW Scissor hip flexion/extension            Stretches              HS/calf  2 2 2 2 2 2 2 2     Wall stretches             Other:            Hot Tub - 10 minutes only  10 10 10 10 10 10 10 10          "

## 2024-03-22 ENCOUNTER — TELEPHONE (OUTPATIENT)
Age: 44
End: 2024-03-22

## 2024-03-22 DIAGNOSIS — S39.92XD LOWER BACK INJURY, SUBSEQUENT ENCOUNTER: Primary | ICD-10-CM

## 2024-03-22 NOTE — TELEPHONE ENCOUNTER
Caller: Patient    Doctor: Nesha    Reason for call:     Patient is requesting a script for pool therapy for her back, she stated she had one before and she would like another script to go for more therapy.  Can the office please advise if one can be written??    Call back#: [phone]

## 2024-03-22 NOTE — PROGRESS NOTES
Patient had left message stating the re-evaluation documentation was not correct. After speaking with patient today via phone, I made the changes in the subjective and assessment of the re-evaluation removing the comment about the injections being helpful. Patient was also upset about needing to change her appointments as to not have PT and OT visits on the same day. She states this has put an additional burden on her and she needed to change her other appts now. Offered to change her aquatic appts as needed.

## 2024-03-25 ENCOUNTER — OFFICE VISIT (OUTPATIENT)
Dept: OBGYN CLINIC | Facility: CLINIC | Age: 44
End: 2024-03-25
Payer: OTHER MISCELLANEOUS

## 2024-03-25 VITALS
SYSTOLIC BLOOD PRESSURE: 121 MMHG | TEMPERATURE: 98.8 F | WEIGHT: 215 LBS | BODY MASS INDEX: 39.56 KG/M2 | DIASTOLIC BLOOD PRESSURE: 88 MMHG | HEART RATE: 75 BPM | HEIGHT: 62 IN

## 2024-03-25 DIAGNOSIS — S39.92XD LOWER BACK INJURY, SUBSEQUENT ENCOUNTER: Primary | ICD-10-CM

## 2024-03-25 DIAGNOSIS — M51.36 BULGING LUMBAR DISC: ICD-10-CM

## 2024-03-25 DIAGNOSIS — M54.16 LUMBAR RADICULOPATHY: ICD-10-CM

## 2024-03-25 PROCEDURE — 99213 OFFICE O/P EST LOW 20 MIN: CPT | Performed by: FAMILY MEDICINE

## 2024-03-25 NOTE — PROGRESS NOTES
"[facility] [address] [address] [phone]      Assessment:  1. Lower back injury, subsequent encounter  -     Ambulatory Referral to Physical Therapy; Future    2. Lumbar radiculopathy  -     Ambulatory Referral to Physical Therapy; Future    3. Bulging lumbar disc  -     Ambulatory Referral to Physical Therapy; Future        Plan:  Patient Instructions   F/u as needed  F/u with pain mgmt  PT- water therapy.  Home exercises.  Continue current meds  Icing/heat/OTC pain meds as needed.     Return if symptoms worsen or fail to improve.    Chief Complaint:  Chief Complaint   Patient presents with    Lower Back - Follow-up     With right sided sciatica       Subjective:   HPI    Patient ID: Bertha Beckett is a 44 y.o. female     Here for WC injury lower back pain  She is still having pain  Going to pain mgmt  She has been going to PT at [facility] and would like to go to [facility] PT.  Tylenol/gabapentin/lidocaine patches  Still having pain radiating down R leg/n/t but less  Pain bending/twisting/prolonged sitting/standing  She is back to work light duty     She works as a travel nurse.   6/26/23 she was lifting a resident and felt sharp pain in R lower back and numbness in R leg.    Review of Systems   Constitutional:  Negative for fatigue and fever.   Respiratory:  Negative for shortness of breath.    Cardiovascular:  Negative for chest pain.   Gastrointestinal:  Negative for abdominal pain and nausea.   Genitourinary:  Negative for dysuria.   Musculoskeletal:  Positive for back pain.   Skin:  Negative for rash and wound.   Neurological:  Negative for weakness and headaches.       Objective:  Vitals:  /88 (BP Location: Right arm, Patient Position: Sitting, Cuff Size: Large)   Pulse 75   Temp 98.8 °F (37.1 °C) (Tympanic)   Ht 5' 2\" (1.575 m)   Wt 97.5 kg (215 lb)   BMI 39.32 kg/m²     The following portions of the patient's " history were reviewed and updated as appropriate: allergies, current medications, past family history, past medical history, past social history, past surgical history, and problem list.    Physical exam:  Physical Exam  Constitutional:       Appearance: Normal appearance. She is normal weight.   HENT:      Head: Normocephalic.   Eyes:      Extraocular Movements: Extraocular movements intact.   Pulmonary:      Effort: Pulmonary effort is normal.   Musculoskeletal:      Cervical back: Normal range of motion.      Lumbar back: Positive right straight leg raise test. Negative left straight leg raise test.   Skin:     General: Skin is warm and dry.   Neurological:      General: No focal deficit present.      Mental Status: She is alert and oriented to person, place, and time. Mental status is at baseline.   Psychiatric:         Mood and Affect: Mood normal.         Behavior: Behavior normal.         Thought Content: Thought content normal.         Judgment: Judgment normal.       Back Exam     Tenderness   The patient is experiencing tenderness in the lumbar and sacroiliac.    Range of Motion   The patient has normal back ROM.    Muscle Strength   The patient has normal back strength.    Tests   Straight leg raise right: positive  Straight leg raise left: negative    Reflexes   Patellar:  normal    Other   Toe walk: abnormal  Heel walk: abnormal  Gait: antalgic     Comments:  Pain with ROM                  Donn Jeffries MD

## 2024-03-25 NOTE — LETTER
March 25, 2024     Patient: Bertha Beckett  YOB: 1980  Date of Visit: 3/25/2024      To Whom it May Concern:    Bertha Beckett is under my professional care. Bertha was seen in my office on 3/25/2024. Bertha may return to work with limitations - no pushing/pulling/lifting >10 lbs.  No repetitive bending/twisting .    If you have any questions or concerns, please don't hesitate to call.         Sincerely,          Donn Jeffries MD        CC: No Recipients

## 2024-03-25 NOTE — PATIENT INSTRUCTIONS
F/u as needed  F/u with pain mgmt  PT- water therapy.  Home exercises.  Continue current meds  Icing/heat/OTC pain meds as needed.

## 2024-03-27 ENCOUNTER — APPOINTMENT (OUTPATIENT)
Dept: PHYSICAL THERAPY | Age: 44
End: 2024-03-27
Payer: OTHER MISCELLANEOUS

## 2024-03-29 ENCOUNTER — APPOINTMENT (OUTPATIENT)
Dept: PHYSICAL THERAPY | Age: 44
End: 2024-03-29
Payer: OTHER MISCELLANEOUS

## 2024-04-02 ENCOUNTER — APPOINTMENT (OUTPATIENT)
Dept: LAB | Facility: CLINIC | Age: 44
End: 2024-04-02
Payer: COMMERCIAL

## 2024-04-02 DIAGNOSIS — Z17.0 MALIGNANT NEOPLASM OF LEFT BREAST IN FEMALE, ESTROGEN RECEPTOR POSITIVE, UNSPECIFIED SITE OF BREAST (HCC): ICD-10-CM

## 2024-04-02 DIAGNOSIS — C50.912 MALIGNANT NEOPLASM OF LEFT BREAST IN FEMALE, ESTROGEN RECEPTOR POSITIVE, UNSPECIFIED SITE OF BREAST (HCC): ICD-10-CM

## 2024-04-02 DIAGNOSIS — C79.51 MALIGNANT NEOPLASM METASTATIC TO BONE (HCC): ICD-10-CM

## 2024-04-02 LAB
ALBUMIN SERPL BCP-MCNC: 4 G/DL (ref 3.5–5)
ALP SERPL-CCNC: 36 U/L (ref 34–104)
ALT SERPL W P-5'-P-CCNC: 17 U/L (ref 7–52)
ANION GAP SERPL CALCULATED.3IONS-SCNC: 9 MMOL/L (ref 4–13)
AST SERPL W P-5'-P-CCNC: 21 U/L (ref 13–39)
BASOPHILS # BLD AUTO: 0.04 THOUSANDS/ÂΜL (ref 0–0.1)
BASOPHILS NFR BLD AUTO: 1 % (ref 0–1)
BILIRUB SERPL-MCNC: 0.69 MG/DL (ref 0.2–1)
BUN SERPL-MCNC: 13 MG/DL (ref 5–25)
CALCIUM SERPL-MCNC: 9.3 MG/DL (ref 8.4–10.2)
CHLORIDE SERPL-SCNC: 102 MMOL/L (ref 96–108)
CO2 SERPL-SCNC: 29 MMOL/L (ref 21–32)
CREAT SERPL-MCNC: 0.95 MG/DL (ref 0.6–1.3)
EOSINOPHIL # BLD AUTO: 0.14 THOUSAND/ÂΜL (ref 0–0.61)
EOSINOPHIL NFR BLD AUTO: 3 % (ref 0–6)
ERYTHROCYTE [DISTWIDTH] IN BLOOD BY AUTOMATED COUNT: 12.7 % (ref 11.6–15.1)
GFR SERPL CREATININE-BSD FRML MDRD: 73 ML/MIN/1.73SQ M
GLUCOSE P FAST SERPL-MCNC: 90 MG/DL (ref 65–99)
HCT VFR BLD AUTO: 35.6 % (ref 34.8–46.1)
HGB BLD-MCNC: 12.1 G/DL (ref 11.5–15.4)
IMM GRANULOCYTES # BLD AUTO: 0.02 THOUSAND/UL (ref 0–0.2)
IMM GRANULOCYTES NFR BLD AUTO: 1 % (ref 0–2)
LYMPHOCYTES # BLD AUTO: 2.01 THOUSANDS/ÂΜL (ref 0.6–4.47)
LYMPHOCYTES NFR BLD AUTO: 49 % (ref 14–44)
MCH RBC QN AUTO: 35.5 PG (ref 26.8–34.3)
MCHC RBC AUTO-ENTMCNC: 34 G/DL (ref 31.4–37.4)
MCV RBC AUTO: 104 FL (ref 82–98)
MONOCYTES # BLD AUTO: 0.41 THOUSAND/ÂΜL (ref 0.17–1.22)
MONOCYTES NFR BLD AUTO: 10 % (ref 4–12)
NEUTROPHILS # BLD AUTO: 1.49 THOUSANDS/ÂΜL (ref 1.85–7.62)
NEUTS SEG NFR BLD AUTO: 36 % (ref 43–75)
NRBC BLD AUTO-RTO: 0 /100 WBCS
PLATELET # BLD AUTO: 218 THOUSANDS/UL (ref 149–390)
PMV BLD AUTO: 9.5 FL (ref 8.9–12.7)
POTASSIUM SERPL-SCNC: 4.1 MMOL/L (ref 3.5–5.3)
PROT SERPL-MCNC: 6.9 G/DL (ref 6.4–8.4)
RBC # BLD AUTO: 3.41 MILLION/UL (ref 3.81–5.12)
SODIUM SERPL-SCNC: 140 MMOL/L (ref 135–147)
WBC # BLD AUTO: 4.11 THOUSAND/UL (ref 4.31–10.16)

## 2024-04-02 PROCEDURE — 85025 COMPLETE CBC W/AUTO DIFF WBC: CPT

## 2024-04-02 PROCEDURE — 80053 COMPREHEN METABOLIC PANEL: CPT

## 2024-04-02 PROCEDURE — 36415 COLL VENOUS BLD VENIPUNCTURE: CPT

## 2024-04-23 ENCOUNTER — APPOINTMENT (OUTPATIENT)
Dept: LAB | Facility: CLINIC | Age: 44
End: 2024-04-23
Payer: COMMERCIAL

## 2024-04-23 DIAGNOSIS — Z17.0 MALIGNANT NEOPLASM OF LEFT BREAST IN FEMALE, ESTROGEN RECEPTOR POSITIVE, UNSPECIFIED SITE OF BREAST (HCC): ICD-10-CM

## 2024-04-23 DIAGNOSIS — C50.912 MALIGNANT NEOPLASM OF LEFT BREAST IN FEMALE, ESTROGEN RECEPTOR POSITIVE, UNSPECIFIED SITE OF BREAST (HCC): ICD-10-CM

## 2024-04-23 DIAGNOSIS — C79.51 MALIGNANT NEOPLASM METASTATIC TO BONE (HCC): ICD-10-CM

## 2024-04-23 LAB
BASOPHILS # BLD AUTO: 0.06 THOUSANDS/ÂΜL (ref 0–0.1)
BASOPHILS NFR BLD AUTO: 1 % (ref 0–1)
EOSINOPHIL # BLD AUTO: 0.2 THOUSAND/ÂΜL (ref 0–0.61)
EOSINOPHIL NFR BLD AUTO: 4 % (ref 0–6)
ERYTHROCYTE [DISTWIDTH] IN BLOOD BY AUTOMATED COUNT: 13 % (ref 11.6–15.1)
HCT VFR BLD AUTO: 33.9 % (ref 34.8–46.1)
HGB BLD-MCNC: 11.8 G/DL (ref 11.5–15.4)
IMM GRANULOCYTES # BLD AUTO: 0.01 THOUSAND/UL (ref 0–0.2)
IMM GRANULOCYTES NFR BLD AUTO: 0 % (ref 0–2)
LYMPHOCYTES # BLD AUTO: 2.17 THOUSANDS/ÂΜL (ref 0.6–4.47)
LYMPHOCYTES NFR BLD AUTO: 47 % (ref 14–44)
MCH RBC QN AUTO: 36.1 PG (ref 26.8–34.3)
MCHC RBC AUTO-ENTMCNC: 34.8 G/DL (ref 31.4–37.4)
MCV RBC AUTO: 104 FL (ref 82–98)
MONOCYTES # BLD AUTO: 0.4 THOUSAND/ÂΜL (ref 0.17–1.22)
MONOCYTES NFR BLD AUTO: 9 % (ref 4–12)
NEUTROPHILS # BLD AUTO: 1.83 THOUSANDS/ÂΜL (ref 1.85–7.62)
NEUTS SEG NFR BLD AUTO: 39 % (ref 43–75)
NRBC BLD AUTO-RTO: 0 /100 WBCS
PLATELET # BLD AUTO: 237 THOUSANDS/UL (ref 149–390)
PMV BLD AUTO: 9.4 FL (ref 8.9–12.7)
RBC # BLD AUTO: 3.27 MILLION/UL (ref 3.81–5.12)
WBC # BLD AUTO: 4.67 THOUSAND/UL (ref 4.31–10.16)

## 2024-04-23 PROCEDURE — 36415 COLL VENOUS BLD VENIPUNCTURE: CPT

## 2024-04-23 PROCEDURE — 80053 COMPREHEN METABOLIC PANEL: CPT

## 2024-04-23 PROCEDURE — 85025 COMPLETE CBC W/AUTO DIFF WBC: CPT

## 2024-04-24 LAB
ALBUMIN SERPL BCP-MCNC: 4.1 G/DL (ref 3.5–5)
ALP SERPL-CCNC: 37 U/L (ref 34–104)
ALT SERPL W P-5'-P-CCNC: 15 U/L (ref 7–52)
ANION GAP SERPL CALCULATED.3IONS-SCNC: 8 MMOL/L (ref 4–13)
AST SERPL W P-5'-P-CCNC: 17 U/L (ref 13–39)
BILIRUB SERPL-MCNC: 0.71 MG/DL (ref 0.2–1)
BUN SERPL-MCNC: 14 MG/DL (ref 5–25)
CALCIUM SERPL-MCNC: 9.7 MG/DL (ref 8.4–10.2)
CHLORIDE SERPL-SCNC: 102 MMOL/L (ref 96–108)
CO2 SERPL-SCNC: 29 MMOL/L (ref 21–32)
CREAT SERPL-MCNC: 0.94 MG/DL (ref 0.6–1.3)
GFR SERPL CREATININE-BSD FRML MDRD: 74 ML/MIN/1.73SQ M
GLUCOSE P FAST SERPL-MCNC: 84 MG/DL (ref 65–99)
POTASSIUM SERPL-SCNC: 4.1 MMOL/L (ref 3.5–5.3)
PROT SERPL-MCNC: 6.7 G/DL (ref 6.4–8.4)
SODIUM SERPL-SCNC: 139 MMOL/L (ref 135–147)

## 2024-05-08 ENCOUNTER — HOSPITAL ENCOUNTER (OUTPATIENT)
Dept: NUCLEAR MEDICINE | Facility: HOSPITAL | Age: 44
Discharge: HOME | End: 2024-05-08
Payer: COMMERCIAL

## 2024-05-08 ENCOUNTER — HOSPITAL ENCOUNTER (OUTPATIENT)
Dept: CT IMAGING | Facility: HOSPITAL | Age: 44
End: 2024-05-08
Payer: COMMERCIAL

## 2024-05-08 DIAGNOSIS — C79.52 SECONDARY MALIGNANT NEOPLASM OF BONE AND BONE MARROW (HCC): ICD-10-CM

## 2024-05-08 DIAGNOSIS — Z17.0 ESTROGEN RECEPTOR POSITIVE: ICD-10-CM

## 2024-05-08 DIAGNOSIS — C50.912 MALIGNANT NEOPLASM OF LEFT FEMALE BREAST, UNSPECIFIED ESTROGEN RECEPTOR STATUS, UNSPECIFIED SITE OF BREAST (HCC): ICD-10-CM

## 2024-05-08 DIAGNOSIS — C79.51 SECONDARY MALIGNANT NEOPLASM OF BONE AND BONE MARROW (HCC): ICD-10-CM

## 2024-05-08 PROCEDURE — 78306 BONE IMAGING WHOLE BODY: CPT

## 2024-05-08 PROCEDURE — G1004 CDSM NDSC: HCPCS

## 2024-05-08 PROCEDURE — A9503 TC99M MEDRONATE: HCPCS

## 2024-06-19 ENCOUNTER — HOSPITAL ENCOUNTER (OUTPATIENT)
Dept: CT IMAGING | Facility: HOSPITAL | Age: 44
Discharge: HOME | End: 2024-06-19
Payer: COMMERCIAL

## 2024-06-19 DIAGNOSIS — C50.912 MALIGNANT NEOPLASM OF LEFT FEMALE BREAST, UNSPECIFIED ESTROGEN RECEPTOR STATUS, UNSPECIFIED SITE OF BREAST (HCC): ICD-10-CM

## 2024-06-19 DIAGNOSIS — Z17.0 ESTROGEN RECEPTOR POSITIVE: ICD-10-CM

## 2024-06-19 DIAGNOSIS — C79.52 SECONDARY MALIGNANT NEOPLASM OF BONE AND BONE MARROW (HCC): ICD-10-CM

## 2024-06-19 DIAGNOSIS — C79.51 SECONDARY MALIGNANT NEOPLASM OF BONE AND BONE MARROW (HCC): ICD-10-CM

## 2024-06-19 PROCEDURE — G1004 CDSM NDSC: HCPCS

## 2024-06-19 PROCEDURE — 71260 CT THORAX DX C+: CPT

## 2024-06-19 PROCEDURE — 74177 CT ABD & PELVIS W/CONTRAST: CPT

## 2024-06-19 RX ADMIN — IOHEXOL 100 ML: 350 INJECTION, SOLUTION INTRAVENOUS at 07:32

## 2024-09-05 ENCOUNTER — APPOINTMENT (OUTPATIENT)
Dept: LAB | Facility: CLINIC | Age: 44
End: 2024-09-05
Payer: COMMERCIAL

## 2024-09-05 DIAGNOSIS — Z17.0 MALIGNANT NEOPLASM OF LEFT BREAST IN FEMALE, ESTROGEN RECEPTOR POSITIVE, UNSPECIFIED SITE OF BREAST (HCC): ICD-10-CM

## 2024-09-05 DIAGNOSIS — C79.51 MALIGNANT NEOPLASM METASTATIC TO BONE (HCC): ICD-10-CM

## 2024-09-05 DIAGNOSIS — C50.912 MALIGNANT NEOPLASM OF LEFT BREAST IN FEMALE, ESTROGEN RECEPTOR POSITIVE, UNSPECIFIED SITE OF BREAST (HCC): ICD-10-CM

## 2024-09-05 DIAGNOSIS — Z00.6 ENCOUNTER FOR EXAMINATION FOR NORMAL COMPARISON OR CONTROL IN CLINICAL RESEARCH PROGRAM: ICD-10-CM

## 2024-09-05 LAB
ALBUMIN SERPL BCG-MCNC: 3.9 G/DL (ref 3.5–5)
ALP SERPL-CCNC: 96 U/L (ref 34–104)
ALT SERPL W P-5'-P-CCNC: 15 U/L (ref 7–52)
ANION GAP SERPL CALCULATED.3IONS-SCNC: 9 MMOL/L (ref 4–13)
AST SERPL W P-5'-P-CCNC: 18 U/L (ref 13–39)
BASOPHILS # BLD AUTO: 0.07 THOUSANDS/ÂΜL (ref 0–0.1)
BASOPHILS NFR BLD AUTO: 1 % (ref 0–1)
BILIRUB SERPL-MCNC: 0.65 MG/DL (ref 0.2–1)
BUN SERPL-MCNC: 13 MG/DL (ref 5–25)
CALCIUM SERPL-MCNC: 9.4 MG/DL (ref 8.4–10.2)
CHLORIDE SERPL-SCNC: 101 MMOL/L (ref 96–108)
CO2 SERPL-SCNC: 26 MMOL/L (ref 21–32)
CREAT SERPL-MCNC: 1.13 MG/DL (ref 0.6–1.3)
EOSINOPHIL # BLD AUTO: 0.09 THOUSAND/ÂΜL (ref 0–0.61)
EOSINOPHIL NFR BLD AUTO: 2 % (ref 0–6)
ERYTHROCYTE [DISTWIDTH] IN BLOOD BY AUTOMATED COUNT: 13.1 % (ref 11.6–15.1)
GFR SERPL CREATININE-BSD FRML MDRD: 59 ML/MIN/1.73SQ M
GLUCOSE SERPL-MCNC: 109 MG/DL (ref 65–140)
HCT VFR BLD AUTO: 32 % (ref 34.8–46.1)
HGB BLD-MCNC: 10.9 G/DL (ref 11.5–15.4)
IMM GRANULOCYTES # BLD AUTO: 0.02 THOUSAND/UL (ref 0–0.2)
IMM GRANULOCYTES NFR BLD AUTO: 0 % (ref 0–2)
LYMPHOCYTES # BLD AUTO: 2.39 THOUSANDS/ÂΜL (ref 0.6–4.47)
LYMPHOCYTES NFR BLD AUTO: 47 % (ref 14–44)
MCH RBC QN AUTO: 36 PG (ref 26.8–34.3)
MCHC RBC AUTO-ENTMCNC: 34.1 G/DL (ref 31.4–37.4)
MCV RBC AUTO: 106 FL (ref 82–98)
MONOCYTES # BLD AUTO: 0.48 THOUSAND/ÂΜL (ref 0.17–1.22)
MONOCYTES NFR BLD AUTO: 9 % (ref 4–12)
NEUTROPHILS # BLD AUTO: 2.12 THOUSANDS/ÂΜL (ref 1.85–7.62)
NEUTS SEG NFR BLD AUTO: 41 % (ref 43–75)
NRBC BLD AUTO-RTO: 0 /100 WBCS
PLATELET # BLD AUTO: 247 THOUSANDS/UL (ref 149–390)
PMV BLD AUTO: 9.2 FL (ref 8.9–12.7)
POTASSIUM SERPL-SCNC: 3.9 MMOL/L (ref 3.5–5.3)
PROT SERPL-MCNC: 6.7 G/DL (ref 6.4–8.4)
RBC # BLD AUTO: 3.03 MILLION/UL (ref 3.81–5.12)
SODIUM SERPL-SCNC: 136 MMOL/L (ref 135–147)
WBC # BLD AUTO: 5.17 THOUSAND/UL (ref 4.31–10.16)

## 2024-09-05 PROCEDURE — 36415 COLL VENOUS BLD VENIPUNCTURE: CPT

## 2024-09-05 PROCEDURE — 85025 COMPLETE CBC W/AUTO DIFF WBC: CPT

## 2024-09-05 PROCEDURE — 80053 COMPREHEN METABOLIC PANEL: CPT

## 2024-09-17 LAB
APOB+LDLR+PCSK9 GENE MUT ANL BLD/T: NOT DETECTED
BRCA1+BRCA2 DEL+DUP + FULL MUT ANL BLD/T: NOT DETECTED
MLH1+MSH2+MSH6+PMS2 GN DEL+DUP+FUL M: NOT DETECTED

## 2024-10-08 PROBLEM — I50.9 CONGESTIVE HEART FAILURE (HCC): Status: ACTIVE | Noted: 2024-09-19

## 2024-11-06 ENCOUNTER — HOSPITAL ENCOUNTER (OUTPATIENT)
Dept: CT IMAGING | Facility: HOSPITAL | Age: 44
Discharge: HOME | End: 2024-11-06
Payer: COMMERCIAL

## 2024-11-06 ENCOUNTER — HOSPITAL ENCOUNTER (OUTPATIENT)
Dept: NUCLEAR MEDICINE | Facility: HOSPITAL | Age: 44
Discharge: HOME | End: 2024-11-06
Payer: COMMERCIAL

## 2024-11-06 DIAGNOSIS — Z17.0 ESTROGEN RECEPTOR POSITIVE STATUS (ER+): ICD-10-CM

## 2024-11-06 DIAGNOSIS — C79.51 SECONDARY MALIGNANT NEOPLASM OF BONE (HCC): ICD-10-CM

## 2024-11-06 DIAGNOSIS — C50.912 MALIGNANT NEOPLASM OF UNSPECIFIED SITE OF LEFT FEMALE BREAST (HCC): ICD-10-CM

## 2024-11-06 PROCEDURE — 78306 BONE IMAGING WHOLE BODY: CPT

## 2024-11-06 PROCEDURE — 71260 CT THORAX DX C+: CPT

## 2024-11-06 PROCEDURE — 74177 CT ABD & PELVIS W/CONTRAST: CPT

## 2024-11-06 PROCEDURE — A9503 TC99M MEDRONATE: HCPCS

## 2024-11-06 RX ADMIN — IOHEXOL 100 ML: 350 INJECTION, SOLUTION INTRAVENOUS at 08:05

## 2024-11-07 ENCOUNTER — APPOINTMENT (OUTPATIENT)
Dept: LAB | Facility: CLINIC | Age: 44
End: 2024-11-07
Payer: COMMERCIAL

## 2024-11-07 DIAGNOSIS — C50.912 MALIGNANT NEOPLASM OF LEFT BREAST IN FEMALE, ESTROGEN RECEPTOR POSITIVE, UNSPECIFIED SITE OF BREAST (HCC): ICD-10-CM

## 2024-11-07 DIAGNOSIS — Z17.0 MALIGNANT NEOPLASM OF LEFT BREAST IN FEMALE, ESTROGEN RECEPTOR POSITIVE, UNSPECIFIED SITE OF BREAST (HCC): ICD-10-CM

## 2024-11-07 DIAGNOSIS — C79.51 MALIGNANT NEOPLASM METASTATIC TO BONE (HCC): ICD-10-CM

## 2024-11-07 LAB
ALBUMIN SERPL BCG-MCNC: 4 G/DL (ref 3.5–5)
ALP SERPL-CCNC: 95 U/L (ref 34–104)
ALT SERPL W P-5'-P-CCNC: 14 U/L (ref 7–52)
ANION GAP SERPL CALCULATED.3IONS-SCNC: 8 MMOL/L (ref 4–13)
ANISOCYTOSIS BLD QL SMEAR: PRESENT
AST SERPL W P-5'-P-CCNC: 18 U/L (ref 13–39)
BASOPHILS # BLD MANUAL: 0.08 THOUSAND/UL (ref 0–0.1)
BASOPHILS NFR MAR MANUAL: 2 % (ref 0–1)
BILIRUB SERPL-MCNC: 0.66 MG/DL (ref 0.2–1)
BUN SERPL-MCNC: 13 MG/DL (ref 5–25)
CALCIUM SERPL-MCNC: 9.6 MG/DL (ref 8.4–10.2)
CHLORIDE SERPL-SCNC: 103 MMOL/L (ref 96–108)
CO2 SERPL-SCNC: 28 MMOL/L (ref 21–32)
CREAT SERPL-MCNC: 0.95 MG/DL (ref 0.6–1.3)
EOSINOPHIL # BLD MANUAL: 0.08 THOUSAND/UL (ref 0–0.4)
EOSINOPHIL NFR BLD MANUAL: 2 % (ref 0–6)
ERYTHROCYTE [DISTWIDTH] IN BLOOD BY AUTOMATED COUNT: 12.7 % (ref 11.6–15.1)
GFR SERPL CREATININE-BSD FRML MDRD: 73 ML/MIN/1.73SQ M
GLUCOSE P FAST SERPL-MCNC: 87 MG/DL (ref 65–99)
HCT VFR BLD AUTO: 32.4 % (ref 34.8–46.1)
HGB BLD-MCNC: 11.1 G/DL (ref 11.5–15.4)
LYMPHOCYTES # BLD AUTO: 1.31 THOUSAND/UL (ref 0.6–4.47)
LYMPHOCYTES # BLD AUTO: 21 % (ref 14–44)
MACROCYTES BLD QL AUTO: PRESENT
MCH RBC QN AUTO: 35.8 PG (ref 26.8–34.3)
MCHC RBC AUTO-ENTMCNC: 34.3 G/DL (ref 31.4–37.4)
MCV RBC AUTO: 105 FL (ref 82–98)
MONOCYTES # BLD AUTO: 0.21 THOUSAND/UL (ref 0–1.22)
MONOCYTES NFR BLD: 5 % (ref 4–12)
NEUTROPHILS # BLD MANUAL: 2.53 THOUSAND/UL (ref 1.85–7.62)
NEUTS BAND NFR BLD MANUAL: 2 % (ref 0–8)
NEUTS SEG NFR BLD AUTO: 58 % (ref 43–75)
PLATELET # BLD AUTO: 227 THOUSANDS/UL (ref 149–390)
PLATELET BLD QL SMEAR: ADEQUATE
PMV BLD AUTO: 9.4 FL (ref 8.9–12.7)
POLYCHROMASIA BLD QL SMEAR: PRESENT
POTASSIUM SERPL-SCNC: 4.1 MMOL/L (ref 3.5–5.3)
PROT SERPL-MCNC: 6.6 G/DL (ref 6.4–8.4)
RBC # BLD AUTO: 3.1 MILLION/UL (ref 3.81–5.12)
RBC MORPH BLD: PRESENT
SODIUM SERPL-SCNC: 139 MMOL/L (ref 135–147)
VARIANT LYMPHS # BLD AUTO: 10 %
WBC # BLD AUTO: 4.22 THOUSAND/UL (ref 4.31–10.16)

## 2024-11-07 PROCEDURE — 85027 COMPLETE CBC AUTOMATED: CPT

## 2024-11-07 PROCEDURE — 80053 COMPREHEN METABOLIC PANEL: CPT

## 2024-11-07 PROCEDURE — 36415 COLL VENOUS BLD VENIPUNCTURE: CPT

## 2024-11-07 PROCEDURE — 85007 BL SMEAR W/DIFF WBC COUNT: CPT

## 2025-02-05 ENCOUNTER — APPOINTMENT (OUTPATIENT)
Dept: LAB | Facility: CLINIC | Age: 45
End: 2025-02-05
Payer: COMMERCIAL

## 2025-02-05 DIAGNOSIS — Z17.0 MALIGNANT NEOPLASM OF LEFT BREAST IN FEMALE, ESTROGEN RECEPTOR POSITIVE, UNSPECIFIED SITE OF BREAST (HCC): ICD-10-CM

## 2025-02-05 DIAGNOSIS — C79.51 MALIGNANT NEOPLASM METASTATIC TO BONE (HCC): ICD-10-CM

## 2025-02-05 DIAGNOSIS — C50.912 MALIGNANT NEOPLASM OF LEFT BREAST IN FEMALE, ESTROGEN RECEPTOR POSITIVE, UNSPECIFIED SITE OF BREAST (HCC): ICD-10-CM

## 2025-02-05 LAB
ALBUMIN SERPL BCG-MCNC: 4.3 G/DL (ref 3.5–5)
ALP SERPL-CCNC: 110 U/L (ref 34–104)
ALT SERPL W P-5'-P-CCNC: 17 U/L (ref 7–52)
ANION GAP SERPL CALCULATED.3IONS-SCNC: 5 MMOL/L (ref 4–13)
AST SERPL W P-5'-P-CCNC: 20 U/L (ref 13–39)
BASOPHILS # BLD AUTO: 0.06 THOUSANDS/ΜL (ref 0–0.1)
BASOPHILS NFR BLD AUTO: 1 % (ref 0–1)
BILIRUB SERPL-MCNC: 0.7 MG/DL (ref 0.2–1)
BUN SERPL-MCNC: 15 MG/DL (ref 5–25)
CALCIUM SERPL-MCNC: 9.8 MG/DL (ref 8.4–10.2)
CHLORIDE SERPL-SCNC: 105 MMOL/L (ref 96–108)
CO2 SERPL-SCNC: 29 MMOL/L (ref 21–32)
CREAT SERPL-MCNC: 1.15 MG/DL (ref 0.6–1.3)
EOSINOPHIL # BLD AUTO: 0.14 THOUSAND/ΜL (ref 0–0.61)
EOSINOPHIL NFR BLD AUTO: 3 % (ref 0–6)
ERYTHROCYTE [DISTWIDTH] IN BLOOD BY AUTOMATED COUNT: 13.2 % (ref 11.6–15.1)
GFR SERPL CREATININE-BSD FRML MDRD: 58 ML/MIN/1.73SQ M
GLUCOSE P FAST SERPL-MCNC: 101 MG/DL (ref 65–99)
HCT VFR BLD AUTO: 35.9 % (ref 34.8–46.1)
HGB BLD-MCNC: 11.9 G/DL (ref 11.5–15.4)
IMM GRANULOCYTES # BLD AUTO: 0.02 THOUSAND/UL (ref 0–0.2)
IMM GRANULOCYTES NFR BLD AUTO: 1 % (ref 0–2)
LDH SERPL-CCNC: 254 U/L (ref 140–271)
LYMPHOCYTES # BLD AUTO: 1.43 THOUSANDS/ΜL (ref 0.6–4.47)
LYMPHOCYTES NFR BLD AUTO: 33 % (ref 14–44)
MCH RBC QN AUTO: 35.5 PG (ref 26.8–34.3)
MCHC RBC AUTO-ENTMCNC: 33.1 G/DL (ref 31.4–37.4)
MCV RBC AUTO: 107 FL (ref 82–98)
MONOCYTES # BLD AUTO: 0.39 THOUSAND/ΜL (ref 0.17–1.22)
MONOCYTES NFR BLD AUTO: 9 % (ref 4–12)
NEUTROPHILS # BLD AUTO: 2.34 THOUSANDS/ΜL (ref 1.85–7.62)
NEUTS SEG NFR BLD AUTO: 53 % (ref 43–75)
NRBC BLD AUTO-RTO: 0 /100 WBCS
PLATELET # BLD AUTO: 225 THOUSANDS/UL (ref 149–390)
PMV BLD AUTO: 9.3 FL (ref 8.9–12.7)
POTASSIUM SERPL-SCNC: 4.2 MMOL/L (ref 3.5–5.3)
PROT SERPL-MCNC: 7.2 G/DL (ref 6.4–8.4)
RBC # BLD AUTO: 3.35 MILLION/UL (ref 3.81–5.12)
SODIUM SERPL-SCNC: 139 MMOL/L (ref 135–147)
WBC # BLD AUTO: 4.38 THOUSAND/UL (ref 4.31–10.16)

## 2025-02-05 PROCEDURE — 80053 COMPREHEN METABOLIC PANEL: CPT

## 2025-02-05 PROCEDURE — 83615 LACTATE (LD) (LDH) ENZYME: CPT

## 2025-02-05 PROCEDURE — 36415 COLL VENOUS BLD VENIPUNCTURE: CPT

## 2025-02-05 PROCEDURE — 85025 COMPLETE CBC W/AUTO DIFF WBC: CPT

## 2025-03-08 ENCOUNTER — APPOINTMENT (OUTPATIENT)
Dept: LAB | Facility: CLINIC | Age: 45
End: 2025-03-08
Payer: COMMERCIAL

## 2025-03-08 DIAGNOSIS — Z17.0 ESTROGEN RECEPTOR POSITIVE: ICD-10-CM

## 2025-03-08 DIAGNOSIS — C79.51 SECONDARY MALIGNANT NEOPLASM OF BONE AND BONE MARROW (HCC): ICD-10-CM

## 2025-03-08 DIAGNOSIS — Z51.11 ENCOUNTER FOR ANTINEOPLASTIC CHEMOTHERAPY: ICD-10-CM

## 2025-03-08 DIAGNOSIS — C79.52 SECONDARY MALIGNANT NEOPLASM OF BONE AND BONE MARROW (HCC): ICD-10-CM

## 2025-03-08 DIAGNOSIS — C50.912 MALIGNANT NEOPLASM OF LEFT FEMALE BREAST, UNSPECIFIED ESTROGEN RECEPTOR STATUS, UNSPECIFIED SITE OF BREAST (HCC): ICD-10-CM

## 2025-03-08 LAB
ALBUMIN SERPL BCG-MCNC: 4.1 G/DL (ref 3.5–5)
ALP SERPL-CCNC: 87 U/L (ref 34–104)
ALT SERPL W P-5'-P-CCNC: 15 U/L (ref 7–52)
ANION GAP SERPL CALCULATED.3IONS-SCNC: 8 MMOL/L (ref 4–13)
AST SERPL W P-5'-P-CCNC: 16 U/L (ref 13–39)
BASOPHILS # BLD AUTO: 0.04 THOUSANDS/ÂΜL (ref 0–0.1)
BASOPHILS NFR BLD AUTO: 1 % (ref 0–1)
BILIRUB SERPL-MCNC: 0.6 MG/DL (ref 0.2–1)
BUN SERPL-MCNC: 15 MG/DL (ref 5–25)
CALCIUM SERPL-MCNC: 8.7 MG/DL (ref 8.4–10.2)
CHLORIDE SERPL-SCNC: 104 MMOL/L (ref 96–108)
CO2 SERPL-SCNC: 29 MMOL/L (ref 21–32)
CREAT SERPL-MCNC: 0.73 MG/DL (ref 0.6–1.3)
EOSINOPHIL # BLD AUTO: 0.03 THOUSAND/ÂΜL (ref 0–0.61)
EOSINOPHIL NFR BLD AUTO: 0 % (ref 0–6)
ERYTHROCYTE [DISTWIDTH] IN BLOOD BY AUTOMATED COUNT: 13.7 % (ref 11.6–15.1)
GFR SERPL CREATININE-BSD FRML MDRD: 100 ML/MIN/1.73SQ M
GLUCOSE P FAST SERPL-MCNC: 90 MG/DL (ref 65–99)
HCT VFR BLD AUTO: 35.2 % (ref 34.8–46.1)
HGB BLD-MCNC: 12.1 G/DL (ref 11.5–15.4)
IMM GRANULOCYTES # BLD AUTO: 0.04 THOUSAND/UL (ref 0–0.2)
IMM GRANULOCYTES NFR BLD AUTO: 1 % (ref 0–2)
LDH SERPL-CCNC: 213 U/L (ref 140–271)
LYMPHOCYTES # BLD AUTO: 2.25 THOUSANDS/ÂΜL (ref 0.6–4.47)
LYMPHOCYTES NFR BLD AUTO: 32 % (ref 14–44)
MCH RBC QN AUTO: 36.4 PG (ref 26.8–34.3)
MCHC RBC AUTO-ENTMCNC: 34.4 G/DL (ref 31.4–37.4)
MCV RBC AUTO: 106 FL (ref 82–98)
MONOCYTES # BLD AUTO: 0.53 THOUSAND/ÂΜL (ref 0.17–1.22)
MONOCYTES NFR BLD AUTO: 8 % (ref 4–12)
NEUTROPHILS # BLD AUTO: 4.13 THOUSANDS/ÂΜL (ref 1.85–7.62)
NEUTS SEG NFR BLD AUTO: 58 % (ref 43–75)
NRBC BLD AUTO-RTO: 0 /100 WBCS
PLATELET # BLD AUTO: 255 THOUSANDS/UL (ref 149–390)
PMV BLD AUTO: 9.2 FL (ref 8.9–12.7)
POTASSIUM SERPL-SCNC: 4.1 MMOL/L (ref 3.5–5.3)
PROT SERPL-MCNC: 6.7 G/DL (ref 6.4–8.4)
RBC # BLD AUTO: 3.32 MILLION/UL (ref 3.81–5.12)
SODIUM SERPL-SCNC: 141 MMOL/L (ref 135–147)
WBC # BLD AUTO: 7.02 THOUSAND/UL (ref 4.31–10.16)

## 2025-03-08 PROCEDURE — 85025 COMPLETE CBC W/AUTO DIFF WBC: CPT

## 2025-03-08 PROCEDURE — 83615 LACTATE (LD) (LDH) ENZYME: CPT

## 2025-03-08 PROCEDURE — 80053 COMPREHEN METABOLIC PANEL: CPT

## 2025-04-09 ENCOUNTER — TELEPHONE (OUTPATIENT)
Age: 45
End: 2025-04-09

## 2025-04-09 ENCOUNTER — TELEPHONE (OUTPATIENT)
Dept: OTHER | Facility: OTHER | Age: 45
End: 2025-04-09

## 2025-04-09 NOTE — TELEPHONE ENCOUNTER
Pt returned our call after missing 4/9/25 appt due to side effects from her treatment, she  was rescheduled 4/16/25  She request afternoon appt to avoid AM illness again

## 2025-04-09 NOTE — TELEPHONE ENCOUNTER
Patient is calling regarding cancelling an appointment.    Date/Time: 4/9/2025, 8:30 AM    Patient was rescheduled: YES [] NO [x]    Patient requesting call back to reschedule: YES [x] NO []    Patient is not feeling well; please call her back to reschedule.  Thank you!

## 2025-04-15 ENCOUNTER — APPOINTMENT (OUTPATIENT)
Dept: LAB | Facility: CLINIC | Age: 45
End: 2025-04-15
Attending: NURSE PRACTITIONER
Payer: COMMERCIAL

## 2025-04-15 DIAGNOSIS — Z51.11 CHEMOTHERAPY MANAGEMENT, ENCOUNTER FOR: ICD-10-CM

## 2025-04-15 DIAGNOSIS — C50.912 MALIGNANT NEOPLASM OF LEFT BREAST IN FEMALE, ESTROGEN RECEPTOR POSITIVE, UNSPECIFIED SITE OF BREAST (HCC): ICD-10-CM

## 2025-04-15 DIAGNOSIS — C79.51 MALIGNANT NEOPLASM METASTATIC TO BONE (HCC): ICD-10-CM

## 2025-04-15 DIAGNOSIS — Z17.0 MALIGNANT NEOPLASM OF LEFT BREAST IN FEMALE, ESTROGEN RECEPTOR POSITIVE, UNSPECIFIED SITE OF BREAST (HCC): ICD-10-CM

## 2025-04-15 LAB
ALBUMIN SERPL BCG-MCNC: 4.2 G/DL (ref 3.5–5)
ALP SERPL-CCNC: 68 U/L (ref 34–104)
ALT SERPL W P-5'-P-CCNC: 16 U/L (ref 7–52)
ANION GAP SERPL CALCULATED.3IONS-SCNC: 8 MMOL/L (ref 4–13)
AST SERPL W P-5'-P-CCNC: 18 U/L (ref 13–39)
BASOPHILS # BLD AUTO: 0.06 THOUSANDS/ÂΜL (ref 0–0.1)
BASOPHILS NFR BLD AUTO: 1 % (ref 0–1)
BILIRUB SERPL-MCNC: 0.64 MG/DL (ref 0.2–1)
BUN SERPL-MCNC: 8 MG/DL (ref 5–25)
CALCIUM SERPL-MCNC: 8.6 MG/DL (ref 8.4–10.2)
CHLORIDE SERPL-SCNC: 102 MMOL/L (ref 96–108)
CO2 SERPL-SCNC: 28 MMOL/L (ref 21–32)
CREAT SERPL-MCNC: 0.8 MG/DL (ref 0.6–1.3)
EOSINOPHIL # BLD AUTO: 0.11 THOUSAND/ÂΜL (ref 0–0.61)
EOSINOPHIL NFR BLD AUTO: 2 % (ref 0–6)
ERYTHROCYTE [DISTWIDTH] IN BLOOD BY AUTOMATED COUNT: 12.9 % (ref 11.6–15.1)
GFR SERPL CREATININE-BSD FRML MDRD: 89 ML/MIN/1.73SQ M
GLUCOSE P FAST SERPL-MCNC: 89 MG/DL (ref 65–99)
HCT VFR BLD AUTO: 37.7 % (ref 34.8–46.1)
HGB BLD-MCNC: 12.8 G/DL (ref 11.5–15.4)
IMM GRANULOCYTES # BLD AUTO: 0.03 THOUSAND/UL (ref 0–0.2)
IMM GRANULOCYTES NFR BLD AUTO: 1 % (ref 0–2)
LDH SERPL-CCNC: 206 U/L (ref 140–271)
LYMPHOCYTES # BLD AUTO: 2.17 THOUSANDS/ÂΜL (ref 0.6–4.47)
LYMPHOCYTES NFR BLD AUTO: 43 % (ref 14–44)
MCH RBC QN AUTO: 35.8 PG (ref 26.8–34.3)
MCHC RBC AUTO-ENTMCNC: 34 G/DL (ref 31.4–37.4)
MCV RBC AUTO: 105 FL (ref 82–98)
MONOCYTES # BLD AUTO: 0.38 THOUSAND/ÂΜL (ref 0.17–1.22)
MONOCYTES NFR BLD AUTO: 8 % (ref 4–12)
NEUTROPHILS # BLD AUTO: 2.26 THOUSANDS/ÂΜL (ref 1.85–7.62)
NEUTS SEG NFR BLD AUTO: 45 % (ref 43–75)
NRBC BLD AUTO-RTO: 0 /100 WBCS
PLATELET # BLD AUTO: 214 THOUSANDS/UL (ref 149–390)
PMV BLD AUTO: 8.8 FL (ref 8.9–12.7)
POTASSIUM SERPL-SCNC: 4.1 MMOL/L (ref 3.5–5.3)
PROT SERPL-MCNC: 7 G/DL (ref 6.4–8.4)
RBC # BLD AUTO: 3.58 MILLION/UL (ref 3.81–5.12)
SODIUM SERPL-SCNC: 138 MMOL/L (ref 135–147)
WBC # BLD AUTO: 5.01 THOUSAND/UL (ref 4.31–10.16)

## 2025-04-15 PROCEDURE — 85025 COMPLETE CBC W/AUTO DIFF WBC: CPT

## 2025-04-15 PROCEDURE — 80053 COMPREHEN METABOLIC PANEL: CPT

## 2025-04-15 PROCEDURE — 83615 LACTATE (LD) (LDH) ENZYME: CPT

## 2025-04-15 PROCEDURE — 36415 COLL VENOUS BLD VENIPUNCTURE: CPT

## 2025-04-16 ENCOUNTER — HOSPITAL ENCOUNTER (OUTPATIENT)
Dept: CT IMAGING | Facility: HOSPITAL | Age: 45
Discharge: HOME | End: 2025-04-16
Attending: INTERNAL MEDICINE
Payer: COMMERCIAL

## 2025-04-16 DIAGNOSIS — C50.919 MALIGNANT NEOPLASM OF FEMALE BREAST, UNSPECIFIED ESTROGEN RECEPTOR STATUS, UNSPECIFIED LATERALITY, UNSPECIFIED SITE OF BREAST (HCC): ICD-10-CM

## 2025-04-16 PROCEDURE — 74177 CT ABD & PELVIS W/CONTRAST: CPT

## 2025-04-16 PROCEDURE — 71260 CT THORAX DX C+: CPT

## 2025-04-16 RX ADMIN — IOHEXOL 100 ML: 350 INJECTION, SOLUTION INTRAVENOUS at 13:40

## 2025-05-02 ENCOUNTER — OFFICE VISIT (OUTPATIENT)
Dept: NEUROSURGERY | Facility: CLINIC | Age: 45
End: 2025-05-02
Payer: OTHER MISCELLANEOUS

## 2025-05-02 ENCOUNTER — APPOINTMENT (OUTPATIENT)
Dept: RADIOLOGY | Facility: CLINIC | Age: 45
End: 2025-05-02
Payer: OTHER MISCELLANEOUS

## 2025-05-02 VITALS
DIASTOLIC BLOOD PRESSURE: 85 MMHG | HEART RATE: 100 BPM | TEMPERATURE: 98.2 F | HEIGHT: 62 IN | SYSTOLIC BLOOD PRESSURE: 152 MMHG | BODY MASS INDEX: 38.64 KG/M2 | OXYGEN SATURATION: 98 % | WEIGHT: 210 LBS

## 2025-05-02 DIAGNOSIS — M51.27 LUMBAGO-SCIATICA DUE TO DISPLACEMENT OF LUMBAR INTERVERTEBRAL DISC: ICD-10-CM

## 2025-05-02 DIAGNOSIS — M51.27 LUMBAGO-SCIATICA DUE TO DISPLACEMENT OF LUMBAR INTERVERTEBRAL DISC: Primary | ICD-10-CM

## 2025-05-02 DIAGNOSIS — M25.551 RIGHT HIP PAIN: ICD-10-CM

## 2025-05-02 PROCEDURE — 99204 OFFICE O/P NEW MOD 45 MIN: CPT | Performed by: NEUROLOGICAL SURGERY

## 2025-05-02 PROCEDURE — 73502 X-RAY EXAM HIP UNI 2-3 VIEWS: CPT

## 2025-05-02 NOTE — PROGRESS NOTES
HPI  PT and RICKIE without benefit including aquatherapy  Medications: prednisone 1 month ago because it got worse  Now taking tylenol and motrin - helps temporarily  No left sided problems  Describes urinary incontinence for at least 1 year  Valsalva - no changes  Worse laying down and driving a car  Back pain is on the right only  Right hip and back hurts the most     Breast Cancer since 2013

## 2025-05-02 NOTE — ASSESSMENT & PLAN NOTE
Orders:  •  XR hip/pelvis 4+ vw right if performed; Future  •  EMG; Future  •  MRI lumbar spine without contrast; Future  Right L5/S1 HNP on MRI 11/24.  Started 1.5+ years ago when lifting a patient at work.  Tried PT and RICKIE in the past without durable benefit  Here for a second opinion after seeing Dr Lopez at [facility].  Stage IV breast cancer and medications to treat may limit options for treatment.  Will coordinate with Onc for surgical planning if contemplated  I agree that updated MRI is necessary for possible surgical planning  Right hip pain is her chief complaint.  Given her BMI, I think right hip X-rays to r/o arthritis are warranted  I have also ordered EMGs to determine if there is ongoing nerve compression that might benefit from surgical decompression via right L5/S1 laminotomy.  I explained the nature of her problem and possible surgical intervention using a spine model.  I will go over details further at her next visit if surgery is an option.  All questions answered  I would be happy to continue to participate in her care however she deems appropriate.  She has chosen to have her tests as above and follow up with me

## 2025-05-02 NOTE — PROGRESS NOTES
Name: Bertha Beckett      : 1980      MRN: [id number]  Encounter Provider: Craig Robert Goldberg, MD  Encounter Date: 2025   Encounter department: [facility] NEUROSURGICAL [facility]  :  Assessment & Plan  Lumbago-sciatica due to displacement of lumbar intervertebral disc    Orders:  •  XR hip/pelvis 4+ vw right if performed; Future  •  EMG; Future  •  MRI lumbar spine without contrast; Future  Right L5/S1 HNP on MRI .  Started 1.5+ years ago when lifting a patient at work.  Tried PT and RICKIE in the past without durable benefit  Here for a second opinion after seeing Dr Lopez at [facility].  Stage IV breast cancer and medications to treat may limit options for treatment.  Will coordinate with Onc for surgical planning if contemplated  I agree that updated MRI is necessary for possible surgical planning  Right hip pain is her chief complaint.  Given her BMI, I think right hip X-rays to r/o arthritis are warranted  I have also ordered EMGs to determine if there is ongoing nerve compression that might benefit from surgical decompression via right L5/S1 laminotomy.  I explained the nature of her problem and possible surgical intervention using a spine model.  I will go over details further at her next visit if surgery is an option.  All questions answered  I would be happy to continue to participate in her care however she deems appropriate.  She has chosen to have her tests as above and follow up with me      Right hip pain    Orders:  •  XR hip/pelvis 4+ vw right if performed; Future  •  EMG; Future  •  MRI lumbar spine without contrast; Future    Body mass index (BMI) 40.0-44.9, adult (HCC)             History of Present Illness     Bertha Beckett is a 45 y.o. female who presents with 1.5 years of back and right leg pain which started after lifting a patient at work    Back Pain  Associated symptoms include weakness.      PT and RICKIE without benefit including aquatherapy  Medications: prednisone 1  month ago because it got worse  Now taking tylenol and motrin - helps temporarily  No left sided problems  Describes urinary incontinence for at least 1 year  Valsalva - no changes  Worse laying down and driving a car  Back pain is on the right only  Right hip and back hurts the most     Breast Cancer since 2013  Review of Systems   Constitutional: Negative.    HENT: Negative.     Eyes: Negative.    Respiratory: Negative.     Cardiovascular: Negative.    Gastrointestinal: Negative.    Endocrine: Negative.    Genitourinary: Negative.  Negative for vaginal discharge.   Musculoskeletal:  Positive for back pain and gait problem.        2nd Opinion- Lumbar Disc herniation at (R) L5/S1    Saw Dr ARIANNA Lopez @ [facility] on 3/17/25  - L/S MRI on 11/29/24 @ [facility]    6/10 Lbp radiates to (R) buttock and posterior leg into foot intermittently x 1.5 yrs .   S/p injured back lifting a patient in 2023  + W on RLE and difficulty walking   +limping gait favoring (R) leg  Pain is worse in the evening after prolonged standing/walking   + Bladder incontinence  Meds:motrin/tylenol Prn -- no relief  PT @ [facility] in June /July 2024   LESI @ [facility] over 1 yr ago-- temporary relief  No AC/ former smoker/ no previous back sx   H/o Stage 4 Breast CA     Skin: Negative.    Allergic/Immunologic: Negative.    Neurological:  Positive for weakness.   Hematological: Negative.    Psychiatric/Behavioral:  Positive for sleep disturbance (due to pain).    All other systems reviewed and are negative.    I have personally reviewed the MA's review of systems and made changes as necessary.    Past Medical History   Past Medical History:   Diagnosis Date   • Allergy    • Anxiety    • Arthritis    • BRCA negative    • Breast cancer (HCC)     left mastectomy 2013   • Candidiasis, cutaneous    • Cervical cancer (HCC)    • Depression    • Diabetes mellitus (HCC)    • Disease of thyroid gland     hypothyroid   • Disorder of urinary tract    • Fatty liver    •  Hemorrhoids    • History of chemotherapy    • History of radiation therapy    • Influenza A    • Obesity    • PONV (postoperative nausea and vomiting)    • Port-A-Cath in place      Past Surgical History:   Procedure Laterality Date   • BILATERAL SALPINGOOPHORECTOMY     • BREAST BIOPSY      needle core   • GASTRECTOMY SLEEVE LAPAROSCOPIC      Onset: 1/26/15  Dr. Mal Almeida   • HYSTERECTOMY  04/07/2014   • IR PORT REMOVAL  11/21/2019   • MASTECTOMY Left 12/19/2013    Lymph nodes removed     Family History   Problem Relation Age of Onset   • Diabetes Mother    • Diabetes Father    • Ovarian cancer Sister 34   • No Known Problems Sister    • No Known Problems Sister    • No Known Problems Daughter    • Hypertension Maternal Grandfather    • Hodgkin's lymphoma Paternal Grandmother    • Leukemia Son         chronic myeloid   • No Known Problems Son    • No Known Problems Son    • No Known Problems Son    • Uterine cancer Maternal Aunt 61   • Breast cancer Family    • Diabetes Family    • Hypertension Family      she reports that she quit smoking about 12 years ago. Her smoking use included cigarettes. She has never used smokeless tobacco. She reports that she does not drink alcohol and does not use drugs.  Current Outpatient Medications   Medication Instructions   • Abemaciclib (VERZENIO) 150 mg, Oral, 2 times daily   • Cholecalciferol 50,000 Units   • Denosumab (XGEVA SC) Inject under the skin   • ergocalciferol (VITAMIN D2) 50,000 Units, Oral, Weekly   • famotidine (PEPCID) 40 mg, Oral, Daily at bedtime PRN   • fulvestrant (FASLODEX) 500 mg, Once   • levothyroxine 88 mcg, Oral, Daily   • Lidocaine (ZTlido) 1.8 % PTCH Apply externally   • lisinopril (ZESTRIL) 2.5 mg, Oral, Daily   • loperamide (IMODIUM A-D) 2 mg, Oral, 4 times daily PRN   • magnesium Oxide (MAGOX 400) 400 mg, Oral, Daily   • methocarbamol (ROBAXIN) 500 mg tablet Take 1 tablet by mouth 4 times a day for 21 days   • nystatin (MYCOSTATIN) powder 1  "Application, Topical, 2 times daily, Abdominal fold   • omeprazole (PRILOSEC) 40 mg, Oral, Daily   • ondansetron (ZOFRAN-ODT) 4 mg disintegrating tablet    • oxybutynin (DITROPAN-XL) 5 mg, Oral, Daily   • pentoxifylline (TRENTAL) 400 mg, Oral   • simvastatin (ZOCOR) 20 mg, Oral, Daily at bedtime   • Sodium Fluoride 1.1 % PSTE 1 Squirt, Dental, Daily at bedtime, Brush daily at bedtime. Spit, do not rinse. Nothing to eat or drink afterwards.   • traMADol (ULTRAM) 50 mg tablet Oral   • vitamin E (tocopherol) 400 Units, Oral, 2 times daily     Allergies   Allergen Reactions   • Paclitaxel Anaphylaxis     Pt states she needs a steroid before she can take this medication.      Objective   /85   Pulse 100   Temp 98.2 °F (36.8 °C) (Temporal)   Ht 5' 2\" (1.575 m)   Wt 95.3 kg (210 lb)   SpO2 98%   BMI 38.41 kg/m²     Physical Exam  Neurological Exam  Physical exam:     Well-developed well-nourished  Appears stated age of 45 y.o.  Awake alert oriented x3  Verbally interactive and appropriate  Cranial nerves II through VIII intact   Motor strength 5 out of 5 except giveway in RLE  No pronator drift  Sensory intact to light touch  No sensory changes on the dorsum of the spine.  with pain to palpation over the Sacrum on the right  Reflexes 1+and symmetric  Toes: downgoing  Gait: limping on the right  Heel walk: able to stand on heels for a short while  Toe walk:: able to stand on toes for a short while  Tandem walk: not assessed  Straight leg raising: with right leg pain at less than 30 deg  with right sided hip pain with hip rotation  without left sided hip pain with hip rotation  Memory intact  Mood: Euthymic affect: Full range  Language: Fluent in English  Good peripheral pulses in bilateral lower  extremities        MRI Images and Report of the Lumbar from 11/24 show right L5/S1 HNP with right S1 nerve root impingement, likely explaining her right leg complaints              "

## 2025-05-06 ENCOUNTER — APPOINTMENT (OUTPATIENT)
Dept: LAB | Facility: CLINIC | Age: 45
End: 2025-05-06
Attending: NURSE PRACTITIONER
Payer: COMMERCIAL

## 2025-05-06 DIAGNOSIS — C79.51 MALIGNANT NEOPLASM METASTATIC TO BONE (HCC): ICD-10-CM

## 2025-05-06 DIAGNOSIS — Z17.0 MALIGNANT NEOPLASM OF LEFT BREAST IN FEMALE, ESTROGEN RECEPTOR POSITIVE, UNSPECIFIED SITE OF BREAST (HCC): ICD-10-CM

## 2025-05-06 DIAGNOSIS — C50.912 MALIGNANT NEOPLASM OF LEFT BREAST IN FEMALE, ESTROGEN RECEPTOR POSITIVE, UNSPECIFIED SITE OF BREAST (HCC): ICD-10-CM

## 2025-05-06 DIAGNOSIS — E55.9 VITAMIN D DEFICIENCY: ICD-10-CM

## 2025-05-06 LAB
25(OH)D3 SERPL-MCNC: 10.9 NG/ML (ref 30–100)
ALBUMIN SERPL BCG-MCNC: 4 G/DL (ref 3.5–5)
ALP SERPL-CCNC: 56 U/L (ref 34–104)
ALT SERPL W P-5'-P-CCNC: 19 U/L (ref 7–52)
ANION GAP SERPL CALCULATED.3IONS-SCNC: 10 MMOL/L (ref 4–13)
AST SERPL W P-5'-P-CCNC: 19 U/L (ref 13–39)
BASOPHILS # BLD AUTO: 0.04 THOUSANDS/ÂΜL (ref 0–0.1)
BASOPHILS NFR BLD AUTO: 1 % (ref 0–1)
BILIRUB SERPL-MCNC: 0.81 MG/DL (ref 0.2–1)
BUN SERPL-MCNC: 18 MG/DL (ref 5–25)
CALCIUM SERPL-MCNC: 9 MG/DL (ref 8.4–10.2)
CHLORIDE SERPL-SCNC: 103 MMOL/L (ref 96–108)
CO2 SERPL-SCNC: 27 MMOL/L (ref 21–32)
CREAT SERPL-MCNC: 0.9 MG/DL (ref 0.6–1.3)
EOSINOPHIL # BLD AUTO: 0.09 THOUSAND/ÂΜL (ref 0–0.61)
EOSINOPHIL NFR BLD AUTO: 2 % (ref 0–6)
ERYTHROCYTE [DISTWIDTH] IN BLOOD BY AUTOMATED COUNT: 13.1 % (ref 11.6–15.1)
GFR SERPL CREATININE-BSD FRML MDRD: 78 ML/MIN/1.73SQ M
GLUCOSE P FAST SERPL-MCNC: 95 MG/DL (ref 65–99)
HCT VFR BLD AUTO: 34.7 % (ref 34.8–46.1)
HGB BLD-MCNC: 12.1 G/DL (ref 11.5–15.4)
IMM GRANULOCYTES # BLD AUTO: 0.01 THOUSAND/UL (ref 0–0.2)
IMM GRANULOCYTES NFR BLD AUTO: 0 % (ref 0–2)
LDH SERPL-CCNC: 210 U/L (ref 140–271)
LYMPHOCYTES # BLD AUTO: 1.94 THOUSANDS/ÂΜL (ref 0.6–4.47)
LYMPHOCYTES NFR BLD AUTO: 43 % (ref 14–44)
MCH RBC QN AUTO: 36.1 PG (ref 26.8–34.3)
MCHC RBC AUTO-ENTMCNC: 34.9 G/DL (ref 31.4–37.4)
MCV RBC AUTO: 104 FL (ref 82–98)
MONOCYTES # BLD AUTO: 0.39 THOUSAND/ÂΜL (ref 0.17–1.22)
MONOCYTES NFR BLD AUTO: 9 % (ref 4–12)
NEUTROPHILS # BLD AUTO: 2.1 THOUSANDS/ÂΜL (ref 1.85–7.62)
NEUTS SEG NFR BLD AUTO: 45 % (ref 43–75)
NRBC BLD AUTO-RTO: 0 /100 WBCS
PLATELET # BLD AUTO: 207 THOUSANDS/UL (ref 149–390)
PMV BLD AUTO: 9 FL (ref 8.9–12.7)
POTASSIUM SERPL-SCNC: 3.9 MMOL/L (ref 3.5–5.3)
PROT SERPL-MCNC: 6.6 G/DL (ref 6.4–8.4)
RBC # BLD AUTO: 3.35 MILLION/UL (ref 3.81–5.12)
SODIUM SERPL-SCNC: 140 MMOL/L (ref 135–147)
WBC # BLD AUTO: 4.57 THOUSAND/UL (ref 4.31–10.16)

## 2025-05-06 PROCEDURE — 82306 VITAMIN D 25 HYDROXY: CPT

## 2025-05-06 PROCEDURE — 85025 COMPLETE CBC W/AUTO DIFF WBC: CPT

## 2025-05-06 PROCEDURE — 83615 LACTATE (LD) (LDH) ENZYME: CPT

## 2025-05-06 PROCEDURE — 80053 COMPREHEN METABOLIC PANEL: CPT

## 2025-05-06 PROCEDURE — 36415 COLL VENOUS BLD VENIPUNCTURE: CPT

## 2025-05-15 ENCOUNTER — HOSPITAL ENCOUNTER (OUTPATIENT)
Dept: MRI IMAGING | Facility: HOSPITAL | Age: 45
Discharge: HOME | End: 2025-05-15
Attending: NEUROLOGICAL SURGERY
Payer: OTHER MISCELLANEOUS

## 2025-05-15 DIAGNOSIS — M51.27 LUMBAGO-SCIATICA DUE TO DISPLACEMENT OF LUMBAR INTERVERTEBRAL DISC: ICD-10-CM

## 2025-05-15 DIAGNOSIS — M25.551 RIGHT HIP PAIN: ICD-10-CM

## 2025-05-15 PROCEDURE — 72148 MRI LUMBAR SPINE W/O DYE: CPT

## 2025-05-20 ENCOUNTER — HOSPITAL ENCOUNTER (OUTPATIENT)
Dept: NEUROLOGY | Facility: CLINIC | Age: 45
Discharge: HOME | End: 2025-05-20
Attending: NEUROLOGICAL SURGERY
Payer: COMMERCIAL

## 2025-05-20 DIAGNOSIS — M51.27 LUMBAGO-SCIATICA DUE TO DISPLACEMENT OF LUMBAR INTERVERTEBRAL DISC: ICD-10-CM

## 2025-05-20 DIAGNOSIS — M25.551 RIGHT HIP PAIN: ICD-10-CM

## 2025-05-20 PROCEDURE — 95886 MUSC TEST DONE W/N TEST COMP: CPT | Performed by: PSYCHIATRY & NEUROLOGY

## 2025-05-20 PROCEDURE — 95909 NRV CNDJ TST 5-6 STUDIES: CPT | Performed by: PSYCHIATRY & NEUROLOGY

## 2025-05-28 ENCOUNTER — OFFICE VISIT (OUTPATIENT)
Dept: NEUROSURGERY | Facility: CLINIC | Age: 45
End: 2025-05-28
Payer: OTHER MISCELLANEOUS

## 2025-05-28 ENCOUNTER — APPOINTMENT (OUTPATIENT)
Dept: LAB | Facility: CLINIC | Age: 45
End: 2025-05-28
Attending: NURSE PRACTITIONER
Payer: COMMERCIAL

## 2025-05-28 VITALS
DIASTOLIC BLOOD PRESSURE: 78 MMHG | OXYGEN SATURATION: 97 % | BODY MASS INDEX: 38.64 KG/M2 | TEMPERATURE: 98 F | HEIGHT: 62 IN | HEART RATE: 76 BPM | SYSTOLIC BLOOD PRESSURE: 118 MMHG | WEIGHT: 210 LBS

## 2025-05-28 DIAGNOSIS — M25.551 RIGHT HIP PAIN: Primary | ICD-10-CM

## 2025-05-28 DIAGNOSIS — C50.912 MALIGNANT NEOPLASM OF LEFT BREAST IN FEMALE, ESTROGEN RECEPTOR POSITIVE, UNSPECIFIED SITE OF BREAST (HCC): ICD-10-CM

## 2025-05-28 DIAGNOSIS — C79.51 MALIGNANT NEOPLASM METASTATIC TO BONE (HCC): ICD-10-CM

## 2025-05-28 DIAGNOSIS — M51.27 LUMBAGO-SCIATICA DUE TO DISPLACEMENT OF LUMBAR INTERVERTEBRAL DISC: ICD-10-CM

## 2025-05-28 DIAGNOSIS — Z17.0 MALIGNANT NEOPLASM OF LEFT BREAST IN FEMALE, ESTROGEN RECEPTOR POSITIVE, UNSPECIFIED SITE OF BREAST (HCC): ICD-10-CM

## 2025-05-28 LAB
ALBUMIN SERPL BCG-MCNC: 4 G/DL (ref 3.5–5)
ALP SERPL-CCNC: 44 U/L (ref 34–104)
ALT SERPL W P-5'-P-CCNC: 15 U/L (ref 7–52)
ANION GAP SERPL CALCULATED.3IONS-SCNC: 5 MMOL/L (ref 4–13)
AST SERPL W P-5'-P-CCNC: 18 U/L (ref 13–39)
BASOPHILS # BLD AUTO: 0.03 THOUSANDS/ÂΜL (ref 0–0.1)
BASOPHILS NFR BLD AUTO: 1 % (ref 0–1)
BILIRUB SERPL-MCNC: 0.68 MG/DL (ref 0.2–1)
BUN SERPL-MCNC: 14 MG/DL (ref 5–25)
CALCIUM SERPL-MCNC: 8.8 MG/DL (ref 8.4–10.2)
CHLORIDE SERPL-SCNC: 107 MMOL/L (ref 96–108)
CO2 SERPL-SCNC: 30 MMOL/L (ref 21–32)
CREAT SERPL-MCNC: 1.04 MG/DL (ref 0.6–1.3)
EOSINOPHIL # BLD AUTO: 0.12 THOUSAND/ÂΜL (ref 0–0.61)
EOSINOPHIL NFR BLD AUTO: 3 % (ref 0–6)
ERYTHROCYTE [DISTWIDTH] IN BLOOD BY AUTOMATED COUNT: 13 % (ref 11.6–15.1)
GFR SERPL CREATININE-BSD FRML MDRD: 65 ML/MIN/1.73SQ M
GLUCOSE P FAST SERPL-MCNC: 86 MG/DL (ref 65–99)
HCT VFR BLD AUTO: 34.9 % (ref 34.8–46.1)
HGB BLD-MCNC: 11.8 G/DL (ref 11.5–15.4)
IMM GRANULOCYTES # BLD AUTO: 0.01 THOUSAND/UL (ref 0–0.2)
IMM GRANULOCYTES NFR BLD AUTO: 0 % (ref 0–2)
LDH SERPL-CCNC: 183 U/L (ref 140–271)
LYMPHOCYTES # BLD AUTO: 1.85 THOUSANDS/ÂΜL (ref 0.6–4.47)
LYMPHOCYTES NFR BLD AUTO: 47 % (ref 14–44)
MCH RBC QN AUTO: 36 PG (ref 26.8–34.3)
MCHC RBC AUTO-ENTMCNC: 33.8 G/DL (ref 31.4–37.4)
MCV RBC AUTO: 106 FL (ref 82–98)
MONOCYTES # BLD AUTO: 0.4 THOUSAND/ÂΜL (ref 0.17–1.22)
MONOCYTES NFR BLD AUTO: 10 % (ref 4–12)
NEUTROPHILS # BLD AUTO: 1.56 THOUSANDS/ÂΜL (ref 1.85–7.62)
NEUTS SEG NFR BLD AUTO: 39 % (ref 43–75)
NRBC BLD AUTO-RTO: 0 /100 WBCS
PLATELET # BLD AUTO: 178 THOUSANDS/UL (ref 149–390)
PMV BLD AUTO: 9 FL (ref 8.9–12.7)
POTASSIUM SERPL-SCNC: 4.1 MMOL/L (ref 3.5–5.3)
PROT SERPL-MCNC: 6.5 G/DL (ref 6.4–8.4)
RBC # BLD AUTO: 3.28 MILLION/UL (ref 3.81–5.12)
SODIUM SERPL-SCNC: 142 MMOL/L (ref 135–147)
WBC # BLD AUTO: 3.97 THOUSAND/UL (ref 4.31–10.16)

## 2025-05-28 PROCEDURE — 99213 OFFICE O/P EST LOW 20 MIN: CPT | Performed by: NEUROLOGICAL SURGERY

## 2025-05-28 PROCEDURE — 80053 COMPREHEN METABOLIC PANEL: CPT

## 2025-05-28 PROCEDURE — 85025 COMPLETE CBC W/AUTO DIFF WBC: CPT

## 2025-05-28 PROCEDURE — 83615 LACTATE (LD) (LDH) ENZYME: CPT

## 2025-05-28 PROCEDURE — 36415 COLL VENOUS BLD VENIPUNCTURE: CPT

## 2025-05-28 NOTE — PROGRESS NOTES
Name: Bertha Beckett      : 1980      MRN: [id number]  Encounter Provider: Craig Robert Goldberg, MD  Encounter Date: 2025   [facility]  :  Assessment & Plan  Right hip pain  X-rays of right hip do not show an etiology in the hip itself       Lumbago-sciatica due to displacement of lumbar intervertebral disc  Right L5/S1 on MRI has improved  EMGs show no evidence of ongoing nerve root impingement  I explained that, based on these findings, and with 1.5+ years of symptoms that are intermittent and currently worse with only with sitting and driving, and with her ongoing treatment for breast cancer, I would not recommend surgery for her.   Pain management follow up  All questions answered             History of Present Illness     Bertha Beckett is a 45 y.o. female who presents for follow up    Back Pain  Associated symptoms include weakness.      Today she complains of right leg pain more than right buttock or right hip pain.  She says her symptoms vary from day to day.  She has better days and worse days.  Her right leg is worse after sitting and driving a car      Review of Systems   Constitutional: Negative.    HENT: Negative.     Eyes: Negative.    Respiratory: Negative.     Cardiovascular: Negative.    Gastrointestinal: Negative.    Endocrine: Negative.    Genitourinary: Negative.    Musculoskeletal:  Positive for back pain and gait problem.         F/u after  EMG on 25 and L/S MRI on 5/15/25, (R) Hip on 25  Lumbar Disc herniation at (R) L5/S1    4/10 Lbp radiates to RLE posteriorly w/ gait instability and (R) leg weakness   Meds:Gabapentin, Robaxin, Lidocaine Patch  No AC/ former smoker/ no previous back sx       Skin: Negative.    Allergic/Immunologic: Negative.    Neurological:  Positive for weakness.   Hematological: Negative.    Psychiatric/Behavioral: Negative.     All other systems reviewed and are negative.    I have  "personally reviewed the MA's review of systems and made changes as necessary.    Past Medical History   Past Medical History[1]  Past Surgical History[2]  Family History[3]  she reports that she quit smoking about 12 years ago. Her smoking use included cigarettes. She has never used smokeless tobacco. She reports that she does not drink alcohol and does not use drugs.  Current Outpatient Medications   Medication Instructions   • Abemaciclib (VERZENIO) 150 mg, Oral, 2 times daily   • Cholecalciferol 50,000 Units   • Denosumab (XGEVA SC) Inject under the skin   • ergocalciferol (VITAMIN D2) 50,000 Units, Oral, Weekly   • famotidine (PEPCID) 40 mg, Oral, Daily at bedtime PRN   • fulvestrant (FASLODEX) 500 mg, Once   • levothyroxine 88 mcg, Oral, Daily   • Lidocaine (ZTlido) 1.8 % PTCH Apply externally   • lisinopril (ZESTRIL) 2.5 mg, Oral, Daily   • loperamide (IMODIUM A-D) 2 mg, Oral, 4 times daily PRN   • magnesium Oxide (MAGOX 400) 400 mg, Oral, Daily   • methocarbamol (ROBAXIN) 500 mg tablet Take 1 tablet by mouth 4 times a day for 21 days   • nystatin (MYCOSTATIN) powder 1 Application, Topical, 2 times daily, Abdominal fold   • omeprazole (PRILOSEC) 40 mg, Oral, Daily   • ondansetron (ZOFRAN-ODT) 4 mg disintegrating tablet    • oxybutynin (DITROPAN-XL) 5 mg, Oral, Daily   • pentoxifylline (TRENTAL) 400 mg, Oral   • simvastatin (ZOCOR) 20 mg, Oral, Daily at bedtime   • Sodium Fluoride 1.1 % PSTE 1 Squirt, Dental, Daily at bedtime, Brush daily at bedtime. Spit, do not rinse. Nothing to eat or drink afterwards.   • traMADol (ULTRAM) 50 mg tablet Oral   • vitamin E (tocopherol) 400 Units, Oral, 2 times daily   Allergies[4]   Objective   /78   Pulse 76   Temp 98 °F (36.7 °C) (Temporal)   Ht 5' 2\" (1.575 m)   Wt 95.3 kg (210 lb)   SpO2 97%   BMI 38.41 kg/m²     Physical Exam  Neurological Exam    MRI Images and Report of the Lumbar show Right L5/S1 HNP.  Though smaller, still impinges on right S1 nerve root, " less      Administrative Statements   I have spent a total time of 20 minutes in caring for this patient on the day of the visit/encounter including Diagnostic results, Prognosis, Risks and benefits of tx options, Impressions, Counseling / Coordination of care, Documenting in the medical record, Reviewing/placing orders in the medical record (including tests, medications, and/or procedures), and Obtaining or reviewing history  .             [1]  Past Medical History:  Diagnosis Date   • Allergy    • Anxiety    • Arthritis    • BRCA negative    • Breast cancer (HCC)     left mastectomy 2013   • Candidiasis, cutaneous    • Cervical cancer (HCC)    • Depression    • Diabetes mellitus (HCC)    • Disease of thyroid gland     hypothyroid   • Disorder of urinary tract    • Fatty liver    • Hemorrhoids    • History of chemotherapy    • History of radiation therapy    • Influenza A    • Obesity    • PONV (postoperative nausea and vomiting)    • Port-A-Cath in place    [2]  Past Surgical History:  Procedure Laterality Date   • BILATERAL SALPINGOOPHORECTOMY     • BREAST BIOPSY      needle core   • GASTRECTOMY SLEEVE LAPAROSCOPIC      Onset: 1/26/15  Dr. Mal Almeida   • HYSTERECTOMY  04/07/2014   • IR PORT REMOVAL  11/21/2019   • MASTECTOMY Left 12/19/2013    Lymph nodes removed   [3]  Family History  Problem Relation Name Age of Onset   • Diabetes Mother     • Diabetes Father     • Ovarian cancer Sister  34   • No Known Problems Sister     • No Known Problems Sister     • No Known Problems Daughter     • Hypertension Maternal Grandfather     • Hodgkin's lymphoma Paternal Grandmother     • Leukemia Son          chronic myeloid   • No Known Problems Son     • No Known Problems Son     • No Known Problems Son     • Uterine cancer Maternal Aunt  61   • Breast cancer Family     • Diabetes Family     • Hypertension Family     [4]  Allergies  Allergen Reactions   • Paclitaxel Anaphylaxis     Pt states she needs a steroid before  she can take this medication.

## 2025-05-28 NOTE — ASSESSMENT & PLAN NOTE
Right L5/S1 on MRI has improved  EMGs show no evidence of ongoing nerve root impingement  I explained that, based on these findings, and with 1.5+ years of symptoms that are intermittent and currently worse with only with sitting and driving, and with her ongoing treatment for breast cancer, I would not recommend surgery for her.   Pain management follow up  All questions answered

## 2025-07-23 ENCOUNTER — APPOINTMENT (OUTPATIENT)
Dept: LAB | Facility: CLINIC | Age: 45
End: 2025-07-23
Attending: NURSE PRACTITIONER
Payer: COMMERCIAL

## 2025-07-23 DIAGNOSIS — C50.912 MALIGNANT NEOPLASM OF LEFT BREAST IN FEMALE, ESTROGEN RECEPTOR POSITIVE, UNSPECIFIED SITE OF BREAST (HCC): ICD-10-CM

## 2025-07-23 DIAGNOSIS — Z17.0 MALIGNANT NEOPLASM OF LEFT BREAST IN FEMALE, ESTROGEN RECEPTOR POSITIVE, UNSPECIFIED SITE OF BREAST (HCC): ICD-10-CM

## 2025-07-23 DIAGNOSIS — C79.51 MALIGNANT NEOPLASM METASTATIC TO BONE (HCC): ICD-10-CM

## 2025-07-23 LAB
ALBUMIN SERPL BCG-MCNC: 4 G/DL (ref 3.5–5)
ALP SERPL-CCNC: 43 U/L (ref 34–104)
ALT SERPL W P-5'-P-CCNC: 22 U/L (ref 7–52)
ANION GAP SERPL CALCULATED.3IONS-SCNC: 10 MMOL/L (ref 4–13)
AST SERPL W P-5'-P-CCNC: 23 U/L (ref 13–39)
BASOPHILS # BLD AUTO: 0.06 THOUSANDS/ÂΜL (ref 0–0.1)
BASOPHILS NFR BLD AUTO: 1 % (ref 0–1)
BILIRUB SERPL-MCNC: 0.67 MG/DL (ref 0.2–1)
BUN SERPL-MCNC: 12 MG/DL (ref 5–25)
CALCIUM SERPL-MCNC: 9 MG/DL (ref 8.4–10.2)
CHLORIDE SERPL-SCNC: 104 MMOL/L (ref 96–108)
CO2 SERPL-SCNC: 26 MMOL/L (ref 21–32)
CREAT SERPL-MCNC: 0.77 MG/DL (ref 0.6–1.3)
EOSINOPHIL # BLD AUTO: 0.12 THOUSAND/ÂΜL (ref 0–0.61)
EOSINOPHIL NFR BLD AUTO: 3 % (ref 0–6)
ERYTHROCYTE [DISTWIDTH] IN BLOOD BY AUTOMATED COUNT: 13.5 % (ref 11.6–15.1)
GFR SERPL CREATININE-BSD FRML MDRD: 94 ML/MIN/1.73SQ M
GLUCOSE P FAST SERPL-MCNC: 92 MG/DL (ref 65–99)
HCT VFR BLD AUTO: 34.2 % (ref 34.8–46.1)
HGB BLD-MCNC: 11.7 G/DL (ref 11.5–15.4)
IMM GRANULOCYTES # BLD AUTO: 0.01 THOUSAND/UL (ref 0–0.2)
IMM GRANULOCYTES NFR BLD AUTO: 0 % (ref 0–2)
LDH SERPL-CCNC: 263 U/L (ref 140–271)
LYMPHOCYTES # BLD AUTO: 1.98 THOUSANDS/ÂΜL (ref 0.6–4.47)
LYMPHOCYTES NFR BLD AUTO: 46 % (ref 14–44)
MCH RBC QN AUTO: 36.1 PG (ref 26.8–34.3)
MCHC RBC AUTO-ENTMCNC: 34.2 G/DL (ref 31.4–37.4)
MCV RBC AUTO: 106 FL (ref 82–98)
MONOCYTES # BLD AUTO: 0.37 THOUSAND/ÂΜL (ref 0.17–1.22)
MONOCYTES NFR BLD AUTO: 8 % (ref 4–12)
NEUTROPHILS # BLD AUTO: 1.84 THOUSANDS/ÂΜL (ref 1.85–7.62)
NEUTS SEG NFR BLD AUTO: 42 % (ref 43–75)
NRBC BLD AUTO-RTO: 0 /100 WBCS
PLATELET # BLD AUTO: 227 THOUSANDS/UL (ref 149–390)
PMV BLD AUTO: 9.3 FL (ref 8.9–12.7)
POTASSIUM SERPL-SCNC: 3.6 MMOL/L (ref 3.5–5.3)
PROT SERPL-MCNC: 6.7 G/DL (ref 6.4–8.4)
RBC # BLD AUTO: 3.24 MILLION/UL (ref 3.81–5.12)
SODIUM SERPL-SCNC: 140 MMOL/L (ref 135–147)
WBC # BLD AUTO: 4.38 THOUSAND/UL (ref 4.31–10.16)

## 2025-07-23 PROCEDURE — 83615 LACTATE (LD) (LDH) ENZYME: CPT

## 2025-07-23 PROCEDURE — 80053 COMPREHEN METABOLIC PANEL: CPT

## 2025-07-23 PROCEDURE — 36415 COLL VENOUS BLD VENIPUNCTURE: CPT

## 2025-07-23 PROCEDURE — 85025 COMPLETE CBC W/AUTO DIFF WBC: CPT

## 2025-07-24 ENCOUNTER — APPOINTMENT (OUTPATIENT)
Dept: URGENT CARE | Facility: CLINIC | Age: 45
End: 2025-07-24

## 2025-07-29 ENCOUNTER — APPOINTMENT (OUTPATIENT)
Dept: URGENT CARE | Facility: CLINIC | Age: 45
End: 2025-07-29